# Patient Record
Sex: FEMALE | Race: BLACK OR AFRICAN AMERICAN | NOT HISPANIC OR LATINO | URBAN - METROPOLITAN AREA
[De-identification: names, ages, dates, MRNs, and addresses within clinical notes are randomized per-mention and may not be internally consistent; named-entity substitution may affect disease eponyms.]

---

## 2021-01-01 ENCOUNTER — INPATIENT (INPATIENT)
Facility: HOSPITAL | Age: 76
LOS: 12 days | DRG: 286 | End: 2021-06-08
Attending: INTERNAL MEDICINE | Admitting: INTERNAL MEDICINE
Payer: MEDICARE

## 2021-01-01 ENCOUNTER — INPATIENT (INPATIENT)
Facility: HOSPITAL | Age: 76
LOS: 3 days | Discharge: HOME HEALTH | End: 2021-02-03
Attending: INTERNAL MEDICINE | Admitting: INTERNAL MEDICINE
Payer: MEDICARE

## 2021-01-01 VITALS
DIASTOLIC BLOOD PRESSURE: 48 MMHG | HEIGHT: 65 IN | HEART RATE: 81 BPM | TEMPERATURE: 98 F | WEIGHT: 293 LBS | RESPIRATION RATE: 16 BRPM | OXYGEN SATURATION: 98 % | SYSTOLIC BLOOD PRESSURE: 113 MMHG

## 2021-01-01 VITALS
OXYGEN SATURATION: 96 % | SYSTOLIC BLOOD PRESSURE: 117 MMHG | HEART RATE: 93 BPM | RESPIRATION RATE: 17 BRPM | TEMPERATURE: 98 F | DIASTOLIC BLOOD PRESSURE: 75 MMHG

## 2021-01-01 VITALS — SYSTOLIC BLOOD PRESSURE: 123 MMHG | DIASTOLIC BLOOD PRESSURE: 65 MMHG | HEART RATE: 89 BPM

## 2021-01-01 DIAGNOSIS — I48.91 UNSPECIFIED ATRIAL FIBRILLATION: ICD-10-CM

## 2021-01-01 DIAGNOSIS — I31.3 PERICARDIAL EFFUSION (NONINFLAMMATORY): ICD-10-CM

## 2021-01-01 DIAGNOSIS — I50.9 HEART FAILURE, UNSPECIFIED: ICD-10-CM

## 2021-01-01 DIAGNOSIS — Z71.89 OTHER SPECIFIED COUNSELING: ICD-10-CM

## 2021-01-01 DIAGNOSIS — Z51.5 ENCOUNTER FOR PALLIATIVE CARE: ICD-10-CM

## 2021-01-01 DIAGNOSIS — J96.02 ACUTE RESPIRATORY FAILURE WITH HYPERCAPNIA: ICD-10-CM

## 2021-01-01 DIAGNOSIS — I42.9 CARDIOMYOPATHY, UNSPECIFIED: ICD-10-CM

## 2021-01-01 DIAGNOSIS — N17.9 ACUTE KIDNEY FAILURE, UNSPECIFIED: ICD-10-CM

## 2021-01-01 DIAGNOSIS — F03.90 UNSPECIFIED DEMENTIA, UNSPECIFIED SEVERITY, WITHOUT BEHAVIORAL DISTURBANCE, PSYCHOTIC DISTURBANCE, MOOD DISTURBANCE, AND ANXIETY: ICD-10-CM

## 2021-01-01 DIAGNOSIS — I27.20 PULMONARY HYPERTENSION, UNSPECIFIED: ICD-10-CM

## 2021-01-01 DIAGNOSIS — G93.40 ENCEPHALOPATHY, UNSPECIFIED: ICD-10-CM

## 2021-01-01 DIAGNOSIS — J44.9 CHRONIC OBSTRUCTIVE PULMONARY DISEASE, UNSPECIFIED: ICD-10-CM

## 2021-01-01 DIAGNOSIS — R53.2 FUNCTIONAL QUADRIPLEGIA: ICD-10-CM

## 2021-01-01 DIAGNOSIS — I24.8 OTHER FORMS OF ACUTE ISCHEMIC HEART DISEASE: ICD-10-CM

## 2021-01-01 DIAGNOSIS — Z79.01 LONG TERM (CURRENT) USE OF ANTICOAGULANTS: ICD-10-CM

## 2021-01-01 DIAGNOSIS — F03.90 UNSPECIFIED DEMENTIA WITHOUT BEHAVIORAL DISTURBANCE: ICD-10-CM

## 2021-01-01 DIAGNOSIS — I50.33 ACUTE ON CHRONIC DIASTOLIC (CONGESTIVE) HEART FAILURE: ICD-10-CM

## 2021-01-01 DIAGNOSIS — I50.810 RIGHT HEART FAILURE, UNSPECIFIED: ICD-10-CM

## 2021-01-01 DIAGNOSIS — R57.9 SHOCK, UNSPECIFIED: ICD-10-CM

## 2021-01-01 LAB
A1C WITH ESTIMATED AVERAGE GLUCOSE RESULT: 5.3 % — SIGNIFICANT CHANGE UP (ref 4–5.6)
ALBUMIN SERPL ELPH-MCNC: 1.8 G/DL — LOW (ref 3.3–5)
ALBUMIN SERPL ELPH-MCNC: 2.2 G/DL — LOW (ref 3.3–5)
ALBUMIN SERPL ELPH-MCNC: 2.3 G/DL — LOW (ref 3.3–5)
ALBUMIN SERPL ELPH-MCNC: 2.4 G/DL — LOW (ref 3.3–5)
ALBUMIN SERPL ELPH-MCNC: 2.5 G/DL — LOW (ref 3.3–5)
ALBUMIN SERPL ELPH-MCNC: 2.5 G/DL — LOW (ref 3.3–5)
ALBUMIN SERPL ELPH-MCNC: 2.6 G/DL — LOW (ref 3.3–5)
ALBUMIN SERPL ELPH-MCNC: 2.7 G/DL — LOW (ref 3.3–5)
ALBUMIN SERPL ELPH-MCNC: 2.8 G/DL — LOW (ref 3.3–5)
ALBUMIN SERPL ELPH-MCNC: 2.9 G/DL — LOW (ref 3.3–5)
ALBUMIN SERPL ELPH-MCNC: 2.9 G/DL — LOW (ref 3.3–5)
ALBUMIN SERPL ELPH-MCNC: 3 G/DL — LOW (ref 3.3–5)
ALBUMIN SERPL ELPH-MCNC: 3.1 G/DL — LOW (ref 3.3–5)
ALBUMIN SERPL ELPH-MCNC: 3.2 G/DL — LOW (ref 3.3–5)
ALBUMIN SERPL ELPH-MCNC: 3.3 G/DL — SIGNIFICANT CHANGE UP (ref 3.3–5)
ALBUMIN SERPL ELPH-MCNC: 3.3 G/DL — SIGNIFICANT CHANGE UP (ref 3.3–5)
ALP SERPL-CCNC: 103 U/L — SIGNIFICANT CHANGE UP (ref 40–120)
ALP SERPL-CCNC: 107 U/L — SIGNIFICANT CHANGE UP (ref 40–120)
ALP SERPL-CCNC: 112 U/L — SIGNIFICANT CHANGE UP (ref 40–120)
ALP SERPL-CCNC: 118 U/L — SIGNIFICANT CHANGE UP (ref 40–120)
ALP SERPL-CCNC: 119 U/L — SIGNIFICANT CHANGE UP (ref 40–120)
ALP SERPL-CCNC: 136 U/L — HIGH (ref 40–120)
ALP SERPL-CCNC: 156 U/L — HIGH (ref 40–120)
ALP SERPL-CCNC: 189 U/L — HIGH (ref 40–120)
ALP SERPL-CCNC: 190 U/L — HIGH (ref 40–120)
ALP SERPL-CCNC: 193 U/L — HIGH (ref 40–120)
ALP SERPL-CCNC: 200 U/L — HIGH (ref 40–120)
ALP SERPL-CCNC: 204 U/L — HIGH (ref 40–120)
ALP SERPL-CCNC: 215 U/L — HIGH (ref 40–120)
ALP SERPL-CCNC: 220 U/L — HIGH (ref 40–120)
ALP SERPL-CCNC: 221 U/L — HIGH (ref 40–120)
ALP SERPL-CCNC: 224 U/L — HIGH (ref 40–120)
ALP SERPL-CCNC: 224 U/L — HIGH (ref 40–120)
ALP SERPL-CCNC: 225 U/L — HIGH (ref 40–120)
ALP SERPL-CCNC: 230 U/L — HIGH (ref 40–120)
ALP SERPL-CCNC: 231 U/L — HIGH (ref 40–120)
ALP SERPL-CCNC: 234 U/L — HIGH (ref 40–120)
ALP SERPL-CCNC: 235 U/L — HIGH (ref 40–120)
ALP SERPL-CCNC: 240 U/L — HIGH (ref 40–120)
ALP SERPL-CCNC: 241 U/L — HIGH (ref 40–120)
ALP SERPL-CCNC: 242 U/L — HIGH (ref 40–120)
ALP SERPL-CCNC: 246 U/L — HIGH (ref 40–120)
ALP SERPL-CCNC: 256 U/L — HIGH (ref 40–120)
ALP SERPL-CCNC: 42 U/L — SIGNIFICANT CHANGE UP (ref 40–120)
ALP SERPL-CCNC: 72 U/L — SIGNIFICANT CHANGE UP (ref 40–120)
ALP SERPL-CCNC: 80 U/L — SIGNIFICANT CHANGE UP (ref 40–120)
ALP SERPL-CCNC: 82 U/L — SIGNIFICANT CHANGE UP (ref 40–120)
ALP SERPL-CCNC: 83 U/L — SIGNIFICANT CHANGE UP (ref 40–120)
ALP SERPL-CCNC: 83 U/L — SIGNIFICANT CHANGE UP (ref 40–120)
ALP SERPL-CCNC: 84 U/L — SIGNIFICANT CHANGE UP (ref 40–120)
ALP SERPL-CCNC: 86 U/L — SIGNIFICANT CHANGE UP (ref 40–120)
ALP SERPL-CCNC: 91 U/L — SIGNIFICANT CHANGE UP (ref 40–120)
ALP SERPL-CCNC: 92 U/L — SIGNIFICANT CHANGE UP (ref 40–120)
ALP SERPL-CCNC: 95 U/L — SIGNIFICANT CHANGE UP (ref 40–120)
ALT FLD-CCNC: 14 U/L — SIGNIFICANT CHANGE UP (ref 10–45)
ALT FLD-CCNC: 19 U/L — SIGNIFICANT CHANGE UP (ref 10–45)
ALT FLD-CCNC: 19 U/L — SIGNIFICANT CHANGE UP (ref 10–45)
ALT FLD-CCNC: 20 U/L — SIGNIFICANT CHANGE UP (ref 10–45)
ALT FLD-CCNC: 21 U/L — SIGNIFICANT CHANGE UP (ref 10–45)
ALT FLD-CCNC: 22 U/L — SIGNIFICANT CHANGE UP (ref 10–45)
ALT FLD-CCNC: 23 U/L — SIGNIFICANT CHANGE UP (ref 10–45)
ALT FLD-CCNC: 24 U/L — SIGNIFICANT CHANGE UP (ref 10–45)
ALT FLD-CCNC: 24 U/L — SIGNIFICANT CHANGE UP (ref 10–45)
ALT FLD-CCNC: 25 U/L — SIGNIFICANT CHANGE UP (ref 10–45)
ALT FLD-CCNC: 26 U/L — SIGNIFICANT CHANGE UP (ref 10–45)
ALT FLD-CCNC: 27 U/L — SIGNIFICANT CHANGE UP (ref 10–45)
ALT FLD-CCNC: 28 U/L — SIGNIFICANT CHANGE UP (ref 10–45)
ALT FLD-CCNC: 29 U/L — SIGNIFICANT CHANGE UP (ref 10–45)
ALT FLD-CCNC: 36 U/L — SIGNIFICANT CHANGE UP (ref 12–78)
ALT FLD-CCNC: 49 U/L — SIGNIFICANT CHANGE UP (ref 12–78)
AMMONIA BLD-MCNC: 41 UMOL/L — SIGNIFICANT CHANGE UP (ref 11–55)
ANA PAT FLD IF-IMP: ABNORMAL
ANA TITR SER: ABNORMAL
ANION GAP SERPL CALC-SCNC: 1 MMOL/L — LOW (ref 5–17)
ANION GAP SERPL CALC-SCNC: 1 MMOL/L — LOW (ref 5–17)
ANION GAP SERPL CALC-SCNC: 11 MMOL/L — SIGNIFICANT CHANGE UP (ref 5–17)
ANION GAP SERPL CALC-SCNC: 11 MMOL/L — SIGNIFICANT CHANGE UP (ref 5–17)
ANION GAP SERPL CALC-SCNC: 12 MMOL/L — SIGNIFICANT CHANGE UP (ref 5–17)
ANION GAP SERPL CALC-SCNC: 13 MMOL/L — SIGNIFICANT CHANGE UP (ref 5–17)
ANION GAP SERPL CALC-SCNC: 14 MMOL/L — SIGNIFICANT CHANGE UP (ref 5–17)
ANION GAP SERPL CALC-SCNC: 15 MMOL/L — SIGNIFICANT CHANGE UP (ref 5–17)
ANION GAP SERPL CALC-SCNC: 16 MMOL/L — SIGNIFICANT CHANGE UP (ref 5–17)
ANION GAP SERPL CALC-SCNC: 17 MMOL/L — SIGNIFICANT CHANGE UP (ref 5–17)
ANION GAP SERPL CALC-SCNC: 17 MMOL/L — SIGNIFICANT CHANGE UP (ref 5–17)
ANION GAP SERPL CALC-SCNC: 18 MMOL/L — HIGH (ref 5–17)
ANION GAP SERPL CALC-SCNC: 19 MMOL/L — HIGH (ref 5–17)
ANION GAP SERPL CALC-SCNC: 2 MMOL/L — LOW (ref 5–17)
ANION GAP SERPL CALC-SCNC: 28 MMOL/L — HIGH (ref 5–17)
ANION GAP SERPL CALC-SCNC: 4 MMOL/L — LOW (ref 5–17)
ANION GAP SERPL CALC-SCNC: 4 MMOL/L — LOW (ref 5–17)
ANISOCYTOSIS BLD QL: SLIGHT — SIGNIFICANT CHANGE UP
ANISOCYTOSIS BLD QL: SLIGHT — SIGNIFICANT CHANGE UP
APPEARANCE UR: ABNORMAL
APPEARANCE UR: CLEAR — SIGNIFICANT CHANGE UP
APPEARANCE UR: CLEAR — SIGNIFICANT CHANGE UP
APTT BLD: 28 SEC — SIGNIFICANT CHANGE UP (ref 27.5–35.5)
APTT BLD: 31.7 SEC — SIGNIFICANT CHANGE UP (ref 27.5–35.5)
APTT BLD: 32.1 SEC — SIGNIFICANT CHANGE UP (ref 27.5–35.5)
APTT BLD: 32.5 SEC — SIGNIFICANT CHANGE UP (ref 27.5–35.5)
APTT BLD: 35.1 SEC — SIGNIFICANT CHANGE UP (ref 27.5–35.5)
APTT BLD: 35.6 SEC — HIGH (ref 27.5–35.5)
APTT BLD: 40.4 SEC — HIGH (ref 27.5–35.5)
APTT BLD: 43.1 SEC — HIGH (ref 27.5–35.5)
APTT BLD: 48.3 SEC — HIGH (ref 27.5–35.5)
APTT BLD: 63.1 SEC — HIGH (ref 27.5–35.5)
APTT BLD: 67.1 SEC — HIGH (ref 27.5–35.5)
APTT BLD: 67.5 SEC — HIGH (ref 27.5–35.5)
APTT BLD: 94.2 SEC — HIGH (ref 27.5–35.5)
APTT BLD: >200 SEC — CRITICAL HIGH (ref 27.5–35.5)
APTT BLD: >200 SEC — CRITICAL HIGH (ref 27.5–35.5)
AST SERPL-CCNC: 23 U/L — SIGNIFICANT CHANGE UP (ref 10–40)
AST SERPL-CCNC: 32 U/L — SIGNIFICANT CHANGE UP (ref 10–40)
AST SERPL-CCNC: 34 U/L — SIGNIFICANT CHANGE UP (ref 10–40)
AST SERPL-CCNC: 35 U/L — SIGNIFICANT CHANGE UP (ref 10–40)
AST SERPL-CCNC: 36 U/L — SIGNIFICANT CHANGE UP (ref 10–40)
AST SERPL-CCNC: 36 U/L — SIGNIFICANT CHANGE UP (ref 15–37)
AST SERPL-CCNC: 38 U/L — SIGNIFICANT CHANGE UP (ref 10–40)
AST SERPL-CCNC: 39 U/L — SIGNIFICANT CHANGE UP (ref 10–40)
AST SERPL-CCNC: 40 U/L — SIGNIFICANT CHANGE UP (ref 10–40)
AST SERPL-CCNC: 41 U/L — HIGH (ref 10–40)
AST SERPL-CCNC: 41 U/L — HIGH (ref 10–40)
AST SERPL-CCNC: 42 U/L — HIGH (ref 10–40)
AST SERPL-CCNC: 43 U/L — HIGH (ref 10–40)
AST SERPL-CCNC: 43 U/L — HIGH (ref 10–40)
AST SERPL-CCNC: 44 U/L — HIGH (ref 10–40)
AST SERPL-CCNC: 44 U/L — HIGH (ref 10–40)
AST SERPL-CCNC: 46 U/L — HIGH (ref 10–40)
AST SERPL-CCNC: 47 U/L — HIGH (ref 10–40)
AST SERPL-CCNC: 47 U/L — HIGH (ref 10–40)
AST SERPL-CCNC: 50 U/L — HIGH (ref 10–40)
AST SERPL-CCNC: 50 U/L — HIGH (ref 10–40)
AST SERPL-CCNC: 53 U/L — HIGH (ref 10–40)
AST SERPL-CCNC: 56 U/L — HIGH (ref 10–40)
AST SERPL-CCNC: 57 U/L — HIGH (ref 15–37)
BACTERIA # UR AUTO: ABNORMAL
BACTERIA # UR AUTO: ABNORMAL
BACTERIA # UR AUTO: NEGATIVE — SIGNIFICANT CHANGE UP
BASE EXCESS BLDMV CALC-SCNC: 10.1 MMOL/L — HIGH (ref -3–3)
BASE EXCESS BLDMV CALC-SCNC: 11.1 MMOL/L — HIGH (ref -3–3)
BASE EXCESS BLDMV CALC-SCNC: 11.4 MMOL/L — HIGH (ref -3–3)
BASE EXCESS BLDMV CALC-SCNC: 11.6 MMOL/L — HIGH (ref -3–3)
BASE EXCESS BLDMV CALC-SCNC: 8.8 MMOL/L — HIGH (ref -3–3)
BASE EXCESS BLDMV CALC-SCNC: 9.5 MMOL/L — HIGH (ref -3–3)
BASE EXCESS BLDMV CALC-SCNC: 9.5 MMOL/L — HIGH (ref -3–3)
BASE EXCESS BLDMV CALC-SCNC: 9.9 MMOL/L — HIGH (ref -3–3)
BASE EXCESS BLDV CALC-SCNC: 10.3 MMOL/L — HIGH (ref -2–2)
BASE EXCESS BLDV CALC-SCNC: 10.8 MMOL/L — HIGH (ref -2–2)
BASE EXCESS BLDV CALC-SCNC: 11.2 MMOL/L — HIGH (ref -2–2)
BASE EXCESS BLDV CALC-SCNC: 11.4 MMOL/L — HIGH (ref -2–2)
BASE EXCESS BLDV CALC-SCNC: 11.8 MMOL/L — HIGH (ref -2–2)
BASE EXCESS BLDV CALC-SCNC: 12.3 MMOL/L — HIGH (ref -2–2)
BASE EXCESS BLDV CALC-SCNC: 13.4 MMOL/L — HIGH (ref -2–2)
BASE EXCESS BLDV CALC-SCNC: 7.7 MMOL/L — HIGH (ref -2–2)
BASE EXCESS BLDV CALC-SCNC: 8.1 MMOL/L — HIGH (ref -2–2)
BASE EXCESS BLDV CALC-SCNC: 8.7 MMOL/L — HIGH (ref -2–2)
BASE EXCESS BLDV CALC-SCNC: 9 MMOL/L — HIGH (ref -2–2)
BASE EXCESS BLDV CALC-SCNC: 9.6 MMOL/L — HIGH (ref -2–2)
BASOPHILS # BLD AUTO: 0 K/UL — SIGNIFICANT CHANGE UP (ref 0–0.2)
BASOPHILS # BLD AUTO: 0 K/UL — SIGNIFICANT CHANGE UP (ref 0–0.2)
BASOPHILS # BLD AUTO: 0.01 K/UL — SIGNIFICANT CHANGE UP (ref 0–0.2)
BASOPHILS # BLD AUTO: 0.02 K/UL — SIGNIFICANT CHANGE UP (ref 0–0.2)
BASOPHILS # BLD AUTO: 0.03 K/UL — SIGNIFICANT CHANGE UP (ref 0–0.2)
BASOPHILS # BLD AUTO: 0.03 K/UL — SIGNIFICANT CHANGE UP (ref 0–0.2)
BASOPHILS # BLD AUTO: 0.04 K/UL — SIGNIFICANT CHANGE UP (ref 0–0.2)
BASOPHILS # BLD AUTO: 0.04 K/UL — SIGNIFICANT CHANGE UP (ref 0–0.2)
BASOPHILS # BLD AUTO: 0.05 K/UL — SIGNIFICANT CHANGE UP (ref 0–0.2)
BASOPHILS NFR BLD AUTO: 0 % — SIGNIFICANT CHANGE UP (ref 0–2)
BASOPHILS NFR BLD AUTO: 0 % — SIGNIFICANT CHANGE UP (ref 0–2)
BASOPHILS NFR BLD AUTO: 0.1 % — SIGNIFICANT CHANGE UP (ref 0–2)
BASOPHILS NFR BLD AUTO: 0.2 % — SIGNIFICANT CHANGE UP (ref 0–2)
BASOPHILS NFR BLD AUTO: 0.3 % — SIGNIFICANT CHANGE UP (ref 0–2)
BASOPHILS NFR BLD AUTO: 0.4 % — SIGNIFICANT CHANGE UP (ref 0–2)
BILIRUB SERPL-MCNC: 0.9 MG/DL — SIGNIFICANT CHANGE UP (ref 0.2–1.2)
BILIRUB SERPL-MCNC: 1.8 MG/DL — HIGH (ref 0.2–1.2)
BILIRUB SERPL-MCNC: 2 MG/DL — HIGH (ref 0.2–1.2)
BILIRUB SERPL-MCNC: 2.1 MG/DL — HIGH (ref 0.2–1.2)
BILIRUB SERPL-MCNC: 2.2 MG/DL — HIGH (ref 0.2–1.2)
BILIRUB SERPL-MCNC: 2.3 MG/DL — HIGH (ref 0.2–1.2)
BILIRUB SERPL-MCNC: 2.4 MG/DL — HIGH (ref 0.2–1.2)
BILIRUB SERPL-MCNC: 2.5 MG/DL — HIGH (ref 0.2–1.2)
BILIRUB SERPL-MCNC: 2.6 MG/DL — HIGH (ref 0.2–1.2)
BILIRUB SERPL-MCNC: 2.9 MG/DL — HIGH (ref 0.2–1.2)
BILIRUB SERPL-MCNC: 3 MG/DL — HIGH (ref 0.2–1.2)
BILIRUB SERPL-MCNC: 3.1 MG/DL — HIGH (ref 0.2–1.2)
BILIRUB SERPL-MCNC: 3.2 MG/DL — HIGH (ref 0.2–1.2)
BILIRUB SERPL-MCNC: 3.2 MG/DL — HIGH (ref 0.2–1.2)
BILIRUB SERPL-MCNC: 3.3 MG/DL — HIGH (ref 0.2–1.2)
BILIRUB SERPL-MCNC: 3.4 MG/DL — HIGH (ref 0.2–1.2)
BILIRUB SERPL-MCNC: 3.7 MG/DL — HIGH (ref 0.2–1.2)
BILIRUB SERPL-MCNC: 3.7 MG/DL — HIGH (ref 0.2–1.2)
BILIRUB SERPL-MCNC: 3.8 MG/DL — HIGH (ref 0.2–1.2)
BILIRUB SERPL-MCNC: 3.8 MG/DL — HIGH (ref 0.2–1.2)
BILIRUB SERPL-MCNC: 4 MG/DL — HIGH (ref 0.2–1.2)
BILIRUB SERPL-MCNC: 4.1 MG/DL — HIGH (ref 0.2–1.2)
BILIRUB SERPL-MCNC: 4.1 MG/DL — HIGH (ref 0.2–1.2)
BILIRUB SERPL-MCNC: 4.2 MG/DL — HIGH (ref 0.2–1.2)
BILIRUB SERPL-MCNC: 4.4 MG/DL — HIGH (ref 0.2–1.2)
BILIRUB UR-MCNC: ABNORMAL
BILIRUB UR-MCNC: NEGATIVE — SIGNIFICANT CHANGE UP
BILIRUB UR-MCNC: NEGATIVE — SIGNIFICANT CHANGE UP
BLD GP AB SCN SERPL QL: NEGATIVE — SIGNIFICANT CHANGE UP
BUN SERPL-MCNC: 100 MG/DL — HIGH (ref 7–23)
BUN SERPL-MCNC: 26 MG/DL — HIGH (ref 7–23)
BUN SERPL-MCNC: 31 MG/DL — HIGH (ref 7–23)
BUN SERPL-MCNC: 32 MG/DL — HIGH (ref 7–23)
BUN SERPL-MCNC: 35 MG/DL — HIGH (ref 7–23)
BUN SERPL-MCNC: 36 MG/DL — HIGH (ref 7–23)
BUN SERPL-MCNC: 49 MG/DL — HIGH (ref 7–23)
BUN SERPL-MCNC: 56 MG/DL — HIGH (ref 7–23)
BUN SERPL-MCNC: 58 MG/DL — HIGH (ref 7–23)
BUN SERPL-MCNC: 60 MG/DL — HIGH (ref 7–23)
BUN SERPL-MCNC: 62 MG/DL — HIGH (ref 7–23)
BUN SERPL-MCNC: 62 MG/DL — HIGH (ref 7–23)
BUN SERPL-MCNC: 63 MG/DL — HIGH (ref 7–23)
BUN SERPL-MCNC: 66 MG/DL — HIGH (ref 7–23)
BUN SERPL-MCNC: 66 MG/DL — HIGH (ref 7–23)
BUN SERPL-MCNC: 67 MG/DL — HIGH (ref 7–23)
BUN SERPL-MCNC: 68 MG/DL — HIGH (ref 7–23)
BUN SERPL-MCNC: 70 MG/DL — HIGH (ref 7–23)
BUN SERPL-MCNC: 71 MG/DL — HIGH (ref 7–23)
BUN SERPL-MCNC: 73 MG/DL — HIGH (ref 7–23)
BUN SERPL-MCNC: 74 MG/DL — HIGH (ref 7–23)
BUN SERPL-MCNC: 74 MG/DL — HIGH (ref 7–23)
BUN SERPL-MCNC: 75 MG/DL — HIGH (ref 7–23)
BUN SERPL-MCNC: 76 MG/DL — HIGH (ref 7–23)
BUN SERPL-MCNC: 76 MG/DL — HIGH (ref 7–23)
BUN SERPL-MCNC: 78 MG/DL — HIGH (ref 7–23)
BUN SERPL-MCNC: 81 MG/DL — HIGH (ref 7–23)
BUN SERPL-MCNC: 82 MG/DL — HIGH (ref 7–23)
BUN SERPL-MCNC: 83 MG/DL — HIGH (ref 7–23)
BUN SERPL-MCNC: 83 MG/DL — HIGH (ref 7–23)
BUN SERPL-MCNC: 86 MG/DL — HIGH (ref 7–23)
BUN SERPL-MCNC: 86 MG/DL — HIGH (ref 7–23)
BUN SERPL-MCNC: 94 MG/DL — HIGH (ref 7–23)
BUN SERPL-MCNC: 96 MG/DL — HIGH (ref 7–23)
BUN SERPL-MCNC: 98 MG/DL — HIGH (ref 7–23)
BUN SERPL-MCNC: 98 MG/DL — HIGH (ref 7–23)
C3 SERPL-MCNC: 106 MG/DL — SIGNIFICANT CHANGE UP (ref 81–157)
C4 SERPL-MCNC: 17 MG/DL — SIGNIFICANT CHANGE UP (ref 13–39)
CA-I SERPL-SCNC: 1 MMOL/L — LOW (ref 1.12–1.3)
CA-I SERPL-SCNC: 1.03 MMOL/L — LOW (ref 1.12–1.3)
CA-I SERPL-SCNC: 1.14 MMOL/L — SIGNIFICANT CHANGE UP (ref 1.12–1.3)
CA-I SERPL-SCNC: 1.15 MMOL/L — SIGNIFICANT CHANGE UP (ref 1.12–1.3)
CA-I SERPL-SCNC: 1.16 MMOL/L — SIGNIFICANT CHANGE UP (ref 1.12–1.3)
CA-I SERPL-SCNC: 1.18 MMOL/L — SIGNIFICANT CHANGE UP (ref 1.12–1.3)
CA-I SERPL-SCNC: 1.21 MMOL/L — SIGNIFICANT CHANGE UP (ref 1.12–1.3)
CALCIUM SERPL-MCNC: 10 MG/DL — SIGNIFICANT CHANGE UP (ref 8.4–10.5)
CALCIUM SERPL-MCNC: 10 MG/DL — SIGNIFICANT CHANGE UP (ref 8.4–10.5)
CALCIUM SERPL-MCNC: 10.1 MG/DL — SIGNIFICANT CHANGE UP (ref 8.4–10.5)
CALCIUM SERPL-MCNC: 10.4 MG/DL — SIGNIFICANT CHANGE UP (ref 8.4–10.5)
CALCIUM SERPL-MCNC: 10.5 MG/DL — SIGNIFICANT CHANGE UP (ref 8.4–10.5)
CALCIUM SERPL-MCNC: 7.3 MG/DL — LOW (ref 8.4–10.5)
CALCIUM SERPL-MCNC: 8.1 MG/DL — LOW (ref 8.4–10.5)
CALCIUM SERPL-MCNC: 8.5 MG/DL — SIGNIFICANT CHANGE UP (ref 8.4–10.5)
CALCIUM SERPL-MCNC: 8.6 MG/DL — SIGNIFICANT CHANGE UP (ref 8.5–10.1)
CALCIUM SERPL-MCNC: 8.8 MG/DL — SIGNIFICANT CHANGE UP (ref 8.4–10.5)
CALCIUM SERPL-MCNC: 8.8 MG/DL — SIGNIFICANT CHANGE UP (ref 8.5–10.1)
CALCIUM SERPL-MCNC: 8.9 MG/DL — SIGNIFICANT CHANGE UP (ref 8.4–10.5)
CALCIUM SERPL-MCNC: 8.9 MG/DL — SIGNIFICANT CHANGE UP (ref 8.4–10.5)
CALCIUM SERPL-MCNC: 8.9 MG/DL — SIGNIFICANT CHANGE UP (ref 8.5–10.1)
CALCIUM SERPL-MCNC: 9 MG/DL — SIGNIFICANT CHANGE UP (ref 8.4–10.5)
CALCIUM SERPL-MCNC: 9.1 MG/DL — SIGNIFICANT CHANGE UP (ref 8.4–10.5)
CALCIUM SERPL-MCNC: 9.1 MG/DL — SIGNIFICANT CHANGE UP (ref 8.4–10.5)
CALCIUM SERPL-MCNC: 9.2 MG/DL — SIGNIFICANT CHANGE UP (ref 8.4–10.5)
CALCIUM SERPL-MCNC: 9.3 MG/DL — SIGNIFICANT CHANGE UP (ref 8.4–10.5)
CALCIUM SERPL-MCNC: 9.5 MG/DL — SIGNIFICANT CHANGE UP (ref 8.4–10.5)
CALCIUM SERPL-MCNC: 9.6 MG/DL — SIGNIFICANT CHANGE UP (ref 8.4–10.5)
CALCIUM SERPL-MCNC: 9.6 MG/DL — SIGNIFICANT CHANGE UP (ref 8.4–10.5)
CALCIUM SERPL-MCNC: 9.7 MG/DL — SIGNIFICANT CHANGE UP (ref 8.4–10.5)
CALCIUM SERPL-MCNC: 9.9 MG/DL — SIGNIFICANT CHANGE UP (ref 8.4–10.5)
CHLORIDE BLDV-SCNC: 100 MMOL/L — SIGNIFICANT CHANGE UP (ref 96–108)
CHLORIDE BLDV-SCNC: 100 MMOL/L — SIGNIFICANT CHANGE UP (ref 96–108)
CHLORIDE BLDV-SCNC: 101 MMOL/L — SIGNIFICANT CHANGE UP (ref 96–108)
CHLORIDE BLDV-SCNC: 102 MMOL/L — SIGNIFICANT CHANGE UP (ref 96–108)
CHLORIDE BLDV-SCNC: 103 MMOL/L — SIGNIFICANT CHANGE UP (ref 96–108)
CHLORIDE BLDV-SCNC: 103 MMOL/L — SIGNIFICANT CHANGE UP (ref 96–108)
CHLORIDE BLDV-SCNC: 104 MMOL/L — SIGNIFICANT CHANGE UP (ref 96–108)
CHLORIDE BLDV-SCNC: 104 MMOL/L — SIGNIFICANT CHANGE UP (ref 96–108)
CHLORIDE BLDV-SCNC: 106 MMOL/L — SIGNIFICANT CHANGE UP (ref 96–108)
CHLORIDE SERPL-SCNC: 100 MMOL/L — SIGNIFICANT CHANGE UP (ref 96–108)
CHLORIDE SERPL-SCNC: 101 MMOL/L — SIGNIFICANT CHANGE UP (ref 96–108)
CHLORIDE SERPL-SCNC: 102 MMOL/L — SIGNIFICANT CHANGE UP (ref 96–108)
CHLORIDE SERPL-SCNC: 103 MMOL/L — SIGNIFICANT CHANGE UP (ref 96–108)
CHLORIDE SERPL-SCNC: 104 MMOL/L — SIGNIFICANT CHANGE UP (ref 96–108)
CHLORIDE SERPL-SCNC: 104 MMOL/L — SIGNIFICANT CHANGE UP (ref 96–108)
CHLORIDE SERPL-SCNC: 96 MMOL/L — SIGNIFICANT CHANGE UP (ref 96–108)
CHLORIDE SERPL-SCNC: 97 MMOL/L — SIGNIFICANT CHANGE UP (ref 96–108)
CHLORIDE SERPL-SCNC: 98 MMOL/L — SIGNIFICANT CHANGE UP (ref 96–108)
CHLORIDE SERPL-SCNC: 98 MMOL/L — SIGNIFICANT CHANGE UP (ref 96–108)
CHLORIDE SERPL-SCNC: 99 MMOL/L — SIGNIFICANT CHANGE UP (ref 96–108)
CHLORIDE UR-SCNC: 102 MMOL/L — SIGNIFICANT CHANGE UP
CK MB BLD-MCNC: 4.2 % — HIGH (ref 0–3.5)
CK MB BLD-MCNC: <0.7 % — SIGNIFICANT CHANGE UP (ref 0–3.5)
CK MB CFR SERPL CALC: 2.2 NG/ML — SIGNIFICANT CHANGE UP (ref 0–3.8)
CK MB CFR SERPL CALC: <1 NG/ML — SIGNIFICANT CHANGE UP (ref 0.5–3.6)
CK MB CFR SERPL CALC: <1 NG/ML — SIGNIFICANT CHANGE UP (ref 0.5–3.6)
CK SERPL-CCNC: 138 U/L — SIGNIFICANT CHANGE UP (ref 26–192)
CK SERPL-CCNC: 335 U/L — HIGH (ref 26–192)
CK SERPL-CCNC: 52 U/L — SIGNIFICANT CHANGE UP (ref 25–170)
CO2 BLDMV-SCNC: 35 MMOL/L — HIGH (ref 21–29)
CO2 BLDMV-SCNC: 36 MMOL/L — HIGH (ref 21–29)
CO2 BLDMV-SCNC: 37 MMOL/L — HIGH (ref 21–29)
CO2 BLDMV-SCNC: 37 MMOL/L — HIGH (ref 21–29)
CO2 BLDMV-SCNC: 38 MMOL/L — HIGH (ref 21–29)
CO2 BLDMV-SCNC: 39 MMOL/L — HIGH (ref 21–29)
CO2 BLDMV-SCNC: 40 MMOL/L — HIGH (ref 21–29)
CO2 BLDMV-SCNC: 40 MMOL/L — HIGH (ref 21–29)
CO2 BLDV-SCNC: 35 MMOL/L — HIGH (ref 22–30)
CO2 BLDV-SCNC: 35 MMOL/L — HIGH (ref 22–30)
CO2 BLDV-SCNC: 36 MMOL/L — HIGH (ref 22–30)
CO2 BLDV-SCNC: 36 MMOL/L — HIGH (ref 22–30)
CO2 BLDV-SCNC: 37 MMOL/L — HIGH (ref 22–30)
CO2 BLDV-SCNC: 37 MMOL/L — HIGH (ref 22–30)
CO2 BLDV-SCNC: 38 MMOL/L — HIGH (ref 22–30)
CO2 BLDV-SCNC: 38 MMOL/L — HIGH (ref 22–30)
CO2 BLDV-SCNC: 39 MMOL/L — HIGH (ref 22–30)
CO2 BLDV-SCNC: 41 MMOL/L — HIGH (ref 22–30)
CO2 SERPL-SCNC: 18 MMOL/L — LOW (ref 22–31)
CO2 SERPL-SCNC: 24 MMOL/L — SIGNIFICANT CHANGE UP (ref 22–31)
CO2 SERPL-SCNC: 26 MMOL/L — SIGNIFICANT CHANGE UP (ref 22–31)
CO2 SERPL-SCNC: 27 MMOL/L — SIGNIFICANT CHANGE UP (ref 22–31)
CO2 SERPL-SCNC: 28 MMOL/L — SIGNIFICANT CHANGE UP (ref 22–31)
CO2 SERPL-SCNC: 28 MMOL/L — SIGNIFICANT CHANGE UP (ref 22–31)
CO2 SERPL-SCNC: 29 MMOL/L — SIGNIFICANT CHANGE UP (ref 22–31)
CO2 SERPL-SCNC: 29 MMOL/L — SIGNIFICANT CHANGE UP (ref 22–31)
CO2 SERPL-SCNC: 31 MMOL/L — SIGNIFICANT CHANGE UP (ref 22–31)
CO2 SERPL-SCNC: 32 MMOL/L — HIGH (ref 22–31)
CO2 SERPL-SCNC: 33 MMOL/L — HIGH (ref 22–31)
CO2 SERPL-SCNC: 34 MMOL/L — HIGH (ref 22–31)
CO2 SERPL-SCNC: 34 MMOL/L — HIGH (ref 22–31)
CO2 SERPL-SCNC: 36 MMOL/L — HIGH (ref 22–31)
CO2 SERPL-SCNC: 37 MMOL/L — HIGH (ref 22–31)
CO2 SERPL-SCNC: 37 MMOL/L — HIGH (ref 22–31)
CO2 SERPL-SCNC: 38 MMOL/L — HIGH (ref 22–31)
CO2 SERPL-SCNC: 38 MMOL/L — HIGH (ref 22–31)
CO2 SERPL-SCNC: 42 MMOL/L — HIGH (ref 22–31)
COLOR SPEC: ABNORMAL
COLOR SPEC: YELLOW — SIGNIFICANT CHANGE UP
COLOR SPEC: YELLOW — SIGNIFICANT CHANGE UP
COPPER SERPL-MCNC: 116 UG/DL — SIGNIFICANT CHANGE UP (ref 80–158)
COVID-19 NUCLEOCAPSID GAM AB INTERP: NEGATIVE — SIGNIFICANT CHANGE UP
COVID-19 NUCLEOCAPSID TOTAL GAM ANTIBODY RESULT: 0.09 INDEX — SIGNIFICANT CHANGE UP
COVID-19 SPIKE DOMAIN AB INTERP: NEGATIVE — SIGNIFICANT CHANGE UP
COVID-19 SPIKE DOMAIN ANTIBODY RESULT: 0.4 U/ML — SIGNIFICANT CHANGE UP
CREAT ?TM UR-MCNC: 15 MG/DL — SIGNIFICANT CHANGE UP
CREAT ?TM UR-MCNC: 25 MG/DL — SIGNIFICANT CHANGE UP
CREAT SERPL-MCNC: 0.97 MG/DL — SIGNIFICANT CHANGE UP (ref 0.5–1.3)
CREAT SERPL-MCNC: 1.22 MG/DL — SIGNIFICANT CHANGE UP (ref 0.5–1.3)
CREAT SERPL-MCNC: 1.32 MG/DL — HIGH (ref 0.5–1.3)
CREAT SERPL-MCNC: 1.67 MG/DL — HIGH (ref 0.5–1.3)
CREAT SERPL-MCNC: 2 MG/DL — HIGH (ref 0.5–1.3)
CREAT SERPL-MCNC: 2.66 MG/DL — HIGH (ref 0.5–1.3)
CREAT SERPL-MCNC: 2.84 MG/DL — HIGH (ref 0.5–1.3)
CREAT SERPL-MCNC: 2.85 MG/DL — HIGH (ref 0.5–1.3)
CREAT SERPL-MCNC: 2.86 MG/DL — HIGH (ref 0.5–1.3)
CREAT SERPL-MCNC: 2.91 MG/DL — HIGH (ref 0.5–1.3)
CREAT SERPL-MCNC: 2.94 MG/DL — HIGH (ref 0.5–1.3)
CREAT SERPL-MCNC: 2.95 MG/DL — HIGH (ref 0.5–1.3)
CREAT SERPL-MCNC: 2.95 MG/DL — HIGH (ref 0.5–1.3)
CREAT SERPL-MCNC: 2.98 MG/DL — HIGH (ref 0.5–1.3)
CREAT SERPL-MCNC: 2.99 MG/DL — HIGH (ref 0.5–1.3)
CREAT SERPL-MCNC: 3 MG/DL — HIGH (ref 0.5–1.3)
CREAT SERPL-MCNC: 3 MG/DL — HIGH (ref 0.5–1.3)
CREAT SERPL-MCNC: 3.01 MG/DL — HIGH (ref 0.5–1.3)
CREAT SERPL-MCNC: 3.01 MG/DL — HIGH (ref 0.5–1.3)
CREAT SERPL-MCNC: 3.02 MG/DL — HIGH (ref 0.5–1.3)
CREAT SERPL-MCNC: 3.02 MG/DL — HIGH (ref 0.5–1.3)
CREAT SERPL-MCNC: 3.04 MG/DL — HIGH (ref 0.5–1.3)
CREAT SERPL-MCNC: 3.06 MG/DL — HIGH (ref 0.5–1.3)
CREAT SERPL-MCNC: 3.09 MG/DL — HIGH (ref 0.5–1.3)
CREAT SERPL-MCNC: 3.13 MG/DL — HIGH (ref 0.5–1.3)
CREAT SERPL-MCNC: 3.16 MG/DL — HIGH (ref 0.5–1.3)
CREAT SERPL-MCNC: 3.21 MG/DL — HIGH (ref 0.5–1.3)
CREAT SERPL-MCNC: 3.28 MG/DL — HIGH (ref 0.5–1.3)
CREAT SERPL-MCNC: 3.29 MG/DL — HIGH (ref 0.5–1.3)
CREAT SERPL-MCNC: 3.31 MG/DL — HIGH (ref 0.5–1.3)
CREAT SERPL-MCNC: 3.32 MG/DL — HIGH (ref 0.5–1.3)
CREAT SERPL-MCNC: 3.38 MG/DL — HIGH (ref 0.5–1.3)
CREAT SERPL-MCNC: 3.42 MG/DL — HIGH (ref 0.5–1.3)
CREAT SERPL-MCNC: 3.92 MG/DL — HIGH (ref 0.5–1.3)
CREAT SERPL-MCNC: 4.1 MG/DL — HIGH (ref 0.5–1.3)
CREAT SERPL-MCNC: 4.49 MG/DL — HIGH (ref 0.5–1.3)
CREAT SERPL-MCNC: 4.73 MG/DL — HIGH (ref 0.5–1.3)
CREAT SERPL-MCNC: 4.87 MG/DL — HIGH (ref 0.5–1.3)
CRP SERPL-MCNC: 78 MG/L — HIGH (ref 0–4)
CULTURE RESULTS: SIGNIFICANT CHANGE UP
D DIMER BLD IA.RAPID-MCNC: 335 NG/ML DDU — HIGH
DACRYOCYTES BLD QL SMEAR: SLIGHT — SIGNIFICANT CHANGE UP
DACRYOCYTES BLD QL SMEAR: SLIGHT — SIGNIFICANT CHANGE UP
DIFF PNL FLD: ABNORMAL
DIFF PNL FLD: ABNORMAL
DIFF PNL FLD: NEGATIVE — SIGNIFICANT CHANGE UP
DSDNA AB SER-ACNC: 12 IU/ML — SIGNIFICANT CHANGE UP
ELLIPTOCYTES BLD QL SMEAR: SLIGHT — SIGNIFICANT CHANGE UP
ELLIPTOCYTES BLD QL SMEAR: SLIGHT — SIGNIFICANT CHANGE UP
ENA SCL70 AB SER-ACNC: <0.2 AI — SIGNIFICANT CHANGE UP
EOSINOPHIL # BLD AUTO: 0.01 K/UL — SIGNIFICANT CHANGE UP (ref 0–0.5)
EOSINOPHIL # BLD AUTO: 0.02 K/UL — SIGNIFICANT CHANGE UP (ref 0–0.5)
EOSINOPHIL # BLD AUTO: 0.03 K/UL — SIGNIFICANT CHANGE UP (ref 0–0.5)
EOSINOPHIL # BLD AUTO: 0.04 K/UL — SIGNIFICANT CHANGE UP (ref 0–0.5)
EOSINOPHIL # BLD AUTO: 0.05 K/UL — SIGNIFICANT CHANGE UP (ref 0–0.5)
EOSINOPHIL # BLD AUTO: 0.06 K/UL — SIGNIFICANT CHANGE UP (ref 0–0.5)
EOSINOPHIL # BLD AUTO: 0.06 K/UL — SIGNIFICANT CHANGE UP (ref 0–0.5)
EOSINOPHIL # BLD AUTO: 0.12 K/UL — SIGNIFICANT CHANGE UP (ref 0–0.5)
EOSINOPHIL # BLD AUTO: 0.13 K/UL — SIGNIFICANT CHANGE UP (ref 0–0.5)
EOSINOPHIL # BLD AUTO: 0.14 K/UL — SIGNIFICANT CHANGE UP (ref 0–0.5)
EOSINOPHIL # BLD AUTO: 0.14 K/UL — SIGNIFICANT CHANGE UP (ref 0–0.5)
EOSINOPHIL # BLD AUTO: 0.15 K/UL — SIGNIFICANT CHANGE UP (ref 0–0.5)
EOSINOPHIL # BLD AUTO: 0.15 K/UL — SIGNIFICANT CHANGE UP (ref 0–0.5)
EOSINOPHIL NFR BLD AUTO: 0.1 % — SIGNIFICANT CHANGE UP (ref 0–6)
EOSINOPHIL NFR BLD AUTO: 0.4 % — SIGNIFICANT CHANGE UP (ref 0–6)
EOSINOPHIL NFR BLD AUTO: 0.4 % — SIGNIFICANT CHANGE UP (ref 0–6)
EOSINOPHIL NFR BLD AUTO: 0.5 % — SIGNIFICANT CHANGE UP (ref 0–6)
EOSINOPHIL NFR BLD AUTO: 0.6 % — SIGNIFICANT CHANGE UP (ref 0–6)
EOSINOPHIL NFR BLD AUTO: 0.6 % — SIGNIFICANT CHANGE UP (ref 0–6)
EOSINOPHIL NFR BLD AUTO: 0.7 % — SIGNIFICANT CHANGE UP (ref 0–6)
EOSINOPHIL NFR BLD AUTO: 0.8 % — SIGNIFICANT CHANGE UP (ref 0–6)
EOSINOPHIL NFR BLD AUTO: 0.9 % — SIGNIFICANT CHANGE UP (ref 0–6)
EOSINOPHIL NFR BLD AUTO: 1.1 % — SIGNIFICANT CHANGE UP (ref 0–6)
EOSINOPHIL NFR BLD AUTO: 1.2 % — SIGNIFICANT CHANGE UP (ref 0–6)
EOSINOPHIL NFR BLD AUTO: 1.4 % — SIGNIFICANT CHANGE UP (ref 0–6)
EOSINOPHIL NFR BLD AUTO: 1.5 % — SIGNIFICANT CHANGE UP (ref 0–6)
EPI CELLS # UR: 0 /HPF — SIGNIFICANT CHANGE UP
EPI CELLS # UR: 1 /HPF — SIGNIFICANT CHANGE UP
EPI CELLS # UR: SIGNIFICANT CHANGE UP
ERYTHROCYTE [SEDIMENTATION RATE] IN BLOOD: 8 MM/HR — SIGNIFICANT CHANGE UP (ref 0–20)
ESTIMATED AVERAGE GLUCOSE: 105 MG/DL — SIGNIFICANT CHANGE UP (ref 68–114)
FIBRINOGEN PPP-MCNC: 268 MG/DL — LOW (ref 290–520)
FIBRINOGEN PPP-MCNC: 312 MG/DL — SIGNIFICANT CHANGE UP (ref 290–520)
FOLATE SERPL-MCNC: 7.2 NG/ML — SIGNIFICANT CHANGE UP
GAS PNL BLDA: SIGNIFICANT CHANGE UP
GAS PNL BLDMV: SIGNIFICANT CHANGE UP
GAS PNL BLDV: 137 MMOL/L — SIGNIFICANT CHANGE UP (ref 135–145)
GAS PNL BLDV: 138 MMOL/L — SIGNIFICANT CHANGE UP (ref 135–145)
GAS PNL BLDV: 138 MMOL/L — SIGNIFICANT CHANGE UP (ref 135–145)
GAS PNL BLDV: 139 MMOL/L — SIGNIFICANT CHANGE UP (ref 135–145)
GAS PNL BLDV: 140 MMOL/L — SIGNIFICANT CHANGE UP (ref 135–145)
GAS PNL BLDV: 143 MMOL/L — SIGNIFICANT CHANGE UP (ref 135–145)
GAS PNL BLDV: 143 MMOL/L — SIGNIFICANT CHANGE UP (ref 135–145)
GAS PNL BLDV: 145 MMOL/L — SIGNIFICANT CHANGE UP (ref 135–145)
GAS PNL BLDV: 145 MMOL/L — SIGNIFICANT CHANGE UP (ref 135–145)
GAS PNL BLDV: SIGNIFICANT CHANGE UP
GGT SERPL-CCNC: 224 U/L — HIGH (ref 8–40)
GIANT PLATELETS BLD QL SMEAR: PRESENT — SIGNIFICANT CHANGE UP
GIANT PLATELETS BLD QL SMEAR: PRESENT — SIGNIFICANT CHANGE UP
GLUCOSE BLDV-MCNC: 100 MG/DL — HIGH (ref 70–99)
GLUCOSE BLDV-MCNC: 113 MG/DL — HIGH (ref 70–99)
GLUCOSE BLDV-MCNC: 117 MG/DL — HIGH (ref 70–99)
GLUCOSE BLDV-MCNC: 126 MG/DL — HIGH (ref 70–99)
GLUCOSE BLDV-MCNC: 133 MG/DL — HIGH (ref 70–99)
GLUCOSE BLDV-MCNC: 146 MG/DL — HIGH (ref 70–99)
GLUCOSE BLDV-MCNC: 155 MG/DL — HIGH (ref 70–99)
GLUCOSE BLDV-MCNC: 89 MG/DL — SIGNIFICANT CHANGE UP (ref 70–99)
GLUCOSE BLDV-MCNC: 89 MG/DL — SIGNIFICANT CHANGE UP (ref 70–99)
GLUCOSE SERPL-MCNC: 101 MG/DL — HIGH (ref 70–99)
GLUCOSE SERPL-MCNC: 104 MG/DL — HIGH (ref 70–99)
GLUCOSE SERPL-MCNC: 104 MG/DL — HIGH (ref 70–99)
GLUCOSE SERPL-MCNC: 107 MG/DL — HIGH (ref 70–99)
GLUCOSE SERPL-MCNC: 108 MG/DL — HIGH (ref 70–99)
GLUCOSE SERPL-MCNC: 111 MG/DL — HIGH (ref 70–99)
GLUCOSE SERPL-MCNC: 113 MG/DL — HIGH (ref 70–99)
GLUCOSE SERPL-MCNC: 115 MG/DL — HIGH (ref 70–99)
GLUCOSE SERPL-MCNC: 115 MG/DL — HIGH (ref 70–99)
GLUCOSE SERPL-MCNC: 116 MG/DL — HIGH (ref 70–99)
GLUCOSE SERPL-MCNC: 118 MG/DL — HIGH (ref 70–99)
GLUCOSE SERPL-MCNC: 119 MG/DL — HIGH (ref 70–99)
GLUCOSE SERPL-MCNC: 124 MG/DL — HIGH (ref 70–99)
GLUCOSE SERPL-MCNC: 132 MG/DL — HIGH (ref 70–99)
GLUCOSE SERPL-MCNC: 134 MG/DL — HIGH (ref 70–99)
GLUCOSE SERPL-MCNC: 136 MG/DL — HIGH (ref 70–99)
GLUCOSE SERPL-MCNC: 137 MG/DL — HIGH (ref 70–99)
GLUCOSE SERPL-MCNC: 140 MG/DL — HIGH (ref 70–99)
GLUCOSE SERPL-MCNC: 153 MG/DL — HIGH (ref 70–99)
GLUCOSE SERPL-MCNC: 154 MG/DL — HIGH (ref 70–99)
GLUCOSE SERPL-MCNC: 157 MG/DL — HIGH (ref 70–99)
GLUCOSE SERPL-MCNC: 158 MG/DL — HIGH (ref 70–99)
GLUCOSE SERPL-MCNC: 159 MG/DL — HIGH (ref 70–99)
GLUCOSE SERPL-MCNC: 166 MG/DL — HIGH (ref 70–99)
GLUCOSE SERPL-MCNC: 169 MG/DL — HIGH (ref 70–99)
GLUCOSE SERPL-MCNC: 174 MG/DL — HIGH (ref 70–99)
GLUCOSE SERPL-MCNC: 180 MG/DL — HIGH (ref 70–99)
GLUCOSE SERPL-MCNC: 236 MG/DL — HIGH (ref 70–99)
GLUCOSE SERPL-MCNC: 296 MG/DL — HIGH (ref 70–99)
GLUCOSE SERPL-MCNC: 72 MG/DL — SIGNIFICANT CHANGE UP (ref 70–99)
GLUCOSE SERPL-MCNC: 78 MG/DL — SIGNIFICANT CHANGE UP (ref 70–99)
GLUCOSE SERPL-MCNC: 83 MG/DL — SIGNIFICANT CHANGE UP (ref 70–99)
GLUCOSE SERPL-MCNC: 85 MG/DL — SIGNIFICANT CHANGE UP (ref 70–99)
GLUCOSE SERPL-MCNC: 89 MG/DL — SIGNIFICANT CHANGE UP (ref 70–99)
GLUCOSE SERPL-MCNC: 91 MG/DL — SIGNIFICANT CHANGE UP (ref 70–99)
GLUCOSE SERPL-MCNC: 93 MG/DL — SIGNIFICANT CHANGE UP (ref 70–99)
GLUCOSE SERPL-MCNC: 94 MG/DL — SIGNIFICANT CHANGE UP (ref 70–99)
GLUCOSE SERPL-MCNC: 95 MG/DL — SIGNIFICANT CHANGE UP (ref 70–99)
GLUCOSE SERPL-MCNC: 96 MG/DL — SIGNIFICANT CHANGE UP (ref 70–99)
GLUCOSE UR QL: NEGATIVE MG/DL — SIGNIFICANT CHANGE UP
GLUCOSE UR QL: NEGATIVE — SIGNIFICANT CHANGE UP
GLUCOSE UR QL: NEGATIVE — SIGNIFICANT CHANGE UP
GRAM STN FLD: SIGNIFICANT CHANGE UP
HCO3 BLDMV-SCNC: 34 MMOL/L — HIGH (ref 20–28)
HCO3 BLDMV-SCNC: 34 MMOL/L — HIGH (ref 20–28)
HCO3 BLDMV-SCNC: 35 MMOL/L — HIGH (ref 20–28)
HCO3 BLDMV-SCNC: 36 MMOL/L — HIGH (ref 20–28)
HCO3 BLDMV-SCNC: 36 MMOL/L — HIGH (ref 20–28)
HCO3 BLDMV-SCNC: 37 MMOL/L — HIGH (ref 20–28)
HCO3 BLDMV-SCNC: 38 MMOL/L — HIGH (ref 20–28)
HCO3 BLDMV-SCNC: 38 MMOL/L — HIGH (ref 20–28)
HCO3 BLDV-SCNC: 34 MMOL/L — HIGH (ref 21–29)
HCO3 BLDV-SCNC: 35 MMOL/L — HIGH (ref 21–29)
HCO3 BLDV-SCNC: 35 MMOL/L — HIGH (ref 21–29)
HCO3 BLDV-SCNC: 36 MMOL/L — HIGH (ref 21–29)
HCO3 BLDV-SCNC: 36 MMOL/L — HIGH (ref 21–29)
HCO3 BLDV-SCNC: 37 MMOL/L — HIGH (ref 21–29)
HCO3 BLDV-SCNC: 37 MMOL/L — HIGH (ref 21–29)
HCO3 BLDV-SCNC: 38 MMOL/L — HIGH (ref 21–29)
HCO3 BLDV-SCNC: 39 MMOL/L — HIGH (ref 21–29)
HCO3 BLDV-SCNC: 39 MMOL/L — HIGH (ref 21–29)
HCT VFR BLD CALC: 24.3 % — LOW (ref 34.5–45)
HCT VFR BLD CALC: 26.2 % — LOW (ref 34.5–45)
HCT VFR BLD CALC: 28.3 % — LOW (ref 34.5–45)
HCT VFR BLD CALC: 29.5 % — LOW (ref 34.5–45)
HCT VFR BLD CALC: 30.9 % — LOW (ref 34.5–45)
HCT VFR BLD CALC: 32.6 % — LOW (ref 34.5–45)
HCT VFR BLD CALC: 32.8 % — LOW (ref 34.5–45)
HCT VFR BLD CALC: 33.4 % — LOW (ref 34.5–45)
HCT VFR BLD CALC: 33.6 % — LOW (ref 34.5–45)
HCT VFR BLD CALC: 33.6 % — LOW (ref 34.5–45)
HCT VFR BLD CALC: 34 % — LOW (ref 34.5–45)
HCT VFR BLD CALC: 34.3 % — LOW (ref 34.5–45)
HCT VFR BLD CALC: 35.1 % — SIGNIFICANT CHANGE UP (ref 34.5–45)
HCT VFR BLD CALC: 35.2 % — SIGNIFICANT CHANGE UP (ref 34.5–45)
HCT VFR BLD CALC: 35.3 % — SIGNIFICANT CHANGE UP (ref 34.5–45)
HCT VFR BLD CALC: 35.4 % — SIGNIFICANT CHANGE UP (ref 34.5–45)
HCT VFR BLD CALC: 35.4 % — SIGNIFICANT CHANGE UP (ref 34.5–45)
HCT VFR BLD CALC: 35.5 % — SIGNIFICANT CHANGE UP (ref 34.5–45)
HCT VFR BLD CALC: 35.5 % — SIGNIFICANT CHANGE UP (ref 34.5–45)
HCT VFR BLD CALC: 35.6 % — SIGNIFICANT CHANGE UP (ref 34.5–45)
HCT VFR BLD CALC: 35.7 % — SIGNIFICANT CHANGE UP (ref 34.5–45)
HCT VFR BLD CALC: 36.4 % — SIGNIFICANT CHANGE UP (ref 34.5–45)
HCT VFR BLD CALC: 36.4 % — SIGNIFICANT CHANGE UP (ref 34.5–45)
HCT VFR BLD CALC: 37.1 % — SIGNIFICANT CHANGE UP (ref 34.5–45)
HCT VFR BLD CALC: 37.7 % — SIGNIFICANT CHANGE UP (ref 34.5–45)
HCT VFR BLD CALC: 39 % — SIGNIFICANT CHANGE UP (ref 34.5–45)
HCT VFR BLD CALC: 41.3 % — SIGNIFICANT CHANGE UP (ref 34.5–45)
HCT VFR BLDA CALC: 32 % — LOW (ref 39–50)
HCT VFR BLDA CALC: 33 % — LOW (ref 39–50)
HCT VFR BLDA CALC: 33 % — LOW (ref 39–50)
HCT VFR BLDA CALC: 34 % — LOW (ref 39–50)
HCT VFR BLDA CALC: 36 % — LOW (ref 39–50)
HCT VFR BLDA CALC: 38 % — LOW (ref 39–50)
HGB BLD CALC-MCNC: 10.5 G/DL — LOW (ref 11.5–15.5)
HGB BLD CALC-MCNC: 10.5 G/DL — LOW (ref 11.5–15.5)
HGB BLD CALC-MCNC: 10.8 G/DL — LOW (ref 11.5–15.5)
HGB BLD CALC-MCNC: 11 G/DL — LOW (ref 11.5–15.5)
HGB BLD CALC-MCNC: 11 G/DL — LOW (ref 11.5–15.5)
HGB BLD CALC-MCNC: 11.1 G/DL — LOW (ref 11.5–15.5)
HGB BLD CALC-MCNC: 11.1 G/DL — LOW (ref 11.5–15.5)
HGB BLD CALC-MCNC: 11.6 G/DL — SIGNIFICANT CHANGE UP (ref 11.5–15.5)
HGB BLD CALC-MCNC: 12.2 G/DL — SIGNIFICANT CHANGE UP (ref 11.5–15.5)
HGB BLD-MCNC: 10.3 G/DL — LOW (ref 11.5–15.5)
HGB BLD-MCNC: 10.4 G/DL — LOW (ref 11.5–15.5)
HGB BLD-MCNC: 10.5 G/DL — LOW (ref 11.5–15.5)
HGB BLD-MCNC: 10.6 G/DL — LOW (ref 11.5–15.5)
HGB BLD-MCNC: 10.8 G/DL — LOW (ref 11.5–15.5)
HGB BLD-MCNC: 11 G/DL — LOW (ref 11.5–15.5)
HGB BLD-MCNC: 11.1 G/DL — LOW (ref 11.5–15.5)
HGB BLD-MCNC: 11.2 G/DL — LOW (ref 11.5–15.5)
HGB BLD-MCNC: 11.3 G/DL — LOW (ref 11.5–15.5)
HGB BLD-MCNC: 11.4 G/DL — LOW (ref 11.5–15.5)
HGB BLD-MCNC: 11.5 G/DL — SIGNIFICANT CHANGE UP (ref 11.5–15.5)
HGB BLD-MCNC: 11.6 G/DL — SIGNIFICANT CHANGE UP (ref 11.5–15.5)
HGB BLD-MCNC: 11.7 G/DL — SIGNIFICANT CHANGE UP (ref 11.5–15.5)
HGB BLD-MCNC: 11.8 G/DL — SIGNIFICANT CHANGE UP (ref 11.5–15.5)
HGB BLD-MCNC: 11.8 G/DL — SIGNIFICANT CHANGE UP (ref 11.5–15.5)
HGB BLD-MCNC: 11.9 G/DL — SIGNIFICANT CHANGE UP (ref 11.5–15.5)
HGB BLD-MCNC: 11.9 G/DL — SIGNIFICANT CHANGE UP (ref 11.5–15.5)
HGB BLD-MCNC: 12 G/DL — SIGNIFICANT CHANGE UP (ref 11.5–15.5)
HGB BLD-MCNC: 12.1 G/DL — SIGNIFICANT CHANGE UP (ref 11.5–15.5)
HGB BLD-MCNC: 12.3 G/DL — SIGNIFICANT CHANGE UP (ref 11.5–15.5)
HGB BLD-MCNC: 12.9 G/DL — SIGNIFICANT CHANGE UP (ref 11.5–15.5)
HGB BLD-MCNC: 13.2 G/DL — SIGNIFICANT CHANGE UP (ref 11.5–15.5)
HGB BLD-MCNC: 13.5 G/DL — SIGNIFICANT CHANGE UP (ref 11.5–15.5)
HGB BLD-MCNC: 8.1 G/DL — LOW (ref 11.5–15.5)
HGB BLD-MCNC: 8.8 G/DL — LOW (ref 11.5–15.5)
HGB BLD-MCNC: 8.8 G/DL — LOW (ref 11.5–15.5)
HGB BLD-MCNC: 9.4 G/DL — LOW (ref 11.5–15.5)
HISTONE AB SER-ACNC: 0.8 UNITS — SIGNIFICANT CHANGE UP (ref 0–0.9)
HIV 1+2 AB+HIV1 P24 AG SERPL QL IA: SIGNIFICANT CHANGE UP
HOROWITZ INDEX BLDMV+IHG-RTO: 30 — SIGNIFICANT CHANGE UP
HOROWITZ INDEX BLDMV+IHG-RTO: 40 — SIGNIFICANT CHANGE UP
HOROWITZ INDEX BLDV+IHG-RTO: 30 — SIGNIFICANT CHANGE UP
HOROWITZ INDEX BLDV+IHG-RTO: 32 — SIGNIFICANT CHANGE UP
HYALINE CASTS # UR AUTO: 0 /LPF — SIGNIFICANT CHANGE UP (ref 0–2)
HYALINE CASTS # UR AUTO: 1 /LPF — SIGNIFICANT CHANGE UP (ref 0–2)
HYPOCHROMIA BLD QL: SIGNIFICANT CHANGE UP
IMM GRANULOCYTES NFR BLD AUTO: 0.2 % — SIGNIFICANT CHANGE UP (ref 0–1.5)
IMM GRANULOCYTES NFR BLD AUTO: 0.3 % — SIGNIFICANT CHANGE UP (ref 0–1.5)
IMM GRANULOCYTES NFR BLD AUTO: 0.3 % — SIGNIFICANT CHANGE UP (ref 0–1.5)
IMM GRANULOCYTES NFR BLD AUTO: 0.4 % — SIGNIFICANT CHANGE UP (ref 0–1.5)
IMM GRANULOCYTES NFR BLD AUTO: 0.5 % — SIGNIFICANT CHANGE UP (ref 0–1.5)
IMM GRANULOCYTES NFR BLD AUTO: 0.5 % — SIGNIFICANT CHANGE UP (ref 0–1.5)
IMM GRANULOCYTES NFR BLD AUTO: 0.6 % — SIGNIFICANT CHANGE UP (ref 0–1.5)
IMM GRANULOCYTES NFR BLD AUTO: 0.6 % — SIGNIFICANT CHANGE UP (ref 0–1.5)
IMM GRANULOCYTES NFR BLD AUTO: 0.7 % — SIGNIFICANT CHANGE UP (ref 0–1.5)
INR BLD: 1.17 RATIO — HIGH (ref 0.88–1.16)
INR BLD: 1.18 RATIO — HIGH (ref 0.88–1.16)
INR BLD: 1.2 RATIO — HIGH (ref 0.88–1.16)
INR BLD: 1.2 RATIO — HIGH (ref 0.88–1.16)
INR BLD: 1.49 RATIO — HIGH (ref 0.88–1.16)
INR BLD: 1.56 RATIO — HIGH (ref 0.88–1.16)
INR BLD: 2.28 RATIO — HIGH (ref 0.88–1.16)
KETONES UR-MCNC: NEGATIVE — SIGNIFICANT CHANGE UP
KETONES UR-MCNC: NEGATIVE — SIGNIFICANT CHANGE UP
KETONES UR-MCNC: SIGNIFICANT CHANGE UP
LACTATE BLDV-MCNC: 0.7 MMOL/L — SIGNIFICANT CHANGE UP (ref 0.7–2)
LACTATE BLDV-MCNC: 0.8 MMOL/L — SIGNIFICANT CHANGE UP (ref 0.7–2)
LACTATE BLDV-MCNC: 0.9 MMOL/L — SIGNIFICANT CHANGE UP (ref 0.7–2)
LACTATE BLDV-MCNC: 1.6 MMOL/L — SIGNIFICANT CHANGE UP (ref 0.7–2)
LACTATE BLDV-MCNC: 1.7 MMOL/L — SIGNIFICANT CHANGE UP (ref 0.7–2)
LACTATE BLDV-MCNC: 1.8 MMOL/L — SIGNIFICANT CHANGE UP (ref 0.7–2)
LACTATE SERPL-SCNC: 0.7 MMOL/L — SIGNIFICANT CHANGE UP (ref 0.7–2)
LACTATE SERPL-SCNC: 0.9 MMOL/L — SIGNIFICANT CHANGE UP (ref 0.7–2)
LACTATE SERPL-SCNC: 0.9 MMOL/L — SIGNIFICANT CHANGE UP (ref 0.7–2)
LACTATE SERPL-SCNC: 1.2 MMOL/L — SIGNIFICANT CHANGE UP (ref 0.7–2)
LACTATE SERPL-SCNC: 1.7 MMOL/L — SIGNIFICANT CHANGE UP (ref 0.7–2)
LEUKOCYTE ESTERASE UR-ACNC: ABNORMAL
LEUKOCYTE ESTERASE UR-ACNC: NEGATIVE — SIGNIFICANT CHANGE UP
LEUKOCYTE ESTERASE UR-ACNC: NEGATIVE — SIGNIFICANT CHANGE UP
LYMPHOCYTES # BLD AUTO: 0.42 K/UL — LOW (ref 1–3.3)
LYMPHOCYTES # BLD AUTO: 0.55 K/UL — LOW (ref 1–3.3)
LYMPHOCYTES # BLD AUTO: 0.6 K/UL — LOW (ref 1–3.3)
LYMPHOCYTES # BLD AUTO: 0.61 K/UL — LOW (ref 1–3.3)
LYMPHOCYTES # BLD AUTO: 0.64 K/UL — LOW (ref 1–3.3)
LYMPHOCYTES # BLD AUTO: 0.68 K/UL — LOW (ref 1–3.3)
LYMPHOCYTES # BLD AUTO: 0.78 K/UL — LOW (ref 1–3.3)
LYMPHOCYTES # BLD AUTO: 1 K/UL — SIGNIFICANT CHANGE UP (ref 1–3.3)
LYMPHOCYTES # BLD AUTO: 1.07 K/UL — SIGNIFICANT CHANGE UP (ref 1–3.3)
LYMPHOCYTES # BLD AUTO: 1.11 K/UL — SIGNIFICANT CHANGE UP (ref 1–3.3)
LYMPHOCYTES # BLD AUTO: 1.13 K/UL — SIGNIFICANT CHANGE UP (ref 1–3.3)
LYMPHOCYTES # BLD AUTO: 1.13 K/UL — SIGNIFICANT CHANGE UP (ref 1–3.3)
LYMPHOCYTES # BLD AUTO: 1.18 K/UL — SIGNIFICANT CHANGE UP (ref 1–3.3)
LYMPHOCYTES # BLD AUTO: 1.41 K/UL — SIGNIFICANT CHANGE UP (ref 1–3.3)
LYMPHOCYTES # BLD AUTO: 1.43 K/UL — SIGNIFICANT CHANGE UP (ref 1–3.3)
LYMPHOCYTES # BLD AUTO: 1.48 K/UL — SIGNIFICANT CHANGE UP (ref 1–3.3)
LYMPHOCYTES # BLD AUTO: 1.52 K/UL — SIGNIFICANT CHANGE UP (ref 1–3.3)
LYMPHOCYTES # BLD AUTO: 1.63 K/UL — SIGNIFICANT CHANGE UP (ref 1–3.3)
LYMPHOCYTES # BLD AUTO: 1.74 K/UL — SIGNIFICANT CHANGE UP (ref 1–3.3)
LYMPHOCYTES # BLD AUTO: 10 % — LOW (ref 13–44)
LYMPHOCYTES # BLD AUTO: 10.5 % — LOW (ref 13–44)
LYMPHOCYTES # BLD AUTO: 10.5 % — LOW (ref 13–44)
LYMPHOCYTES # BLD AUTO: 11 % — LOW (ref 13–44)
LYMPHOCYTES # BLD AUTO: 11.3 % — LOW (ref 13–44)
LYMPHOCYTES # BLD AUTO: 11.4 % — LOW (ref 13–44)
LYMPHOCYTES # BLD AUTO: 11.9 % — LOW (ref 13–44)
LYMPHOCYTES # BLD AUTO: 12 % — LOW (ref 13–44)
LYMPHOCYTES # BLD AUTO: 12.5 % — LOW (ref 13–44)
LYMPHOCYTES # BLD AUTO: 14.2 % — SIGNIFICANT CHANGE UP (ref 13–44)
LYMPHOCYTES # BLD AUTO: 14.5 % — SIGNIFICANT CHANGE UP (ref 13–44)
LYMPHOCYTES # BLD AUTO: 15.6 % — SIGNIFICANT CHANGE UP (ref 13–44)
LYMPHOCYTES # BLD AUTO: 19.9 % — SIGNIFICANT CHANGE UP (ref 13–44)
LYMPHOCYTES # BLD AUTO: 20.1 % — SIGNIFICANT CHANGE UP (ref 13–44)
LYMPHOCYTES # BLD AUTO: 28.5 % — SIGNIFICANT CHANGE UP (ref 13–44)
LYMPHOCYTES # BLD AUTO: 6.8 % — LOW (ref 13–44)
LYMPHOCYTES # BLD AUTO: 6.8 % — LOW (ref 13–44)
LYMPHOCYTES # BLD AUTO: 7 % — LOW (ref 13–44)
LYMPHOCYTES # BLD AUTO: 8.6 % — LOW (ref 13–44)
MACROCYTES BLD QL: SIGNIFICANT CHANGE UP
MACROCYTES BLD QL: SLIGHT — SIGNIFICANT CHANGE UP
MAGNESIUM SERPL-MCNC: 1.8 MG/DL — SIGNIFICANT CHANGE UP (ref 1.6–2.6)
MAGNESIUM SERPL-MCNC: 1.9 MG/DL — SIGNIFICANT CHANGE UP (ref 1.6–2.6)
MAGNESIUM SERPL-MCNC: 2 MG/DL — SIGNIFICANT CHANGE UP (ref 1.6–2.6)
MAGNESIUM SERPL-MCNC: 2.1 MG/DL — SIGNIFICANT CHANGE UP (ref 1.6–2.6)
MAGNESIUM SERPL-MCNC: 2.2 MG/DL — SIGNIFICANT CHANGE UP (ref 1.6–2.6)
MAGNESIUM SERPL-MCNC: 2.4 MG/DL — SIGNIFICANT CHANGE UP (ref 1.6–2.6)
MAGNESIUM SERPL-MCNC: 2.5 MG/DL — SIGNIFICANT CHANGE UP (ref 1.6–2.6)
MAGNESIUM SERPL-MCNC: 2.5 MG/DL — SIGNIFICANT CHANGE UP (ref 1.6–2.6)
MAGNESIUM SERPL-MCNC: 2.8 MG/DL — HIGH (ref 1.6–2.6)
MANUAL SMEAR VERIFICATION: SIGNIFICANT CHANGE UP
MCHC RBC-ENTMCNC: 27.5 PG — SIGNIFICANT CHANGE UP (ref 27–34)
MCHC RBC-ENTMCNC: 27.6 PG — SIGNIFICANT CHANGE UP (ref 27–34)
MCHC RBC-ENTMCNC: 27.7 PG — SIGNIFICANT CHANGE UP (ref 27–34)
MCHC RBC-ENTMCNC: 27.7 PG — SIGNIFICANT CHANGE UP (ref 27–34)
MCHC RBC-ENTMCNC: 27.8 PG — SIGNIFICANT CHANGE UP (ref 27–34)
MCHC RBC-ENTMCNC: 27.8 PG — SIGNIFICANT CHANGE UP (ref 27–34)
MCHC RBC-ENTMCNC: 28.1 PG — SIGNIFICANT CHANGE UP (ref 27–34)
MCHC RBC-ENTMCNC: 28.2 PG — SIGNIFICANT CHANGE UP (ref 27–34)
MCHC RBC-ENTMCNC: 28.2 PG — SIGNIFICANT CHANGE UP (ref 27–34)
MCHC RBC-ENTMCNC: 28.5 PG — SIGNIFICANT CHANGE UP (ref 27–34)
MCHC RBC-ENTMCNC: 28.8 PG — SIGNIFICANT CHANGE UP (ref 27–34)
MCHC RBC-ENTMCNC: 28.9 PG — SIGNIFICANT CHANGE UP (ref 27–34)
MCHC RBC-ENTMCNC: 28.9 PG — SIGNIFICANT CHANGE UP (ref 27–34)
MCHC RBC-ENTMCNC: 29 PG — SIGNIFICANT CHANGE UP (ref 27–34)
MCHC RBC-ENTMCNC: 29.1 PG — SIGNIFICANT CHANGE UP (ref 27–34)
MCHC RBC-ENTMCNC: 29.2 PG — SIGNIFICANT CHANGE UP (ref 27–34)
MCHC RBC-ENTMCNC: 29.3 PG — SIGNIFICANT CHANGE UP (ref 27–34)
MCHC RBC-ENTMCNC: 29.4 PG — SIGNIFICANT CHANGE UP (ref 27–34)
MCHC RBC-ENTMCNC: 29.6 PG — SIGNIFICANT CHANGE UP (ref 27–34)
MCHC RBC-ENTMCNC: 30 PG — SIGNIFICANT CHANGE UP (ref 27–34)
MCHC RBC-ENTMCNC: 31 GM/DL — LOW (ref 32–36)
MCHC RBC-ENTMCNC: 31 GM/DL — LOW (ref 32–36)
MCHC RBC-ENTMCNC: 31.1 GM/DL — LOW (ref 32–36)
MCHC RBC-ENTMCNC: 31.1 GM/DL — LOW (ref 32–36)
MCHC RBC-ENTMCNC: 31.3 GM/DL — LOW (ref 32–36)
MCHC RBC-ENTMCNC: 31.3 GM/DL — LOW (ref 32–36)
MCHC RBC-ENTMCNC: 31.4 GM/DL — LOW (ref 32–36)
MCHC RBC-ENTMCNC: 31.5 GM/DL — LOW (ref 32–36)
MCHC RBC-ENTMCNC: 31.6 GM/DL — LOW (ref 32–36)
MCHC RBC-ENTMCNC: 31.8 GM/DL — LOW (ref 32–36)
MCHC RBC-ENTMCNC: 31.9 GM/DL — LOW (ref 32–36)
MCHC RBC-ENTMCNC: 32.1 GM/DL — SIGNIFICANT CHANGE UP (ref 32–36)
MCHC RBC-ENTMCNC: 32.1 GM/DL — SIGNIFICANT CHANGE UP (ref 32–36)
MCHC RBC-ENTMCNC: 32.7 GM/DL — SIGNIFICANT CHANGE UP (ref 32–36)
MCHC RBC-ENTMCNC: 32.8 GM/DL — SIGNIFICANT CHANGE UP (ref 32–36)
MCHC RBC-ENTMCNC: 32.9 GM/DL — SIGNIFICANT CHANGE UP (ref 32–36)
MCHC RBC-ENTMCNC: 32.9 GM/DL — SIGNIFICANT CHANGE UP (ref 32–36)
MCHC RBC-ENTMCNC: 33.3 GM/DL — SIGNIFICANT CHANGE UP (ref 32–36)
MCHC RBC-ENTMCNC: 33.4 GM/DL — SIGNIFICANT CHANGE UP (ref 32–36)
MCHC RBC-ENTMCNC: 33.5 GM/DL — SIGNIFICANT CHANGE UP (ref 32–36)
MCHC RBC-ENTMCNC: 33.6 GM/DL — SIGNIFICANT CHANGE UP (ref 32–36)
MCHC RBC-ENTMCNC: 33.6 GM/DL — SIGNIFICANT CHANGE UP (ref 32–36)
MCHC RBC-ENTMCNC: 33.8 GM/DL — SIGNIFICANT CHANGE UP (ref 32–36)
MCHC RBC-ENTMCNC: 33.8 GM/DL — SIGNIFICANT CHANGE UP (ref 32–36)
MCHC RBC-ENTMCNC: 34.2 GM/DL — SIGNIFICANT CHANGE UP (ref 32–36)
MCHC RBC-ENTMCNC: 34.2 GM/DL — SIGNIFICANT CHANGE UP (ref 32–36)
MCHC RBC-ENTMCNC: 34.3 GM/DL — SIGNIFICANT CHANGE UP (ref 32–36)
MCHC RBC-ENTMCNC: 34.8 GM/DL — SIGNIFICANT CHANGE UP (ref 32–36)
MCHC RBC-ENTMCNC: 35.3 GM/DL — SIGNIFICANT CHANGE UP (ref 32–36)
MCV RBC AUTO: 82.2 FL — SIGNIFICANT CHANGE UP (ref 80–100)
MCV RBC AUTO: 82.4 FL — SIGNIFICANT CHANGE UP (ref 80–100)
MCV RBC AUTO: 82.8 FL — SIGNIFICANT CHANGE UP (ref 80–100)
MCV RBC AUTO: 84.4 FL — SIGNIFICANT CHANGE UP (ref 80–100)
MCV RBC AUTO: 84.9 FL — SIGNIFICANT CHANGE UP (ref 80–100)
MCV RBC AUTO: 85 FL — SIGNIFICANT CHANGE UP (ref 80–100)
MCV RBC AUTO: 85.1 FL — SIGNIFICANT CHANGE UP (ref 80–100)
MCV RBC AUTO: 85.7 FL — SIGNIFICANT CHANGE UP (ref 80–100)
MCV RBC AUTO: 85.7 FL — SIGNIFICANT CHANGE UP (ref 80–100)
MCV RBC AUTO: 85.8 FL — SIGNIFICANT CHANGE UP (ref 80–100)
MCV RBC AUTO: 86.2 FL — SIGNIFICANT CHANGE UP (ref 80–100)
MCV RBC AUTO: 86.3 FL — SIGNIFICANT CHANGE UP (ref 80–100)
MCV RBC AUTO: 86.5 FL — SIGNIFICANT CHANGE UP (ref 80–100)
MCV RBC AUTO: 86.6 FL — SIGNIFICANT CHANGE UP (ref 80–100)
MCV RBC AUTO: 86.7 FL — SIGNIFICANT CHANGE UP (ref 80–100)
MCV RBC AUTO: 87.4 FL — SIGNIFICANT CHANGE UP (ref 80–100)
MCV RBC AUTO: 87.5 FL — SIGNIFICANT CHANGE UP (ref 80–100)
MCV RBC AUTO: 88.7 FL — SIGNIFICANT CHANGE UP (ref 80–100)
MCV RBC AUTO: 89.8 FL — SIGNIFICANT CHANGE UP (ref 80–100)
MCV RBC AUTO: 89.9 FL — SIGNIFICANT CHANGE UP (ref 80–100)
MCV RBC AUTO: 91.4 FL — SIGNIFICANT CHANGE UP (ref 80–100)
MCV RBC AUTO: 92.6 FL — SIGNIFICANT CHANGE UP (ref 80–100)
MCV RBC AUTO: 92.7 FL — SIGNIFICANT CHANGE UP (ref 80–100)
MCV RBC AUTO: 92.8 FL — SIGNIFICANT CHANGE UP (ref 80–100)
MCV RBC AUTO: 93.1 FL — SIGNIFICANT CHANGE UP (ref 80–100)
MCV RBC AUTO: 93.3 FL — SIGNIFICANT CHANGE UP (ref 80–100)
MCV RBC AUTO: 93.4 FL — SIGNIFICANT CHANGE UP (ref 80–100)
MCV RBC AUTO: 93.6 FL — SIGNIFICANT CHANGE UP (ref 80–100)
MCV RBC AUTO: 93.9 FL — SIGNIFICANT CHANGE UP (ref 80–100)
MONOCYTES # BLD AUTO: 0.1 K/UL — SIGNIFICANT CHANGE UP (ref 0–0.9)
MONOCYTES # BLD AUTO: 0.28 K/UL — SIGNIFICANT CHANGE UP (ref 0–0.9)
MONOCYTES # BLD AUTO: 0.52 K/UL — SIGNIFICANT CHANGE UP (ref 0–0.9)
MONOCYTES # BLD AUTO: 0.52 K/UL — SIGNIFICANT CHANGE UP (ref 0–0.9)
MONOCYTES # BLD AUTO: 0.58 K/UL — SIGNIFICANT CHANGE UP (ref 0–0.9)
MONOCYTES # BLD AUTO: 0.62 K/UL — SIGNIFICANT CHANGE UP (ref 0–0.9)
MONOCYTES # BLD AUTO: 0.67 K/UL — SIGNIFICANT CHANGE UP (ref 0–0.9)
MONOCYTES # BLD AUTO: 0.69 K/UL — SIGNIFICANT CHANGE UP (ref 0–0.9)
MONOCYTES # BLD AUTO: 0.73 K/UL — SIGNIFICANT CHANGE UP (ref 0–0.9)
MONOCYTES # BLD AUTO: 0.84 K/UL — SIGNIFICANT CHANGE UP (ref 0–0.9)
MONOCYTES # BLD AUTO: 0.84 K/UL — SIGNIFICANT CHANGE UP (ref 0–0.9)
MONOCYTES # BLD AUTO: 0.86 K/UL — SIGNIFICANT CHANGE UP (ref 0–0.9)
MONOCYTES # BLD AUTO: 0.92 K/UL — HIGH (ref 0–0.9)
MONOCYTES # BLD AUTO: 0.96 K/UL — HIGH (ref 0–0.9)
MONOCYTES # BLD AUTO: 0.98 K/UL — HIGH (ref 0–0.9)
MONOCYTES # BLD AUTO: 1.06 K/UL — HIGH (ref 0–0.9)
MONOCYTES # BLD AUTO: 1.11 K/UL — HIGH (ref 0–0.9)
MONOCYTES # BLD AUTO: 1.26 K/UL — HIGH (ref 0–0.9)
MONOCYTES # BLD AUTO: 1.31 K/UL — HIGH (ref 0–0.9)
MONOCYTES NFR BLD AUTO: 10 % — SIGNIFICANT CHANGE UP (ref 2–14)
MONOCYTES NFR BLD AUTO: 10.6 % — SIGNIFICANT CHANGE UP (ref 2–14)
MONOCYTES NFR BLD AUTO: 10.7 % — SIGNIFICANT CHANGE UP (ref 2–14)
MONOCYTES NFR BLD AUTO: 11.2 % — SIGNIFICANT CHANGE UP (ref 2–14)
MONOCYTES NFR BLD AUTO: 11.8 % — SIGNIFICANT CHANGE UP (ref 2–14)
MONOCYTES NFR BLD AUTO: 13.7 % — SIGNIFICANT CHANGE UP (ref 2–14)
MONOCYTES NFR BLD AUTO: 17.3 % — HIGH (ref 2–14)
MONOCYTES NFR BLD AUTO: 3.7 % — SIGNIFICANT CHANGE UP (ref 2–14)
MONOCYTES NFR BLD AUTO: 4.4 % — SIGNIFICANT CHANGE UP (ref 2–14)
MONOCYTES NFR BLD AUTO: 5.2 % — SIGNIFICANT CHANGE UP (ref 2–14)
MONOCYTES NFR BLD AUTO: 7.3 % — SIGNIFICANT CHANGE UP (ref 2–14)
MONOCYTES NFR BLD AUTO: 7.4 % — SIGNIFICANT CHANGE UP (ref 2–14)
MONOCYTES NFR BLD AUTO: 7.4 % — SIGNIFICANT CHANGE UP (ref 2–14)
MONOCYTES NFR BLD AUTO: 7.7 % — SIGNIFICANT CHANGE UP (ref 2–14)
MONOCYTES NFR BLD AUTO: 7.7 % — SIGNIFICANT CHANGE UP (ref 2–14)
MONOCYTES NFR BLD AUTO: 8.1 % — SIGNIFICANT CHANGE UP (ref 2–14)
MONOCYTES NFR BLD AUTO: 8.5 % — SIGNIFICANT CHANGE UP (ref 2–14)
MONOCYTES NFR BLD AUTO: 8.8 % — SIGNIFICANT CHANGE UP (ref 2–14)
MONOCYTES NFR BLD AUTO: 9.4 % — SIGNIFICANT CHANGE UP (ref 2–14)
MRSA PCR RESULT.: SIGNIFICANT CHANGE UP
MRSA PCR RESULT.: SIGNIFICANT CHANGE UP
NEUTROPHILS # BLD AUTO: 1.97 K/UL — SIGNIFICANT CHANGE UP (ref 1.8–7.4)
NEUTROPHILS # BLD AUTO: 10.05 K/UL — HIGH (ref 1.8–7.4)
NEUTROPHILS # BLD AUTO: 10.84 K/UL — HIGH (ref 1.8–7.4)
NEUTROPHILS # BLD AUTO: 11.39 K/UL — HIGH (ref 1.8–7.4)
NEUTROPHILS # BLD AUTO: 12.4 K/UL — HIGH (ref 1.8–7.4)
NEUTROPHILS # BLD AUTO: 3.02 K/UL — SIGNIFICANT CHANGE UP (ref 1.8–7.4)
NEUTROPHILS # BLD AUTO: 3.21 K/UL — SIGNIFICANT CHANGE UP (ref 1.8–7.4)
NEUTROPHILS # BLD AUTO: 3.47 K/UL — SIGNIFICANT CHANGE UP (ref 1.8–7.4)
NEUTROPHILS # BLD AUTO: 5.06 K/UL — SIGNIFICANT CHANGE UP (ref 1.8–7.4)
NEUTROPHILS # BLD AUTO: 5.44 K/UL — SIGNIFICANT CHANGE UP (ref 1.8–7.4)
NEUTROPHILS # BLD AUTO: 5.88 K/UL — SIGNIFICANT CHANGE UP (ref 1.8–7.4)
NEUTROPHILS # BLD AUTO: 6.39 K/UL — SIGNIFICANT CHANGE UP (ref 1.8–7.4)
NEUTROPHILS # BLD AUTO: 6.64 K/UL — SIGNIFICANT CHANGE UP (ref 1.8–7.4)
NEUTROPHILS # BLD AUTO: 6.79 K/UL — SIGNIFICANT CHANGE UP (ref 1.8–7.4)
NEUTROPHILS # BLD AUTO: 6.89 K/UL — SIGNIFICANT CHANGE UP (ref 1.8–7.4)
NEUTROPHILS # BLD AUTO: 7.01 K/UL — SIGNIFICANT CHANGE UP (ref 1.8–7.4)
NEUTROPHILS # BLD AUTO: 7.43 K/UL — HIGH (ref 1.8–7.4)
NEUTROPHILS # BLD AUTO: 7.63 K/UL — HIGH (ref 1.8–7.4)
NEUTROPHILS # BLD AUTO: 9.78 K/UL — HIGH (ref 1.8–7.4)
NEUTROPHILS NFR BLD AUTO: 56.6 % — SIGNIFICANT CHANGE UP (ref 43–77)
NEUTROPHILS NFR BLD AUTO: 61.1 % — SIGNIFICANT CHANGE UP (ref 43–77)
NEUTROPHILS NFR BLD AUTO: 71.4 % — SIGNIFICANT CHANGE UP (ref 43–77)
NEUTROPHILS NFR BLD AUTO: 72.6 % — SIGNIFICANT CHANGE UP (ref 43–77)
NEUTROPHILS NFR BLD AUTO: 73.4 % — SIGNIFICANT CHANGE UP (ref 43–77)
NEUTROPHILS NFR BLD AUTO: 74.1 % — SIGNIFICANT CHANGE UP (ref 43–77)
NEUTROPHILS NFR BLD AUTO: 74.3 % — SIGNIFICANT CHANGE UP (ref 43–77)
NEUTROPHILS NFR BLD AUTO: 76.8 % — SIGNIFICANT CHANGE UP (ref 43–77)
NEUTROPHILS NFR BLD AUTO: 78.1 % — HIGH (ref 43–77)
NEUTROPHILS NFR BLD AUTO: 78.7 % — HIGH (ref 43–77)
NEUTROPHILS NFR BLD AUTO: 78.7 % — HIGH (ref 43–77)
NEUTROPHILS NFR BLD AUTO: 78.8 % — HIGH (ref 43–77)
NEUTROPHILS NFR BLD AUTO: 79.6 % — HIGH (ref 43–77)
NEUTROPHILS NFR BLD AUTO: 80.4 % — HIGH (ref 43–77)
NEUTROPHILS NFR BLD AUTO: 81.5 % — HIGH (ref 43–77)
NEUTROPHILS NFR BLD AUTO: 82.9 % — HIGH (ref 43–77)
NEUTROPHILS NFR BLD AUTO: 83.9 % — HIGH (ref 43–77)
NEUTROPHILS NFR BLD AUTO: 84.2 % — HIGH (ref 43–77)
NEUTROPHILS NFR BLD AUTO: 84.5 % — HIGH (ref 43–77)
NITRITE UR-MCNC: NEGATIVE — SIGNIFICANT CHANGE UP
NRBC # BLD: 0 /100 WBCS — SIGNIFICANT CHANGE UP (ref 0–0)
NT-PROBNP SERPL-SCNC: 8601 PG/ML — HIGH (ref 0–450)
NT-PROBNP SERPL-SCNC: 8765 PG/ML — HIGH (ref 0–450)
O2 CT VFR BLD CALC: 31 MMHG — SIGNIFICANT CHANGE UP (ref 30–65)
O2 CT VFR BLD CALC: 32 MMHG — SIGNIFICANT CHANGE UP (ref 30–65)
O2 CT VFR BLD CALC: 42 MMHG — SIGNIFICANT CHANGE UP (ref 30–65)
O2 CT VFR BLD CALC: 42 MMHG — SIGNIFICANT CHANGE UP (ref 30–65)
O2 CT VFR BLD CALC: 46 MMHG — SIGNIFICANT CHANGE UP (ref 30–65)
O2 CT VFR BLD CALC: 47 MMHG — SIGNIFICANT CHANGE UP (ref 30–65)
O2 CT VFR BLD CALC: 47 MMHG — SIGNIFICANT CHANGE UP (ref 30–65)
O2 CT VFR BLD CALC: 50 MMHG — SIGNIFICANT CHANGE UP (ref 30–65)
OSMOLALITY UR: 307 MOS/KG — SIGNIFICANT CHANGE UP (ref 300–900)
OTHER CELLS CSF MANUAL: 11 ML/DL — LOW (ref 18–22)
OTHER CELLS CSF MANUAL: 12 ML/DL — LOW (ref 18–22)
OTHER CELLS CSF MANUAL: 13 ML/DL — LOW (ref 18–22)
OVALOCYTES BLD QL SMEAR: SLIGHT — SIGNIFICANT CHANGE UP
PCO2 BLDMV: 32 MMHG — SIGNIFICANT CHANGE UP (ref 30–65)
PCO2 BLDMV: 50 MMHG — SIGNIFICANT CHANGE UP (ref 30–65)
PCO2 BLDMV: 57 MMHG — SIGNIFICANT CHANGE UP (ref 30–65)
PCO2 BLDMV: 57 MMHG — SIGNIFICANT CHANGE UP (ref 30–65)
PCO2 BLDMV: 58 MMHG — SIGNIFICANT CHANGE UP (ref 30–65)
PCO2 BLDMV: 61 MMHG — SIGNIFICANT CHANGE UP (ref 30–65)
PCO2 BLDMV: 61 MMHG — SIGNIFICANT CHANGE UP (ref 30–65)
PCO2 BLDMV: 63 MMHG — SIGNIFICANT CHANGE UP (ref 30–65)
PCO2 BLDV: 33 MMHG — LOW (ref 35–50)
PCO2 BLDV: 44 MMHG — SIGNIFICANT CHANGE UP (ref 35–50)
PCO2 BLDV: 50 MMHG — SIGNIFICANT CHANGE UP (ref 35–50)
PCO2 BLDV: 51 MMHG — HIGH (ref 35–50)
PCO2 BLDV: 53 MMHG — HIGH (ref 35–50)
PCO2 BLDV: 55 MMHG — HIGH (ref 35–50)
PCO2 BLDV: 56 MMHG — HIGH (ref 35–50)
PCO2 BLDV: 57 MMHG — HIGH (ref 35–50)
PCO2 BLDV: 57 MMHG — HIGH (ref 35–50)
PCO2 BLDV: 59 MMHG — HIGH (ref 35–50)
PCO2 BLDV: 63 MMHG — HIGH (ref 35–50)
PCO2 BLDV: 96 MMHG — HIGH (ref 35–50)
PH BLDMV: 7.4 — SIGNIFICANT CHANGE UP (ref 7.32–7.45)
PH BLDMV: 7.41 — SIGNIFICANT CHANGE UP (ref 7.32–7.45)
PH BLDMV: 7.42 — SIGNIFICANT CHANGE UP (ref 7.32–7.45)
PH BLDMV: 7.42 — SIGNIFICANT CHANGE UP (ref 7.32–7.45)
PH BLDMV: 7.45 — SIGNIFICANT CHANGE UP (ref 7.32–7.45)
PH BLDMV: 7.62 — CRITICAL HIGH (ref 7.32–7.45)
PH BLDV: 7.23 — LOW (ref 7.35–7.45)
PH BLDV: 7.41 — SIGNIFICANT CHANGE UP (ref 7.35–7.45)
PH BLDV: 7.42 — SIGNIFICANT CHANGE UP (ref 7.35–7.45)
PH BLDV: 7.44 — SIGNIFICANT CHANGE UP (ref 7.35–7.45)
PH BLDV: 7.45 — SIGNIFICANT CHANGE UP (ref 7.35–7.45)
PH BLDV: 7.46 — HIGH (ref 7.35–7.45)
PH BLDV: 7.51 — HIGH (ref 7.35–7.45)
PH BLDV: 7.62 — CRITICAL HIGH (ref 7.35–7.45)
PH UR: 6 — SIGNIFICANT CHANGE UP (ref 5–8)
PH UR: 7 — SIGNIFICANT CHANGE UP (ref 5–8)
PH UR: 7 — SIGNIFICANT CHANGE UP (ref 5–8)
PHOSPHATE SERPL-MCNC: 2 MG/DL — LOW (ref 2.5–4.5)
PHOSPHATE SERPL-MCNC: 2.1 MG/DL — LOW (ref 2.5–4.5)
PHOSPHATE SERPL-MCNC: 2.2 MG/DL — LOW (ref 2.5–4.5)
PHOSPHATE SERPL-MCNC: 2.3 MG/DL — LOW (ref 2.5–4.5)
PHOSPHATE SERPL-MCNC: 2.5 MG/DL — SIGNIFICANT CHANGE UP (ref 2.5–4.5)
PHOSPHATE SERPL-MCNC: 2.6 MG/DL — SIGNIFICANT CHANGE UP (ref 2.5–4.5)
PHOSPHATE SERPL-MCNC: 2.6 MG/DL — SIGNIFICANT CHANGE UP (ref 2.5–4.5)
PHOSPHATE SERPL-MCNC: 2.7 MG/DL — SIGNIFICANT CHANGE UP (ref 2.5–4.5)
PHOSPHATE SERPL-MCNC: 2.9 MG/DL — SIGNIFICANT CHANGE UP (ref 2.5–4.5)
PHOSPHATE SERPL-MCNC: 3 MG/DL — SIGNIFICANT CHANGE UP (ref 2.5–4.5)
PHOSPHATE SERPL-MCNC: 3.1 MG/DL — SIGNIFICANT CHANGE UP (ref 2.5–4.5)
PHOSPHATE SERPL-MCNC: 3.2 MG/DL — SIGNIFICANT CHANGE UP (ref 2.5–4.5)
PHOSPHATE SERPL-MCNC: 3.2 MG/DL — SIGNIFICANT CHANGE UP (ref 2.5–4.5)
PHOSPHATE SERPL-MCNC: 3.3 MG/DL — SIGNIFICANT CHANGE UP (ref 2.5–4.5)
PHOSPHATE SERPL-MCNC: 3.4 MG/DL — SIGNIFICANT CHANGE UP (ref 2.5–4.5)
PHOSPHATE SERPL-MCNC: 3.4 MG/DL — SIGNIFICANT CHANGE UP (ref 2.5–4.5)
PHOSPHATE SERPL-MCNC: 4.1 MG/DL — SIGNIFICANT CHANGE UP (ref 2.5–4.5)
PHOSPHATE SERPL-MCNC: 4.6 MG/DL — HIGH (ref 2.5–4.5)
PHOSPHATE SERPL-MCNC: 4.8 MG/DL — HIGH (ref 2.5–4.5)
PHOSPHATE SERPL-MCNC: 4.9 MG/DL — HIGH (ref 2.5–4.5)
PHOSPHATE SERPL-MCNC: 5.1 MG/DL — HIGH (ref 2.5–4.5)
PHOSPHATE SERPL-MCNC: 5.3 MG/DL — HIGH (ref 2.5–4.5)
PHOSPHATE SERPL-MCNC: 5.4 MG/DL — HIGH (ref 2.5–4.5)
PHOSPHATE SERPL-MCNC: 5.4 MG/DL — HIGH (ref 2.5–4.5)
PHOSPHATE SERPL-MCNC: 5.9 MG/DL — HIGH (ref 2.5–4.5)
PHOSPHATE SERPL-MCNC: 5.9 MG/DL — HIGH (ref 2.5–4.5)
PHOSPHATE SERPL-MCNC: 6.1 MG/DL — HIGH (ref 2.5–4.5)
PLAT MORPH BLD: ABNORMAL
PLAT MORPH BLD: ABNORMAL
PLAT MORPH BLD: NORMAL — SIGNIFICANT CHANGE UP
PLATELET # BLD AUTO: 103 K/UL — LOW (ref 150–400)
PLATELET # BLD AUTO: 114 K/UL — LOW (ref 150–400)
PLATELET # BLD AUTO: 117 K/UL — LOW (ref 150–400)
PLATELET # BLD AUTO: 125 K/UL — LOW (ref 150–400)
PLATELET # BLD AUTO: 135 K/UL — LOW (ref 150–400)
PLATELET # BLD AUTO: 142 K/UL — LOW (ref 150–400)
PLATELET # BLD AUTO: 172 K/UL — SIGNIFICANT CHANGE UP (ref 150–400)
PLATELET # BLD AUTO: 181 K/UL — SIGNIFICANT CHANGE UP (ref 150–400)
PLATELET # BLD AUTO: 187 K/UL — SIGNIFICANT CHANGE UP (ref 150–400)
PLATELET # BLD AUTO: 199 K/UL — SIGNIFICANT CHANGE UP (ref 150–400)
PLATELET # BLD AUTO: 207 K/UL — SIGNIFICANT CHANGE UP (ref 150–400)
PLATELET # BLD AUTO: 209 K/UL — SIGNIFICANT CHANGE UP (ref 150–400)
PLATELET # BLD AUTO: 213 K/UL — SIGNIFICANT CHANGE UP (ref 150–400)
PLATELET # BLD AUTO: 218 K/UL — SIGNIFICANT CHANGE UP (ref 150–400)
PLATELET # BLD AUTO: 57 K/UL — LOW (ref 150–400)
PLATELET # BLD AUTO: 70 K/UL — LOW (ref 150–400)
PLATELET # BLD AUTO: 71 K/UL — LOW (ref 150–400)
PLATELET # BLD AUTO: 73 K/UL — LOW (ref 150–400)
PLATELET # BLD AUTO: 74 K/UL — LOW (ref 150–400)
PLATELET # BLD AUTO: 75 K/UL — LOW (ref 150–400)
PLATELET # BLD AUTO: 80 K/UL — LOW (ref 150–400)
PLATELET # BLD AUTO: 86 K/UL — LOW (ref 150–400)
PLATELET # BLD AUTO: 89 K/UL — LOW (ref 150–400)
PLATELET # BLD AUTO: 93 K/UL — LOW (ref 150–400)
PLATELET # BLD AUTO: 94 K/UL — LOW (ref 150–400)
PLATELET # BLD AUTO: 95 K/UL — LOW (ref 150–400)
PLATELET # BLD AUTO: 97 K/UL — LOW (ref 150–400)
PO2 BLDV: 35 MMHG — SIGNIFICANT CHANGE UP (ref 25–45)
PO2 BLDV: 39 MMHG — SIGNIFICANT CHANGE UP (ref 25–45)
PO2 BLDV: 40 MMHG — SIGNIFICANT CHANGE UP (ref 25–45)
PO2 BLDV: 42 MMHG — SIGNIFICANT CHANGE UP (ref 25–45)
PO2 BLDV: 42 MMHG — SIGNIFICANT CHANGE UP (ref 25–45)
PO2 BLDV: 43 MMHG — SIGNIFICANT CHANGE UP (ref 25–45)
PO2 BLDV: 43 MMHG — SIGNIFICANT CHANGE UP (ref 25–45)
PO2 BLDV: 44 MMHG — SIGNIFICANT CHANGE UP (ref 25–45)
PO2 BLDV: 47 MMHG — HIGH (ref 25–45)
PO2 BLDV: 52 MMHG — HIGH (ref 25–45)
POIKILOCYTOSIS BLD QL AUTO: SLIGHT — SIGNIFICANT CHANGE UP
POIKILOCYTOSIS BLD QL AUTO: SLIGHT — SIGNIFICANT CHANGE UP
POLYCHROMASIA BLD QL SMEAR: SLIGHT — SIGNIFICANT CHANGE UP
POTASSIUM BLDV-SCNC: 3.3 MMOL/L — LOW (ref 3.5–5.3)
POTASSIUM BLDV-SCNC: 3.3 MMOL/L — LOW (ref 3.5–5.3)
POTASSIUM BLDV-SCNC: 3.4 MMOL/L — LOW (ref 3.5–5.3)
POTASSIUM BLDV-SCNC: 3.5 MMOL/L — SIGNIFICANT CHANGE UP (ref 3.5–5.3)
POTASSIUM BLDV-SCNC: 3.5 MMOL/L — SIGNIFICANT CHANGE UP (ref 3.5–5.3)
POTASSIUM BLDV-SCNC: 3.6 MMOL/L — SIGNIFICANT CHANGE UP (ref 3.5–5.3)
POTASSIUM BLDV-SCNC: 3.8 MMOL/L — SIGNIFICANT CHANGE UP (ref 3.5–5.3)
POTASSIUM BLDV-SCNC: 4.3 MMOL/L — SIGNIFICANT CHANGE UP (ref 3.5–5.3)
POTASSIUM BLDV-SCNC: 4.6 MMOL/L — SIGNIFICANT CHANGE UP (ref 3.5–5.3)
POTASSIUM SERPL-MCNC: 3 MMOL/L — LOW (ref 3.5–5.3)
POTASSIUM SERPL-MCNC: 3.1 MMOL/L — LOW (ref 3.5–5.3)
POTASSIUM SERPL-MCNC: 3.1 MMOL/L — LOW (ref 3.5–5.3)
POTASSIUM SERPL-MCNC: 3.2 MMOL/L — LOW (ref 3.5–5.3)
POTASSIUM SERPL-MCNC: 3.3 MMOL/L — LOW (ref 3.5–5.3)
POTASSIUM SERPL-MCNC: 3.4 MMOL/L — LOW (ref 3.5–5.3)
POTASSIUM SERPL-MCNC: 3.5 MMOL/L — SIGNIFICANT CHANGE UP (ref 3.5–5.3)
POTASSIUM SERPL-MCNC: 3.5 MMOL/L — SIGNIFICANT CHANGE UP (ref 3.5–5.3)
POTASSIUM SERPL-MCNC: 3.6 MMOL/L — SIGNIFICANT CHANGE UP (ref 3.5–5.3)
POTASSIUM SERPL-MCNC: 3.7 MMOL/L — SIGNIFICANT CHANGE UP (ref 3.5–5.3)
POTASSIUM SERPL-MCNC: 3.8 MMOL/L — SIGNIFICANT CHANGE UP (ref 3.5–5.3)
POTASSIUM SERPL-MCNC: 3.9 MMOL/L — SIGNIFICANT CHANGE UP (ref 3.5–5.3)
POTASSIUM SERPL-MCNC: 4 MMOL/L — SIGNIFICANT CHANGE UP (ref 3.5–5.3)
POTASSIUM SERPL-MCNC: 4 MMOL/L — SIGNIFICANT CHANGE UP (ref 3.5–5.3)
POTASSIUM SERPL-MCNC: 4.1 MMOL/L — SIGNIFICANT CHANGE UP (ref 3.5–5.3)
POTASSIUM SERPL-MCNC: 4.2 MMOL/L — SIGNIFICANT CHANGE UP (ref 3.5–5.3)
POTASSIUM SERPL-MCNC: 4.2 MMOL/L — SIGNIFICANT CHANGE UP (ref 3.5–5.3)
POTASSIUM SERPL-MCNC: 4.4 MMOL/L — SIGNIFICANT CHANGE UP (ref 3.5–5.3)
POTASSIUM SERPL-MCNC: 4.5 MMOL/L — SIGNIFICANT CHANGE UP (ref 3.5–5.3)
POTASSIUM SERPL-MCNC: 4.9 MMOL/L — SIGNIFICANT CHANGE UP (ref 3.5–5.3)
POTASSIUM SERPL-MCNC: 4.9 MMOL/L — SIGNIFICANT CHANGE UP (ref 3.5–5.3)
POTASSIUM SERPL-SCNC: 3 MMOL/L — LOW (ref 3.5–5.3)
POTASSIUM SERPL-SCNC: 3.1 MMOL/L — LOW (ref 3.5–5.3)
POTASSIUM SERPL-SCNC: 3.1 MMOL/L — LOW (ref 3.5–5.3)
POTASSIUM SERPL-SCNC: 3.2 MMOL/L — LOW (ref 3.5–5.3)
POTASSIUM SERPL-SCNC: 3.3 MMOL/L — LOW (ref 3.5–5.3)
POTASSIUM SERPL-SCNC: 3.4 MMOL/L — LOW (ref 3.5–5.3)
POTASSIUM SERPL-SCNC: 3.5 MMOL/L — SIGNIFICANT CHANGE UP (ref 3.5–5.3)
POTASSIUM SERPL-SCNC: 3.5 MMOL/L — SIGNIFICANT CHANGE UP (ref 3.5–5.3)
POTASSIUM SERPL-SCNC: 3.6 MMOL/L — SIGNIFICANT CHANGE UP (ref 3.5–5.3)
POTASSIUM SERPL-SCNC: 3.7 MMOL/L — SIGNIFICANT CHANGE UP (ref 3.5–5.3)
POTASSIUM SERPL-SCNC: 3.8 MMOL/L — SIGNIFICANT CHANGE UP (ref 3.5–5.3)
POTASSIUM SERPL-SCNC: 3.9 MMOL/L — SIGNIFICANT CHANGE UP (ref 3.5–5.3)
POTASSIUM SERPL-SCNC: 4 MMOL/L — SIGNIFICANT CHANGE UP (ref 3.5–5.3)
POTASSIUM SERPL-SCNC: 4 MMOL/L — SIGNIFICANT CHANGE UP (ref 3.5–5.3)
POTASSIUM SERPL-SCNC: 4.1 MMOL/L — SIGNIFICANT CHANGE UP (ref 3.5–5.3)
POTASSIUM SERPL-SCNC: 4.2 MMOL/L — SIGNIFICANT CHANGE UP (ref 3.5–5.3)
POTASSIUM SERPL-SCNC: 4.2 MMOL/L — SIGNIFICANT CHANGE UP (ref 3.5–5.3)
POTASSIUM SERPL-SCNC: 4.4 MMOL/L — SIGNIFICANT CHANGE UP (ref 3.5–5.3)
POTASSIUM SERPL-SCNC: 4.5 MMOL/L — SIGNIFICANT CHANGE UP (ref 3.5–5.3)
POTASSIUM SERPL-SCNC: 4.9 MMOL/L — SIGNIFICANT CHANGE UP (ref 3.5–5.3)
POTASSIUM SERPL-SCNC: 4.9 MMOL/L — SIGNIFICANT CHANGE UP (ref 3.5–5.3)
PROT ?TM UR-MCNC: 10 MG/DL — SIGNIFICANT CHANGE UP (ref 0–12)
PROT ?TM UR-MCNC: 12 MG/DL — SIGNIFICANT CHANGE UP (ref 0–12)
PROT SERPL-MCNC: 4.3 G/DL — LOW (ref 6–8.3)
PROT SERPL-MCNC: 4.9 G/DL — LOW (ref 6–8.3)
PROT SERPL-MCNC: 5.6 G/DL — LOW (ref 6–8.3)
PROT SERPL-MCNC: 5.6 G/DL — LOW (ref 6–8.3)
PROT SERPL-MCNC: 5.7 G/DL — LOW (ref 6–8.3)
PROT SERPL-MCNC: 5.8 G/DL — LOW (ref 6–8.3)
PROT SERPL-MCNC: 5.8 G/DL — LOW (ref 6–8.3)
PROT SERPL-MCNC: 5.9 G/DL — LOW (ref 6–8.3)
PROT SERPL-MCNC: 6 G/DL — SIGNIFICANT CHANGE UP (ref 6–8.3)
PROT SERPL-MCNC: 6.2 G/DL — SIGNIFICANT CHANGE UP (ref 6–8.3)
PROT SERPL-MCNC: 6.2 G/DL — SIGNIFICANT CHANGE UP (ref 6–8.3)
PROT SERPL-MCNC: 6.4 G/DL — SIGNIFICANT CHANGE UP (ref 6–8.3)
PROT SERPL-MCNC: 6.5 G/DL — SIGNIFICANT CHANGE UP (ref 6–8.3)
PROT SERPL-MCNC: 6.6 G/DL — SIGNIFICANT CHANGE UP (ref 6–8.3)
PROT SERPL-MCNC: 6.7 G/DL — SIGNIFICANT CHANGE UP (ref 6–8.3)
PROT SERPL-MCNC: 6.8 G/DL — SIGNIFICANT CHANGE UP (ref 6–8.3)
PROT SERPL-MCNC: 6.9 G/DL — SIGNIFICANT CHANGE UP (ref 6–8.3)
PROT SERPL-MCNC: 7.2 G/DL — SIGNIFICANT CHANGE UP (ref 6–8.3)
PROT SERPL-MCNC: 7.2 G/DL — SIGNIFICANT CHANGE UP (ref 6–8.3)
PROT SERPL-MCNC: 7.3 G/DL — SIGNIFICANT CHANGE UP (ref 6–8.3)
PROT SERPL-MCNC: 7.5 G/DL — SIGNIFICANT CHANGE UP (ref 6–8.3)
PROT SERPL-MCNC: 7.7 GM/DL — SIGNIFICANT CHANGE UP (ref 6–8.3)
PROT SERPL-MCNC: 7.9 GM/DL — SIGNIFICANT CHANGE UP (ref 6–8.3)
PROT UR-MCNC: ABNORMAL
PROT UR-MCNC: ABNORMAL
PROT UR-MCNC: NEGATIVE MG/DL — SIGNIFICANT CHANGE UP
PROT/CREAT UR-RTO: 0.8 RATIO — HIGH (ref 0–0.2)
PROTHROM AB SERPL-ACNC: 13.9 SEC — HIGH (ref 10.6–13.6)
PROTHROM AB SERPL-ACNC: 14 SEC — HIGH (ref 10.6–13.6)
PROTHROM AB SERPL-ACNC: 14.3 SEC — HIGH (ref 10.6–13.6)
PROTHROM AB SERPL-ACNC: 14.3 SEC — HIGH (ref 10.6–13.6)
PROTHROM AB SERPL-ACNC: 17.5 SEC — HIGH (ref 10.6–13.6)
PROTHROM AB SERPL-ACNC: 18.3 SEC — HIGH (ref 10.6–13.6)
PROTHROM AB SERPL-ACNC: 25.4 SEC — HIGH (ref 10.6–13.6)
RAPID RVP RESULT: SIGNIFICANT CHANGE UP
RAPID RVP RESULT: SIGNIFICANT CHANGE UP
RBC # BLD: 2.74 M/UL — LOW (ref 3.8–5.2)
RBC # BLD: 3.04 M/UL — LOW (ref 3.8–5.2)
RBC # BLD: 3.18 M/UL — LOW (ref 3.8–5.2)
RBC # BLD: 3.18 M/UL — LOW (ref 3.8–5.2)
RBC # BLD: 3.52 M/UL — LOW (ref 3.8–5.2)
RBC # BLD: 3.58 M/UL — LOW (ref 3.8–5.2)
RBC # BLD: 3.59 M/UL — LOW (ref 3.8–5.2)
RBC # BLD: 3.76 M/UL — LOW (ref 3.8–5.2)
RBC # BLD: 3.79 M/UL — LOW (ref 3.8–5.2)
RBC # BLD: 3.8 M/UL — SIGNIFICANT CHANGE UP (ref 3.8–5.2)
RBC # BLD: 3.85 M/UL — SIGNIFICANT CHANGE UP (ref 3.8–5.2)
RBC # BLD: 3.85 M/UL — SIGNIFICANT CHANGE UP (ref 3.8–5.2)
RBC # BLD: 3.94 M/UL — SIGNIFICANT CHANGE UP (ref 3.8–5.2)
RBC # BLD: 3.95 M/UL — SIGNIFICANT CHANGE UP (ref 3.8–5.2)
RBC # BLD: 3.95 M/UL — SIGNIFICANT CHANGE UP (ref 3.8–5.2)
RBC # BLD: 3.96 M/UL — SIGNIFICANT CHANGE UP (ref 3.8–5.2)
RBC # BLD: 4 M/UL — SIGNIFICANT CHANGE UP (ref 3.8–5.2)
RBC # BLD: 4.05 M/UL — SIGNIFICANT CHANGE UP (ref 3.8–5.2)
RBC # BLD: 4.05 M/UL — SIGNIFICANT CHANGE UP (ref 3.8–5.2)
RBC # BLD: 4.06 M/UL — SIGNIFICANT CHANGE UP (ref 3.8–5.2)
RBC # BLD: 4.17 M/UL — SIGNIFICANT CHANGE UP (ref 3.8–5.2)
RBC # BLD: 4.18 M/UL — SIGNIFICANT CHANGE UP (ref 3.8–5.2)
RBC # BLD: 4.24 M/UL — SIGNIFICANT CHANGE UP (ref 3.8–5.2)
RBC # BLD: 4.24 M/UL — SIGNIFICANT CHANGE UP (ref 3.8–5.2)
RBC # BLD: 4.3 M/UL — SIGNIFICANT CHANGE UP (ref 3.8–5.2)
RBC # BLD: 4.33 M/UL — SIGNIFICANT CHANGE UP (ref 3.8–5.2)
RBC # BLD: 4.4 M/UL — SIGNIFICANT CHANGE UP (ref 3.8–5.2)
RBC # BLD: 4.52 M/UL — SIGNIFICANT CHANGE UP (ref 3.8–5.2)
RBC # BLD: 4.79 M/UL — SIGNIFICANT CHANGE UP (ref 3.8–5.2)
RBC # FLD: 14.6 % — HIGH (ref 10.3–14.5)
RBC # FLD: 14.6 % — HIGH (ref 10.3–14.5)
RBC # FLD: 14.8 % — HIGH (ref 10.3–14.5)
RBC # FLD: 15 % — HIGH (ref 10.3–14.5)
RBC # FLD: 15.4 % — HIGH (ref 10.3–14.5)
RBC # FLD: 15.4 % — HIGH (ref 10.3–14.5)
RBC # FLD: 15.5 % — HIGH (ref 10.3–14.5)
RBC # FLD: 15.5 % — HIGH (ref 10.3–14.5)
RBC # FLD: 15.6 % — HIGH (ref 10.3–14.5)
RBC # FLD: 15.6 % — HIGH (ref 10.3–14.5)
RBC # FLD: 15.7 % — HIGH (ref 10.3–14.5)
RBC # FLD: 15.9 % — HIGH (ref 10.3–14.5)
RBC # FLD: 15.9 % — HIGH (ref 10.3–14.5)
RBC # FLD: 16.1 % — HIGH (ref 10.3–14.5)
RBC # FLD: 16.2 % — HIGH (ref 10.3–14.5)
RBC # FLD: 16.2 % — HIGH (ref 10.3–14.5)
RBC # FLD: 16.3 % — HIGH (ref 10.3–14.5)
RBC # FLD: 16.4 % — HIGH (ref 10.3–14.5)
RBC # FLD: 16.4 % — HIGH (ref 10.3–14.5)
RBC # FLD: 16.6 % — HIGH (ref 10.3–14.5)
RBC # FLD: 17 % — HIGH (ref 10.3–14.5)
RBC # FLD: 18.1 % — HIGH (ref 10.3–14.5)
RBC # FLD: 19.2 % — HIGH (ref 10.3–14.5)
RBC # FLD: 19.3 % — HIGH (ref 10.3–14.5)
RBC # FLD: 19.3 % — HIGH (ref 10.3–14.5)
RBC # FLD: 19.4 % — HIGH (ref 10.3–14.5)
RBC # FLD: 19.7 % — HIGH (ref 10.3–14.5)
RBC BLD AUTO: ABNORMAL
RBC BLD AUTO: ABNORMAL
RBC BLD AUTO: SIGNIFICANT CHANGE UP
RBC CASTS # UR COMP ASSIST: 2 /HPF — SIGNIFICANT CHANGE UP (ref 0–4)
RBC CASTS # UR COMP ASSIST: >50 /HPF — HIGH (ref 0–4)
RH IG SCN BLD-IMP: POSITIVE — SIGNIFICANT CHANGE UP
RHEUMATOID FACT SERPL-ACNC: <10 IU/ML — SIGNIFICANT CHANGE UP (ref 0–13)
S AUREUS DNA NOSE QL NAA+PROBE: SIGNIFICANT CHANGE UP
S AUREUS DNA NOSE QL NAA+PROBE: SIGNIFICANT CHANGE UP
SAO2 % BLDMV: 52 % — LOW (ref 60–90)
SAO2 % BLDMV: 76 % — SIGNIFICANT CHANGE UP (ref 60–90)
SAO2 % BLDMV: 77 % — SIGNIFICANT CHANGE UP (ref 60–90)
SAO2 % BLDMV: 81 % — SIGNIFICANT CHANGE UP (ref 60–90)
SAO2 % BLDV: 72 % — SIGNIFICANT CHANGE UP (ref 67–88)
SAO2 % BLDV: 75 % — SIGNIFICANT CHANGE UP (ref 67–88)
SAO2 % BLDV: 75 % — SIGNIFICANT CHANGE UP (ref 67–88)
SAO2 % BLDV: 77 % — SIGNIFICANT CHANGE UP (ref 67–88)
SAO2 % BLDV: 78 % — SIGNIFICANT CHANGE UP (ref 67–88)
SAO2 % BLDV: 78 % — SIGNIFICANT CHANGE UP (ref 67–88)
SAO2 % BLDV: 81 % — SIGNIFICANT CHANGE UP (ref 67–88)
SAO2 % BLDV: 82 % — SIGNIFICANT CHANGE UP (ref 67–88)
SAO2 % BLDV: 84 % — SIGNIFICANT CHANGE UP (ref 67–88)
SARS-COV-2 IGG SERPL QL IA: NEGATIVE — SIGNIFICANT CHANGE UP
SARS-COV-2 IGG+IGM SERPL QL IA: 0.09 INDEX — SIGNIFICANT CHANGE UP
SARS-COV-2 IGG+IGM SERPL QL IA: 0.4 U/ML — SIGNIFICANT CHANGE UP
SARS-COV-2 IGG+IGM SERPL QL IA: NEGATIVE — SIGNIFICANT CHANGE UP
SARS-COV-2 IGG+IGM SERPL QL IA: NEGATIVE — SIGNIFICANT CHANGE UP
SARS-COV-2 IGM SERPL IA-ACNC: 0.08 INDEX — SIGNIFICANT CHANGE UP
SARS-COV-2 RNA SPEC QL NAA+PROBE: SIGNIFICANT CHANGE UP
SCHISTOCYTES BLD QL AUTO: SLIGHT — SIGNIFICANT CHANGE UP
SMUDGE CELLS # BLD: PRESENT — SIGNIFICANT CHANGE UP
SODIUM SERPL-SCNC: 137 MMOL/L — SIGNIFICANT CHANGE UP (ref 135–145)
SODIUM SERPL-SCNC: 139 MMOL/L — SIGNIFICANT CHANGE UP (ref 135–145)
SODIUM SERPL-SCNC: 140 MMOL/L — SIGNIFICANT CHANGE UP (ref 135–145)
SODIUM SERPL-SCNC: 141 MMOL/L — SIGNIFICANT CHANGE UP (ref 135–145)
SODIUM SERPL-SCNC: 141 MMOL/L — SIGNIFICANT CHANGE UP (ref 135–145)
SODIUM SERPL-SCNC: 142 MMOL/L — SIGNIFICANT CHANGE UP (ref 135–145)
SODIUM SERPL-SCNC: 143 MMOL/L — SIGNIFICANT CHANGE UP (ref 135–145)
SODIUM SERPL-SCNC: 143 MMOL/L — SIGNIFICANT CHANGE UP (ref 135–145)
SODIUM SERPL-SCNC: 144 MMOL/L — SIGNIFICANT CHANGE UP (ref 135–145)
SODIUM SERPL-SCNC: 145 MMOL/L — SIGNIFICANT CHANGE UP (ref 135–145)
SODIUM SERPL-SCNC: 146 MMOL/L — HIGH (ref 135–145)
SODIUM SERPL-SCNC: 147 MMOL/L — HIGH (ref 135–145)
SODIUM SERPL-SCNC: 148 MMOL/L — HIGH (ref 135–145)
SODIUM SERPL-SCNC: 149 MMOL/L — HIGH (ref 135–145)
SODIUM SERPL-SCNC: 149 MMOL/L — HIGH (ref 135–145)
SODIUM SERPL-SCNC: 150 MMOL/L — HIGH (ref 135–145)
SODIUM SERPL-SCNC: 150 MMOL/L — HIGH (ref 135–145)
SODIUM SERPL-SCNC: 151 MMOL/L — HIGH (ref 135–145)
SODIUM UR-SCNC: 118 MMOL/L — SIGNIFICANT CHANGE UP
SP GR SPEC: 1 — LOW (ref 1.01–1.02)
SP GR SPEC: 1.01 — SIGNIFICANT CHANGE UP (ref 1.01–1.02)
SP GR SPEC: 1.02 — SIGNIFICANT CHANGE UP (ref 1.01–1.02)
SPECIMEN SOURCE: SIGNIFICANT CHANGE UP
TARGETS BLD QL SMEAR: SIGNIFICANT CHANGE UP
TARGETS BLD QL SMEAR: SLIGHT — SIGNIFICANT CHANGE UP
TROPONIN I SERPL-MCNC: 0.06 NG/ML — HIGH (ref 0.01–0.04)
TROPONIN I SERPL-MCNC: 0.06 NG/ML — HIGH (ref 0.01–0.04)
TROPONIN T, HIGH SENSITIVITY RESULT: 100 NG/L — HIGH (ref 0–51)
TSH SERPL-MCNC: 3.9 UIU/ML — SIGNIFICANT CHANGE UP (ref 0.27–4.2)
UROBILINOGEN FLD QL: 4 MG/DL
UROBILINOGEN FLD QL: ABNORMAL
UROBILINOGEN FLD QL: NEGATIVE — SIGNIFICANT CHANGE UP
UUN UR-MCNC: 129 MG/DL — SIGNIFICANT CHANGE UP
VANCOMYCIN FLD-MCNC: 12.4 UG/ML — SIGNIFICANT CHANGE UP
VANCOMYCIN FLD-MCNC: <4 UG/ML — SIGNIFICANT CHANGE UP
VANCOMYCIN TROUGH SERPL-MCNC: 11.7 UG/ML — SIGNIFICANT CHANGE UP (ref 10–20)
VARIANT LYMPHS # BLD: 6.4 % — HIGH (ref 0–6)
VIT B1 SERPL-MCNC: 138.6 NMOL/L — SIGNIFICANT CHANGE UP (ref 66.5–200)
VIT B12 SERPL-MCNC: 1704 PG/ML — HIGH (ref 232–1245)
WBC # BLD: 10.08 K/UL — SIGNIFICANT CHANGE UP (ref 3.8–10.5)
WBC # BLD: 11.14 K/UL — HIGH (ref 3.8–10.5)
WBC # BLD: 12.42 K/UL — HIGH (ref 3.8–10.5)
WBC # BLD: 12.85 K/UL — HIGH (ref 3.8–10.5)
WBC # BLD: 13.09 K/UL — HIGH (ref 3.8–10.5)
WBC # BLD: 14.48 K/UL — HIGH (ref 3.8–10.5)
WBC # BLD: 15.57 K/UL — HIGH (ref 3.8–10.5)
WBC # BLD: 2.69 K/UL — LOW (ref 3.8–10.5)
WBC # BLD: 4.42 K/UL — SIGNIFICANT CHANGE UP (ref 3.8–10.5)
WBC # BLD: 4.59 K/UL — SIGNIFICANT CHANGE UP (ref 3.8–10.5)
WBC # BLD: 5.01 K/UL — SIGNIFICANT CHANGE UP (ref 3.8–10.5)
WBC # BLD: 5.33 K/UL — SIGNIFICANT CHANGE UP (ref 3.8–10.5)
WBC # BLD: 5.67 K/UL — SIGNIFICANT CHANGE UP (ref 3.8–10.5)
WBC # BLD: 5.67 K/UL — SIGNIFICANT CHANGE UP (ref 3.8–10.5)
WBC # BLD: 5.99 K/UL — SIGNIFICANT CHANGE UP (ref 3.8–10.5)
WBC # BLD: 6.46 K/UL — SIGNIFICANT CHANGE UP (ref 3.8–10.5)
WBC # BLD: 7.1 K/UL — SIGNIFICANT CHANGE UP (ref 3.8–10.5)
WBC # BLD: 7.83 K/UL — SIGNIFICANT CHANGE UP (ref 3.8–10.5)
WBC # BLD: 7.94 K/UL — SIGNIFICANT CHANGE UP (ref 3.8–10.5)
WBC # BLD: 8.1 K/UL — SIGNIFICANT CHANGE UP (ref 3.8–10.5)
WBC # BLD: 8.33 K/UL — SIGNIFICANT CHANGE UP (ref 3.8–10.5)
WBC # BLD: 8.43 K/UL — SIGNIFICANT CHANGE UP (ref 3.8–10.5)
WBC # BLD: 8.58 K/UL — SIGNIFICANT CHANGE UP (ref 3.8–10.5)
WBC # BLD: 8.8 K/UL — SIGNIFICANT CHANGE UP (ref 3.8–10.5)
WBC # BLD: 8.96 K/UL — SIGNIFICANT CHANGE UP (ref 3.8–10.5)
WBC # BLD: 9.12 K/UL — SIGNIFICANT CHANGE UP (ref 3.8–10.5)
WBC # BLD: 9.44 K/UL — SIGNIFICANT CHANGE UP (ref 3.8–10.5)
WBC # BLD: 9.67 K/UL — SIGNIFICANT CHANGE UP (ref 3.8–10.5)
WBC # BLD: 9.77 K/UL — SIGNIFICANT CHANGE UP (ref 3.8–10.5)
WBC # FLD AUTO: 10.08 K/UL — SIGNIFICANT CHANGE UP (ref 3.8–10.5)
WBC # FLD AUTO: 11.14 K/UL — HIGH (ref 3.8–10.5)
WBC # FLD AUTO: 12.42 K/UL — HIGH (ref 3.8–10.5)
WBC # FLD AUTO: 12.85 K/UL — HIGH (ref 3.8–10.5)
WBC # FLD AUTO: 13.09 K/UL — HIGH (ref 3.8–10.5)
WBC # FLD AUTO: 14.48 K/UL — HIGH (ref 3.8–10.5)
WBC # FLD AUTO: 15.57 K/UL — HIGH (ref 3.8–10.5)
WBC # FLD AUTO: 2.69 K/UL — LOW (ref 3.8–10.5)
WBC # FLD AUTO: 4.42 K/UL — SIGNIFICANT CHANGE UP (ref 3.8–10.5)
WBC # FLD AUTO: 4.59 K/UL — SIGNIFICANT CHANGE UP (ref 3.8–10.5)
WBC # FLD AUTO: 5.01 K/UL — SIGNIFICANT CHANGE UP (ref 3.8–10.5)
WBC # FLD AUTO: 5.33 K/UL — SIGNIFICANT CHANGE UP (ref 3.8–10.5)
WBC # FLD AUTO: 5.67 K/UL — SIGNIFICANT CHANGE UP (ref 3.8–10.5)
WBC # FLD AUTO: 5.67 K/UL — SIGNIFICANT CHANGE UP (ref 3.8–10.5)
WBC # FLD AUTO: 5.99 K/UL — SIGNIFICANT CHANGE UP (ref 3.8–10.5)
WBC # FLD AUTO: 6.46 K/UL — SIGNIFICANT CHANGE UP (ref 3.8–10.5)
WBC # FLD AUTO: 7.1 K/UL — SIGNIFICANT CHANGE UP (ref 3.8–10.5)
WBC # FLD AUTO: 7.83 K/UL — SIGNIFICANT CHANGE UP (ref 3.8–10.5)
WBC # FLD AUTO: 7.94 K/UL — SIGNIFICANT CHANGE UP (ref 3.8–10.5)
WBC # FLD AUTO: 8.1 K/UL — SIGNIFICANT CHANGE UP (ref 3.8–10.5)
WBC # FLD AUTO: 8.33 K/UL — SIGNIFICANT CHANGE UP (ref 3.8–10.5)
WBC # FLD AUTO: 8.43 K/UL — SIGNIFICANT CHANGE UP (ref 3.8–10.5)
WBC # FLD AUTO: 8.58 K/UL — SIGNIFICANT CHANGE UP (ref 3.8–10.5)
WBC # FLD AUTO: 8.8 K/UL — SIGNIFICANT CHANGE UP (ref 3.8–10.5)
WBC # FLD AUTO: 8.96 K/UL — SIGNIFICANT CHANGE UP (ref 3.8–10.5)
WBC # FLD AUTO: 9.12 K/UL — SIGNIFICANT CHANGE UP (ref 3.8–10.5)
WBC # FLD AUTO: 9.44 K/UL — SIGNIFICANT CHANGE UP (ref 3.8–10.5)
WBC # FLD AUTO: 9.67 K/UL — SIGNIFICANT CHANGE UP (ref 3.8–10.5)
WBC # FLD AUTO: 9.77 K/UL — SIGNIFICANT CHANGE UP (ref 3.8–10.5)
WBC UR QL: 10 /HPF — HIGH (ref 0–5)
WBC UR QL: 7 /HPF — HIGH (ref 0–5)
WBC UR QL: SIGNIFICANT CHANGE UP
ZINC SERPL-MCNC: 49 UG/DL — SIGNIFICANT CHANGE UP (ref 44–115)

## 2021-01-01 PROCEDURE — 99223 1ST HOSP IP/OBS HIGH 75: CPT

## 2021-01-01 PROCEDURE — 99291 CRITICAL CARE FIRST HOUR: CPT | Mod: 25

## 2021-01-01 PROCEDURE — 82977 ASSAY OF GGT: CPT

## 2021-01-01 PROCEDURE — 82565 ASSAY OF CREATININE: CPT

## 2021-01-01 PROCEDURE — 93451 RIGHT HEART CATH: CPT

## 2021-01-01 PROCEDURE — 82607 VITAMIN B-12: CPT

## 2021-01-01 PROCEDURE — 99291 CRITICAL CARE FIRST HOUR: CPT

## 2021-01-01 PROCEDURE — 84425 ASSAY OF VITAMIN B-1: CPT

## 2021-01-01 PROCEDURE — 99221 1ST HOSP IP/OBS SF/LOW 40: CPT

## 2021-01-01 PROCEDURE — 84630 ASSAY OF ZINC: CPT

## 2021-01-01 PROCEDURE — 93010 ELECTROCARDIOGRAM REPORT: CPT

## 2021-01-01 PROCEDURE — 94002 VENT MGMT INPAT INIT DAY: CPT

## 2021-01-01 PROCEDURE — 71045 X-RAY EXAM CHEST 1 VIEW: CPT | Mod: 26

## 2021-01-01 PROCEDURE — 80202 ASSAY OF VANCOMYCIN: CPT

## 2021-01-01 PROCEDURE — 84540 ASSAY OF URINE/UREA-N: CPT

## 2021-01-01 PROCEDURE — 71045 X-RAY EXAM CHEST 1 VIEW: CPT | Mod: 26,77

## 2021-01-01 PROCEDURE — 99233 SBSQ HOSP IP/OBS HIGH 50: CPT

## 2021-01-01 PROCEDURE — U0005: CPT

## 2021-01-01 PROCEDURE — 36430 TRANSFUSION BLD/BLD COMPNT: CPT

## 2021-01-01 PROCEDURE — 85025 COMPLETE CBC W/AUTO DIFF WBC: CPT

## 2021-01-01 PROCEDURE — 86769 SARS-COV-2 COVID-19 ANTIBODY: CPT

## 2021-01-01 PROCEDURE — 99233 SBSQ HOSP IP/OBS HIGH 50: CPT | Mod: GC

## 2021-01-01 PROCEDURE — 84132 ASSAY OF SERUM POTASSIUM: CPT

## 2021-01-01 PROCEDURE — 71250 CT THORAX DX C-: CPT | Mod: 26

## 2021-01-01 PROCEDURE — 85730 THROMBOPLASTIN TIME PARTIAL: CPT

## 2021-01-01 PROCEDURE — 81001 URINALYSIS AUTO W/SCOPE: CPT

## 2021-01-01 PROCEDURE — 93010 ELECTROCARDIOGRAM REPORT: CPT | Mod: 76

## 2021-01-01 PROCEDURE — 82435 ASSAY OF BLOOD CHLORIDE: CPT

## 2021-01-01 PROCEDURE — P9016: CPT

## 2021-01-01 PROCEDURE — 76705 ECHO EXAM OF ABDOMEN: CPT | Mod: 26,RT

## 2021-01-01 PROCEDURE — 93306 TTE W/DOPPLER COMPLETE: CPT | Mod: 26

## 2021-01-01 PROCEDURE — 99292 CRITICAL CARE ADDL 30 MIN: CPT | Mod: 25

## 2021-01-01 PROCEDURE — 94003 VENT MGMT INPAT SUBQ DAY: CPT

## 2021-01-01 PROCEDURE — 83735 ASSAY OF MAGNESIUM: CPT

## 2021-01-01 PROCEDURE — 93926 LOWER EXTREMITY STUDY: CPT

## 2021-01-01 PROCEDURE — P9059: CPT

## 2021-01-01 PROCEDURE — 82553 CREATINE MB FRACTION: CPT

## 2021-01-01 PROCEDURE — 86923 COMPATIBILITY TEST ELECTRIC: CPT

## 2021-01-01 PROCEDURE — 83036 HEMOGLOBIN GLYCOSYLATED A1C: CPT

## 2021-01-01 PROCEDURE — 87640 STAPH A DNA AMP PROBE: CPT

## 2021-01-01 PROCEDURE — 86901 BLOOD TYPING SEROLOGIC RH(D): CPT

## 2021-01-01 PROCEDURE — 85610 PROTHROMBIN TIME: CPT

## 2021-01-01 PROCEDURE — 95720 EEG PHY/QHP EA INCR W/VEEG: CPT

## 2021-01-01 PROCEDURE — 85018 HEMOGLOBIN: CPT

## 2021-01-01 PROCEDURE — 85652 RBC SED RATE AUTOMATED: CPT

## 2021-01-01 PROCEDURE — 36415 COLL VENOUS BLD VENIPUNCTURE: CPT

## 2021-01-01 PROCEDURE — 86160 COMPLEMENT ANTIGEN: CPT

## 2021-01-01 PROCEDURE — 74176 CT ABD & PELVIS W/O CONTRAST: CPT | Mod: 26

## 2021-01-01 PROCEDURE — C1769: CPT

## 2021-01-01 PROCEDURE — 95700 EEG CONT REC W/VID EEG TECH: CPT

## 2021-01-01 PROCEDURE — 76705 ECHO EXAM OF ABDOMEN: CPT

## 2021-01-01 PROCEDURE — 82803 BLOOD GASES ANY COMBINATION: CPT

## 2021-01-01 PROCEDURE — 87070 CULTURE OTHR SPECIMN AEROBIC: CPT

## 2021-01-01 PROCEDURE — 82570 ASSAY OF URINE CREATININE: CPT

## 2021-01-01 PROCEDURE — 70551 MRI BRAIN STEM W/O DYE: CPT

## 2021-01-01 PROCEDURE — 84484 ASSAY OF TROPONIN QUANT: CPT

## 2021-01-01 PROCEDURE — 99222 1ST HOSP IP/OBS MODERATE 55: CPT

## 2021-01-01 PROCEDURE — 82436 ASSAY OF URINE CHLORIDE: CPT

## 2021-01-01 PROCEDURE — 93970 EXTREMITY STUDY: CPT

## 2021-01-01 PROCEDURE — 83935 ASSAY OF URINE OSMOLALITY: CPT

## 2021-01-01 PROCEDURE — 86900 BLOOD TYPING SEROLOGIC ABO: CPT

## 2021-01-01 PROCEDURE — 74176 CT ABD & PELVIS W/O CONTRAST: CPT

## 2021-01-01 PROCEDURE — 93970 EXTREMITY STUDY: CPT | Mod: 26

## 2021-01-01 PROCEDURE — 82140 ASSAY OF AMMONIA: CPT

## 2021-01-01 PROCEDURE — 86431 RHEUMATOID FACTOR QUANT: CPT

## 2021-01-01 PROCEDURE — 0225U NFCT DS DNA&RNA 21 SARSCOV2: CPT

## 2021-01-01 PROCEDURE — 76775 US EXAM ABDO BACK WALL LIM: CPT | Mod: 26

## 2021-01-01 PROCEDURE — 86235 NUCLEAR ANTIGEN ANTIBODY: CPT

## 2021-01-01 PROCEDURE — 86850 RBC ANTIBODY SCREEN: CPT

## 2021-01-01 PROCEDURE — U0003: CPT

## 2021-01-01 PROCEDURE — 36620 INSERTION CATHETER ARTERY: CPT

## 2021-01-01 PROCEDURE — 83605 ASSAY OF LACTIC ACID: CPT

## 2021-01-01 PROCEDURE — 86225 DNA ANTIBODY NATIVE: CPT

## 2021-01-01 PROCEDURE — 83516 IMMUNOASSAY NONANTIBODY: CPT

## 2021-01-01 PROCEDURE — C1894: CPT

## 2021-01-01 PROCEDURE — 71250 CT THORAX DX C-: CPT

## 2021-01-01 PROCEDURE — 82525 ASSAY OF COPPER: CPT

## 2021-01-01 PROCEDURE — 94799 UNLISTED PULMONARY SVC/PX: CPT

## 2021-01-01 PROCEDURE — 95715 VEEG EA 12-26HR INTMT MNTR: CPT

## 2021-01-01 PROCEDURE — 82746 ASSAY OF FOLIC ACID SERUM: CPT

## 2021-01-01 PROCEDURE — 93451 RIGHT HEART CATH: CPT | Mod: 26

## 2021-01-01 PROCEDURE — 82947 ASSAY GLUCOSE BLOOD QUANT: CPT

## 2021-01-01 PROCEDURE — 84100 ASSAY OF PHOSPHORUS: CPT

## 2021-01-01 PROCEDURE — 87086 URINE CULTURE/COLONY COUNT: CPT

## 2021-01-01 PROCEDURE — 84300 ASSAY OF URINE SODIUM: CPT

## 2021-01-01 PROCEDURE — 82330 ASSAY OF CALCIUM: CPT

## 2021-01-01 PROCEDURE — 84156 ASSAY OF PROTEIN URINE: CPT

## 2021-01-01 PROCEDURE — 95711 VEEG 2-12 HR UNMONITORED: CPT

## 2021-01-01 PROCEDURE — 87040 BLOOD CULTURE FOR BACTERIA: CPT

## 2021-01-01 PROCEDURE — 93005 ELECTROCARDIOGRAM TRACING: CPT

## 2021-01-01 PROCEDURE — 85014 HEMATOCRIT: CPT

## 2021-01-01 PROCEDURE — 99195 PHLEBOTOMY: CPT

## 2021-01-01 PROCEDURE — 99223 1ST HOSP IP/OBS HIGH 75: CPT | Mod: 25

## 2021-01-01 PROCEDURE — 70450 CT HEAD/BRAIN W/O DYE: CPT | Mod: 26

## 2021-01-01 PROCEDURE — 71045 X-RAY EXAM CHEST 1 VIEW: CPT

## 2021-01-01 PROCEDURE — 93306 TTE W/DOPPLER COMPLETE: CPT

## 2021-01-01 PROCEDURE — 36556 INSERT NON-TUNNEL CV CATH: CPT | Mod: 77,RT

## 2021-01-01 PROCEDURE — P9037: CPT

## 2021-01-01 PROCEDURE — 82550 ASSAY OF CK (CPK): CPT

## 2021-01-01 PROCEDURE — 85384 FIBRINOGEN ACTIVITY: CPT

## 2021-01-01 PROCEDURE — 86140 C-REACTIVE PROTEIN: CPT

## 2021-01-01 PROCEDURE — 70551 MRI BRAIN STEM W/O DYE: CPT | Mod: 26

## 2021-01-01 PROCEDURE — 95714 VEEG EA 12-26 HR UNMNTR: CPT

## 2021-01-01 PROCEDURE — C1751: CPT

## 2021-01-01 PROCEDURE — 36556 INSERT NON-TUNNEL CV CATH: CPT

## 2021-01-01 PROCEDURE — 93926 LOWER EXTREMITY STUDY: CPT | Mod: 26,RT

## 2021-01-01 PROCEDURE — 86038 ANTINUCLEAR ANTIBODIES: CPT

## 2021-01-01 PROCEDURE — 70450 CT HEAD/BRAIN W/O DYE: CPT

## 2021-01-01 PROCEDURE — 87641 MR-STAPH DNA AMP PROBE: CPT

## 2021-01-01 PROCEDURE — 87389 HIV-1 AG W/HIV-1&-2 AB AG IA: CPT

## 2021-01-01 PROCEDURE — 95718 EEG PHYS/QHP 2-12 HR W/VEEG: CPT

## 2021-01-01 PROCEDURE — 84295 ASSAY OF SERUM SODIUM: CPT

## 2021-01-01 PROCEDURE — 80053 COMPREHEN METABOLIC PANEL: CPT

## 2021-01-01 PROCEDURE — 85379 FIBRIN DEGRADATION QUANT: CPT

## 2021-01-01 PROCEDURE — 84443 ASSAY THYROID STIM HORMONE: CPT

## 2021-01-01 PROCEDURE — 99285 EMERGENCY DEPT VISIT HI MDM: CPT

## 2021-01-01 RX ORDER — POTASSIUM CHLORIDE 20 MEQ
20 PACKET (EA) ORAL ONCE
Refills: 0 | Status: COMPLETED | OUTPATIENT
Start: 2021-01-01 | End: 2021-01-01

## 2021-01-01 RX ORDER — PANTOPRAZOLE SODIUM 20 MG/1
40 TABLET, DELAYED RELEASE ORAL ONCE
Refills: 0 | Status: COMPLETED | OUTPATIENT
Start: 2021-01-01 | End: 2021-01-01

## 2021-01-01 RX ORDER — POTASSIUM CHLORIDE 20 MEQ
1 PACKET (EA) ORAL
Qty: 30 | Refills: 0
Start: 2021-01-01 | End: 2021-01-01

## 2021-01-01 RX ORDER — CEFEPIME 1 G/1
1000 INJECTION, POWDER, FOR SOLUTION INTRAMUSCULAR; INTRAVENOUS ONCE
Refills: 0 | Status: COMPLETED | OUTPATIENT
Start: 2021-01-01 | End: 2021-01-01

## 2021-01-01 RX ORDER — POTASSIUM CHLORIDE 20 MEQ
40 PACKET (EA) ORAL ONCE
Refills: 0 | Status: COMPLETED | OUTPATIENT
Start: 2021-01-01 | End: 2021-01-01

## 2021-01-01 RX ORDER — BUMETANIDE 0.25 MG/ML
2 INJECTION INTRAMUSCULAR; INTRAVENOUS ONCE
Refills: 0 | Status: COMPLETED | OUTPATIENT
Start: 2021-01-01 | End: 2021-01-01

## 2021-01-01 RX ORDER — ROBINUL 0.2 MG/ML
0.4 INJECTION INTRAMUSCULAR; INTRAVENOUS EVERY 6 HOURS
Refills: 0 | Status: DISCONTINUED | OUTPATIENT
Start: 2021-01-01 | End: 2021-01-01

## 2021-01-01 RX ORDER — BUMETANIDE 0.25 MG/ML
4 INJECTION INTRAMUSCULAR; INTRAVENOUS ONCE
Refills: 0 | Status: COMPLETED | OUTPATIENT
Start: 2021-01-01 | End: 2021-01-01

## 2021-01-01 RX ORDER — POTASSIUM CHLORIDE 20 MEQ
40 PACKET (EA) ORAL EVERY 4 HOURS
Refills: 0 | Status: COMPLETED | OUTPATIENT
Start: 2021-01-01 | End: 2021-01-01

## 2021-01-01 RX ORDER — HEPARIN SODIUM 5000 [USP'U]/ML
5000 INJECTION INTRAVENOUS; SUBCUTANEOUS EVERY 8 HOURS
Refills: 0 | Status: DISCONTINUED | OUTPATIENT
Start: 2021-01-01 | End: 2021-01-01

## 2021-01-01 RX ORDER — FENTANYL CITRATE 50 UG/ML
25 INJECTION INTRAVENOUS ONCE
Refills: 0 | Status: DISCONTINUED | OUTPATIENT
Start: 2021-01-01 | End: 2021-01-01

## 2021-01-01 RX ORDER — BUDESONIDE AND FORMOTEROL FUMARATE DIHYDRATE 160; 4.5 UG/1; UG/1
2 AEROSOL RESPIRATORY (INHALATION)
Qty: 0 | Refills: 0 | DISCHARGE

## 2021-01-01 RX ORDER — POTASSIUM CHLORIDE 20 MEQ
40 PACKET (EA) ORAL EVERY 4 HOURS
Refills: 0 | Status: DISCONTINUED | OUTPATIENT
Start: 2021-01-01 | End: 2021-01-01

## 2021-01-01 RX ORDER — NOREPINEPHRINE BITARTRATE/D5W 8 MG/250ML
0.05 PLASTIC BAG, INJECTION (ML) INTRAVENOUS
Qty: 8 | Refills: 0 | Status: DISCONTINUED | OUTPATIENT
Start: 2021-01-01 | End: 2021-01-01

## 2021-01-01 RX ORDER — LOSARTAN POTASSIUM 100 MG/1
25 TABLET, FILM COATED ORAL DAILY
Refills: 0 | Status: DISCONTINUED | OUTPATIENT
Start: 2021-01-01 | End: 2021-01-01

## 2021-01-01 RX ORDER — FENTANYL CITRATE 50 UG/ML
50 INJECTION INTRAVENOUS ONCE
Refills: 0 | Status: DISCONTINUED | OUTPATIENT
Start: 2021-01-01 | End: 2021-01-01

## 2021-01-01 RX ORDER — HYDROMORPHONE HYDROCHLORIDE 2 MG/ML
0.8 INJECTION INTRAMUSCULAR; INTRAVENOUS; SUBCUTANEOUS ONCE
Refills: 0 | Status: DISCONTINUED | OUTPATIENT
Start: 2021-01-01 | End: 2021-01-01

## 2021-01-01 RX ORDER — ROBINUL 0.2 MG/ML
0.4 INJECTION INTRAMUSCULAR; INTRAVENOUS EVERY 4 HOURS
Refills: 0 | Status: DISCONTINUED | OUTPATIENT
Start: 2021-01-01 | End: 2021-01-01

## 2021-01-01 RX ORDER — SODIUM CHLORIDE 9 MG/ML
1000 INJECTION, SOLUTION INTRAVENOUS
Refills: 0 | Status: DISCONTINUED | OUTPATIENT
Start: 2021-01-01 | End: 2021-01-01

## 2021-01-01 RX ORDER — CEFEPIME 1 G/1
1000 INJECTION, POWDER, FOR SOLUTION INTRAMUSCULAR; INTRAVENOUS EVERY 12 HOURS
Refills: 0 | Status: DISCONTINUED | OUTPATIENT
Start: 2021-01-01 | End: 2021-01-01

## 2021-01-01 RX ORDER — ACETAZOLAMIDE 250 MG/1
500 TABLET ORAL ONCE
Refills: 0 | Status: COMPLETED | OUTPATIENT
Start: 2021-01-01 | End: 2021-01-01

## 2021-01-01 RX ORDER — SODIUM,POTASSIUM PHOSPHATES 278-250MG
1 POWDER IN PACKET (EA) ORAL ONCE
Refills: 0 | Status: COMPLETED | OUTPATIENT
Start: 2021-01-01 | End: 2021-01-01

## 2021-01-01 RX ORDER — PROTAMINE SULFATE 10 MG/ML
50 AMPUL (ML) INTRAVENOUS ONCE
Refills: 0 | Status: COMPLETED | OUTPATIENT
Start: 2021-01-01 | End: 2021-01-01

## 2021-01-01 RX ORDER — BUMETANIDE 0.25 MG/ML
1 INJECTION INTRAMUSCULAR; INTRAVENOUS
Qty: 60 | Refills: 0
Start: 2021-01-01 | End: 2021-01-01

## 2021-01-01 RX ORDER — MORPHINE SULFATE 50 MG/1
2 CAPSULE, EXTENDED RELEASE ORAL
Refills: 0 | Status: DISCONTINUED | OUTPATIENT
Start: 2021-01-01 | End: 2021-01-01

## 2021-01-01 RX ORDER — HEPARIN SODIUM 5000 [USP'U]/ML
2000 INJECTION INTRAVENOUS; SUBCUTANEOUS
Qty: 25000 | Refills: 0 | Status: DISCONTINUED | OUTPATIENT
Start: 2021-01-01 | End: 2021-01-01

## 2021-01-01 RX ORDER — POTASSIUM CHLORIDE 20 MEQ
20 PACKET (EA) ORAL
Refills: 0 | Status: COMPLETED | OUTPATIENT
Start: 2021-01-01 | End: 2021-01-01

## 2021-01-01 RX ORDER — PHENYLEPHRINE HYDROCHLORIDE 10 MG/ML
0.8 INJECTION INTRAVENOUS
Qty: 160 | Refills: 0 | Status: DISCONTINUED | OUTPATIENT
Start: 2021-01-01 | End: 2021-01-01

## 2021-01-01 RX ORDER — PROPOFOL 10 MG/ML
25 INJECTION, EMULSION INTRAVENOUS
Qty: 1000 | Refills: 0 | Status: DISCONTINUED | OUTPATIENT
Start: 2021-01-01 | End: 2021-01-01

## 2021-01-01 RX ORDER — SODIUM,POTASSIUM PHOSPHATES 278-250MG
1 POWDER IN PACKET (EA) ORAL ONCE
Refills: 0 | Status: DISCONTINUED | OUTPATIENT
Start: 2021-01-01 | End: 2021-01-01

## 2021-01-01 RX ORDER — POTASSIUM CHLORIDE 20 MEQ
40 PACKET (EA) ORAL ONCE
Refills: 0 | Status: DISCONTINUED | OUTPATIENT
Start: 2021-01-01 | End: 2021-01-01

## 2021-01-01 RX ORDER — NOREPINEPHRINE BITARTRATE/D5W 8 MG/250ML
0.03 PLASTIC BAG, INJECTION (ML) INTRAVENOUS
Qty: 16 | Refills: 0 | Status: DISCONTINUED | OUTPATIENT
Start: 2021-01-01 | End: 2021-01-01

## 2021-01-01 RX ORDER — VANCOMYCIN HCL 1 G
1000 VIAL (EA) INTRAVENOUS ONCE
Refills: 0 | Status: COMPLETED | OUTPATIENT
Start: 2021-01-01 | End: 2021-01-01

## 2021-01-01 RX ORDER — CALCIUM CHLORIDE
1000 POWDER (GRAM) MISCELLANEOUS ONCE
Refills: 0 | Status: COMPLETED | OUTPATIENT
Start: 2021-01-01 | End: 2021-01-01

## 2021-01-01 RX ORDER — CHLORHEXIDINE GLUCONATE 213 G/1000ML
1 SOLUTION TOPICAL
Refills: 0 | Status: DISCONTINUED | OUTPATIENT
Start: 2021-01-01 | End: 2021-01-01

## 2021-01-01 RX ORDER — CALCIUM GLUCONATE 100 MG/ML
2 VIAL (ML) INTRAVENOUS ONCE
Refills: 0 | Status: COMPLETED | OUTPATIENT
Start: 2021-01-01 | End: 2021-01-01

## 2021-01-01 RX ORDER — ALBUTEROL 90 UG/1
2 AEROSOL, METERED ORAL EVERY 6 HOURS
Refills: 0 | Status: DISCONTINUED | OUTPATIENT
Start: 2021-01-01 | End: 2021-01-01

## 2021-01-01 RX ORDER — SODIUM BICARBONATE 1 MEQ/ML
50 SYRINGE (ML) INTRAVENOUS ONCE
Refills: 0 | Status: COMPLETED | OUTPATIENT
Start: 2021-01-01 | End: 2021-01-01

## 2021-01-01 RX ORDER — LOSARTAN POTASSIUM 100 MG/1
1 TABLET, FILM COATED ORAL
Qty: 0 | Refills: 0 | DISCHARGE

## 2021-01-01 RX ORDER — ATORVASTATIN CALCIUM 80 MG/1
1 TABLET, FILM COATED ORAL
Qty: 0 | Refills: 0 | DISCHARGE

## 2021-01-01 RX ORDER — POTASSIUM PHOSPHATE, MONOBASIC POTASSIUM PHOSPHATE, DIBASIC 236; 224 MG/ML; MG/ML
15 INJECTION, SOLUTION INTRAVENOUS ONCE
Refills: 0 | Status: COMPLETED | OUTPATIENT
Start: 2021-01-01 | End: 2021-01-01

## 2021-01-01 RX ORDER — SODIUM CHLORIDE 9 MG/ML
500 INJECTION, SOLUTION INTRAVENOUS ONCE
Refills: 0 | Status: COMPLETED | OUTPATIENT
Start: 2021-01-01 | End: 2021-01-01

## 2021-01-01 RX ORDER — VASOPRESSIN 20 [USP'U]/ML
0.05 INJECTION INTRAVENOUS
Qty: 50 | Refills: 0 | Status: DISCONTINUED | OUTPATIENT
Start: 2021-01-01 | End: 2021-01-01

## 2021-01-01 RX ORDER — BUMETANIDE 0.25 MG/ML
4 INJECTION INTRAMUSCULAR; INTRAVENOUS ONCE
Refills: 0 | Status: DISCONTINUED | OUTPATIENT
Start: 2021-01-01 | End: 2021-01-01

## 2021-01-01 RX ORDER — CEFEPIME 1 G/1
INJECTION, POWDER, FOR SOLUTION INTRAMUSCULAR; INTRAVENOUS
Refills: 0 | Status: DISCONTINUED | OUTPATIENT
Start: 2021-01-01 | End: 2021-01-01

## 2021-01-01 RX ORDER — BUDESONIDE AND FORMOTEROL FUMARATE DIHYDRATE 160; 4.5 UG/1; UG/1
2 AEROSOL RESPIRATORY (INHALATION)
Refills: 0 | Status: DISCONTINUED | OUTPATIENT
Start: 2021-01-01 | End: 2021-01-01

## 2021-01-01 RX ORDER — MOXIFLOXACIN HYDROCHLORIDE TABLETS, 400 MG 400 MG/1
1 TABLET, FILM COATED ORAL
Qty: 0 | Refills: 0 | DISCHARGE

## 2021-01-01 RX ORDER — LACTULOSE 10 G/15ML
20 SOLUTION ORAL
Refills: 0 | Status: DISCONTINUED | OUTPATIENT
Start: 2021-01-01 | End: 2021-01-01

## 2021-01-01 RX ORDER — ACETAZOLAMIDE 250 MG/1
500 TABLET ORAL ONCE
Refills: 0 | Status: DISCONTINUED | OUTPATIENT
Start: 2021-01-01 | End: 2021-01-01

## 2021-01-01 RX ORDER — HYDROMORPHONE HYDROCHLORIDE 2 MG/ML
0.5 INJECTION INTRAMUSCULAR; INTRAVENOUS; SUBCUTANEOUS
Refills: 0 | Status: DISCONTINUED | OUTPATIENT
Start: 2021-01-01 | End: 2021-01-01

## 2021-01-01 RX ORDER — ASPIRIN/CALCIUM CARB/MAGNESIUM 324 MG
325 TABLET ORAL ONCE
Refills: 0 | Status: COMPLETED | OUTPATIENT
Start: 2021-01-01 | End: 2021-01-01

## 2021-01-01 RX ORDER — AMIODARONE HYDROCHLORIDE 400 MG/1
150 TABLET ORAL ONCE
Refills: 0 | Status: COMPLETED | OUTPATIENT
Start: 2021-01-01 | End: 2021-01-01

## 2021-01-01 RX ORDER — APIXABAN 2.5 MG/1
1 TABLET, FILM COATED ORAL
Qty: 0 | Refills: 0 | DISCHARGE

## 2021-01-01 RX ORDER — PANTOPRAZOLE SODIUM 20 MG/1
40 TABLET, DELAYED RELEASE ORAL DAILY
Refills: 0 | Status: DISCONTINUED | OUTPATIENT
Start: 2021-01-01 | End: 2021-01-01

## 2021-01-01 RX ORDER — BUMETANIDE 0.25 MG/ML
1 INJECTION INTRAMUSCULAR; INTRAVENOUS
Qty: 20 | Refills: 0 | Status: DISCONTINUED | OUTPATIENT
Start: 2021-01-01 | End: 2021-01-01

## 2021-01-01 RX ORDER — BUMETANIDE 0.25 MG/ML
1 INJECTION INTRAMUSCULAR; INTRAVENOUS
Refills: 0 | Status: DISCONTINUED | OUTPATIENT
Start: 2021-01-01 | End: 2021-01-01

## 2021-01-01 RX ORDER — PHYTONADIONE (VIT K1) 5 MG
10 TABLET ORAL ONCE
Refills: 0 | Status: COMPLETED | OUTPATIENT
Start: 2021-01-01 | End: 2021-01-01

## 2021-01-01 RX ORDER — FUROSEMIDE 40 MG
1 TABLET ORAL
Qty: 0 | Refills: 0 | DISCHARGE

## 2021-01-01 RX ORDER — POTASSIUM PHOSPHATE, MONOBASIC POTASSIUM PHOSPHATE, DIBASIC 236; 224 MG/ML; MG/ML
30 INJECTION, SOLUTION INTRAVENOUS ONCE
Refills: 0 | Status: COMPLETED | OUTPATIENT
Start: 2021-01-01 | End: 2021-01-01

## 2021-01-01 RX ORDER — CEFEPIME 1 G/1
2000 INJECTION, POWDER, FOR SOLUTION INTRAMUSCULAR; INTRAVENOUS EVERY 24 HOURS
Refills: 0 | Status: DISCONTINUED | OUTPATIENT
Start: 2021-01-01 | End: 2021-01-01

## 2021-01-01 RX ORDER — ALBUTEROL 90 UG/1
2 AEROSOL, METERED ORAL
Qty: 0 | Refills: 0 | DISCHARGE

## 2021-01-01 RX ORDER — ATORVASTATIN CALCIUM 80 MG/1
40 TABLET, FILM COATED ORAL AT BEDTIME
Refills: 0 | Status: DISCONTINUED | OUTPATIENT
Start: 2021-01-01 | End: 2021-01-01

## 2021-01-01 RX ORDER — PROTAMINE SULFATE 10 MG/ML
50 AMPUL (ML) INTRAVENOUS ONCE
Refills: 0 | Status: DISCONTINUED | OUTPATIENT
Start: 2021-01-01 | End: 2021-01-01

## 2021-01-01 RX ORDER — FUROSEMIDE 40 MG
40 TABLET ORAL ONCE
Refills: 0 | Status: COMPLETED | OUTPATIENT
Start: 2021-01-01 | End: 2021-01-01

## 2021-01-01 RX ORDER — SODIUM CHLORIDE 9 MG/ML
10 INJECTION INTRAMUSCULAR; INTRAVENOUS; SUBCUTANEOUS
Refills: 0 | Status: DISCONTINUED | OUTPATIENT
Start: 2021-01-01 | End: 2021-01-01

## 2021-01-01 RX ORDER — BUMETANIDE 0.25 MG/ML
3 INJECTION INTRAMUSCULAR; INTRAVENOUS
Qty: 20 | Refills: 0 | Status: DISCONTINUED | OUTPATIENT
Start: 2021-01-01 | End: 2021-01-01

## 2021-01-01 RX ORDER — HYDROMORPHONE HYDROCHLORIDE 2 MG/ML
0.5 INJECTION INTRAMUSCULAR; INTRAVENOUS; SUBCUTANEOUS ONCE
Refills: 0 | Status: DISCONTINUED | OUTPATIENT
Start: 2021-01-01 | End: 2021-01-01

## 2021-01-01 RX ORDER — APIXABAN 2.5 MG/1
2.5 TABLET, FILM COATED ORAL
Refills: 0 | Status: DISCONTINUED | OUTPATIENT
Start: 2021-01-01 | End: 2021-01-01

## 2021-01-01 RX ORDER — CEFEPIME 1 G/1
2000 INJECTION, POWDER, FOR SOLUTION INTRAMUSCULAR; INTRAVENOUS ONCE
Refills: 0 | Status: COMPLETED | OUTPATIENT
Start: 2021-01-01 | End: 2021-01-01

## 2021-01-01 RX ORDER — NOREPINEPHRINE BITARTRATE/D5W 8 MG/250ML
0.04 PLASTIC BAG, INJECTION (ML) INTRAVENOUS
Qty: 16 | Refills: 0 | Status: DISCONTINUED | OUTPATIENT
Start: 2021-01-01 | End: 2021-01-01

## 2021-01-01 RX ORDER — CHLORHEXIDINE GLUCONATE 213 G/1000ML
15 SOLUTION TOPICAL EVERY 12 HOURS
Refills: 0 | Status: DISCONTINUED | OUTPATIENT
Start: 2021-01-01 | End: 2021-01-01

## 2021-01-01 RX ORDER — PROPOFOL 10 MG/ML
30 INJECTION, EMULSION INTRAVENOUS
Qty: 500 | Refills: 0 | Status: DISCONTINUED | OUTPATIENT
Start: 2021-01-01 | End: 2021-01-01

## 2021-01-01 RX ORDER — ACETAMINOPHEN 500 MG
650 TABLET ORAL EVERY 6 HOURS
Refills: 0 | Status: DISCONTINUED | OUTPATIENT
Start: 2021-01-01 | End: 2021-01-01

## 2021-01-01 RX ORDER — POTASSIUM CHLORIDE 20 MEQ
10 PACKET (EA) ORAL
Refills: 0 | Status: COMPLETED | OUTPATIENT
Start: 2021-01-01 | End: 2021-01-01

## 2021-01-01 RX ORDER — VASOPRESSIN 20 [USP'U]/ML
0.04 INJECTION INTRAVENOUS
Qty: 50 | Refills: 0 | Status: DISCONTINUED | OUTPATIENT
Start: 2021-01-01 | End: 2021-01-01

## 2021-01-01 RX ORDER — HEPARIN SODIUM 5000 [USP'U]/ML
1500 INJECTION INTRAVENOUS; SUBCUTANEOUS
Qty: 25000 | Refills: 0 | Status: DISCONTINUED | OUTPATIENT
Start: 2021-01-01 | End: 2021-01-01

## 2021-01-01 RX ORDER — HEPARIN SODIUM 5000 [USP'U]/ML
5000 INJECTION INTRAVENOUS; SUBCUTANEOUS EVERY 12 HOURS
Refills: 0 | Status: DISCONTINUED | OUTPATIENT
Start: 2021-01-01 | End: 2021-01-01

## 2021-01-01 RX ORDER — VANCOMYCIN HCL 1 G
1250 VIAL (EA) INTRAVENOUS ONCE
Refills: 0 | Status: COMPLETED | OUTPATIENT
Start: 2021-01-01 | End: 2021-01-01

## 2021-01-01 RX ORDER — VASOPRESSIN 20 [USP'U]/ML
0.06 INJECTION INTRAVENOUS
Qty: 50 | Refills: 0 | Status: DISCONTINUED | OUTPATIENT
Start: 2021-01-01 | End: 2021-01-01

## 2021-01-01 RX ORDER — LACTULOSE 10 G/15ML
20 SOLUTION ORAL EVERY 12 HOURS
Refills: 0 | Status: DISCONTINUED | OUTPATIENT
Start: 2021-01-01 | End: 2021-01-01

## 2021-01-01 RX ORDER — SODIUM CHLORIDE 9 MG/ML
1000 INJECTION INTRAMUSCULAR; INTRAVENOUS; SUBCUTANEOUS
Refills: 0 | Status: DISCONTINUED | OUTPATIENT
Start: 2021-01-01 | End: 2021-01-01

## 2021-01-01 RX ADMIN — CHLORHEXIDINE GLUCONATE 15 MILLILITER(S): 213 SOLUTION TOPICAL at 17:29

## 2021-01-01 RX ADMIN — Medication 10.5 MICROGRAM(S)/KG/MIN: at 11:25

## 2021-01-01 RX ADMIN — HEPARIN SODIUM 5000 UNIT(S): 5000 INJECTION INTRAVENOUS; SUBCUTANEOUS at 06:05

## 2021-01-01 RX ADMIN — CHLORHEXIDINE GLUCONATE 15 MILLILITER(S): 213 SOLUTION TOPICAL at 18:20

## 2021-01-01 RX ADMIN — HEPARIN SODIUM 5000 UNIT(S): 5000 INJECTION INTRAVENOUS; SUBCUTANEOUS at 13:25

## 2021-01-01 RX ADMIN — Medication 100 MILLIEQUIVALENT(S): at 07:38

## 2021-01-01 RX ADMIN — Medication 40 MILLIEQUIVALENT(S): at 13:07

## 2021-01-01 RX ADMIN — HEPARIN SODIUM 5000 UNIT(S): 5000 INJECTION INTRAVENOUS; SUBCUTANEOUS at 06:19

## 2021-01-01 RX ADMIN — FENTANYL CITRATE 25 MICROGRAM(S): 50 INJECTION INTRAVENOUS at 08:37

## 2021-01-01 RX ADMIN — Medication 1 PACKET(S): at 19:28

## 2021-01-01 RX ADMIN — PANTOPRAZOLE SODIUM 40 MILLIGRAM(S): 20 TABLET, DELAYED RELEASE ORAL at 15:42

## 2021-01-01 RX ADMIN — CHLORHEXIDINE GLUCONATE 15 MILLILITER(S): 213 SOLUTION TOPICAL at 05:28

## 2021-01-01 RX ADMIN — BUMETANIDE 10 MG/HR: 0.25 INJECTION INTRAMUSCULAR; INTRAVENOUS at 16:41

## 2021-01-01 RX ADMIN — SODIUM CHLORIDE 10 MILLILITER(S): 9 INJECTION INTRAMUSCULAR; INTRAVENOUS; SUBCUTANEOUS at 20:32

## 2021-01-01 RX ADMIN — SODIUM CHLORIDE 10 MILLILITER(S): 9 INJECTION INTRAMUSCULAR; INTRAVENOUS; SUBCUTANEOUS at 19:49

## 2021-01-01 RX ADMIN — Medication 100 MILLIEQUIVALENT(S): at 05:06

## 2021-01-01 RX ADMIN — CEFEPIME 100 MILLIGRAM(S): 1 INJECTION, POWDER, FOR SOLUTION INTRAMUSCULAR; INTRAVENOUS at 05:46

## 2021-01-01 RX ADMIN — HEPARIN SODIUM 5000 UNIT(S): 5000 INJECTION INTRAVENOUS; SUBCUTANEOUS at 21:38

## 2021-01-01 RX ADMIN — CHLORHEXIDINE GLUCONATE 15 MILLILITER(S): 213 SOLUTION TOPICAL at 05:50

## 2021-01-01 RX ADMIN — HYDROMORPHONE HYDROCHLORIDE 0.8 MILLIGRAM(S): 2 INJECTION INTRAMUSCULAR; INTRAVENOUS; SUBCUTANEOUS at 17:50

## 2021-01-01 RX ADMIN — LACTULOSE 20 GRAM(S): 10 SOLUTION ORAL at 18:06

## 2021-01-01 RX ADMIN — Medication 3.16 MICROGRAM(S)/KG/MIN: at 01:23

## 2021-01-01 RX ADMIN — HYDROMORPHONE HYDROCHLORIDE 0.8 MILLIGRAM(S): 2 INJECTION INTRAMUSCULAR; INTRAVENOUS; SUBCUTANEOUS at 18:15

## 2021-01-01 RX ADMIN — ATORVASTATIN CALCIUM 40 MILLIGRAM(S): 80 TABLET, FILM COATED ORAL at 23:31

## 2021-01-01 RX ADMIN — BUMETANIDE 10 MG/HR: 0.25 INJECTION INTRAMUSCULAR; INTRAVENOUS at 13:20

## 2021-01-01 RX ADMIN — HEPARIN SODIUM 5000 UNIT(S): 5000 INJECTION INTRAVENOUS; SUBCUTANEOUS at 21:11

## 2021-01-01 RX ADMIN — CHLORHEXIDINE GLUCONATE 15 MILLILITER(S): 213 SOLUTION TOPICAL at 06:07

## 2021-01-01 RX ADMIN — Medication 100 MILLIEQUIVALENT(S): at 21:49

## 2021-01-01 RX ADMIN — HEPARIN SODIUM 5000 UNIT(S): 5000 INJECTION INTRAVENOUS; SUBCUTANEOUS at 05:12

## 2021-01-01 RX ADMIN — BUMETANIDE 1 MILLIGRAM(S): 0.25 INJECTION INTRAMUSCULAR; INTRAVENOUS at 05:14

## 2021-01-01 RX ADMIN — PANTOPRAZOLE SODIUM 40 MILLIGRAM(S): 20 TABLET, DELAYED RELEASE ORAL at 13:15

## 2021-01-01 RX ADMIN — CEFEPIME 100 MILLIGRAM(S): 1 INJECTION, POWDER, FOR SOLUTION INTRAMUSCULAR; INTRAVENOUS at 17:16

## 2021-01-01 RX ADMIN — CHLORHEXIDINE GLUCONATE 15 MILLILITER(S): 213 SOLUTION TOPICAL at 18:53

## 2021-01-01 RX ADMIN — SODIUM CHLORIDE 10 MILLILITER(S): 9 INJECTION INTRAMUSCULAR; INTRAVENOUS; SUBCUTANEOUS at 18:20

## 2021-01-01 RX ADMIN — Medication 10.5 MICROGRAM(S)/KG/MIN: at 14:16

## 2021-01-01 RX ADMIN — Medication 50 MILLIEQUIVALENT(S): at 23:37

## 2021-01-01 RX ADMIN — ACETAZOLAMIDE 500 MILLIGRAM(S): 250 TABLET ORAL at 13:28

## 2021-01-01 RX ADMIN — Medication 40 MILLIEQUIVALENT(S): at 18:24

## 2021-01-01 RX ADMIN — AMIODARONE HYDROCHLORIDE 600 MILLIGRAM(S): 400 TABLET ORAL at 00:56

## 2021-01-01 RX ADMIN — CHLORHEXIDINE GLUCONATE 15 MILLILITER(S): 213 SOLUTION TOPICAL at 05:46

## 2021-01-01 RX ADMIN — Medication 40 MILLIEQUIVALENT(S): at 02:47

## 2021-01-01 RX ADMIN — BUMETANIDE 2 MILLIGRAM(S): 0.25 INJECTION INTRAMUSCULAR; INTRAVENOUS at 20:26

## 2021-01-01 RX ADMIN — CHLORHEXIDINE GLUCONATE 1 APPLICATION(S): 213 SOLUTION TOPICAL at 05:34

## 2021-01-01 RX ADMIN — CHLORHEXIDINE GLUCONATE 15 MILLILITER(S): 213 SOLUTION TOPICAL at 05:17

## 2021-01-01 RX ADMIN — Medication 5 MILLIGRAM(S): at 23:31

## 2021-01-01 RX ADMIN — Medication 40 MILLIEQUIVALENT(S): at 13:20

## 2021-01-01 RX ADMIN — Medication 20 MILLIEQUIVALENT(S): at 18:27

## 2021-01-01 RX ADMIN — SODIUM CHLORIDE 10 MILLILITER(S): 9 INJECTION INTRAMUSCULAR; INTRAVENOUS; SUBCUTANEOUS at 17:34

## 2021-01-01 RX ADMIN — CHLORHEXIDINE GLUCONATE 15 MILLILITER(S): 213 SOLUTION TOPICAL at 18:06

## 2021-01-01 RX ADMIN — HEPARIN SODIUM 5000 UNIT(S): 5000 INJECTION INTRAVENOUS; SUBCUTANEOUS at 05:50

## 2021-01-01 RX ADMIN — Medication 102 MILLIGRAM(S): at 13:44

## 2021-01-01 RX ADMIN — ATORVASTATIN CALCIUM 40 MILLIGRAM(S): 80 TABLET, FILM COATED ORAL at 21:16

## 2021-01-01 RX ADMIN — ATORVASTATIN CALCIUM 40 MILLIGRAM(S): 80 TABLET, FILM COATED ORAL at 21:45

## 2021-01-01 RX ADMIN — CHLORHEXIDINE GLUCONATE 15 MILLILITER(S): 213 SOLUTION TOPICAL at 17:31

## 2021-01-01 RX ADMIN — BUMETANIDE 5 MG/HR: 0.25 INJECTION INTRAMUSCULAR; INTRAVENOUS at 07:00

## 2021-01-01 RX ADMIN — BUMETANIDE 5 MG/HR: 0.25 INJECTION INTRAMUSCULAR; INTRAVENOUS at 08:36

## 2021-01-01 RX ADMIN — CEFEPIME 100 MILLIGRAM(S): 1 INJECTION, POWDER, FOR SOLUTION INTRAMUSCULAR; INTRAVENOUS at 05:39

## 2021-01-01 RX ADMIN — CHLORHEXIDINE GLUCONATE 15 MILLILITER(S): 213 SOLUTION TOPICAL at 17:20

## 2021-01-01 RX ADMIN — PANTOPRAZOLE SODIUM 40 MILLIGRAM(S): 20 TABLET, DELAYED RELEASE ORAL at 15:37

## 2021-01-01 RX ADMIN — BUMETANIDE 5 MG/HR: 0.25 INJECTION INTRAMUSCULAR; INTRAVENOUS at 17:05

## 2021-01-01 RX ADMIN — LOSARTAN POTASSIUM 25 MILLIGRAM(S): 100 TABLET, FILM COATED ORAL at 05:36

## 2021-01-01 RX ADMIN — ATORVASTATIN CALCIUM 40 MILLIGRAM(S): 80 TABLET, FILM COATED ORAL at 21:35

## 2021-01-01 RX ADMIN — Medication 50 MILLIEQUIVALENT(S): at 20:25

## 2021-01-01 RX ADMIN — PROPOFOL 18 MICROGRAM(S)/KG/MIN: 10 INJECTION, EMULSION INTRAVENOUS at 20:32

## 2021-01-01 RX ADMIN — PANTOPRAZOLE SODIUM 40 MILLIGRAM(S): 20 TABLET, DELAYED RELEASE ORAL at 11:25

## 2021-01-01 RX ADMIN — BUDESONIDE AND FORMOTEROL FUMARATE DIHYDRATE 2 PUFF(S): 160; 4.5 AEROSOL RESPIRATORY (INHALATION) at 05:50

## 2021-01-01 RX ADMIN — Medication 40 MILLIEQUIVALENT(S): at 21:16

## 2021-01-01 RX ADMIN — HEPARIN SODIUM 5000 UNIT(S): 5000 INJECTION INTRAVENOUS; SUBCUTANEOUS at 21:00

## 2021-01-01 RX ADMIN — CEFEPIME 100 MILLIGRAM(S): 1 INJECTION, POWDER, FOR SOLUTION INTRAMUSCULAR; INTRAVENOUS at 05:15

## 2021-01-01 RX ADMIN — APIXABAN 2.5 MILLIGRAM(S): 2.5 TABLET, FILM COATED ORAL at 17:17

## 2021-01-01 RX ADMIN — FENTANYL CITRATE 25 MICROGRAM(S): 50 INJECTION INTRAVENOUS at 14:00

## 2021-01-01 RX ADMIN — HEPARIN SODIUM 5000 UNIT(S): 5000 INJECTION INTRAVENOUS; SUBCUTANEOUS at 05:06

## 2021-01-01 RX ADMIN — Medication 50 MILLIEQUIVALENT(S): at 20:58

## 2021-01-01 RX ADMIN — FENTANYL CITRATE 50 MICROGRAM(S): 50 INJECTION INTRAVENOUS at 02:10

## 2021-01-01 RX ADMIN — CHLORHEXIDINE GLUCONATE 1 APPLICATION(S): 213 SOLUTION TOPICAL at 06:00

## 2021-01-01 RX ADMIN — HEPARIN SODIUM 5000 UNIT(S): 5000 INJECTION INTRAVENOUS; SUBCUTANEOUS at 21:49

## 2021-01-01 RX ADMIN — VASOPRESSIN 4.8 UNIT(S)/MIN: 20 INJECTION INTRAVENOUS at 07:00

## 2021-01-01 RX ADMIN — BUMETANIDE 2 MILLIGRAM(S): 0.25 INJECTION INTRAMUSCULAR; INTRAVENOUS at 15:07

## 2021-01-01 RX ADMIN — Medication 100 MILLIEQUIVALENT(S): at 03:45

## 2021-01-01 RX ADMIN — APIXABAN 2.5 MILLIGRAM(S): 2.5 TABLET, FILM COATED ORAL at 06:36

## 2021-01-01 RX ADMIN — AMIODARONE HYDROCHLORIDE 600 MILLIGRAM(S): 400 TABLET ORAL at 11:30

## 2021-01-01 RX ADMIN — LACTULOSE 20 GRAM(S): 10 SOLUTION ORAL at 12:21

## 2021-01-01 RX ADMIN — CHLORHEXIDINE GLUCONATE 1 APPLICATION(S): 213 SOLUTION TOPICAL at 06:20

## 2021-01-01 RX ADMIN — CHLORHEXIDINE GLUCONATE 15 MILLILITER(S): 213 SOLUTION TOPICAL at 05:12

## 2021-01-01 RX ADMIN — Medication 10.5 MICROGRAM(S)/KG/MIN: at 21:22

## 2021-01-01 RX ADMIN — HYDROMORPHONE HYDROCHLORIDE 0.5 MILLIGRAM(S): 2 INJECTION INTRAMUSCULAR; INTRAVENOUS; SUBCUTANEOUS at 17:36

## 2021-01-01 RX ADMIN — VASOPRESSIN 2.4 UNIT(S)/MIN: 20 INJECTION INTRAVENOUS at 19:29

## 2021-01-01 RX ADMIN — CHLORHEXIDINE GLUCONATE 1 APPLICATION(S): 213 SOLUTION TOPICAL at 06:04

## 2021-01-01 RX ADMIN — Medication 1000 MILLIGRAM(S): at 11:13

## 2021-01-01 RX ADMIN — Medication 650 MILLIGRAM(S): at 20:40

## 2021-01-01 RX ADMIN — ATORVASTATIN CALCIUM 40 MILLIGRAM(S): 80 TABLET, FILM COATED ORAL at 21:31

## 2021-01-01 RX ADMIN — ATORVASTATIN CALCIUM 40 MILLIGRAM(S): 80 TABLET, FILM COATED ORAL at 21:43

## 2021-01-01 RX ADMIN — CHLORHEXIDINE GLUCONATE 15 MILLILITER(S): 213 SOLUTION TOPICAL at 17:08

## 2021-01-01 RX ADMIN — HEPARIN SODIUM 5000 UNIT(S): 5000 INJECTION INTRAVENOUS; SUBCUTANEOUS at 21:31

## 2021-01-01 RX ADMIN — VASOPRESSIN 3.6 UNIT(S)/MIN: 20 INJECTION INTRAVENOUS at 21:36

## 2021-01-01 RX ADMIN — CHLORHEXIDINE GLUCONATE 15 MILLILITER(S): 213 SOLUTION TOPICAL at 19:02

## 2021-01-01 RX ADMIN — Medication 50 MILLIEQUIVALENT(S): at 21:45

## 2021-01-01 RX ADMIN — Medication 20 MILLIEQUIVALENT(S): at 06:47

## 2021-01-01 RX ADMIN — BUDESONIDE AND FORMOTEROL FUMARATE DIHYDRATE 2 PUFF(S): 160; 4.5 AEROSOL RESPIRATORY (INHALATION) at 05:36

## 2021-01-01 RX ADMIN — Medication 200 GRAM(S): at 11:13

## 2021-01-01 RX ADMIN — VASOPRESSIN 3.6 UNIT(S)/MIN: 20 INJECTION INTRAVENOUS at 08:36

## 2021-01-01 RX ADMIN — HEPARIN SODIUM 5000 UNIT(S): 5000 INJECTION INTRAVENOUS; SUBCUTANEOUS at 13:26

## 2021-01-01 RX ADMIN — CHLORHEXIDINE GLUCONATE 1 APPLICATION(S): 213 SOLUTION TOPICAL at 05:17

## 2021-01-01 RX ADMIN — BUMETANIDE 10 MG/HR: 0.25 INJECTION INTRAMUSCULAR; INTRAVENOUS at 04:44

## 2021-01-01 RX ADMIN — Medication 10 MILLIGRAM(S): at 05:46

## 2021-01-01 RX ADMIN — VASOPRESSIN 4.8 UNIT(S)/MIN: 20 INJECTION INTRAVENOUS at 23:49

## 2021-01-01 RX ADMIN — Medication 50 MILLIEQUIVALENT(S): at 11:14

## 2021-01-01 RX ADMIN — BUMETANIDE 5 MG/HR: 0.25 INJECTION INTRAMUSCULAR; INTRAVENOUS at 11:25

## 2021-01-01 RX ADMIN — BUMETANIDE 10 MG/HR: 0.25 INJECTION INTRAMUSCULAR; INTRAVENOUS at 01:22

## 2021-01-01 RX ADMIN — CHLORHEXIDINE GLUCONATE 1 APPLICATION(S): 213 SOLUTION TOPICAL at 05:51

## 2021-01-01 RX ADMIN — Medication 325 MILLIGRAM(S): at 20:02

## 2021-01-01 RX ADMIN — HEPARIN SODIUM 5000 UNIT(S): 5000 INJECTION INTRAVENOUS; SUBCUTANEOUS at 05:25

## 2021-01-01 RX ADMIN — BUDESONIDE AND FORMOTEROL FUMARATE DIHYDRATE 2 PUFF(S): 160; 4.5 AEROSOL RESPIRATORY (INHALATION) at 19:08

## 2021-01-01 RX ADMIN — Medication 9.38 MICROGRAM(S)/KG/MIN: at 11:18

## 2021-01-01 RX ADMIN — ATORVASTATIN CALCIUM 40 MILLIGRAM(S): 80 TABLET, FILM COATED ORAL at 21:00

## 2021-01-01 RX ADMIN — CHLORHEXIDINE GLUCONATE 1 APPLICATION(S): 213 SOLUTION TOPICAL at 06:03

## 2021-01-01 RX ADMIN — BUMETANIDE 5 MG/HR: 0.25 INJECTION INTRAMUSCULAR; INTRAVENOUS at 19:28

## 2021-01-01 RX ADMIN — Medication 3.16 MICROGRAM(S)/KG/MIN: at 19:48

## 2021-01-01 RX ADMIN — Medication 5 MILLIGRAM(S): at 21:35

## 2021-01-01 RX ADMIN — Medication 40 MILLIEQUIVALENT(S): at 17:17

## 2021-01-01 RX ADMIN — Medication 330 MILLIGRAM(S): at 11:13

## 2021-01-01 RX ADMIN — Medication 100 MILLIEQUIVALENT(S): at 06:05

## 2021-01-01 RX ADMIN — CEFEPIME 100 MILLIGRAM(S): 1 INJECTION, POWDER, FOR SOLUTION INTRAMUSCULAR; INTRAVENOUS at 18:20

## 2021-01-01 RX ADMIN — PANTOPRAZOLE SODIUM 40 MILLIGRAM(S): 20 TABLET, DELAYED RELEASE ORAL at 12:06

## 2021-01-01 RX ADMIN — CEFEPIME 100 MILLIGRAM(S): 1 INJECTION, POWDER, FOR SOLUTION INTRAMUSCULAR; INTRAVENOUS at 13:37

## 2021-01-01 RX ADMIN — CEFEPIME 100 MILLIGRAM(S): 1 INJECTION, POWDER, FOR SOLUTION INTRAMUSCULAR; INTRAVENOUS at 16:41

## 2021-01-01 RX ADMIN — Medication 250 MILLIGRAM(S): at 18:27

## 2021-01-01 RX ADMIN — CEFEPIME 100 MILLIGRAM(S): 1 INJECTION, POWDER, FOR SOLUTION INTRAMUSCULAR; INTRAVENOUS at 13:59

## 2021-01-01 RX ADMIN — Medication 10 MILLIGRAM(S): at 20:35

## 2021-01-01 RX ADMIN — FENTANYL CITRATE 50 MICROGRAM(S): 50 INJECTION INTRAVENOUS at 06:00

## 2021-01-01 RX ADMIN — HEPARIN SODIUM 5000 UNIT(S): 5000 INJECTION INTRAVENOUS; SUBCUTANEOUS at 21:01

## 2021-01-01 RX ADMIN — ATORVASTATIN CALCIUM 40 MILLIGRAM(S): 80 TABLET, FILM COATED ORAL at 21:04

## 2021-01-01 RX ADMIN — BUMETANIDE 15 MG/HR: 0.25 INJECTION INTRAMUSCULAR; INTRAVENOUS at 17:56

## 2021-01-01 RX ADMIN — PANTOPRAZOLE SODIUM 40 MILLIGRAM(S): 20 TABLET, DELAYED RELEASE ORAL at 17:27

## 2021-01-01 RX ADMIN — HEPARIN SODIUM 5000 UNIT(S): 5000 INJECTION INTRAVENOUS; SUBCUTANEOUS at 13:59

## 2021-01-01 RX ADMIN — HEPARIN SODIUM 5000 UNIT(S): 5000 INJECTION INTRAVENOUS; SUBCUTANEOUS at 13:16

## 2021-01-01 RX ADMIN — Medication 10 MILLIGRAM(S): at 06:39

## 2021-01-01 RX ADMIN — Medication 10.5 MICROGRAM(S)/KG/MIN: at 07:00

## 2021-01-01 RX ADMIN — Medication 10.5 MICROGRAM(S)/KG/MIN: at 10:30

## 2021-01-01 RX ADMIN — CHLORHEXIDINE GLUCONATE 15 MILLILITER(S): 213 SOLUTION TOPICAL at 06:19

## 2021-01-01 RX ADMIN — Medication 10.5 MICROGRAM(S)/KG/MIN: at 19:31

## 2021-01-01 RX ADMIN — PROPOFOL 18 MICROGRAM(S)/KG/MIN: 10 INJECTION, EMULSION INTRAVENOUS at 07:00

## 2021-01-01 RX ADMIN — Medication 50 MILLIEQUIVALENT(S): at 19:31

## 2021-01-01 RX ADMIN — Medication 5 MILLIGRAM(S): at 21:10

## 2021-01-01 RX ADMIN — Medication 650 MILLIGRAM(S): at 20:13

## 2021-01-01 RX ADMIN — FENTANYL CITRATE 50 MICROGRAM(S): 50 INJECTION INTRAVENOUS at 05:10

## 2021-01-01 RX ADMIN — BUMETANIDE 10 MG/HR: 0.25 INJECTION INTRAMUSCULAR; INTRAVENOUS at 09:30

## 2021-01-01 RX ADMIN — Medication 0.5 MILLIGRAM(S): at 17:35

## 2021-01-01 RX ADMIN — PANTOPRAZOLE SODIUM 40 MILLIGRAM(S): 20 TABLET, DELAYED RELEASE ORAL at 11:20

## 2021-01-01 RX ADMIN — HEPARIN SODIUM 10 UNIT(S)/HR: 5000 INJECTION INTRAVENOUS; SUBCUTANEOUS at 02:39

## 2021-01-01 RX ADMIN — BUMETANIDE 1 MILLIGRAM(S): 0.25 INJECTION INTRAMUSCULAR; INTRAVENOUS at 05:50

## 2021-01-01 RX ADMIN — BUMETANIDE 10 MG/HR: 0.25 INJECTION INTRAMUSCULAR; INTRAVENOUS at 21:49

## 2021-01-01 RX ADMIN — BUMETANIDE 1 MILLIGRAM(S): 0.25 INJECTION INTRAMUSCULAR; INTRAVENOUS at 17:17

## 2021-01-01 RX ADMIN — Medication 20 MILLIEQUIVALENT(S): at 05:25

## 2021-01-01 RX ADMIN — ATORVASTATIN CALCIUM 40 MILLIGRAM(S): 80 TABLET, FILM COATED ORAL at 21:38

## 2021-01-01 RX ADMIN — CHLORHEXIDINE GLUCONATE 15 MILLILITER(S): 213 SOLUTION TOPICAL at 18:18

## 2021-01-01 RX ADMIN — POTASSIUM PHOSPHATE, MONOBASIC POTASSIUM PHOSPHATE, DIBASIC 83.33 MILLIMOLE(S): 236; 224 INJECTION, SOLUTION INTRAVENOUS at 22:50

## 2021-01-01 RX ADMIN — APIXABAN 2.5 MILLIGRAM(S): 2.5 TABLET, FILM COATED ORAL at 05:50

## 2021-01-01 RX ADMIN — HEPARIN SODIUM 5000 UNIT(S): 5000 INJECTION INTRAVENOUS; SUBCUTANEOUS at 13:37

## 2021-01-01 RX ADMIN — APIXABAN 2.5 MILLIGRAM(S): 2.5 TABLET, FILM COATED ORAL at 05:14

## 2021-01-01 RX ADMIN — HEPARIN SODIUM 5000 UNIT(S): 5000 INJECTION INTRAVENOUS; SUBCUTANEOUS at 14:02

## 2021-01-01 RX ADMIN — HEPARIN SODIUM 5000 UNIT(S): 5000 INJECTION INTRAVENOUS; SUBCUTANEOUS at 21:16

## 2021-01-01 RX ADMIN — ATORVASTATIN CALCIUM 40 MILLIGRAM(S): 80 TABLET, FILM COATED ORAL at 21:11

## 2021-01-01 RX ADMIN — Medication 50 MILLIEQUIVALENT(S): at 18:16

## 2021-01-01 RX ADMIN — CHLORHEXIDINE GLUCONATE 1 APPLICATION(S): 213 SOLUTION TOPICAL at 05:12

## 2021-01-01 RX ADMIN — BUMETANIDE 10 MG/HR: 0.25 INJECTION INTRAMUSCULAR; INTRAVENOUS at 21:22

## 2021-01-01 RX ADMIN — CHLORHEXIDINE GLUCONATE 1 APPLICATION(S): 213 SOLUTION TOPICAL at 05:46

## 2021-01-01 RX ADMIN — LACTULOSE 20 GRAM(S): 10 SOLUTION ORAL at 05:11

## 2021-01-01 RX ADMIN — CHLORHEXIDINE GLUCONATE 1 APPLICATION(S): 213 SOLUTION TOPICAL at 05:06

## 2021-01-01 RX ADMIN — BUDESONIDE AND FORMOTEROL FUMARATE DIHYDRATE 2 PUFF(S): 160; 4.5 AEROSOL RESPIRATORY (INHALATION) at 05:15

## 2021-01-01 RX ADMIN — CHLORHEXIDINE GLUCONATE 1 APPLICATION(S): 213 SOLUTION TOPICAL at 05:26

## 2021-01-01 RX ADMIN — HEPARIN SODIUM 5000 UNIT(S): 5000 INJECTION INTRAVENOUS; SUBCUTANEOUS at 05:17

## 2021-01-01 RX ADMIN — CEFEPIME 100 MILLIGRAM(S): 1 INJECTION, POWDER, FOR SOLUTION INTRAMUSCULAR; INTRAVENOUS at 05:33

## 2021-01-01 RX ADMIN — Medication 166.67 MILLIGRAM(S): at 14:13

## 2021-01-01 RX ADMIN — BUMETANIDE 10 MG/HR: 0.25 INJECTION INTRAMUSCULAR; INTRAVENOUS at 11:18

## 2021-01-01 RX ADMIN — HEPARIN SODIUM 5000 UNIT(S): 5000 INJECTION INTRAVENOUS; SUBCUTANEOUS at 21:22

## 2021-01-01 RX ADMIN — APIXABAN 2.5 MILLIGRAM(S): 2.5 TABLET, FILM COATED ORAL at 05:36

## 2021-01-01 RX ADMIN — POTASSIUM PHOSPHATE, MONOBASIC POTASSIUM PHOSPHATE, DIBASIC 62.5 MILLIMOLE(S): 236; 224 INJECTION, SOLUTION INTRAVENOUS at 06:37

## 2021-01-01 RX ADMIN — BUMETANIDE 1 MILLIGRAM(S): 0.25 INJECTION INTRAMUSCULAR; INTRAVENOUS at 05:36

## 2021-01-01 RX ADMIN — HYDROMORPHONE HYDROCHLORIDE 0.5 MILLIGRAM(S): 2 INJECTION INTRAMUSCULAR; INTRAVENOUS; SUBCUTANEOUS at 17:45

## 2021-01-01 RX ADMIN — POTASSIUM PHOSPHATE, MONOBASIC POTASSIUM PHOSPHATE, DIBASIC 62.5 MILLIMOLE(S): 236; 224 INJECTION, SOLUTION INTRAVENOUS at 03:15

## 2021-01-01 RX ADMIN — VASOPRESSIN 2.4 UNIT(S)/MIN: 20 INJECTION INTRAVENOUS at 13:47

## 2021-01-01 RX ADMIN — ATORVASTATIN CALCIUM 40 MILLIGRAM(S): 80 TABLET, FILM COATED ORAL at 21:10

## 2021-01-01 RX ADMIN — ATORVASTATIN CALCIUM 40 MILLIGRAM(S): 80 TABLET, FILM COATED ORAL at 22:00

## 2021-01-01 RX ADMIN — FENTANYL CITRATE 25 MICROGRAM(S): 50 INJECTION INTRAVENOUS at 09:00

## 2021-01-01 RX ADMIN — Medication 100 MILLIEQUIVALENT(S): at 04:44

## 2021-01-01 RX ADMIN — BUMETANIDE 2 MILLIGRAM(S): 0.25 INJECTION INTRAMUSCULAR; INTRAVENOUS at 11:13

## 2021-01-01 RX ADMIN — APIXABAN 2.5 MILLIGRAM(S): 2.5 TABLET, FILM COATED ORAL at 19:07

## 2021-01-01 RX ADMIN — HEPARIN SODIUM 5000 UNIT(S): 5000 INJECTION INTRAVENOUS; SUBCUTANEOUS at 15:42

## 2021-01-01 RX ADMIN — APIXABAN 2.5 MILLIGRAM(S): 2.5 TABLET, FILM COATED ORAL at 16:29

## 2021-01-01 RX ADMIN — Medication 40 MILLIGRAM(S): at 20:44

## 2021-01-01 RX ADMIN — VASOPRESSIN 3.6 UNIT(S)/MIN: 20 INJECTION INTRAVENOUS at 01:17

## 2021-01-01 RX ADMIN — ATORVASTATIN CALCIUM 40 MILLIGRAM(S): 80 TABLET, FILM COATED ORAL at 21:49

## 2021-01-01 RX ADMIN — BUMETANIDE 10 MG/HR: 0.25 INJECTION INTRAMUSCULAR; INTRAVENOUS at 15:08

## 2021-01-01 RX ADMIN — Medication 250 MILLIGRAM(S): at 14:16

## 2021-01-01 RX ADMIN — CHLORHEXIDINE GLUCONATE 15 MILLILITER(S): 213 SOLUTION TOPICAL at 17:17

## 2021-01-01 RX ADMIN — Medication 50 MILLIEQUIVALENT(S): at 18:42

## 2021-01-01 RX ADMIN — Medication 50 MILLIEQUIVALENT(S): at 21:59

## 2021-01-01 RX ADMIN — Medication 100 MILLIEQUIVALENT(S): at 09:29

## 2021-01-01 RX ADMIN — Medication 200 GRAM(S): at 04:00

## 2021-01-01 RX ADMIN — FENTANYL CITRATE 50 MICROGRAM(S): 50 INJECTION INTRAVENOUS at 01:33

## 2021-01-01 RX ADMIN — BUMETANIDE 5 MG/HR: 0.25 INJECTION INTRAMUSCULAR; INTRAVENOUS at 20:13

## 2021-01-01 RX ADMIN — BUMETANIDE 5 MG/HR: 0.25 INJECTION INTRAMUSCULAR; INTRAVENOUS at 19:31

## 2021-01-01 RX ADMIN — ATORVASTATIN CALCIUM 40 MILLIGRAM(S): 80 TABLET, FILM COATED ORAL at 21:01

## 2021-01-01 RX ADMIN — LACTULOSE 20 GRAM(S): 10 SOLUTION ORAL at 13:55

## 2021-01-01 RX ADMIN — CEFEPIME 100 MILLIGRAM(S): 1 INJECTION, POWDER, FOR SOLUTION INTRAMUSCULAR; INTRAVENOUS at 13:26

## 2021-01-01 RX ADMIN — BUMETANIDE 1 MILLIGRAM(S): 0.25 INJECTION INTRAMUSCULAR; INTRAVENOUS at 16:29

## 2021-01-01 RX ADMIN — Medication 20 MILLIEQUIVALENT(S): at 10:30

## 2021-01-01 RX ADMIN — BUMETANIDE 10 MG/HR: 0.25 INJECTION INTRAMUSCULAR; INTRAVENOUS at 18:22

## 2021-01-01 RX ADMIN — FENTANYL CITRATE 50 MICROGRAM(S): 50 INJECTION INTRAVENOUS at 02:27

## 2021-01-01 RX ADMIN — Medication 5 MILLIGRAM(S): at 23:57

## 2021-01-01 RX ADMIN — CHLORHEXIDINE GLUCONATE 15 MILLILITER(S): 213 SOLUTION TOPICAL at 18:16

## 2021-01-01 RX ADMIN — HEPARIN SODIUM 5000 UNIT(S): 5000 INJECTION INTRAVENOUS; SUBCUTANEOUS at 15:37

## 2021-01-01 RX ADMIN — Medication 100 MILLIEQUIVALENT(S): at 05:50

## 2021-01-01 RX ADMIN — CHLORHEXIDINE GLUCONATE 15 MILLILITER(S): 213 SOLUTION TOPICAL at 05:33

## 2021-01-01 RX ADMIN — BUMETANIDE 10 MG/HR: 0.25 INJECTION INTRAMUSCULAR; INTRAVENOUS at 06:04

## 2021-01-01 RX ADMIN — Medication 10.5 MICROGRAM(S)/KG/MIN: at 05:23

## 2021-01-01 RX ADMIN — Medication 40 MILLIEQUIVALENT(S): at 06:35

## 2021-01-01 RX ADMIN — CHLORHEXIDINE GLUCONATE 15 MILLILITER(S): 213 SOLUTION TOPICAL at 05:06

## 2021-01-01 RX ADMIN — Medication 10.5 MICROGRAM(S)/KG/MIN: at 19:29

## 2021-01-01 RX ADMIN — HEPARIN SODIUM 15 UNIT(S)/HR: 5000 INJECTION INTRAVENOUS; SUBCUTANEOUS at 17:57

## 2021-01-01 RX ADMIN — FENTANYL CITRATE 25 MICROGRAM(S): 50 INJECTION INTRAVENOUS at 13:25

## 2021-01-01 RX ADMIN — PANTOPRAZOLE SODIUM 40 MILLIGRAM(S): 20 TABLET, DELAYED RELEASE ORAL at 11:53

## 2021-01-01 RX ADMIN — Medication 40 MILLIEQUIVALENT(S): at 21:04

## 2021-01-01 RX ADMIN — POTASSIUM PHOSPHATE, MONOBASIC POTASSIUM PHOSPHATE, DIBASIC 62.5 MILLIMOLE(S): 236; 224 INJECTION, SOLUTION INTRAVENOUS at 05:47

## 2021-01-01 RX ADMIN — CHLORHEXIDINE GLUCONATE 1 APPLICATION(S): 213 SOLUTION TOPICAL at 05:28

## 2021-01-01 RX ADMIN — Medication 250 MILLIGRAM(S): at 06:04

## 2021-01-01 RX ADMIN — Medication 40 MILLIEQUIVALENT(S): at 21:01

## 2021-01-01 RX ADMIN — Medication 40 MILLIEQUIVALENT(S): at 04:42

## 2021-01-01 RX ADMIN — HEPARIN SODIUM 5000 UNIT(S): 5000 INJECTION INTRAVENOUS; SUBCUTANEOUS at 05:23

## 2021-01-01 RX ADMIN — CHLORHEXIDINE GLUCONATE 15 MILLILITER(S): 213 SOLUTION TOPICAL at 06:00

## 2021-01-01 RX ADMIN — ATORVASTATIN CALCIUM 40 MILLIGRAM(S): 80 TABLET, FILM COATED ORAL at 21:12

## 2021-01-01 RX ADMIN — BUMETANIDE 132 MILLIGRAM(S): 0.25 INJECTION INTRAMUSCULAR; INTRAVENOUS at 01:22

## 2021-01-01 RX ADMIN — BUDESONIDE AND FORMOTEROL FUMARATE DIHYDRATE 2 PUFF(S): 160; 4.5 AEROSOL RESPIRATORY (INHALATION) at 17:17

## 2021-01-01 RX ADMIN — CHLORHEXIDINE GLUCONATE 15 MILLILITER(S): 213 SOLUTION TOPICAL at 06:04

## 2021-01-01 RX ADMIN — BUMETANIDE 10 MG/HR: 0.25 INJECTION INTRAMUSCULAR; INTRAVENOUS at 19:49

## 2021-01-01 RX ADMIN — VASOPRESSIN 4.8 UNIT(S)/MIN: 20 INJECTION INTRAVENOUS at 23:10

## 2021-01-01 RX ADMIN — Medication 40 MILLIEQUIVALENT(S): at 12:15

## 2021-01-01 RX ADMIN — BUMETANIDE 1 MILLIGRAM(S): 0.25 INJECTION INTRAMUSCULAR; INTRAVENOUS at 10:51

## 2021-01-01 RX ADMIN — CHLORHEXIDINE GLUCONATE 1 APPLICATION(S): 213 SOLUTION TOPICAL at 06:39

## 2021-01-01 RX ADMIN — Medication 5 MILLIGRAM(S): at 21:43

## 2021-01-01 RX ADMIN — Medication 50 MILLIEQUIVALENT(S): at 14:33

## 2021-01-01 RX ADMIN — ATORVASTATIN CALCIUM 40 MILLIGRAM(S): 80 TABLET, FILM COATED ORAL at 21:22

## 2021-01-01 RX ADMIN — Medication 10.5 MICROGRAM(S)/KG/MIN: at 20:13

## 2021-01-01 RX ADMIN — CHLORHEXIDINE GLUCONATE 15 MILLILITER(S): 213 SOLUTION TOPICAL at 05:25

## 2021-01-01 RX ADMIN — BUDESONIDE AND FORMOTEROL FUMARATE DIHYDRATE 2 PUFF(S): 160; 4.5 AEROSOL RESPIRATORY (INHALATION) at 16:29

## 2021-01-01 RX ADMIN — SODIUM CHLORIDE 1000 MILLILITER(S): 9 INJECTION, SOLUTION INTRAVENOUS at 08:57

## 2021-01-01 RX ADMIN — PROPOFOL 16.9 MICROGRAM(S)/KG/MIN: 10 INJECTION, EMULSION INTRAVENOUS at 19:49

## 2021-01-01 RX ADMIN — CHLORHEXIDINE GLUCONATE 15 MILLILITER(S): 213 SOLUTION TOPICAL at 06:39

## 2021-01-01 RX ADMIN — CEFEPIME 100 MILLIGRAM(S): 1 INJECTION, POWDER, FOR SOLUTION INTRAMUSCULAR; INTRAVENOUS at 18:18

## 2021-01-01 RX ADMIN — FENTANYL CITRATE 50 MICROGRAM(S): 50 INJECTION INTRAVENOUS at 01:17

## 2021-01-01 RX ADMIN — ATORVASTATIN CALCIUM 40 MILLIGRAM(S): 80 TABLET, FILM COATED ORAL at 23:57

## 2021-01-30 NOTE — ED PROVIDER NOTE - CLINICAL SUMMARY MEDICAL DECISION MAKING FREE TEXT BOX
pt presented today with chest pain and worsening pedal edema, has been off her lasix for the last two days, pt's o2 sat on RA was 90-91%, chest xray shows cardiomegaly, ?left pleural effusion, pt admitted for chf exacerbation

## 2021-01-30 NOTE — ED PROVIDER NOTE - CARE PLAN
Principal Discharge DX:	CHF exacerbation   Principal Discharge DX:	CHF exacerbation  Secondary Diagnosis:	Atrial fibrillation with controlled ventricular rate

## 2021-01-30 NOTE — H&P ADULT - HISTORY OF PRESENT ILLNESS
74 y/o F Hx of Afib on Eliquis presents with cp that comes and goes, today cp intermittent at rest associated with some sob. pt also with b/l leg edema, as per her daughter pt was recently discharged from [facility], started on Eliquis and told to stop the lasix, pt has not taken her lasix in two days, today she c/o feeling "a hot sensation" throughout her body,

## 2021-01-30 NOTE — ED ADULT NURSE NOTE - PMH
Afib    CHF (congestive heart failure)    COPD (chronic obstructive pulmonary disease)    Dementia

## 2021-01-30 NOTE — H&P ADULT - NSICDXPASTMEDICALHX_GEN_ALL_CORE_FT
PAST MEDICAL HISTORY:  Afib     CHF (congestive heart failure)     COPD (chronic obstructive pulmonary disease)     Dementia

## 2021-01-30 NOTE — CONSULT NOTE ADULT - SUBJECTIVE AND OBJECTIVE BOX
HPI:  HPI:  76 y/o F Hx of Afib on Eliquis presents with cp that comes and goes, today cp intermittent at rest associated with some sob. pt also with b/l leg edema, as per her daughter pt was recently discharged from [facility], started on Eliquis and told to stop the lasix, pt has not taken her lasix in two days, today she c/o feeling "a hot sensation" throughout her body, (30 Jan 2021 20:15)      Chief Complaint:  Patient is a 75y old  Female who presents with a chief complaint of sob (30 Jan 2021 20:15)      Review of Systems:    General:  No wt loss, fevers, chills, night sweats  Eyes:  Good vision, no reported pain  ENT:  No sore throat, pain, runny nose, dysphagia  CV:  No pain, palpitations, hypo/hypertension  Resp:  dyspnea,          Social History/Family History  SOCHX:   tobacco,  -  alcohol    FMHX: FA/MO  - contributory       Discussed with:  PMD, Family    Physical Exam:    Vital Signs:  Vital Signs Last 24 Hrs  T(C): 36.4 (30 Jan 2021 20:46), Max: 36.9 (30 Jan 2021 14:46)  T(F): 97.5 (30 Jan 2021 20:46), Max: 98.4 (30 Jan 2021 14:46)  HR: 73 (30 Jan 2021 20:46) (73 - 85)  BP: 140/87 (30 Jan 2021 20:46) (99/50 - 140/87)  BP(mean): 66 (30 Jan 2021 20:02) (66 - 66)  RR: 17 (30 Jan 2021 20:46) (16 - 17)  SpO2: 96% (30 Jan 2021 20:46) (96% - 98%)  Daily Height in cm: 165.1 (30 Jan 2021 14:46)    Daily   I&O's Summary      Chest:  decreased at bases  Cardiovascular:  Regular rhythm, S1, S2, no murmur/rub/S3/S4, no carotid/femoral/abdominal bruit, radial/pedal pulses 2+,  edema  Abdomen:  Soft, non-tender, non-distended, normoactive bowel sounds, no HSM    Laboratory:                          11.6   5.33  )-----------( 207      ( 30 Jan 2021 16:37 )             35.3     01-30    137  |  99  |  35<H>  ----------------------------<  96  4.5   |  37<H>  |  2.00<H>    Ca    8.9      30 Jan 2021 18:32    TPro  7.9  /  Alb  3.0<L>  /  TBili  2.9<H>  /  DBili  x   /  AST  57<H>  /  ALT  49  /  AlkPhos  112  01-30      CARDIAC MARKERS ( 30 Jan 2021 18:32 )  .055 ng/mL / x     / 138 U/L / x     / <1.0 ng/mL  CARDIAC MARKERS ( 30 Jan 2021 16:37 )  x     / x     / 335 U/L / x     / x          CAPILLARY BLOOD GLUCOSE        LIVER FUNCTIONS - ( 30 Jan 2021 18:32 )  Alb: 3.0 g/dL / Pro: 7.9 gm/dL / ALK PHOS: 112 U/L / ALT: 49 U/L / AST: 57 U/L / GGT: x           PT/INR - ( 30 Jan 2021 16:37 )   PT: 25.4 sec;   INR: 2.28 ratio         PTT - ( 30 Jan 2021 16:37 )  PTT:43.1 sec      Assessment:    this patient is admitted after a recent discharge from [facility]  The patient was taking Lasix but was told to hold off for 2 days  I initiated Bumex due to the patient's renal function  A diagnostic echocardiogram is ordered  The primary diagnosis is acute on chronic diastolic and/or systolic congestive heart failure with the noted clinical signs  A diagnostic echocardiogram will help confirm systolic versus diastolic  A mildly elevated troponin level with an elevated creatinine is not indicative of an acute MI and likely demand type ischemia

## 2021-01-30 NOTE — ED PROVIDER NOTE - OBJECTIVE STATEMENT
74 y/o F Hx of Afib on Eliquis presents with cp that comes and goes, today cp intermittent at rest associated with some sob. pt also with b/l leg edema. 74 y/o F Hx of Afib on Eliquis presents with cp that comes and goes, today cp intermittent at rest associated with some sob. pt also with b/l leg edema, as per her daughter pt was recently discharged from [facility], started on Eliquis and told to stop the lasix, pt has not taken her lasix in two days, today she c/o feeling "a hot sensation" throughout her body, (-) sick contacts (-) fevers or chills

## 2021-01-30 NOTE — H&P ADULT - ASSESSMENT
76 y/o F Hx of Afib on Eliquis presents with cp that comes and goes, today cp intermittent at rest associated with some sob. pt also with b/l leg edema, as per her daughter pt was recently discharged from [facility], started on Eliquis and told to stop the lasix, pt has not taken her lasix in two days, today she c/o feeling "a hot sensation" throughout her body,

## 2021-01-30 NOTE — ED ADULT NURSE NOTE - OBJECTIVE STATEMENT
received er bed 2 biba from home c/o chest pain since yesterday intermittent shortness of breath b/l lower extremities + 4 edema b/l rales audible deneis fever or cough alert oriented to person received er bed 2 biba from home c/o chest pain since yesterday intermittent shortness of breath b/l lower extremities + 4 edema b/l rales audible deneis fever or cough alert oriented to person. right eye cataracts, CHF, Afib, Htn, HLD, and CKD stage 3, RA, vitamin d deficiency,

## 2021-01-30 NOTE — H&P ADULT - NSHPLABSRESULTS_GEN_ALL_CORE
11.6   5.33  )-----------( 207      ( 30 Jan 2021 16:37 )             35.3     01-30    137  |  99  |  35<H>  ----------------------------<  96  4.5   |  37<H>  |  2.00<H>    Ca    8.9      30 Jan 2021 18:32    TPro  7.9  /  Alb  3.0<L>  /  TBili  2.9<H>  /  DBili  x   /  AST  57<H>  /  ALT  49  /  AlkPhos  112  01-30    PT/INR - ( 30 Jan 2021 16:37 )   PT: 25.4 sec;   INR: 2.28 ratio         PTT - ( 30 Jan 2021 16:37 )  PTT:43.1 sec

## 2021-01-30 NOTE — ED ADULT NURSE NOTE - NSIMPLEMENTINTERV_GEN_ALL_ED
Implemented All Fall with Harm Risk Interventions:  [place] to call system. Call bell, personal items and telephone within reach. Instruct patient to call for assistance. Room bathroom lighting operational. Non-slip footwear when patient is off stretcher. Physically safe environment: no spills, clutter or unnecessary equipment. Stretcher in lowest position, wheels locked, appropriate side rails in place. Provide visual cue, wrist band, yellow gown, etc. Monitor gait and stability. Monitor for mental status changes and reorient to person, place, and time. Review medications for side effects contributing to fall risk. Reinforce activity limits and safety measures with patient and family. Provide visual clues: red socks.

## 2021-01-30 NOTE — ED ADULT NURSE NOTE - HOW OFTEN DO YOU HAVE A DRINK CONTAINING ALCOHOL?
December 12, 2018      Renato Nova MD  [address] [place] [address]           [facility] - Hematology Oncology  [address]  [address]  [place] [address]  Phone: [phone]  Fax: [phone]          Patient: Tami Herrera   MR Number: [id number]   YOB: 1959   Date of Visit: 12/12/2018       Dear Dr. Renato Nova:    Thank you for referring Tami Herrera to me for evaluation. Attached you will find relevant portions of my assessment and plan of care.    If you have questions, please do not hesitate to call me. I look forward to following Tami Herrera along with you.    Sincerely,    Jeremy Horne MD    Enclosure  CC:  No Recipients    If you would like to receive this communication electronically, please contact [email or web address] or [phone] to request more information on [facility] access.    For providers and/or their staff who would like to refer a patient to [facility], please contact us through our one-stop-shop provider referral line, [facility], at [phone].    If you feel you have received this communication in error or would no longer like to receive these types of communications, please e-mail [email or web address]          Never

## 2021-01-31 NOTE — PHYSICAL THERAPY INITIAL EVALUATION ADULT - PLANNED THERAPY INTERVENTIONS, PT EVAL
stair climbing ex/balance training/bed mobility training/gait training/strengthening/transfer training

## 2021-01-31 NOTE — PROGRESS NOTE ADULT - SUBJECTIVE AND OBJECTIVE BOX
Patient is a 75y old  Female who presents with a chief complaint of sob (2021 11:11)      INTERVAL HPI/OVERNIGHT EVENTS:  pt is feeling better  MEDICATIONS  (STANDING):  apixaban 2.5 milliGRAM(s) Oral two times a day  atorvastatin 40 milliGRAM(s) Oral at bedtime  bisacodyl 5 milliGRAM(s) Oral at bedtime  budesonide 160 MICROgram(s)/formoterol 4.5 MICROgram(s) Inhaler 2 Puff(s) Inhalation two times a day  buMETAnide 1 milliGRAM(s) Oral two times a day  losartan 25 milliGRAM(s) Oral daily    MEDICATIONS  (PRN):  ALBUTerol    90 MICROgram(s) HFA Inhaler 2 Puff(s) Inhalation every 6 hours PRN Shortness of Breath and/or Wheezing      Allergies    No Known Allergies    Intolerances        REVIEW OF SYSTEMS:  CONSTITUTIONAL: No fever, weight loss, or fatigue  EYES: No eye pain, visual disturbances, or discharge  ENMT:  No difficulty hearing, tinnitus, vertigo; No sinus or throat pain  NECK: No pain or stiffness  BREASTS: No pain, masses, or nipple discharge  RESPIRATORY: No cough, wheezing, chills or hemoptysis; No shortness of breath  CARDIOVASCULAR: No chest pain, palpitations, dizziness, or leg swelling  GASTROINTESTINAL: No abdominal or epigastric pain. No nausea, vomiting, or hematemesis; No diarrhea or constipation. No melena or hematochezia.  GENITOURINARY: No dysuria, frequency, hematuria, or incontinence  NEUROLOGICAL: No headaches, memory loss, loss of strength, numbness, or tremors  SKIN: No itching, burning, rashes, or lesions   LYMPH NODES: No enlarged glands  ENDOCRINE: No heat or cold intolerance; No hair loss  MUSCULOSKELETAL: No joint pain or swelling; No muscle, back, or extremity pain  PSYCHIATRIC: No depression, anxiety, mood swings, or difficulty sleeping  HEME/LYMPH: No easy bruising, or bleeding gums  ALLERGY AND IMMUNOLOGIC: No hives or eczema    Vital Signs Last 24 Hrs  T(C): 36.8 (2021 10:30), Max: 36.9 (2021 14:46)  T(F): 98.2 (2021 10:30), Max: 98.4 (2021 14:46)  HR: 70 (2021 10:30) (70 - 85)  BP: 102/67 (2021 10:30) (99/50 - 140/87)  BP(mean): 66 (2021 20:02) (66 - 66)  RR: 17 (2021 10:30) (16 - 18)  SpO2: 97% (2021 10:30) (96% - 99%)    PHYSICAL EXAM:  GENERAL: NAD, well-groomed, well-developed  HEAD:  Atraumatic, Normocephalic  EYES: EOMI, PERRLA, conjunctiva and sclera clear  ENMT: No tonsillar erythema, exudates, or enlargement; Moist mucous membranes, Good dentition, No lesions  NECK: Supple, No JVD, Normal thyroid  NERVOUS SYSTEM:  Alert & Oriented X3, Good concentration; Motor Strength 5/5 B/L upper and lower extremities; DTRs 2+ intact and symmetric  CHEST/LUNG: Clear to percussion bilaterally; No rales, rhonchi, wheezing, or rubs  HEART: Regular rate and rhythm; No murmurs, rubs, or gallops  ABDOMEN: Soft, Nontender, Nondistended; Bowel sounds present  EXTREMITIES:  2+ Peripheral Pulses, No clubbing, cyanosis, or edema  LYMPH: No lymphadenopathy noted  SKIN: No rashes or lesions    LABS:                        13.5   4.59  )-----------( 218      ( 2021 13:30 )             41.3     31    140  |  99  |  31<H>  ----------------------------<  89  3.2<L>   |  37<H>  |  1.67<H>    Ca    8.9      2021 13:30    TPro  7.9  /  Alb  3.0<L>  /  TBili  2.9<H>  /  DBili  x   /  AST  57<H>  /  ALT  49  /  AlkPhos  112  30    PT/INR - ( 2021 16:37 )   PT: 25.4 sec;   INR: 2.28 ratio         PTT - ( 2021 16:37 )  PTT:43.1 sec  Urinalysis Basic - ( 2021 14:12 )    Color: Yellow / Appearance: Clear / S.005 / pH: x  Gluc: x / Ketone: Negative  / Bili: Negative / Urobili: 4 mg/dL   Blood: x / Protein: Negative mg/dL / Nitrite: Negative   Leuk Esterase: Negative / RBC: x / WBC x   Sq Epi: x / Non Sq Epi: x / Bacteria: x      CAPILLARY BLOOD GLUCOSE        CULTURES:    HEMOGLOBIN A1C:    CHOLESTEROL:        RADIOLOGY & ADDITIONAL TESTS:

## 2021-01-31 NOTE — PHYSICAL THERAPY INITIAL EVALUATION ADULT - STRENGTHENING, PT EVAL
Improve strength in the UE and LE to 5/5 and be able to perform functional tasks-bed mobility, sitting, standing, transfers and ambulate in a safe manner with or without  assistive device and prevent falls.

## 2021-01-31 NOTE — PROGRESS NOTE ADULT - SUBJECTIVE AND OBJECTIVE BOX
NEPHROLOGY CONSULTATION    CHIEF COMPLAINT:  Renal failure      HPI:  Came to ER c/o chest pain, dyspnea and edema.  Recently d/c from another hospital and started on Eliquis, but told to stop lasix.      ROS:  no chest pain now      PAST MEDICAL & SURGICAL HISTORY:  Dementia  CHF (congestive heart failure), EF not known  COPD (chronic obstructive pulmonary disease)  Afib        SOCIAL HISTORY:  NA    FAMILY HISTORY:  NA      MEDICATIONS  (STANDING):  apixaban 2.5 milliGRAM(s) Oral two times a day  atorvastatin 40 milliGRAM(s) Oral at bedtime  bisacodyl 5 milliGRAM(s) Oral at bedtime  budesonide 160 MICROgram(s)/formoterol 4.5 MICROgram(s) Inhaler 2 Puff(s) Inhalation two times a day  buMETAnide 1 milliGRAM(s) Oral two times a day  losartan 25 milliGRAM(s) Oral daily      PHYSICAL EXAMINATION:  T(F): 98.2 (01-31-21 @ 10:30)  HR: 70 (01-31-21 @ 10:30)  BP: 102/67 (01-31-21 @ 10:30)  RR: 17 (01-31-21 @ 10:30)  SpO2: 97% (01-31-21 @ 10:30)  Conversant, no apparent distress  PERRLA, pink conjunctivae, no ptosis  Good dentition, no pharyngeal erythema  Neck non tender, no mass, no thyromegaly or nodules  Normal respiratory effort, lungs diminished BS at bases  Heart with RRR, no murmurs or rubs, 1+ peripheral edema  Abdomen soft, no masses, no organomegaly  Skin no rashes, ulcers or lesions, normal turgor and temperature  Appropriate affect, AO x 3    LABS:                        11.6   5.33  )-----------( 207      ( 30 Jan 2021 16:37 )             35.3     01-30    137  |  99  |  35<H>  ----------------------------<  96  4.5   |  37<H>  |  2.00<H>    Ca    8.9      30 Jan 2021 18:32    TPro  7.9  /  Alb  3.0<L>  /  TBili  2.9<H>  /  DBili  x   /  AST  57<H>  /  ALT  49  /  AlkPhos  112  01-30    proBNP 8601  SARS-CoV-2 negative    RADIOLOGY:  Chest X-Ray personally reviewed and shows cardiomegaly and PVC        ASSESSMENT:  1.  Renal failure, unkown whether this is acute or chronic  2.  Clinical and radiographic fluid overload  3.  Probably has significant cardiomyopathy given cardiomegaly and clinical picture    PLAN:  Continue diuretics, may need to escalate to intravenous depending on clinical response  May continue low dose ARB for now  Urinalysis, renal ultrasound  Older blood work would assist

## 2021-01-31 NOTE — PROGRESS NOTE ADULT - SUBJECTIVE AND OBJECTIVE BOX
74 y/o F Hx of Afib on Eliquis presents with cp that comes and goes, today cp intermittent at rest associated with some sob. pt also with b/l leg edema, as per her daughter pt was recently discharged from [facility], started on Eliquis and told to stop the lasix, pt has not taken her lasix in two days, today she c/o feeling "a hot sensation" throughout her body, (30 Jan 2021 20:15)      Chief Complaint:  Patient is a 75y old  Female who presents with a chief complaint of sob (30 Jan 2021 20:15)      Review of Systems:    General:  No wt loss, fevers, chills, night sweats  Eyes:  Good vision, no reported pain  ENT:  No sore throat, pain, runny nose, dysphagia  CV:  No pain, palpitations, hypo/hypertension  Resp:  dyspnea,          Social History/Family History  SOCHX:   tobacco,  -  alcohol    FMHX: FA/MO  - contributory       Discussed with:  PMD, Family    Physical Exam:    Vital Signs:  Vital Signs Last 24 Hrs  T(C): 36.4 (30 Jan 2021 20:46), Max: 36.9 (30 Jan 2021 14:46)  T(F): 97.5 (30 Jan 2021 20:46), Max: 98.4 (30 Jan 2021 14:46)  HR: 73 (30 Jan 2021 20:46) (73 - 85)  BP: 140/87 (30 Jan 2021 20:46) (99/50 - 140/87)  BP(mean): 66 (30 Jan 2021 20:02) (66 - 66)  RR: 17 (30 Jan 2021 20:46) (16 - 17)  SpO2: 96% (30 Jan 2021 20:46) (96% - 98%)  Daily Height in cm: 165.1 (30 Jan 2021 14:46)    Daily   I&O's Summary      Chest:  decreased at bases  Cardiovascular:  Regular rhythm, S1, S2, no murmur/rub/S3/S4, no carotid/femoral/abdominal bruit, radial/pedal pulses 2+,  edema  Abdomen:  Soft, non-tender, non-distended, normoactive bowel sounds, no HSM    Laboratory:                          11.6   5.33  )-----------( 207      ( 30 Jan 2021 16:37 )             35.3     01-30    137  |  99  |  35<H>  ----------------------------<  96  4.5   |  37<H>  |  2.00<H>    Ca    8.9      30 Jan 2021 18:32    TPro  7.9  /  Alb  3.0<L>  /  TBili  2.9<H>  /  DBili  x   /  AST  57<H>  /  ALT  49  /  AlkPhos  112  01-30      CARDIAC MARKERS ( 30 Jan 2021 18:32 )  .055 ng/mL / x     / 138 U/L / x     / <1.0 ng/mL  CARDIAC MARKERS ( 30 Jan 2021 16:37 )  x     / x     / 335 U/L / x     / x          CAPILLARY BLOOD GLUCOSE        LIVER FUNCTIONS - ( 30 Jan 2021 18:32 )  Alb: 3.0 g/dL / Pro: 7.9 gm/dL / ALK PHOS: 112 U/L / ALT: 49 U/L / AST: 57 U/L / GGT: x           PT/INR - ( 30 Jan 2021 16:37 )   PT: 25.4 sec;   INR: 2.28 ratio         PTT - ( 30 Jan 2021 16:37 )  PTT:43.1 sec      Assessment:    this patient is admitted after a recent discharge from [facility]  The patient was taking Lasix but was told to hold off for 2 days  I initiated Bumex due to the patient's renal function  A diagnostic echocardiogram is ordered  The primary diagnosis is acute on chronic diastolic and/or systolic congestive heart failure with the noted clinical signs  A diagnostic echocardiogram will help confirm systolic versus diastolic  A mildly elevated troponin level with an elevated creatinine is not indicative of an acute MI and likely demand type ischemia

## 2021-01-31 NOTE — PHYSICAL THERAPY INITIAL EVALUATION ADULT - LIVES WITH, PROFILE
As per daughter named James, the pt. lives with her in apt, has 15 steps with no rails ( hand held assist)

## 2021-01-31 NOTE — PHYSICAL THERAPY INITIAL EVALUATION ADULT - ADDITIONAL COMMENTS
pt's daughter states that patient has a rolling walker at home and ambulates around the house independently using the rolling walker only intermittently.

## 2021-02-01 NOTE — PROGRESS NOTE ADULT - SUBJECTIVE AND OBJECTIVE BOX
[facility] NEPHROLOGY SERVICES, [facility]  NEPHROLOGY AND HYPERTENSION  [address]  [address]  [place] [address]  [phone]    MD LANG PHAN, MD JANAE WILKINS MD YELENA ROSENBERG, MD JAIDA DUNCAN, MD CORI STAPLETON MD          Patient events noted  Feels well but appears slightly tachypneic   MEDICATIONS  (STANDING):  apixaban 2.5 milliGRAM(s) Oral two times a day  atorvastatin 40 milliGRAM(s) Oral at bedtime  bisacodyl 5 milliGRAM(s) Oral at bedtime  budesonide 160 MICROgram(s)/formoterol 4.5 MICROgram(s) Inhaler 2 Puff(s) Inhalation two times a day  buMETAnide 1 milliGRAM(s) Oral two times a day    MEDICATIONS  (PRN):  ALBUTerol    90 MICROgram(s) HFA Inhaler 2 Puff(s) Inhalation every 6 hours PRN Shortness of Breath and/or Wheezing      01-31-21 @ 07:01  -  02-01-21 @ 07:00  --------------------------------------------------------  IN: 0 mL / OUT: 350 mL / NET: -350 mL      PHYSICAL EXAM:      T(C): 36.7 (02-01-21 @ 16:37), Max: 36.7 (02-01-21 @ 16:37)  HR: 97 (02-01-21 @ 16:37) (62 - 97)  BP: 97/64 (02-01-21 @ 16:37) (90/55 - 117/79)  RR: 18 (02-01-21 @ 16:37) (18 - 18)  SpO2: 96% (02-01-21 @ 16:37) (96% - 98%)  Wt(kg): --  Lungs clear  Heart S1S2  Abd soft NT ND  Extremities:   tr edema                                    11.8   5.99  )-----------( 213      ( 01 Feb 2021 08:25 )             37.1     02-01    142  |  100  |  36<H>  ----------------------------<  83  4.2   |  38<H>  |  1.32<H>    Ca    8.6      01 Feb 2021 08:47  Mg     1.9     02-01    TPro  7.7  /  Alb  2.7<L>  /  TBili  2.0<H>  /  DBili  x   /  AST  36  /  ALT  36  /  AlkPhos  103  02-01      LIVER FUNCTIONS - ( 01 Feb 2021 08:47 )  Alb: 2.7 g/dL / Pro: 7.7 gm/dL / ALK PHOS: 103 U/L / ALT: 36 U/L / AST: 36 U/L / GGT: x           Creatinine Trend: 1.32<--, 1.67<--, 2.00<--          ASSESSMENT:   Renal failure, suspected acute on chronic    Clinical and radiographic fluid overload   Probably has significant cardiomyopathy given cardiomegaly and clinical picture    PLAN:  Continue diuretics, may need to escalate to intravenous depending on clinical response  HOLD ARB for now as increasing diuretic challenge  Urinalysis, renal ultrasound        Amrik Nobles MD [facility] NEPHROLOGY SERVICES, [facility]  NEPHROLOGY AND HYPERTENSION  [address]  [address]  [place] [address]  [phone]    MD LANG PHAN, MD JANAE WILKINS MD YELENA ROSENBERG, MD JAIDA DUNCAN, MD CORI STAPLETON MD          Patient events noted  Feels well but appears slightly tachypneic   MEDICATIONS  (STANDING):  apixaban 2.5 milliGRAM(s) Oral two times a day  atorvastatin 40 milliGRAM(s) Oral at bedtime  bisacodyl 5 milliGRAM(s) Oral at bedtime  budesonide 160 MICROgram(s)/formoterol 4.5 MICROgram(s) Inhaler 2 Puff(s) Inhalation two times a day  buMETAnide 1 milliGRAM(s) Oral two times a day    MEDICATIONS  (PRN):  ALBUTerol    90 MICROgram(s) HFA Inhaler 2 Puff(s) Inhalation every 6 hours PRN Shortness of Breath and/or Wheezing      01-31-21 @ 07:01  -  02-01-21 @ 07:00  --------------------------------------------------------  IN: 0 mL / OUT: 350 mL / NET: -350 mL      PHYSICAL EXAM:      T(C): 36.7 (02-01-21 @ 16:37), Max: 36.7 (02-01-21 @ 16:37)  HR: 97 (02-01-21 @ 16:37) (62 - 97)  BP: 97/64 (02-01-21 @ 16:37) (90/55 - 117/79)  RR: 18 (02-01-21 @ 16:37) (18 - 18)  SpO2: 96% (02-01-21 @ 16:37) (96% - 98%)  Wt(kg): --  Lungs clear  Heart S1S2  Abd soft NT ND  Extremities:   tr edema                                    11.8   5.99  )-----------( 213      ( 01 Feb 2021 08:25 )             37.1     02-01    142  |  100  |  36<H>  ----------------------------<  83  4.2   |  38<H>  |  1.32<H>    Ca    8.6      01 Feb 2021 08:47  Mg     1.9     02-01    TPro  7.7  /  Alb  2.7<L>  /  TBili  2.0<H>  /  DBili  x   /  AST  36  /  ALT  36  /  AlkPhos  103  02-01      LIVER FUNCTIONS - ( 01 Feb 2021 08:47 )  Alb: 2.7 g/dL / Pro: 7.7 gm/dL / ALK PHOS: 103 U/L / ALT: 36 U/L / AST: 36 U/L / GGT: x           Creatinine Trend: 1.32<--, 1.67<--, 2.00<--          ASSESSMENT:   Renal failure, suspected acute on chronic    Clinical and radiographic fluid overload   Probably has significant cardiomyopathy given cardiomegaly and clinical picture    PLAN:  Continue diuretics, increase oral dose BUMEX  HOLD ARB for now as increasing diuretic challenge and low MAP  Urinalysis bland   US no hydro   Will follow.        Amrik Nobles MD

## 2021-02-01 NOTE — DIETITIAN INITIAL EVALUATION ADULT. - PERTINENT LABORATORY DATA
02-01 Na142 mmol/L Glu 83 mg/dL K+ 4.2 mmol/L Cr  1.32 mg/dL<H> BUN 36 mg/dL<H> 02-01 Alb 2.7 g/dL<L>02-01 ALT 36 U/L AST 36 U/L Alkaline Phosphatase 103 U/L

## 2021-02-01 NOTE — DIETITIAN INITIAL EVALUATION ADULT. - PERTINENT MEDS FT
MEDICATIONS  (STANDING):  apixaban 2.5 milliGRAM(s) Oral two times a day  atorvastatin 40 milliGRAM(s) Oral at bedtime  bisacodyl 5 milliGRAM(s) Oral at bedtime  budesonide 160 MICROgram(s)/formoterol 4.5 MICROgram(s) Inhaler 2 Puff(s) Inhalation two times a day  buMETAnide 1 milliGRAM(s) Oral two times a day  losartan 25 milliGRAM(s) Oral daily    MEDICATIONS  (PRN):  ALBUTerol    90 MICROgram(s) HFA Inhaler 2 Puff(s) Inhalation every 6 hours PRN Shortness of Breath and/or Wheezing

## 2021-02-01 NOTE — DIETITIAN INITIAL EVALUATION ADULT. - PHYSCIAL ASSESSMENT
BMI=38.5(01/31), 01/31, 3+ edema of feet, ankles, legs noted 02/01, 2+ edema of feet, ankles, legs noted/well nourished/obese/other (specify)

## 2021-02-01 NOTE — DIETITIAN INITIAL EVALUATION ADULT. - ORAL INTAKE PTA/DIET HISTORY
Pt is confused, RD spoke to pt's daughter James via phone.  Daughter did the shopping/cooking, diet PTA: Low sodium, no sugar.   Pt c good appetite, ate well PTA & @ present, requested to food when pt was seen.

## 2021-02-01 NOTE — PROGRESS NOTE ADULT - SUBJECTIVE AND OBJECTIVE BOX
Patient is a 75y old  Female who presents with a chief complaint of sob (2021 12:04)      INTERVAL HPI/OVERNIGHT EVENTS:  pt is feeling better  MEDICATIONS  (STANDING):  apixaban 2.5 milliGRAM(s) Oral two times a day  atorvastatin 40 milliGRAM(s) Oral at bedtime  bisacodyl 5 milliGRAM(s) Oral at bedtime  budesonide 160 MICROgram(s)/formoterol 4.5 MICROgram(s) Inhaler 2 Puff(s) Inhalation two times a day  buMETAnide 1 milliGRAM(s) Oral two times a day  losartan 25 milliGRAM(s) Oral daily    MEDICATIONS  (PRN):  ALBUTerol    90 MICROgram(s) HFA Inhaler 2 Puff(s) Inhalation every 6 hours PRN Shortness of Breath and/or Wheezing      Allergies    No Known Allergies    Intolerances        REVIEW OF SYSTEMS:  CONSTITUTIONAL: No fever, weight loss, or fatigue  EYES: No eye pain, visual disturbances, or discharge  ENMT:  No difficulty hearing, tinnitus, vertigo; No sinus or throat pain  NECK: No pain or stiffness  BREASTS: No pain, masses, or nipple discharge  RESPIRATORY: No cough, wheezing, chills or hemoptysis; No shortness of breath  CARDIOVASCULAR: No chest pain, palpitations, dizziness, or leg swelling  GASTROINTESTINAL: No abdominal or epigastric pain. No nausea, vomiting, or hematemesis; No diarrhea or constipation. No melena or hematochezia.  GENITOURINARY: No dysuria, frequency, hematuria, or incontinence  NEUROLOGICAL: No headaches, memory loss, loss of strength, numbness, or tremors  SKIN: No itching, burning, rashes, or lesions   LYMPH NODES: No enlarged glands  ENDOCRINE: No heat or cold intolerance; No hair loss  MUSCULOSKELETAL: No joint pain or swelling; No muscle, back, or extremity pain  PSYCHIATRIC: No depression, anxiety, mood swings, or difficulty sleeping  HEME/LYMPH: No easy bruising, or bleeding gums  ALLERGY AND IMMUNOLOGIC: No hives or eczema    Vital Signs Last 24 Hrs  T(C): 36.4 (2021 00:17), Max: 36.4 (2021 00:17)  T(F): 97.6 (2021 00:17), Max: 97.6 (2021 00:17)  HR: 90 (2021 09:20) (62 - 90)  BP: 117/79 (2021 09:20) (90/55 - 117/79)  BP(mean): --  RR: 18 (2021 06:23) (18 - 18)  SpO2: 97% (2021 06:23) (96% - 97%)    PHYSICAL EXAM:  GENERAL: NAD, well-groomed, well-developed  HEAD:  Atraumatic, Normocephalic  EYES: EOMI, PERRLA, conjunctiva and sclera clear  ENMT: No tonsillar erythema, exudates, or enlargement; Moist mucous membranes, Good dentition, No lesions  NECK: Supple, No JVD, Normal thyroid  NERVOUS SYSTEM:  Alert & Oriented X3, Good concentration; Motor Strength 5/5 B/L upper and lower extremities; DTRs 2+ intact and symmetric  CHEST/LUNG: Clear to percussion bilaterally; No rales, rhonchi, wheezing, or rubs  HEART: Regular rate and rhythm; No murmurs, rubs, or gallops  ABDOMEN: Soft, Nontender, Nondistended; Bowel sounds present  EXTREMITIES:  2+ Peripheral Pulses, No clubbing, cyanosis, or edema  LYMPH: No lymphadenopathy noted  SKIN: No rashes or lesions    LABS:                        11.8   5.99  )-----------( 213      ( 2021 08:25 )             37.1     02-    142  |  100  |  36<H>  ----------------------------<  83  4.2   |  38<H>  |  1.32<H>    Ca    8.6      2021 08:47  Mg     1.9     -    TPro  7.7  /  Alb  2.7<L>  /  TBili  2.0<H>  /  DBili  x   /  AST  36  /  ALT  36  /  AlkPhos  103  02    PT/INR - ( 2021 16:37 )   PT: 25.4 sec;   INR: 2.28 ratio         PTT - ( 2021 16:37 )  PTT:43.1 sec  Urinalysis Basic - ( 2021 14:12 )    Color: Yellow / Appearance: Clear / S.005 / pH: x  Gluc: x / Ketone: Negative  / Bili: Negative / Urobili: 4 mg/dL   Blood: x / Protein: Negative mg/dL / Nitrite: Negative   Leuk Esterase: Negative / RBC: x / WBC 0-2   Sq Epi: x / Non Sq Epi: Occasional / Bacteria: Occasional      CAPILLARY BLOOD GLUCOSE        CULTURES:    HEMOGLOBIN A1C:    CHOLESTEROL:        RADIOLOGY & ADDITIONAL TESTS:

## 2021-02-01 NOTE — PROGRESS NOTE ADULT - SUBJECTIVE AND OBJECTIVE BOX
74 y/o F Hx of Afib on Eliquis presents with cp that comes and goes, today cp intermittent at rest associated with some sob. pt also with b/l leg edema, as per her daughter pt was recently discharged from [facility], started on Eliquis and told to stop the lasix, pt has not taken her lasix in two days, today she c/o feeling "a hot sensation" throughout her body, (30 Jan 2021 20:15)      Chief Complaint:  Patient is a 75y old  Female who presents with a chief complaint of sob (30 Jan 2021 20:15)      Review of Systems:    General:  No wt loss, fevers, chills, night sweats  Eyes:  Good vision, no reported pain  ENT:  No sore throat, pain, runny nose, dysphagia  CV:  No pain, palpitations, hypo/hypertension  Resp:  dyspnea,          Social History/Family History  SOCHX:   tobacco,  -  alcohol    FMHX: FA/MO  - contributory       Discussed with:  PMD, Family    Physical Exam:    Vital Signs:  Vital Signs Last 24 Hrs  T(C): 36.4 (30 Jan 2021 20:46), Max: 36.9 (30 Jan 2021 14:46)  T(F): 97.5 (30 Jan 2021 20:46), Max: 98.4 (30 Jan 2021 14:46)  HR: 73 (30 Jan 2021 20:46) (73 - 85)  BP: 140/87 (30 Jan 2021 20:46) (99/50 - 140/87)  BP(mean): 66 (30 Jan 2021 20:02) (66 - 66)  RR: 17 (30 Jan 2021 20:46) (16 - 17)  SpO2: 96% (30 Jan 2021 20:46) (96% - 98%)  Daily Height in cm: 165.1 (30 Jan 2021 14:46)    Daily   I&O's Summary      Chest:  decreased at bases  Cardiovascular:  Regular rhythm, S1, S2, no murmur/rub/S3/S4, no carotid/femoral/abdominal bruit, radial/pedal pulses 2+,  edema  Abdomen:  Soft, non-tender, non-distended, normoactive bowel sounds, no HSM    Laboratory:                          11.6   5.33  )-----------( 207      ( 30 Jan 2021 16:37 )             35.3     01-30    137  |  99  |  35<H>  ----------------------------<  96  4.5   |  37<H>  |  2.00<H>    Ca    8.9      30 Jan 2021 18:32    TPro  7.9  /  Alb  3.0<L>  /  TBili  2.9<H>  /  DBili  x   /  AST  57<H>  /  ALT  49  /  AlkPhos  112  01-30      CARDIAC MARKERS ( 30 Jan 2021 18:32 )  .055 ng/mL / x     / 138 U/L / x     / <1.0 ng/mL  CARDIAC MARKERS ( 30 Jan 2021 16:37 )  x     / x     / 335 U/L / x     / x          CAPILLARY BLOOD GLUCOSE        LIVER FUNCTIONS - ( 30 Jan 2021 18:32 )  Alb: 3.0 g/dL / Pro: 7.9 gm/dL / ALK PHOS: 112 U/L / ALT: 49 U/L / AST: 57 U/L / GGT: x           PT/INR - ( 30 Jan 2021 16:37 )   PT: 25.4 sec;   INR: 2.28 ratio         PTT - ( 30 Jan 2021 16:37 )  PTT:43.1 sec      Assessment:    this patient is admitted after a recent discharge from [facility]  The patient was taking Lasix but was told to hold off for 2 days  I initiated Bumex due to the patient's renal function  A diagnostic echocardiogram is ordered  The primary diagnosis is acute on chronic diastolic and/or systolic congestive heart failure with the noted clinical signs  A diagnostic echocardiogram will help confirm systolic versus diastolic  A mildly elevated troponin level with an elevated creatinine is not indicative of an acute MI and likely demand type ischemia

## 2021-02-02 NOTE — PROGRESS NOTE ADULT - PROBLEM/PLAN-1
Clinic hours for Dr. Hawkins:  Monday 7 am - 5 pm  Tuesday 7 am - 5 pm  Wednesday 7 am - 5 pm  Thursday 7 am - 5 pm  Friday  7 am - 4 pm    If you need a refill on your prescription please call your pharmacy and let them know. Please be proactive and call before your medication runs out. The pharmacy will then contact us for the refill. Please allow 24-48 hours for the refill to be processed.     If your physician has ordered additional laboratory or radiology testing as part of your ongoing plan of care, please allow 7-10 business days from the day of your lab draw or test for the results to be sent and reviewed by your provider. If your results are critical and require more immediate intervention, you will be contacted sooner. Your results will be conveyed to you via a phone call or letter.    You may be receiving a patient satisfaction survey in the mail. Please take the time to complete, as your feedback is very important to us. We strive to make your experience exceptional and your comments help us with that goal. We look forward to hearing from you.      
DISPLAY PLAN FREE TEXT

## 2021-02-02 NOTE — PROGRESS NOTE ADULT - SUBJECTIVE AND OBJECTIVE BOX
76 y/o F Hx of Afib on Eliquis presents with cp that comes and goes, today cp intermittent at rest associated with some sob. pt also with b/l leg edema, as per her daughter pt was recently discharged from [facility], started on Eliquis and told to stop the lasix, pt has not taken her lasix in two days, today she c/o feeling "a hot sensation" throughout her body, (30 Jan 2021 20:15)      Chief Complaint:  Patient is a 75y old  Female who presents with a chief complaint of sob (30 Jan 2021 20:15)      Review of Systems:    General:  No wt loss, fevers, chills, night sweats  Eyes:  Good vision, no reported pain  ENT:  No sore throat, pain, runny nose, dysphagia  CV:  No pain, palpitations, hypo/hypertension  Resp:  dyspnea,          Social History/Family History  SOCHX:   tobacco,  -  alcohol    FMHX: FA/MO  - contributory       Discussed with:  PMD, Family    Physical Exam:    Vital Signs:  Vital Signs Last 24 Hrs  T(C): 36.4 (30 Jan 2021 20:46), Max: 36.9 (30 Jan 2021 14:46)  T(F): 97.5 (30 Jan 2021 20:46), Max: 98.4 (30 Jan 2021 14:46)  HR: 73 (30 Jan 2021 20:46) (73 - 85)  BP: 140/87 (30 Jan 2021 20:46) (99/50 - 140/87)  BP(mean): 66 (30 Jan 2021 20:02) (66 - 66)  RR: 17 (30 Jan 2021 20:46) (16 - 17)  SpO2: 96% (30 Jan 2021 20:46) (96% - 98%)  Daily Height in cm: 165.1 (30 Jan 2021 14:46)    Daily   I&O's Summary      Chest:  decreased at bases  Cardiovascular:  Regular rhythm, S1, S2, no murmur/rub/S3/S4, no carotid/femoral/abdominal bruit, radial/pedal pulses 2+,  edema  Abdomen:  Soft, non-tender, non-distended, normoactive bowel sounds, no HSM    Laboratory:                          11.6   5.33  )-----------( 207      ( 30 Jan 2021 16:37 )             35.3     01-30    137  |  99  |  35<H>  ----------------------------<  96  4.5   |  37<H>  |  2.00<H>    Ca    8.9      30 Jan 2021 18:32    TPro  7.9  /  Alb  3.0<L>  /  TBili  2.9<H>  /  DBili  x   /  AST  57<H>  /  ALT  49  /  AlkPhos  112  01-30      CARDIAC MARKERS ( 30 Jan 2021 18:32 )  .055 ng/mL / x     / 138 U/L / x     / <1.0 ng/mL  CARDIAC MARKERS ( 30 Jan 2021 16:37 )  x     / x     / 335 U/L / x     / x          CAPILLARY BLOOD GLUCOSE        LIVER FUNCTIONS - ( 30 Jan 2021 18:32 )  Alb: 3.0 g/dL / Pro: 7.9 gm/dL / ALK PHOS: 112 U/L / ALT: 49 U/L / AST: 57 U/L / GGT: x           PT/INR - ( 30 Jan 2021 16:37 )   PT: 25.4 sec;   INR: 2.28 ratio         PTT - ( 30 Jan 2021 16:37 )  PTT:43.1 sec      Assessment:    this patient is admitted after a recent discharge from [facility]  The patient was taking Lasix but was told to hold off for 2 days  I initiated Bumex due to the patient's renal function, good output noted  A diagnostic echocardiogram is noted for right sided heart failure with pulmonary hypertension and tricuspid valve disease  A mildly elevated troponin level with an elevated creatinine is not indicative of an acute MI and likely demand type ischemia  consider treatment for pulmonary hypertension

## 2021-02-02 NOTE — PROGRESS NOTE ADULT - SUBJECTIVE AND OBJECTIVE BOX
[facility] NEPHROLOGY SERVICES, [facility]  NEPHROLOGY AND HYPERTENSION  [address]  [address]  [place] [address]  [phone]    MD LANG PHAN, MD JANAE WILKINS, MD KARMEN MONTES, MD JAIDA DUNCAN, MD CORI STAPLETON MD          Patient events noted  feels well no distress    MEDICATIONS  (STANDING):  apixaban 2.5 milliGRAM(s) Oral two times a day  atorvastatin 40 milliGRAM(s) Oral at bedtime  bisacodyl 5 milliGRAM(s) Oral at bedtime  budesonide 160 MICROgram(s)/formoterol 4.5 MICROgram(s) Inhaler 2 Puff(s) Inhalation two times a day  buMETAnide 1 milliGRAM(s) Oral two times a day    MEDICATIONS  (PRN):  ALBUTerol    90 MICROgram(s) HFA Inhaler 2 Puff(s) Inhalation every 6 hours PRN Shortness of Breath and/or Wheezing      02-01-21 @ 07:01  -  02-02-21 @ 07:00  --------------------------------------------------------  IN: 50 mL / OUT: 950 mL / NET: -900 mL    02-02-21 @ 07:01  -  02-02-21 @ 21:29  --------------------------------------------------------  IN: 360 mL / OUT: 1000 mL / NET: -640 mL      PHYSICAL EXAM:      T(C): 36.8 (02-02-21 @ 20:00), Max: 36.8 (02-01-21 @ 23:41)  HR: 76 (02-02-21 @ 20:00) (71 - 89)  BP: 100/62 (02-02-21 @ 20:00) (85/62 - 133/72)  RR: 17 (02-02-21 @ 20:00) (17 - 18)  SpO2: 97% (02-02-21 @ 20:00) (97% - 100%)  Wt(kg): --  Lungs clear decreased BS at bases  Heart S1S2  Abd soft NT ND  Extremities:   2 edema                                    11.9   5.01  )-----------( 199      ( 02 Feb 2021 08:36 )             37.1     02-02    139  |  99  |  32<H>  ----------------------------<  72  4.9   |  38<H>  |  1.22    Ca    8.8      02 Feb 2021 08:36  Mg     1.9     02-01    TPro  7.7  /  Alb  2.7<L>  /  TBili  2.0<H>  /  DBili  x   /  AST  36  /  ALT  36  /  AlkPhos  103  02-01      LIVER FUNCTIONS - ( 01 Feb 2021 08:47 )  Alb: 2.7 g/dL / Pro: 7.7 gm/dL / ALK PHOS: 103 U/L / ALT: 36 U/L / AST: 36 U/L / GGT: x           Creatinine Trend: 1.22<--, 1.32<--, 1.67<--, 2.00<--      ASSESSMENT:   Renal failure, suspected acute on chronic    Clinical and radiographic fluid overload   Probably has significant cardiomyopathy given cardiomegaly and clinical picture  Clinically stable    PLAN:  Continue diuretics, increase oral dose BUMEX  HOLD ARB for now as increasing diuretic challenge and low MAP  Urinalysis bland   US no hydro   Will follow.      Amrik Nobles MD

## 2021-02-02 NOTE — PROGRESS NOTE ADULT - SUBJECTIVE AND OBJECTIVE BOX
Patient is a 75y old  Female who presents with a chief complaint of sob (2021 20:57)      INTERVAL HPI/OVERNIGHT EVENTS:  pt is feeling better  MEDICATIONS  (STANDING):  apixaban 2.5 milliGRAM(s) Oral two times a day  atorvastatin 40 milliGRAM(s) Oral at bedtime  bisacodyl 5 milliGRAM(s) Oral at bedtime  budesonide 160 MICROgram(s)/formoterol 4.5 MICROgram(s) Inhaler 2 Puff(s) Inhalation two times a day  buMETAnide 1 milliGRAM(s) Oral two times a day    MEDICATIONS  (PRN):  ALBUTerol    90 MICROgram(s) HFA Inhaler 2 Puff(s) Inhalation every 6 hours PRN Shortness of Breath and/or Wheezing      Allergies    No Known Allergies    Intolerances        REVIEW OF SYSTEMS:  CONSTITUTIONAL: No fever, weight loss, or fatigue  EYES: No eye pain, visual disturbances, or discharge  ENMT:  No difficulty hearing, tinnitus, vertigo; No sinus or throat pain  NECK: No pain or stiffness  BREASTS: No pain, masses, or nipple discharge  RESPIRATORY: No cough, wheezing, chills or hemoptysis; No shortness of breath  CARDIOVASCULAR: No chest pain, palpitations, dizziness, or leg swelling  GASTROINTESTINAL: No abdominal or epigastric pain. No nausea, vomiting, or hematemesis; No diarrhea or constipation. No melena or hematochezia.  GENITOURINARY: No dysuria, frequency, hematuria, or incontinence  NEUROLOGICAL: No headaches, memory loss, loss of strength, numbness, or tremors  SKIN: No itching, burning, rashes, or lesions   LYMPH NODES: No enlarged glands  ENDOCRINE: No heat or cold intolerance; No hair loss  MUSCULOSKELETAL: No joint pain or swelling; No muscle, back, or extremity pain  PSYCHIATRIC: No depression, anxiety, mood swings, or difficulty sleeping  HEME/LYMPH: No easy bruising, or bleeding gums  ALLERGY AND IMMUNOLOGIC: No hives or eczema    Vital Signs Last 24 Hrs  T(C): 36.3 (2021 11:15), Max: 36.8 (2021 23:41)  T(F): 97.3 (2021 11:15), Max: 98.2 (2021 23:41)  HR: 86 (2021 11:15) (71 - 97)  BP: 130/60 (2021 11:15) (86/64 - 133/72)  BP(mean): --  RR: 18 (2021 11:15) (18 - 18)  SpO2: 97% (2021 11:15) (96% - 100%)    PHYSICAL EXAM:  GENERAL: NAD, well-groomed, well-developed  HEAD:  Atraumatic, Normocephalic  EYES: EOMI, PERRLA, conjunctiva and sclera clear  ENMT: No tonsillar erythema, exudates, or enlargement; Moist mucous membranes, Good dentition, No lesions  NECK: Supple, No JVD, Normal thyroid  NERVOUS SYSTEM:  Alert & Oriented X3, Good concentration; Motor Strength 5/5 B/L upper and lower extremities; DTRs 2+ intact and symmetric  CHEST/LUNG: Clear to percussion bilaterally; No rales, rhonchi, wheezing, or rubs  HEART: Regular rate and rhythm; No murmurs, rubs, or gallops  ABDOMEN: Soft, Nontender, Nondistended; Bowel sounds present  EXTREMITIES:  2+ Peripheral Pulses, No clubbing, cyanosis, or edema  LYMPH: No lymphadenopathy noted  SKIN: No rashes or lesions    LABS:                        11.9   5.01  )-----------( 199      ( 2021 08:36 )             37.1     02-    139  |  99  |  32<H>  ----------------------------<  72  4.9   |  38<H>  |  1.22    Ca    8.8      2021 08:36  Mg     1.9     -    TPro  7.7  /  Alb  2.7<L>  /  TBili  2.0<H>  /  DBili  x   /  AST  36  /  ALT  36  /  AlkPhos  103  02      Urinalysis Basic - ( 2021 14:12 )    Color: Yellow / Appearance: Clear / S.005 / pH: x  Gluc: x / Ketone: Negative  / Bili: Negative / Urobili: 4 mg/dL   Blood: x / Protein: Negative mg/dL / Nitrite: Negative   Leuk Esterase: Negative / RBC: x / WBC 0-2   Sq Epi: x / Non Sq Epi: Occasional / Bacteria: Occasional      CAPILLARY BLOOD GLUCOSE        CULTURES:    HEMOGLOBIN A1C:    CHOLESTEROL:        RADIOLOGY & ADDITIONAL TESTS:

## 2021-02-02 NOTE — PROGRESS NOTE ADULT - PROBLEM SELECTOR PROBLEM 4
Atrial fibrillation with controlled ventricular rate

## 2021-02-03 NOTE — PROGRESS NOTE ADULT - PROVIDER SPECIALTY LIST ADULT
Cardiology
Nephrology
Nephrology
Cardiology
Nephrology
Internal Medicine

## 2021-02-03 NOTE — DISCHARGE NOTE PROVIDER - HOSPITAL COURSE
76 y/o F Hx of Afib on Eliquis presents with cp that comes and goes, today cp intermittent at rest associated with some sob. pt also with b/l leg edema

## 2021-02-03 NOTE — DISCHARGE NOTE PROVIDER - CARE PROVIDER_API CALL
Esteban Henson  INTERNAL MEDICINE  [address] [place] [address]  Phone: [phone]  Fax: [phone]  Follow Up Time:

## 2021-02-03 NOTE — DISCHARGE NOTE PROVIDER - NSDCMRMEDTOKEN_GEN_ALL_CORE_FT
Albuterol (Eqv-ProAir HFA) 90 mcg/inh inhalation aerosol: 2 puff(s) inhaled every 6 hours  atorvastatin 40 mg oral tablet: 1 tab(s) orally once a day  bumetanide 1 mg oral tablet: 1 tab(s) orally 2 times a day  Eliquis 5 mg oral tablet: 1 tab(s) orally 2 times a day  Klor-Con 10 mEq oral tablet, extended release: 1 tab(s) orally once a day   losartan 25 mg oral tablet: 1 tab(s) orally once a day

## 2021-02-03 NOTE — DISCHARGE NOTE PROVIDER - NSDCCPCAREPLAN_GEN_ALL_CORE_FT
PRINCIPAL DISCHARGE DIAGNOSIS  Diagnosis: Acute on chronic diastolic CHF (congestive heart failure), NYHA class 1  Assessment and Plan of Treatment:       SECONDARY DISCHARGE DIAGNOSES  Diagnosis: NIC (acute kidney injury)  Assessment and Plan of Treatment: NIC (acute kidney injury)    Diagnosis: Chronic obstructive pulmonary disease, unspecified COPD type  Assessment and Plan of Treatment: Chronic obstructive pulmonary disease, unspecified COPD type    Diagnosis: Atrial fibrillation with controlled ventricular rate  Assessment and Plan of Treatment:

## 2021-02-03 NOTE — PROGRESS NOTE ADULT - SUBJECTIVE AND OBJECTIVE BOX
76 y/o F Hx of Afib on Eliquis presents with cp that comes and goes, today cp intermittent at rest associated with some sob. pt also with b/l leg edema, as per her daughter pt was recently discharged from [facility], started on Eliquis and told to stop the lasix, pt has not taken her lasix in two days, today she c/o feeling "a hot sensation" throughout her body, (30 Jan 2021 20:15)      Chief Complaint:  Patient is a 75y old  Female who presents with a chief complaint of sob (30 Jan 2021 20:15)      Review of Systems:    General:  No wt loss, fevers, chills, night sweats  Eyes:  Good vision, no reported pain  ENT:  No sore throat, pain, runny nose, dysphagia  CV:  No pain, palpitations, hypo/hypertension  Resp:  dyspnea,          Social History/Family History  SOCHX:   tobacco,  -  alcohol    FMHX: FA/MO  - contributory       Discussed with:  PMD, Family    Physical Exam:    Vital Signs:  Vital Signs Last 24 Hrs  T(C): 36.4 (30 Jan 2021 20:46), Max: 36.9 (30 Jan 2021 14:46)  T(F): 97.5 (30 Jan 2021 20:46), Max: 98.4 (30 Jan 2021 14:46)  HR: 73 (30 Jan 2021 20:46) (73 - 85)  BP: 140/87 (30 Jan 2021 20:46) (99/50 - 140/87)  BP(mean): 66 (30 Jan 2021 20:02) (66 - 66)  RR: 17 (30 Jan 2021 20:46) (16 - 17)  SpO2: 96% (30 Jan 2021 20:46) (96% - 98%)  Daily Height in cm: 165.1 (30 Jan 2021 14:46)    Daily   I&O's Summary      Chest:  decreased at bases  Cardiovascular:  Regular rhythm, S1, S2, no murmur/rub/S3/S4, no carotid/femoral/abdominal bruit, radial/pedal pulses 2+,  edema  Abdomen:  Soft, non-tender, non-distended, normoactive bowel sounds, no HSM    Laboratory:                          11.6   5.33  )-----------( 207      ( 30 Jan 2021 16:37 )             35.3     01-30    137  |  99  |  35<H>  ----------------------------<  96  4.5   |  37<H>  |  2.00<H>    Ca    8.9      30 Jan 2021 18:32    TPro  7.9  /  Alb  3.0<L>  /  TBili  2.9<H>  /  DBili  x   /  AST  57<H>  /  ALT  49  /  AlkPhos  112  01-30      CARDIAC MARKERS ( 30 Jan 2021 18:32 )  .055 ng/mL / x     / 138 U/L / x     / <1.0 ng/mL  CARDIAC MARKERS ( 30 Jan 2021 16:37 )  x     / x     / 335 U/L / x     / x          CAPILLARY BLOOD GLUCOSE        LIVER FUNCTIONS - ( 30 Jan 2021 18:32 )  Alb: 3.0 g/dL / Pro: 7.9 gm/dL / ALK PHOS: 112 U/L / ALT: 49 U/L / AST: 57 U/L / GGT: x           PT/INR - ( 30 Jan 2021 16:37 )   PT: 25.4 sec;   INR: 2.28 ratio         PTT - ( 30 Jan 2021 16:37 )  PTT:43.1 sec      Assessment:    this patient is admitted after a recent discharge from [facility]  The patient was taking Lasix but was told to hold off for 2 days  I initiated Bumex due to the patient's renal function, good output noted  A diagnostic echocardiogram is noted for right sided heart failure with pulmonary hypertension and tricuspid valve disease  A mildly elevated troponin level with an elevated creatinine is not indicative of an acute MI and likely demand type ischemia  consider treatment for pulmonary hypertension

## 2021-02-03 NOTE — DISCHARGE NOTE NURSING/CASE MANAGEMENT/SOCIAL WORK - PATIENT PORTAL LINK FT
You can access the FollowMyHealth Patient Portal offered by [facility] by registering at the following website: [email or web address]. By joining [facility] [employer] [facility]’s FollowMyHealth portal, you will also be able to view your health information using other applications (apps) compatible with our system.

## 2021-05-26 PROBLEM — I50.9 HEART FAILURE, UNSPECIFIED: Chronic | Status: ACTIVE | Noted: 2021-01-01

## 2021-05-26 PROBLEM — F03.90 UNSPECIFIED DEMENTIA, UNSPECIFIED SEVERITY, WITHOUT BEHAVIORAL DISTURBANCE, PSYCHOTIC DISTURBANCE, MOOD DISTURBANCE, AND ANXIETY: Chronic | Status: ACTIVE | Noted: 2021-01-01

## 2021-05-26 PROBLEM — J44.9 CHRONIC OBSTRUCTIVE PULMONARY DISEASE, UNSPECIFIED: Chronic | Status: ACTIVE | Noted: 2021-01-01

## 2021-05-26 PROBLEM — I48.91 UNSPECIFIED ATRIAL FIBRILLATION: Chronic | Status: ACTIVE | Noted: 2021-01-01

## 2021-05-26 NOTE — PROCEDURE NOTE - NSINFORMCONSENT_GEN_A_CORE
This was an emergent procedure.
This was an emergent procedure.
via telephone/Benefits, risks, and possible complications of procedure explained to patient/caregiver who verbalized understanding and gave written consent.

## 2021-05-26 NOTE — PROCEDURE NOTE - NSICDXPROCEDURE_GEN_ALL_CORE_FT
PROCEDURES:  Insertion, catheter, non-tunneled, age 5 years or older 26-May-2021 18:52:52  Eva Martel

## 2021-05-26 NOTE — PROGRESS NOTE ADULT - SUBJECTIVE AND OBJECTIVE BOX
Called to patient's bedside for intubation.  Therapy initiated by primary medical team.    Patient Assessment: Pt obtunded on BiPAP support.    Baseline Vital Signs:  BP: 127/73  HR: 81  RR: 24  SpO2:94     Medications Administered: Propofol 50 mg, Succinylcholine 60 mg, Phenylephrine 200 mcg    Intubation:      [ ] Direct Laryngoscopy    [X] Video-Assisted Laryngoscopy   [ ] Fiberoptic Intubation    Blade:   Cormack-Lehane View: [X] 1     [ ] 2A     [ ] 2B     [ ] 3     [ ]  4  ETT Size: 7.5  Marking at Teeth: 21    Post-Intubation Vital Signs:  BP: 133/72  HR: 92  RR: 16  SpO2: 97%     Positive end-tidal carbon dioxide via EasyCap, positive bilateral breath sounds.  CXR to be reviewed by primary team.  Atraumatic intubation with no complications.

## 2021-05-26 NOTE — CONSULT NOTE ADULT - ATTENDING COMMENTS
asked to see by Dr Welch.  75yrs. HFpEF. severe PHTN. severe TR and RV failure. COPD on home O2. HTN, lipids. ChAfib eloquis. CKD/recurrent NIC Cr .9 Jan 2021. DM  Does not follow with a cardiologist. Worsening dementia over past couple of months.   January 2021 chest pressure admitted for a few days. TTE LV normal. severe enlarged RA/ RV vol pressure overload. mildly reduced RV. LA +. mild MR. thickneing of Mv leaflets. Mod TR. mild/mod AI. severe PI. RVSP 82. no effusion.   CT scan showed:   May 23 altered MS. [facility]. Found to have NIC Cr 4.6, K 6.3. NTproBNP 33,000.   Managed with lasix. CT chest/abdo/pelvis: large pericardial effusion. asked to see by Dr Welch.  75yrs. HFpEF. severe PHTN. severe TR and RV failure. COPD on home O2. HTN, lipids. ChAfib eloquis. CKD/recurrent NIC Cr .9 Jan 2021. DM  Does not follow with a cardiologist. Worsening dementia over past couple of months.   January/Feb 2021 chest pressure admitted for a few days. TTE LV normal. severe enlarged RA/ RV vol pressure overload. mildly reduced RV. LA +. mild MR. thickneing of Mv leaflets. Mod TR. mild/mod AI. severe PI. RVSP 82. no effusion. CT scan showed may have shown effusion.   d/c on diuretics.   May 23 altered MS. [facility]. Found to have NIC Cr 4.6, K 6.3. NTproBNP 33,000.   Managed with lasix. CT chest/abdo/pelvis: large pericardial effusion. atelexasis. stable cardiomegaly. enlarged PA. ascites.   Transferred to [facility] for possible pericardiocentesis. Found to be in hypercarbic resp failure. Intuabated. Brief period of pressors which have now been weaned off.   Current meds: bumex 2/hr, proprofol.   HR 80Afib, 110/-120/, afebrile.   UO; 10/hr  TTE: LA 4.4, LVEDD 3.6, preserved LV function. severe RA/RV + with severe RV dysfunction. septal flattening. mod-sev TR. mod [place]. mild-mod MR.   pericardial effusion 3.4cm adjacent to RA. Post to LV 1.5.cm.   CXR:   CT chest abdo.   05-26    144  |  100  |  68<H>  ----------------------------<  116<H>  4.9   |  31  |  4.87<H>  Ca    8.9      26 May 2021 10:15  Phos  5.9     05-26  Mg     2.8     05-26  TPro  6.6  /  Alb  3.1<L>  /  TBili  2.1<H>  /  DBili  x   /  AST  43<H>  /  ALT  27  /  AlkPhos  91  05-26                        11.4   5.67  )-----------( 97       ( 26 May 2021 10:14 )             36.4   lactate .7.   INR 1.4,   ABG: PH 7.45, PCo2 40, PO2 103, Bi 34. FiO2 40. asked to see by Dr Welch.  75yrs. HFpEF. severe PHTN. severe TR and RV failure. COPD on home O2. HTN, lipids. ChAfib eloquis. CKD/recurrent NIC Cr .9 Jan 2021. DM  Does not follow with a cardiologist. Worsening dementia over past couple of months.   January/Feb 2021 chest pressure admitted for a few days. TTE LV normal. severe enlarged RA/ RV vol pressure overload. mildly reduced RV. LA +. mild MR. thickneing of Mv leaflets. Mod TR. mild/mod AI. severe PI. RVSP 82. no effusion. CT scan showed may have shown effusion.   d/c on diuretics.   May 23 altered MS. [facility]. Found to have NIC Cr 4.6, K 6.3. NTproBNP 33,000.   Managed with lasix. CT chest/abdo/pelvis: large pericardial effusion. atelexasis. stable cardiomegaly. enlarged PA. ascites.   Transferred to [facility] for possible pericardiocentesis. Found to be in hypercarbic resp failure. Intuabated. Brief period of pressors which have now been weaned off.   Current meds: bumex 2/hr, proprofol.   HR 80Afib, 110/-120/, afebrile.   sedated intubated.   2/4 EDM over precordiium. JVP appreciated.   abdo soft. warm LE. + LE edema.   UO; 10/hr  TTE: LA 4.4, LVEDD 3.6, preserved LV function. severe RA/RV + with severe RV dysfunction. septal flattening. mod-sev TR. mod MT. mild-mod MR.   pericardial effusion 3.4cm adjacent to RA. Post to LV 1.5.cm.   CXR: significant cardiomegaly.   CT chest abdo.   05-26    144  |  100  |  68<H>  ----------------------------<  116<H>  4.9   |  31  |  4.87<H>  Ca    8.9      26 May 2021 10:15  Phos  5.9     05-26  Mg     2.8     05-26  TPro  6.6  /  Alb  3.1<L>  /  TBili  2.1<H>  /  DBili  x   /  AST  43<H>  /  ALT  27  /  AlkPhos  91  05-26                        11.4   5.67  )-----------( 97       ( 26 May 2021 10:14 )             36.4   lactate .7.   INR 1.4,   ABG: PH 7.45, PCo2 40, PO2 103, Bi 34. FiO2 40.  75yrs old patient with severe pulmonary HTN of as yet unclear etiology.   Hemodynamically unstable but hypertensive after intubation.   Pericardial effusion likely not contributory to hemodynamic demise. Hypercapnic arrest with elevation in pulmonary pressures possible.   discussed with CTU team.  Suggest:  Place swan. confirm central venous pressures are low as reflected by femoral line.   would start sildenafil 20 tid with rapid uptitration. alternative would be IV flolan however therapeutic effect likely to take much longer.   please ask Dr Patel to consult. When stable will need full work up for pulmonary HTN.   In the meanwhile: venous dopplers. collagen vascular profile. HIV.   Jered Farooq

## 2021-05-26 NOTE — H&P ADULT - NSHPPHYSICALEXAM_GEN_ALL_CORE
A&O to first name, lethargic  Distant heart sounds, irregular  Obese, abd soft, non distended, + bowel sounds  + distal pulses, warm and well perfused

## 2021-05-26 NOTE — CHART NOTE - NSCHARTNOTEFT_GEN_A_CORE
IR consulted for possible pericardial effusion drainage, CT reviewed, effusion small-moderate with no tamponade therefore no intervention planned. Spoke with MD Welch who also reviewed CT and not significant effusion and no tamponade, no surgical intervention at this time. Per MD Welch transfer to medicine or CCU service for management. IR consulted for possible pericardial effusion drainage, CT reviewed, effusion small-moderate with no tamponade therefore no intervention planned. Spoke with MD Welch who also reviewed CT and not significant effusion and no tamponade, no surgical intervention at this time. Per MD Welch transfer to medicine or CCU service for management.    MICU fellow- no MICU beds available. Spoke with MD Milton covering CICU and will accept patient to his service. MD Welch updated. Spoke with daughter James Navarrete and also aware of plan.

## 2021-05-26 NOTE — CONSULT NOTE ADULT - ASSESSMENT
----------------------------------------------------------  Interventional Radiology Brief Consult Note  -----------------------------------------------------------    Reason for Referral: Pericardial Drain placement    Clinical Summary: 74yo transfer from [facility] with pericardial effusion, NIC and hypotension    Vitals:  T(C): --  HR: 84 (05-26-21 @ 11:30) (76 - 89)  BP: 116/69 (05-26-21 @ 10:15) (116/69 - 123/65)  RR: --  SpO2: 94% (05-26-21 @ 11:30) (94% - 100%)    Labs:           11.4  5.67)-----(--     (05-26-21 @ 10:14)         36.4     144 | 100 | 68  --------------------< 116     (05-26-21 @ 10:15)  4.9 | 31 | 4.87       PT: 18.3<H> 05-26-21 @ 10:14  aPTT: 94.2<H> 05-26-21 @ 10:14   INR: 1.56<H> 05-26-21 @ 10:14      Assessment: 75y Female with small pericardial effusion on recent CT imaging and NO evidence of tamponade on recent Echocardiogram.    Recommendations:  - No IR intervention at this time as No echocardiographic evidence of pericardial tamponade and CT shows small to moderate effusion.  - Continue conservative management given patients multiple comorbidities.  - Case discussed with Dr. Olson.

## 2021-05-26 NOTE — H&P ADULT - NSHPSOCIALHISTORY_GEN_ALL_CORE
Lives at home with daughter, ,  is 84 and lives with daughter as well  Hx of dementia?? over past month more and more confused but can recall past events  Occasional ETOH, never smoker, no illicit drug use  Legally blind, daughter helps with ADLs, uses walker for short distances, but been very weak, restricted ROMs

## 2021-05-26 NOTE — CONSULT NOTE ADULT - SUBJECTIVE AND OBJECTIVE BOX
NEPHROLOGY CONSULTATION    CHIEF COMPLAINT: AMS    HPI:  Pt is 74 yo female with PMH of CHFpEF, Severe TR with Right CHF and severe pHTN, COPD on home , HTN, HLD, Chronic Afib (on Eliquis), OA, DM, right eye cataract who was brought in by EMS to [facility] on 21 for AMS. Per chart on arrival to ED pt was hypotensive, obtunded. Chest CT with large pericardial effusion (increased since 2021) - per thoracic surgery consult no indication for pericardial window, pHTN, cirrhotic liver with small ascites, ECHO EF 55% dilated RV with decreased function, severe pHTN, NIC. On  Patient transferred to [facility] with concern of pericardial effusion, hypotension and NIC. Seen on [facility] CTU earlier, intubated. Hx from   chart as pt was unable to provide hx or ROS.    ROS:  as above    Allergies:  No Known Allergies    PAST MEDICAL & SURGICAL HISTORY:  Afib  COPD (chronic obstructive pulmonary disease)  CHF (congestive heart failure)  Dementia    SOCIAL HISTORY:  negative    FAMILY HISTORY:  NC    MEDICATIONS  (STANDING):  buMETAnide Infusion 2 mG/Hr (10 mL/Hr) IV Continuous <Continuous>  chlorhexidine 0.12% Liquid 15 milliLiter(s) Oral Mucosa every 12 hours  chlorhexidine 2% Cloths 1 Application(s) Topical <User Schedule>  norepinephrine Infusion 0.05 MICROgram(s)/kG/Min (9.38 mL/Hr) IV Continuous <Continuous>  propofol Infusion 30 MICROgram(s)/kG/Min (18 mL/Hr) IV Continuous <Continuous>  sodium chloride 0.9%. 1000 milliLiter(s) (10 mL/Hr) IV Continuous <Continuous>  vasopressin Infusion 0.05 Unit(s)/Min (3 mL/Hr) IV Continuous <Continuous>    Vital Signs Last 24 Hrs  T(C): 36.4 (21 @ 17:40), Max: 36.4 (21 @ 17:40)  T(F): 97.5 (21 @ 17:40), Max: 97.5 (21 @ 17:40)  HR: 80 (21 @ 17:40) (65 - 89)  BP: 113/68 (21 @ 17:40) (113/68 - 123/65)  BP(mean): 94 (21 @ 17:40) (89 - 94)  RR: 18 (21 @ 17:40) (14 - 28)  SpO2: 100% (21 @ 17:40) (89% - 100%)    I&O's Detail    26 May 2021 07:01  -  26 May 2021 18:23  --------------------------------------------------------  IN:    Bumetanide: 30 mL    IV PiggyBack: 50 mL    Norepinephrine: 9.4 mL    Plasma: 300 mL    Propofol: 216 mL    sodium chloride 0.9%: 50 mL    Vasopressin: 10 mL  Total IN: 665.4 mL    OUT:    Indwelling Catheter - Urethral (mL): 190 mL    Phenylephrine: 0 mL  Total OUT: 190 mL    Total NET: 475.4 mL    Mode: AC/ CMV (Assist Control/ Continuous Mandatory Ventilation)  RR (machine): 14  TV (machine): 550  FiO2: 40  PEEP: 5  ITime: 1  MAP: 11  PIP: 34    s1s2  b/l air entry  soft, ND  edema    LABS:                        11.4   5.67  )-----------( 97       ( 26 May 2021 10:14 )             36.4         144  |  100  |  68<H>  ----------------------------<  116<H>  4.9   |  31  |  4.87<H>    Ca    8.9      26 May 2021 10:15  Phos  5.9       Mg     2.8         TPro  6.6  /  Alb  3.1<L>  /  TBili  2.1<H>  /  DBili  x   /  AST  43<H>  /  ALT  27  /  AlkPhos  91      Urinalysis Basic - ( 26 May 2021 11:07 )    Color: Dark Orange / Appearance: Slightly Turbid / S.021 / pH: x  Gluc: x / Ketone: Trace  / Bili: Moderate / Urobili: Negative   Blood: x / Protein: 300 mg/dL / Nitrite: Negative   Leuk Esterase: Large / RBC: >50 /hpf / WBC 10 /HPF   Sq Epi: x / Non Sq Epi: 0 /hpf / Bacteria: Many    LIVER FUNCTIONS - ( 26 May 2021 10:15 )  Alb: 3.1 g/dL / Pro: 6.6 g/dL / ALK PHOS: 91 U/L / ALT: 27 U/L / AST: 43 U/L / GGT: x           PT/INR - ( 26 May 2021 15:00 )   PT: 17.5 sec;   INR: 1.49 ratio       PTT - ( 26 May 2021 15:00 )  PTT:35.6 sec    A/P:    COPD, CO2 retention, CM, peric effusion, R heart failure, resp failure, intubated  Hemodynamic NIC (Cr 0.97 - 2/3/21)  Poor UO  Trial of Bumex qtt  Avoid nephrotoxins  Avoid hypotension  No urgent indication for HD, hopefully will avoid  F/u BMP, UO  Prognosis is guarded    [phone]

## 2021-05-26 NOTE — PROCEDURE NOTE - NSPOSTPRCRAD_GEN_A_CORE
central line located in the superior vena cava/post-procedure radiography performed
femoral vein/central line located in the/post procedure radiography not performed

## 2021-05-26 NOTE — CONSULT NOTE ADULT - ASSESSMENT
Pt is a 75 year old woman with a PMHx of HFpEF, severe pHTN, severe TR with right-sided HF, COPD on 3L home O2, HTN, HLD, chronic afib (on Eliquis), OA, CKD3, DM, right eye cataract who was brought in by EMS to [facility] on 5/23/21 for AMS. Pt is a 75 year old woman with a PMHx of HFpEF, severe pHTN, severe TR with right-sided HF, COPD on 3L home O2, HTN, HLD, chronic afib (on Eliquis), OA, CKD3, DM, right eye cataract who was brought in by EMS to [facility] on 5/23/21 for AMS transferred here for hypotension w/o concern for pericardial tamponade. On arrival here she was hypotensive requiring jorge a/levo/vasa. She was intubated for hypercarbic respiratory failure, with subsequent improvement in BP. On our assessment SBPs 150-160s off all pressors.

## 2021-05-26 NOTE — PROGRESS NOTE ADULT - SUBJECTIVE AND OBJECTIVE BOX
SANJIV ROBERTS   MRN#: [id number]     The patient is a 75y Female who was seen, evaluated, & examined with the ICU staff with a multidisciplinary care plan formulated & implemented. All available clinical, laboratory, radiographic, pharmacologic, and electrocardiographic data were reviewed & analyzed.      The patient was in the ICU in critical condition secondary to:     persistent cardiopulmonary dysfunction  pulmonary hypertension - right heart failure    For support and evaluation & prevention of further decompensation secondary to persistent cardiopulmonary dysfunction, respiratory status required:     supplemental oxygen with full ventilatory support / mechanical ventilation  continuous pulse oximetry monitoring  following ABGs with A-line monitoring  IV Propofol infusion    Invasive hemodynamic monitoring with     a PA catheter was required to follow serial CIs/MVO2s   an A-line was required for continuous MAP/BP monitoring     to ensure adequate cardiovascular support and to evaluate for & help prevent decompensation.    In addition:  Sedated with Propofol, synchronous with the vent  No pressors or inotropes, lactate normal - will place PA catheter to assess filling pressures and need for pulmonary vasodilator  Acute hypoxic respiratory failure requiring mechanical ventilation - breathing trials once PA pressures are assessed and RV is unloaded  Start tube feeds  Non-oliguric NIC, Renal following - would start Bumex drip  Hemoglobin low but acceptable  Afebrile, no antibiotics  Sugars controlled  No central access    The patient required critical care management and I personally provided 35 minutes of non-continuous care to the patient, excluding separate procedures, in addition to discussing the patient and plan at length with the ICU staff and helping coordinate care.

## 2021-05-26 NOTE — H&P ADULT - ASSESSMENT
75y.o female with PMHx of CHFpEF, Severe TR with Right CHF and severe pHTN, COPD on home 02, HTN, HLD, Chronic Afib (on Eliquis), OA, CKD3, DM, right eye cataract who was brought in by EMS to [facility] on 5/23/21 for AMS. pt A&O x1, per daughter patient with generalized weakness and lethargy since since 5/22. Pt had recent in Jan 2021 with CP and NIC. Per chart on arrival to ED pt was hypotensive, obtunded. Chest CT with large pericardial effusion (increased since Jan 2021)- per thoracic surgery consult no indication for pericardial window, pHTN, cirrhotic liver with small ascites, ECHO EF 55% dilated RV with decreased function, severe TN, pHTN, NIC (per renal consult no acute indication for HD), venous duplex NG for DVTs, Head CT revealed chronic involutional and white matter changes, no acute intracranial process, NIC (SCr peaked at 4.6) with  hyperkalemia (5.8) requiring lokelma and insulin.    5/26 Patient transferred to [facility] with concern of pericardial effusion, hypotension and NIC. On arrival patient SBP 80's, A&O to first name but very lethargic. Started on Neosynephrine, Beto and Central line placed. 75y.o female with PMHx of CHFpEF, Severe TR with Right CHF and severe pHTN, COPD on home 02, HTN, HLD, Chronic Afib (on Eliquis), OA, CKD3, DM, right eye cataract who was brought in by EMS to [facility] on 5/23/21 for AMS. pt A&O x1, per daughter patient with generalized weakness and lethargy since since 5/22. Pt had recent in Jan 2021 with CP and NIC. Per chart on arrival to ED pt was hypotensive, obtunded. Chest CT with large pericardial effusion (increased since Jan 2021)- per thoracic surgery consult no indication for pericardial window, pHTN, cirrhotic liver with small ascites, ECHO EF 55% dilated RV with decreased function, severe TN, pHTN, NIC (per renal consult no acute indication for HD), venous duplex NG for DVTs, Head CT revealed chronic involutional and white matter changes, no acute intracranial process, NIC (SCr peaked at 4.6) with  hyperkalemia (5.8) requiring lokelma and insulin.    5/26 Patient transferred to [facility] with concern of pericardial effusion, hypotension and NIC. On arrival patient SBP 80's, A&O to first name but very lethargic. Started on Neosynephrine, Beto and Central line placed.    Spoke with daughter, James Navarrete, patient is full code.  75y.o female with PMHx of CHFpEF, Severe TR with Right CHF and severe pHTN, COPD on home 02, HTN, HLD, Chronic Afib (on Eliquis), OA, CKD3, DM, right eye cataract who was brought in by EMS to [facility] on 5/23/21 for AMS. pt A&O x1, per daughter patient with generalized weakness and lethargy since since 5/22. Pt had recent in Jan 2021 with CP and NIC. Per chart on arrival to ED pt was hypotensive, obtunded. Chest CT with large pericardial effusion (increased since Jan 2021)- per thoracic surgery consult no indication for pericardial window, pHTN, cirrhotic liver with small ascites, ECHO EF 55% dilated RV with decreased function, severe TN, pHTN, NIC (per renal consult no acute indication for HD), venous duplex NG for DVTs, Head CT revealed chronic involutional and white matter changes, no acute intracranial process, NIC (SCr peaked at 4.6) with  hyperkalemia (5.8) requiring lokelma and insulin.  Of note daughter states over past month patient more and more confused ? dementia and pt is legally blind. Daughter states pt HAS NOT RECEIVED COVID VACCINE as she has been sick these past few months.     5/26 Patient transferred to [facility] with concern of pericardial effusion, hypotension and NIC. On arrival patient SBP 80's, A&O to first name but very lethargic. Started on Neosynephrine, Beto and Central line placed.    Spoke with daughter, James Navarrete, patient is full code.

## 2021-05-26 NOTE — CONSULT NOTE ADULT - SUBJECTIVE AND OBJECTIVE BOX
Patient is a 75y old  Female who presents with a chief complaint of Transfer from [facility] with pericardial effusion, NIC and hypotension (26 May 2021 12:03)    HPI: Pt is a 75 year old woman with a PMHx of HFpEF, severe pHTN, severe TR with right-sided HF, COPD on home 02, HTN, HLD, Chronic Afib (on Eliquis), OA, CKD3, DM, right eye cataract who was brought in by EMS to [facility] on 5/23/21 for AMS. Per daughter, pt with generalized weakness and lethargy since since 5/22, more confused over the past month.     Pt had recent admission in Jan 2021 with CP and NIC in the setting of stopping lasix for 2 days. Per chart on arrival to ED pt was hypotensive, obtunded. Chest CT with large pericardial effusion (increased since Jan 2021), cirrhotic liver with small ascites. ECHO EF 55% dilated RV with decreased function, severe TR, pHTN. NIC(SCr peaked at 4.6) with  hyperkalemia (5.8) requiring lokema and insulin. Started on bumex 1mg PO BID.     Patient transferred to [facility] on 5/26 for pericardial effusion, hypotension and NIC. On arrival patient SBP 80's, started on levo/vaso. Placed on BiPAP, but intubated for airway protection due to worsening mental status. TTE w/ severe RV enlargement w/ severely decreased RV systolic function, mod-severe TR, severe pulmonary htn (RVSP 67 mm Hg), mod NH, large pericardial effusion (up to 3.4 cm adjacent to RA at largest dimension 1.5 cm posterior to LV, no echo evidence of tamponade). IR consulted, no indication for pericardiocentesis.           BUN 68, Cr 4.87, AST 43, ALT 27,  Alphos 91, BiliT 2.1, Alb 3.1. Lactate 0.8  HsTrop 100  ABG 7.22/89/148/35 -> 7.54/30/103/34  U/A: Large LE, WBC 10, many bacteria     Labs:  WBC  PAST MEDICAL & SURGICAL HISTORY:  Afib    COPD (chronic obstructive pulmonary disease)    CHF (congestive heart failure)    Dementia        FAMILY HISTORY:      Home Medications:  Albuterol (Eqv-ProAir HFA) 90 mcg/inh inhalation aerosol: 2 puff(s) inhaled every 6 hours (30 Jan 2021 17:42)  atorvastatin 40 mg oral tablet: 1 tab(s) orally once a day (30 Jan 2021 17:42)  Eliquis 5 mg oral tablet: 1 tab(s) orally 2 times a day (30 Jan 2021 17:42)  losartan 25 mg oral tablet: 1 tab(s) orally once a day (30 Jan 2021 17:42)      Medications:  chlorhexidine 0.12% Liquid 15 milliLiter(s) Oral Mucosa every 12 hours  chlorhexidine 2% Cloths 1 Application(s) Topical <User Schedule>  norepinephrine Infusion 0.05 MICROgram(s)/kG/Min IV Continuous <Continuous>  propofol Infusion 30 MICROgram(s)/kG/Min IV Continuous <Continuous>  sodium chloride 0.9% lock flush 10 milliLiter(s) IV Push every 1 hour PRN  sodium chloride 0.9%. 1000 milliLiter(s) IV Continuous <Continuous>  vasopressin Infusion 0.05 Unit(s)/Min IV Continuous <Continuous>      Review of systems:  10 point review of systems completed and are negative unless noted in HPI    Vitals:  T(C): --  HR: 72 (05-26-21 @ 13:45) (68 - 89)  BP: 116/69 (05-26-21 @ 10:15) (116/69 - 123/65)  BP(mean): 89 (05-26-21 @ 10:15) (89 - 90)  RR: 14 (05-26-21 @ 13:45) (14 - 28)  SpO2: 94% (05-26-21 @ 13:45) (94% - 100%)    Daily     Daily     Weight (kg): 100 (05-26 @ 09:41)    I&O's Summary    26 May 2021 07:01  -  26 May 2021 13:57  --------------------------------------------------------  IN: 101.4 mL / OUT: 50 mL / NET: 51.4 mL        Physical Exam:  Appearance: No Acute Distress  HEENT: PERRL  Neck:   Cardiovascular: Normal S1 S2, No murmurs/rubs/gallops  Respiratory: Clear to auscultation bilaterally  Gastrointestinal: Soft, Non-tender	  Skin: No cyanosis	  Neurologic: Non-focal  Extremities: No LE edema  Psychiatry: A & O x 3, Mood & affect appropriate    Labs:                        11.4   5.67  )-----------( 97       ( 26 May 2021 10:14 )             36.4     05-26    144  |  100  |  68<H>  ----------------------------<  116<H>  4.9   |  31  |  4.87<H>    Ca    8.9      26 May 2021 10:15  Phos  5.9     05-26  Mg     2.8     05-26    TPro  6.6  /  Alb  3.1<L>  /  TBili  2.1<H>  /  DBili  x   /  AST  43<H>  /  ALT  27  /  AlkPhos  91  05-26    PT/INR - ( 26 May 2021 10:14 )   PT: 18.3 sec;   INR: 1.56 ratio         PTT - ( 26 May 2021 10:14 )  PTT:94.2 sec  CARDIAC MARKERS ( 26 May 2021 10:15 )  x     / x     / 52 U/L / x     / 2.2 ng/mL          TELEMETRY:    Echocardiogram:  Dimensions:    Normal Values:  LA:     4.4    2.0 - 4.0 cm  Ao:            2.0 - 3.8 cm  SEPTUM: 0.8    0.6 - 1.2 cm  PWT:    1.0    0.6 - 1.1 cm  LVIDd:  3.6    3.0 - 5.6 cm  LVIDs:  2.2    1.8 - 4.0 cm  Derived variables:  LVMI: 45 g/m2  RWT: 0.55  EF (Visual Estimate): 55 %  ------------------------------------------------------------------------  Observations:  Mitral Valve: Mitral annular calcification, otherwise  normal mitral valve. Mild-moderate mitral regurgitation.  Aortic Valve/Aorta: Calcified trileaflet aortic valve with  normal opening.  Normal aortic root.  Left Atrium: Normal left atrial size.  Left Ventricle: Endocardium not well visualized; overall  preserved left ventricular systolic function. Septal  flattening with paradoxical septal motion consistent with  right ventricular overload. Small left ventricle.  Right Heart: Severe right atrial enlargement. Mobile  interatrial septum. Interatrial septum bows towards the LA  consistent with elevated RA pressure. Severe right  ventricular enlargement with severely decreased right  ventricular systolic function. Tricuspid annular dilatation  with normal leaflet opening. There is central malcoaptation  of the leaflets. Moderate-severe tricuspid regurgitation.  Normal pulmonic valve. Moderate pulmonic regurgitation.  Pericardium/Pleura: Large pericardial effusion seen  adjacent to the right atrium. The effusion measures up to  approximately 3.4 cm adjacent to the RA in its largest  dimension. Thickened pericardium with small-moderate  pericardial effusion seen posterior and lateral to the LV.  The effusion measures up to approximately 1.5 cm posterior  to the LV. No echocardiographic evidence of pericardial  tamponade.  Hemodynamic: Dilated IVC (diameterabout 3.4 cm) with less  than 50% respiratory variation in size consistent with  estimated RA pressure 15 mmHg. Estimated right ventricular  systolic pressure equals 67 mm Hg, assuming right atrial  pressure equals 15 mm Hg, consistent with severe pulmonary  hypertension.    Conclusions:  1. Endocardium not well visualized; overall preserved left  ventricular systolic function. Septal flattening with  paradoxical septal motion consistent with right ventricular  overload.  2. Severe right ventricular enlargement with severely  decreased right ventricular systolic function.  3. Estimated right ventricular systolic pressure equals 67  mm Hg, assuming right atrial pressure equals 15 mm Hg,  consistent with severe pulmonary hypertension.  4. Large pericardial effusion seen adjacent to the right  atrium. The effusion measures up to approximately 3.4 cm  adjacent to the RA in its largest dimension. Thickened  pericardium with small-moderate pericardial effusion seen  posterior and lateral to the LV. The effusion measures up  to approximately 1.5 cm posterior to the LV. No  echocardiographic evidence of pericardial tamponade.  Results discussed with physician assistant Schmidt from  [facility].  *** No previous Echo exam.  ------------------------------------------------------------------------  Confirmed on  5/26/2021 - 11:13:14 by Justin Riley M.D.  ------------------------------------------------------------------------    CXR:   IMPRESSION:  The cardiac silhouette is markedly enlarged compatible with pericardial effusion. If clinically indicated repeat ultrasound. The lungs appear to be clear. Calcified aortic knob. No pleural effusion. No pneumothorax.  EKG: Patient is a 75y old  Female who presents with a chief complaint of Transfer from [facility] with pericardial effusion, NIC and hypotension (26 May 2021 12:03)    HPI: Pt is a 75 year old woman with a PMHx of HFpEF, severe pHTN, severe TR with right-sided HF, COPD on home 02, HTN, HLD, Chronic Afib (on Eliquis), OA, CKD3, DM, right eye cataract who was brought in by EMS to [facility] on 5/23/21 for AMS. Per daughter, pt with generalized weakness and lethargy since since 5/22, more confused over the past month. On admission, Cr 4.6, ProBNP 33,300 (from 17,000), K 6.3, lasix 40 IV BID. TTE w/ large pericardial effusion. CT C/A/P w/ mid-to lower atelectasis, signs of PAH, small ascites. Patient transferred to [facility] on 5/26 for pericardial effusion, hypotension and NIC.     Pt had recent admission in Jan 2021 with CP and NIC in the setting of stopping lasix for 2 days. Per chart on arrival to ED pt was hypotensive, obtunded. Chest CT with large pericardial effusion (increased since Jan 2021), cirrhotic liver with small ascites. ECHO EF 55% dilated RV with decreased function, severe TR, pHTN. NIC(SCr peaked at 4.6->0.97) with  hyperkalemia (5.8) requiring lokema and insulin. Started on bumex 1mg PO BID.     On arrival to CTU, patient SBP 80's, HR 89, SpO2 100% on BiPAP. Weight 100 kg (109 on discharge 2/3/21). Started on jorge a/levo/vaso. Intubated for hypercarbic respiratory failure ( 7.22/89/148/35), and worsening mental status. BPs improved after intubation (currently off all drips). BUN 68, Cr 4.87, AST 43, ALT 27,  Alphos 91, BiliT 2.1, Alb 3.1. Lactate 0.8. HsTrop 100. ABG 7.22/89/148/35 -> 7.54/30/103/34. U/A: Large LE, WBC 10, many bacteria (had ray from OSH)  TTE w/ severe RV enlargement w/ severely decreased RV systolic function, mod-severe TR, severe pulmonary htn (RVSP 67 mm Hg), mod FL, large pericardial effusion (up to 3.4 cm adjacent to RA at largest dimension 1.5 cm posterior to LV, no echo evidence of tamponade). IR consulted, no indication for pericardiocentesis. CT C/A/P read pending.       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    Labs:  WBC  PAST MEDICAL & SURGICAL HISTORY:  Afib    COPD (chronic obstructive pulmonary disease)    CHF (congestive heart failure)    Dementia        FAMILY HISTORY:      Home Medications  Klor-Con 10 mEq oral tablet, extended release: 1 tab(s) orally once a day  bumetanide 1 mg oral tablet: 1 tab(s) orally 2 times a day  Eliquis 5 mg oral tablet: 1 tab(s) orally 2 times a day  Losartan 25 mg oral tablet: 1 tab(s) orally once a day  Atorvastatin 40 mg oral tablet: 1 tab(s) orally once a day  Albuterol (Eqv-ProAir HFA) 90 mcg/inh inhalation aerosol: 2 puff(s) inhaled every 6 hours      Medications:  chlorhexidine 0.12% Liquid 15 milliLiter(s) Oral Mucosa every 12 hours  chlorhexidine 2% Cloths 1 Application(s) Topical <User Schedule>  norepinephrine Infusion 0.05 MICROgram(s)/kG/Min IV Continuous <Continuous>  propofol Infusion 30 MICROgram(s)/kG/Min IV Continuous <Continuous>  sodium chloride 0.9% lock flush 10 milliLiter(s) IV Push every 1 hour PRN  sodium chloride 0.9%. 1000 milliLiter(s) IV Continuous <Continuous>  vasopressin Infusion 0.05 Unit(s)/Min IV Continuous <Continuous>      Review of systems:  10 point review of systems completed and are negative unless noted in HPI    Vitals:  T(C): --  HR: 72 (05-26-21 @ 13:45) (68 - 89)  BP: 116/69 (05-26-21 @ 10:15) (116/69 - 123/65)  BP(mean): 89 (05-26-21 @ 10:15) (89 - 90)  RR: 14 (05-26-21 @ 13:45) (14 - 28)  SpO2: 94% (05-26-21 @ 13:45) (94% - 100%)    Daily     Daily     Weight (kg): 100 (05-26 @ 09:41)    I&O's Summary    26 May 2021 07:01  -  26 May 2021 13:57  --------------------------------------------------------  IN: 101.4 mL / OUT: 50 mL / NET: 51.4 mL        Physical Exam:  Appearance: No Acute Distress  HEENT: PERRL  Neck:   Cardiovascular: Normal S1 S2, No murmurs/rubs/gallops  Respiratory: Clear to auscultation bilaterally  Gastrointestinal: Soft, Non-tender	  Skin: No cyanosis	  Neurologic: Non-focal  Extremities: No LE edema  Psychiatry: A & O x 3, Mood & affect appropriate    Labs:                        11.4   5.67  )-----------( 97       ( 26 May 2021 10:14 )             36.4     05-26    144  |  100  |  68<H>  ----------------------------<  116<H>  4.9   |  31  |  4.87<H>    Ca    8.9      26 May 2021 10:15  Phos  5.9     05-26  Mg     2.8     05-26    TPro  6.6  /  Alb  3.1<L>  /  TBili  2.1<H>  /  DBili  x   /  AST  43<H>  /  ALT  27  /  AlkPhos  91  05-26    PT/INR - ( 26 May 2021 10:14 )   PT: 18.3 sec;   INR: 1.56 ratio         PTT - ( 26 May 2021 10:14 )  PTT:94.2 sec  CARDIAC MARKERS ( 26 May 2021 10:15 )  x     / x     / 52 U/L / x     / 2.2 ng/mL          TELEMETRY:    Echocardiogram:  2/2  Summary:   1. Left ventricular ejection fraction, by visual estimation, is 50 to 55%.   2. Normal global left ventricular systolic function.   3. Severely enlarged right atrium.   4. Normal left ventricular internal cavity size.   5. Right ventricular volume and pressure overload.   6. Spectral Doppler shows pseudonormal pattern of left ventricular myocardial filling (Grade II diastolic dysfunction).   7. Mildly reduced RV systolic function.   8. Moderately enlarged right ventricle.   9. Mildly enlarged left atrium.  10. There is no evidence of pericardial effusion.  11. Mild mitral valve regurgitation.  12. Mild thickening and calcification of the anterior and posterior mitral valve leaflets.  13. Moderate-severe tricuspid regurgitation.  14. Mild to moderate aortic regurgitation.  15. Sclerotic aortic valve with normal opening.  16. Severe pulmonic valve regurgitation.  17. Estimated pulmonary artery systolic pressure is 82.3 mmHg assuming a right atrial pressure of 20 mmHg, which is consistent with severe pulmonary hypertension.      Dimensions:    Normal Values:  LA:     4.4    2.0 - 4.0 cm  Ao:            2.0 - 3.8 cm  SEPTUM: 0.8    0.6 - 1.2 cm  PWT:    1.0    0.6 - 1.1 cm  LVIDd:  3.6    3.0 - 5.6 cm  LVIDs:  2.2    1.8 - 4.0 cm  Derived variables:  LVMI: 45 g/m2  RWT: 0.55  EF (Visual Estimate): 55 %  ------------------------------------------------------------------------  Observations:  Mitral Valve: Mitral annular calcification, otherwise  normal mitral valve. Mild-moderate mitral regurgitation.  Aortic Valve/Aorta: Calcified trileaflet aortic valve with  normal opening.  Normal aortic root.  Left Atrium: Normal left atrial size.  Left Ventricle: Endocardium not well visualized; overall  preserved left ventricular systolic function. Septal  flattening with paradoxical septal motion consistent with  right ventricular overload. Small left ventricle.  Right Heart: Severe right atrial enlargement. Mobile  interatrial septum. Interatrial septum bows towards the LA  consistent with elevated RA pressure. Severe right  ventricular enlargement with severely decreased right  ventricular systolic function. Tricuspid annular dilatation  with normal leaflet opening. There is central malcoaptation  of the leaflets. Moderate-severe tricuspid regurgitation.  Normal pulmonic valve. Moderate pulmonic regurgitation.  Pericardium/Pleura: Large pericardial effusion seen  adjacent to the right atrium. The effusion measures up to  approximately 3.4 cm adjacent to the RA in its largest  dimension. Thickened pericardium with small-moderate  pericardial effusion seen posterior and lateral to the LV.  The effusion measures up to approximately 1.5 cm posterior  to the LV. No echocardiographic evidence of pericardial  tamponade.  Hemodynamic: Dilated IVC (diameterabout 3.4 cm) with less  than 50% respiratory variation in size consistent with  estimated RA pressure 15 mmHg. Estimated right ventricular  systolic pressure equals 67 mm Hg, assuming right atrial  pressure equals 15 mm Hg, consistent with severe pulmonary  hypertension.    Conclusions:  1. Endocardium not well visualized; overall preserved left  ventricular systolic function. Septal flattening with  paradoxical septal motion consistent with right ventricular  overload.  2. Severe right ventricular enlargement with severely  decreased right ventricular systolic function.  3. Estimated right ventricular systolic pressure equals 67  mm Hg, assuming right atrial pressure equals 15 mm Hg,  consistent with severe pulmonary hypertension.  4. Large pericardial effusion seen adjacent to the right  atrium. The effusion measures up to approximately 3.4 cm  adjacent to the RA in its largest dimension. Thickened  pericardium with small-moderate pericardial effusion seen  posterior and lateral to the LV. The effusion measures up  to approximately 1.5 cm posterior to the LV. No  echocardiographic evidence of pericardial tamponade.  Results discussed with physician assistant Brayan from  [facility].  *** No previous Echo exam.  ------------------------------------------------------------------------  Confirmed on  5/26/2021 - 11:13:14 by Justin Riley M.D.  ------------------------------------------------------------------------    CXR:   IMPRESSION:  The cardiac silhouette is markedly enlarged compatible with pericardial effusion. If clinically indicated repeat ultrasound. The lungs appear to be clear. Calcified aortic knob. No pleural effusion. No pneumothorax.  EKG: Patient is a 75y old  Female who presents with a chief complaint of Transfer from [facility] with pericardial effusion, NIC and hypotension (26 May 2021 12:03)    HPI: Pt is a 75 year old woman with a PMHx of HFpEF, severe pHTN, severe TR with right-sided HF, COPD on home 02, HTN, HLD, Chronic Afib (on Eliquis), OA, CKD3, DM, right eye cataract who was brought in by EMS to [facility] on 5/23/21 for AMS. Per daughter, pt with generalized weakness and lethargy since since 5/22, more confused over the past month. On admission, Cr 4.6, ProBNP 33,300 (from 17,000), K 6.3, lasix 40 IV BID. TTE w/ large pericardial effusion. CT C/A/P w/ mid-to lower atelectasis, signs of PAH, small ascites. Patient transferred to [facility] on 5/26 for pericardial effusion, hypotension and NIC.     Pt had recent admission in Jan 2021 with CP and NIC in the setting of stopping lasix for 2 days. Per chart on arrival to ED pt was hypotensive, obtunded. Chest CT with large pericardial effusion (increased since Jan 2021), cirrhotic liver with small ascites. ECHO EF 55% dilated RV with decreased function, severe TR, pHTN. NIC(SCr peaked at 4.6->0.97) with  hyperkalemia (5.8) requiring lokema and insulin. Started on bumex 1mg PO BID.     On arrival to CTU, patient SBP 80's, HR 89, SpO2 100% on BiPAP. Weight 100 kg (109 on discharge 2/3/21). Started on jorge a/levo/vaso. Intubated for hypercarbic respiratory failure ( 7.22/89/148/35), and worsening mental status. BPs improved after intubation (currently off all drips). BUN 68, Cr 4.87, AST 43, ALT 27,  Alphos 91, BiliT 2.1, Alb 3.1. Lactate 0.8. HsTrop 100. ABG 7.22/89/148/35 -> 7.54/30/103/34. U/A: Large LE, WBC 10, many bacteria (had ray from OSH)  TTE w/ severe RV enlargement w/ severely decreased RV systolic function, mod-severe TR, severe pulmonary htn (RVSP 67 mm Hg), mod [place], large pericardial effusion (up to 3.4 cm adjacent to RA at largest dimension 1.5 cm posterior to LV, no echo evidence of tamponade). IR consulted, no indication for pericardiocentesis. CT C/A/P read pending.       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    Labs:  WBC  PAST MEDICAL & SURGICAL HISTORY:  Afib    COPD (chronic obstructive pulmonary disease)    CHF (congestive heart failure)    Dementia        FAMILY HISTORY:      Home Medications  Klor-Con 10 mEq oral tablet, extended release: 1 tab(s) orally once a day  bumetanide 1 mg oral tablet: 1 tab(s) orally 2 times a day  Eliquis 5 mg oral tablet: 1 tab(s) orally 2 times a day  Losartan 25 mg oral tablet: 1 tab(s) orally once a day  Atorvastatin 40 mg oral tablet: 1 tab(s) orally once a day  Albuterol (Eqv-ProAir HFA) 90 mcg/inh inhalation aerosol: 2 puff(s) inhaled every 6 hours      Medications:  chlorhexidine 0.12% Liquid 15 milliLiter(s) Oral Mucosa every 12 hours  chlorhexidine 2% Cloths 1 Application(s) Topical <User Schedule>  norepinephrine Infusion 0.05 MICROgram(s)/kG/Min IV Continuous <Continuous>  propofol Infusion 30 MICROgram(s)/kG/Min IV Continuous <Continuous>  sodium chloride 0.9% lock flush 10 milliLiter(s) IV Push every 1 hour PRN  sodium chloride 0.9%. 1000 milliLiter(s) IV Continuous <Continuous>  vasopressin Infusion 0.05 Unit(s)/Min IV Continuous <Continuous>      Review of systems:  10 point review of systems completed and are negative unless noted in HPI    Vitals:  T(C): --  HR: 72 (05-26-21 @ 13:45) (68 - 89)  BP: 116/69 (05-26-21 @ 10:15) (116/69 - 123/65)  BP(mean): 89 (05-26-21 @ 10:15) (89 - 90)  RR: 14 (05-26-21 @ 13:45) (14 - 28)  SpO2: 94% (05-26-21 @ 13:45) (94% - 100%)    Daily     Daily     Weight (kg): 100 (05-26 @ 09:41)    I&O's Summary    26 May 2021 07:01  -  26 May 2021 13:57  --------------------------------------------------------  IN: 101.4 mL / OUT: 50 mL / NET: 51.4 mL        Physical Exam:  Appearance: No Acute Distress  HEENT: PERRL  Neck:   Cardiovascular: Normal S1 S2, No murmurs/rubs/gallops  Respiratory: Clear to auscultation bilaterally  Gastrointestinal: Soft, Non-tender	  Skin: No cyanosis	  Neurologic: Non-focal  Extremities: No LE edema  Psychiatry: A & O x 3, Mood & affect appropriate    Labs:                        11.4   5.67  )-----------( 97       ( 26 May 2021 10:14 )             36.4     05-26    144  |  100  |  68<H>  ----------------------------<  116<H>  4.9   |  31  |  4.87<H>    Ca    8.9      26 May 2021 10:15  Phos  5.9     05-26  Mg     2.8     05-26    TPro  6.6  /  Alb  3.1<L>  /  TBili  2.1<H>  /  DBili  x   /  AST  43<H>  /  ALT  27  /  AlkPhos  91  05-26    PT/INR - ( 26 May 2021 10:14 )   PT: 18.3 sec;   INR: 1.56 ratio         PTT - ( 26 May 2021 10:14 )  PTT:94.2 sec  CARDIAC MARKERS ( 26 May 2021 10:15 )  x     / x     / 52 U/L / x     / 2.2 ng/mL          TELEMETRY:    Echocardiogram:  2/2  Summary:   1. Left ventricular ejection fraction, by visual estimation, is 50 to 55%.   2. Normal global left ventricular systolic function.   3. Severely enlarged right atrium.   4. Normal left ventricular internal cavity size.   5. Right ventricular volume and pressure overload.   6. Spectral Doppler shows pseudonormal pattern of left ventricular myocardial filling (Grade II diastolic dysfunction).   7. Mildly reduced RV systolic function.   8. Moderately enlarged right ventricle.   9. Mildly enlarged left atrium.  10. There is no evidence of pericardial effusion.  11. Mild mitral valve regurgitation.  12. Mild thickening and calcification of the anterior and posterior mitral valve leaflets.  13. Moderate-severe tricuspid regurgitation.  14. Mild to moderate aortic regurgitation.  15. Sclerotic aortic valve with normal opening.  16. Severe pulmonic valve regurgitation.  17. Estimated pulmonary artery systolic pressure is 82.3 mmHg assuming a right atrial pressure of 20 mmHg, which is consistent with severe pulmonary hypertension.      Dimensions:    Normal Values:  LA:     4.4    2.0 - 4.0 cm  Ao:            2.0 - 3.8 cm  SEPTUM: 0.8    0.6 - 1.2 cm  PWT:    1.0    0.6 - 1.1 cm  LVIDd:  3.6    3.0 - 5.6 cm  LVIDs:  2.2    1.8 - 4.0 cm  Derived variables:  LVMI: 45 g/m2  RWT: 0.55  EF (Visual Estimate): 55 %  ------------------------------------------------------------------------  Observations:  Mitral Valve: Mitral annular calcification, otherwise  normal mitral valve. Mild-moderate mitral regurgitation.  Aortic Valve/Aorta: Calcified trileaflet aortic valve with  normal opening.  Normal aortic root.  Left Atrium: Normal left atrial size.  Left Ventricle: Endocardium not well visualized; overall  preserved left ventricular systolic function. Septal  flattening with paradoxical septal motion consistent with  right ventricular overload. Small left ventricle.  Right Heart: Severe right atrial enlargement. Mobile  interatrial septum. Interatrial septum bows towards the LA  consistent with elevated RA pressure. Severe right  ventricular enlargement with severely decreased right  ventricular systolic function. Tricuspid annular dilatation  with normal leaflet opening. There is central malcoaptation  of the leaflets. Moderate-severe tricuspid regurgitation.  Normal pulmonic valve. Moderate pulmonic regurgitation.  Pericardium/Pleura: Large pericardial effusion seen  adjacent to the right atrium. The effusion measures up to  approximately 3.4 cm adjacent to the RA in its largest  dimension. Thickened pericardium with small-moderate  pericardial effusion seen posterior and lateral to the LV.  The effusion measures up to approximately 1.5 cm posterior  to the LV. No echocardiographic evidence of pericardial  tamponade.  Hemodynamic: Dilated IVC (diameterabout 3.4 cm) with less  than 50% respiratory variation in size consistent with  estimated RA pressure 15 mmHg. Estimated right ventricular  systolic pressure equals 67 mm Hg, assuming right atrial  pressure equals 15 mm Hg, consistent with severe pulmonary  hypertension.    Conclusions:  1. Endocardium not well visualized; overall preserved left  ventricular systolic function. Septal flattening with  paradoxical septal motion consistent with right ventricular  overload.  2. Severe right ventricular enlargement with severely  decreased right ventricular systolic function.  3. Estimated right ventricular systolic pressure equals 67  mm Hg, assuming right atrial pressure equals 15 mm Hg,  consistent with severe pulmonary hypertension.  4. Large pericardial effusion seen adjacent to the right  atrium. The effusion measures up to approximately 3.4 cm  adjacent to the RA in its largest dimension. Thickened  pericardium with small-moderate pericardial effusion seen  posterior and lateral to the LV. The effusion measures up  to approximately 1.5 cm posterior to the LV. No  echocardiographic evidence of pericardial tamponade.  Results discussed with physician assistant Brayan from  [facility].  *** No previous Echo exam.  ------------------------------------------------------------------------  Confirmed on  5/26/2021 - 11:13:14 by Justin Riley M.D.  ------------------------------------------------------------------------    CXR:   IMPRESSION:  The cardiac silhouette is markedly enlarged compatible with pericardial effusion. If clinically indicated repeat ultrasound. The lungs appear to be clear. Calcified aortic knob. No pleural effusion. No pneumothorax.  EKG: Patient is a 75y old  Female who presents with a chief complaint of Transfer from [facility] with pericardial effusion, NIC and hypotension (26 May 2021 12:03)    HPI: Pt is a 75 year old woman with a PMHx of HFpEF, severe pHTN, severe TR with right-sided HF, COPD on 3L home O2, HTN, HLD, chronic afib (on Eliquis), OA, CKD3, DM, right eye cataract who was brought in by EMS to [facility] on 5/23/21 for AMS. Per daughter, pt with generalized weakness and lethargy since 5/22, more confused over the past few months (?dementia). On admission, Cr 4.6, ProBNP 33,300 (from 17,000), K 6.3. Started on lasix 40 IV BID. CT C/A/P w/ large pericardial effusion (enlarged compared to prior 1/2021), mid-to lower atelectasis, signs of PAH, small ascites. CTH negative for any acute changes. Patient transferred to [facility] on 5/26 for pericardial effusion, hypotension and NIC.     Pt had recent admission in Jan 2021 with CP and NIC in the setting of stopping lasix for 2 days. Per chart on arrival to ED pt was hypotensive, obtunded.  SCr peaked at 4.6->0.97 with  hyperkalemia (5.8) requiring lokema and insulin.  Chest CT with large pericardial effusion, cirrhotic liver with small ascites. TTE w/ EF 55%, dilated RV with decreased function, severe TR, severe pHTN. Started on bumex 1mg PO BID.     On arrival to CTU 5/26, patient SBP 80's, HR 89, SpO2 100% on BiPAP. 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 Started on jorge a/levo/vaso. BUN 68, Cr 4.87, AST 43, ALT 27, Alkphos 91, BiliT 2.1, Alb 3.1. Lactate 0.8. HsTrop 100. Intubated for hypercarbic respiratory failure ( 7.22/89/148/35 -> 7.54/30/103/34.), and worsening mental status. BPs improved after intubation (currently off all drips). TTE w/ severe RV enlargement w/ severely decreased RV systolic function, mod-severe TR, severe pulmonary htn (RVSP 67 mm Hg), mod [place], large pericardial effusion (up to 3.4 cm adjacent to RA at largest dimension 1.5 cm posterior to LV, no echo evidence of tamponade), dilated IVC (3.4 cm) with less than 50% respiratory variation. IR consulted, no indication for pericardiocentesis. Given Bumex 2 mg IV, started on bumex gtt 2mg/hr.    PAST MEDICAL & SURGICAL HISTORY:  Afib    COPD (chronic obstructive pulmonary disease)    CHF (congestive heart failure)    Dementia        FAMILY HISTORY:      Home Medications  amlodipine 5 mg daily  Klor-Con 10 mEq oral tablet, extended release: 1 tab(s) orally once a day  bumetanide 1 mg oral tablet: 1 tab(s) orally 2 times a day  Eliquis 5 mg oral tablet: 1 tab(s) orally 2 times a day  Losartan 25 mg oral tablet: 1 tab(s) orally once a day  Atorvastatin 40 mg oral tablet: 1 tab(s) orally once a day  Albuterol (Eqv-ProAir HFA) 90 mcg/inh inhalation aerosol: 2 puff(s) inhaled every 6 hours  Ipratropium 0.5mg Q4H  latanoprost 0.005% 1 drop both eyes daily      Medications:  chlorhexidine 0.12% Liquid 15 milliLiter(s) Oral Mucosa every 12 hours  chlorhexidine 2% Cloths 1 Application(s) Topical <User Schedule>  norepinephrine Infusion 0.05 MICROgram(s)/kG/Min IV Continuous <Continuous>  propofol Infusion 30 MICROgram(s)/kG/Min IV Continuous <Continuous>  sodium chloride 0.9% lock flush 10 milliLiter(s) IV Push every 1 hour PRN  sodium chloride 0.9%. 1000 milliLiter(s) IV Continuous <Continuous>  vasopressin Infusion 0.05 Unit(s)/Min IV Continuous <Continuous>      Review of systems:  10 point review of systems completed and are negative unless noted in HPI    Vitals:  T(C): --  HR: 72 (05-26-21 @ 13:45) (68 - 89)  BP: 116/69 (05-26-21 @ 10:15) (116/69 - 123/65)  BP(mean): 89 (05-26-21 @ 10:15) (89 - 90)  RR: 14 (05-26-21 @ 13:45) (14 - 28)  SpO2: 94% (05-26-21 @ 13:45) (94% - 100%)    Weight 100 kg (109 kg on discharge 2/3/21).     I&O's Summary    26 May 2021 07:01  -  26 May 2021 13:57  --------------------------------------------------------  IN: 101.4 mL / OUT: 50 mL / NET: 51.4 mL    vent:   AC RR 14, TV .55L, PEEP 5, FiO2 40            Physical Exam:  Appearance: Intubated, sedated  HEENT:   Neck:   Cardiovascular:   Respiratory:   Gastrointestinal: 	  Skin:	  Neurologic:   Extremities:   Psychiatry:     Lines: L femoral TLC (5/26)    Labs:                        11.4   5.67  )-----------( 97       ( 26 May 2021 10:14 )             36.4     05-26    144  |  100  |  68<H>  ----------------------------<  116<H>  4.9   |  31  |  4.87<H>    Ca    8.9      26 May 2021 10:15  Phos  5.9     05-26  Mg     2.8     05-26    TPro  6.6  /  Alb  3.1<L>  /  TBili  2.1<H>  /  DBili  x   /  AST  43<H>  /  ALT  27  /  AlkPhos  91  05-26    PT/INR - ( 26 May 2021 10:14 )   PT: 18.3 sec;   INR: 1.56 ratio         PTT - ( 26 May 2021 10:14 )  PTT:94.2 sec  CARDIAC MARKERS ( 26 May 2021 10:15 )  x     / x     / 52 U/L / x     / 2.2 ng/mL    TELEMETRY: afib, w/ occasional PVCs, bigem, trigem    Echocardiogram:  2/2/2021   1. Left ventricular ejection fraction, by visual estimation, is 50 to 55%.   2. Normal global left ventricular systolic function.   3. Severely enlarged right atrium.   4. Normal left ventricular internal cavity size.   5. Right ventricular volume and pressure overload.   6. Spectral Doppler shows pseudonormal pattern of left ventricular myocardial filling (Grade II diastolic dysfunction).   7. Mildly reduced RV systolic function.   8. Moderately enlarged right ventricle.   9. Mildly enlarged left atrium.  10. There is no evidence of pericardial effusion.  11. Mild mitral valve regurgitation.  12. Mild thickening and calcification of the anterior and posterior mitral valve leaflets.  13. Moderate-severe tricuspid regurgitation.  14. Mild to moderate aortic regurgitation.  15. Sclerotic aortic valve with normal opening.  16. Severe pulmonic valve regurgitation.  17. Estimated pulmonary artery systolic pressure is 82.3 mmHg assuming a right atrial pressure of 20 mmHg, which is consistent with severe pulmonary hypertension.    5/26/2021;  Dimensions:    Normal Values:  LA:     4.4    2.0 - 4.0 cm  Ao:            2.0 - 3.8 cm  SEPTUM: 0.8    0.6 - 1.2 cm  PWT:    1.0    0.6 - 1.1 cm  LVIDd:  3.6    3.0 - 5.6 cm  LVIDs:  2.2    1.8 - 4.0 cm  Derived variables:  LVMI: 45 g/m2  RWT: 0.55  EF (Visual Estimate): 55 %  ------------------------------------------------------------------------  Observations:  Mitral Valve: Mitral annular calcification, otherwise  normal mitral valve. Mild-moderate mitral regurgitation.  Aortic Valve/Aorta: Calcified trileaflet aortic valve with  normal opening.  Normal aortic root.  Left Atrium: Normal left atrial size.  Left Ventricle: Endocardium not well visualized; overall  preserved left ventricular systolic function. Septal  flattening with paradoxical septal motion consistent with  right ventricular overload. Small left ventricle.  Right Heart: Severe right atrial enlargement. Mobile  interatrial septum. Interatrial septum bows towards the LA  consistent with elevated RA pressure. Severe right  ventricular enlargement with severely decreased right  ventricular systolic function. Tricuspid annular dilatation  with normal leaflet opening. There is central malcoaptation  of the leaflets. Moderate-severe tricuspid regurgitation.  Normal pulmonic valve. Moderate pulmonic regurgitation.  Pericardium/Pleura: Large pericardial effusion seen  adjacent to the right atrium. The effusion measures up to  approximately 3.4 cm adjacent to the RA in its largest  dimension. Thickened pericardium with small-moderate  pericardial effusion seen posterior and lateral to the LV.  The effusion measures up to approximately 1.5 cm posterior  to the LV. No echocardiographic evidence of pericardial  tamponade.  Hemodynamic: Dilated IVC diameter 3.4 cm) with less  than 50% respiratory variation in size consistent with  estimated RA pressure 15 mmHg. Estimated right ventricular  systolic pressure equals 67 mm Hg, assuming right atrial  pressure equals 15 mm Hg, consistent with severe pulmonary  hypertension.    Conclusions:  1. Endocardium not well visualized; overall preserved left  ventricular systolic function. Septal flattening with  paradoxical septal motion consistent with right ventricular  overload.  2. Severe right ventricular enlargement with severely  decreased right ventricular systolic function.  3. Estimated right ventricular systolic pressure equals 67  mm Hg, assuming right atrial pressure equals 15 mm Hg,  consistent with severe pulmonary hypertension.  4. Large pericardial effusion seen adjacent to the right  atrium. The effusion measures up to approximately 3.4 cm  adjacent to the RA in its largest dimension. Thickened  pericardium with small-moderate pericardial effusion seen  posterior and lateral to the LV. The effusion measures up  to approximately 1.5 cm posterior to the LV. No  echocardiographic evidence of pericardial tamponade.  Results discussed with physician assistant Brayan from  [facility].  *** No previous Echo exam.  ------------------------------------------------------------------------  Confirmed on  5/26/2021 - 11:13:14 by Justin Riley M.D.  ------------------------------------------------------------------------    CXR:   IMPRESSION:  The cardiac silhouette is markedly enlarged compatible with pericardial effusion. If clinically indicated repeat ultrasound. The lungs appear to be clear. Calcified aortic knob. No pleural effusion. No pneumothorax.  EKG: Patient is a 75y old  Female who presents with a chief complaint of Transfer from [facility] with pericardial effusion, NIC and hypotension (26 May 2021 12:03)    HPI: Pt is a 75 year old woman with a PMHx of HFpEF, severe pHTN, severe TR with right-sided HF, COPD on 3L home O2, HTN, HLD, chronic afib (on Eliquis), OA, CKD3, DM, right eye cataract who was brought in by EMS to [facility] on 5/23/21 for AMS. Per daughter, pt with generalized weakness and lethargy since 5/22, more confused over the past few months (?dementia). On admission, Cr 4.6, ProBNP 33,300 (from 17,000), K 6.3. Started on lasix 40 IV BID. CT C/A/P w/ large pericardial effusion (enlarged compared to prior 1/2021), mid-to lower atelectasis, signs of PAH, small ascites. CTH negative for any acute changes. Patient transferred to [facility] on 5/26 for pericardial effusion, hypotension and NIC.     Pt had recent admission in Jan 2021 with CP and NIC in the setting of stopping lasix for 2 days. Per chart on arrival to ED pt was hypotensive, obtunded.  SCr peaked at 4.6->0.97 with  hyperkalemia (5.8) requiring lokema and insulin.  TTE w/ EF 55%, dilated RV with decreased function, severe TR, severe pHTN. Started on bumex 1mg PO BID.     On arrival to CTU 5/26, patient SBP 80's, HR 89, SpO2 100% on BiPAP. Started on jorge a/levo/vaso.  Intubated for hypercarbic respiratory failure ( 7.22/89/148/35 -> 7.54/30/103/34.), and worsening mental status. BPs improved after intubation (currently off all drips). Other labs notable for BUN 68, Cr 4.87, AST 43, ALT 27, Alkphos 91, BiliT 2.1, Alb 3.1. Lactate 0.8, HsTrop 100. TTE w/ severe RV enlargement w/ severely decreased RV systolic function, mod-severe TR, severe pulmonary htn (RVSP 67 mm Hg), mod DC, large pericardial effusion (up to 3.4 cm adjacent to RA at largest dimension 1.5 cm posterior to LV, no echo evidence of tamponade), dilated IVC (3.4 cm) with less than 50% respiratory variation. IR consulted, no indication for pericardiocentesis. Given Bumex 2 mg IV, started on bumex gtt 2mg/hr.    PAST MEDICAL & SURGICAL HISTORY:  Afib    COPD (chronic obstructive pulmonary disease)    CHF (congestive heart failure)    Dementia        FAMILY HISTORY:      Home Medications  amlodipine 5 mg daily  Klor-Con 10 mEq oral tablet, extended release: 1 tab(s) orally once a day  bumetanide 1 mg oral tablet: 1 tab(s) orally 2 times a day  Eliquis 5 mg oral tablet: 1 tab(s) orally 2 times a day  Losartan 25 mg oral tablet: 1 tab(s) orally once a day  Atorvastatin 40 mg oral tablet: 1 tab(s) orally once a day  Albuterol (Eqv-ProAir HFA) 90 mcg/inh inhalation aerosol: 2 puff(s) inhaled every 6 hours  Ipratropium 0.5mg Q4H  latanoprost 0.005% 1 drop both eyes daily      Medications:  chlorhexidine 0.12% Liquid 15 milliLiter(s) Oral Mucosa every 12 hours  chlorhexidine 2% Cloths 1 Application(s) Topical <User Schedule>  norepinephrine Infusion 0.05 MICROgram(s)/kG/Min IV Continuous <Continuous>  propofol Infusion 30 MICROgram(s)/kG/Min IV Continuous <Continuous>  sodium chloride 0.9% lock flush 10 milliLiter(s) IV Push every 1 hour PRN  sodium chloride 0.9%. 1000 milliLiter(s) IV Continuous <Continuous>  vasopressin Infusion 0.05 Unit(s)/Min IV Continuous <Continuous>      Review of systems:  10 point review of systems completed and are negative unless noted in HPI    Vitals:  T(C): --  HR: 72 (05-26-21 @ 13:45) (68 - 89)  BP: 116/69 (05-26-21 @ 10:15) (116/69 - 123/65)  BP(mean): 89 (05-26-21 @ 10:15) (89 - 90)  RR: 14 (05-26-21 @ 13:45) (14 - 28)  SpO2: 94% (05-26-21 @ 13:45) (94% - 100%)    Weight 100 kg (109 kg on discharge 2/3/21).     I&O's Summary    26 May 2021 07:01  -  26 May 2021 13:57  --------------------------------------------------------  IN: 101.4 mL / OUT: 50 mL / NET: 51.4 mL    vent:   AC RR 14, TV .55L, PEEP 5, FiO2 40    Physical Exam:  Appearance: Intubated, sedated  HEENT: ETT in place  Cardiovascular: Irreg irreg, II/IV diastolic murmur loudest at LUSB  Respiratory: Mechanical breath sounds b/l  Gastrointestinal: Soft	  Neurologic: Sedated  Extremities: 2+ B/L LE edema to the knees      Lines: L femoral TLC (5/26)    Labs:                        11.4   5.67  )-----------( 97       ( 26 May 2021 10:14 )             36.4     05-26    144  |  100  |  68<H>  ----------------------------<  116<H>  4.9   |  31  |  4.87<H>    Ca    8.9      26 May 2021 10:15  Phos  5.9     05-26  Mg     2.8     05-26    TPro  6.6  /  Alb  3.1<L>  /  TBili  2.1<H>  /  DBili  x   /  AST  43<H>  /  ALT  27  /  AlkPhos  91  05-26    PT/INR - ( 26 May 2021 10:14 )   PT: 18.3 sec;   INR: 1.56 ratio         PTT - ( 26 May 2021 10:14 )  PTT:94.2 sec  CARDIAC MARKERS ( 26 May 2021 10:15 )  x     / x     / 52 U/L / x     / 2.2 ng/mL    TELEMETRY: afib, w/ occasional PVCs, bigem, trigem    Echocardiogram:  2/2/2021   1. Left ventricular ejection fraction, by visual estimation, is 50 to 55%.   2. Normal global left ventricular systolic function.   3. Severely enlarged right atrium.   4. Normal left ventricular internal cavity size.   5. Right ventricular volume and pressure overload.   6. Spectral Doppler shows pseudonormal pattern of left ventricular myocardial filling (Grade II diastolic dysfunction).   7. Mildly reduced RV systolic function.   8. Moderately enlarged right ventricle.   9. Mildly enlarged left atrium.  10. There is no evidence of pericardial effusion.  11. Mild mitral valve regurgitation.  12. Mild thickening and calcification of the anterior and posterior mitral valve leaflets.  13. Moderate-severe tricuspid regurgitation.  14. Mild to moderate aortic regurgitation.  15. Sclerotic aortic valve with normal opening.  16. Severe pulmonic valve regurgitation.  17. Estimated pulmonary artery systolic pressure is 82.3 mmHg assuming a right atrial pressure of 20 mmHg, which is consistent with severe pulmonary hypertension.    5/26/2021;  Dimensions:    Normal Values:  LA:     4.4    2.0 - 4.0 cm  Ao:            2.0 - 3.8 cm  SEPTUM: 0.8    0.6 - 1.2 cm  PWT:    1.0    0.6 - 1.1 cm  LVIDd:  3.6    3.0 - 5.6 cm  LVIDs:  2.2    1.8 - 4.0 cm  Derived variables:  LVMI: 45 g/m2  RWT: 0.55  EF (Visual Estimate): 55 %  ------------------------------------------------------------------------  Observations:  Mitral Valve: Mitral annular calcification, otherwise  normal mitral valve. Mild-moderate mitral regurgitation.  Aortic Valve/Aorta: Calcified trileaflet aortic valve with  normal opening.  Normal aortic root.  Left Atrium: Normal left atrial size.  Left Ventricle: Endocardium not well visualized; overall  preserved left ventricular systolic function. Septal  flattening with paradoxical septal motion consistent with  right ventricular overload. Small left ventricle.  Right Heart: Severe right atrial enlargement. Mobile  interatrial septum. Interatrial septum bows towards the LA  consistent with elevated RA pressure. Severe right  ventricular enlargement with severely decreased right  ventricular systolic function. Tricuspid annular dilatation  with normal leaflet opening. There is central malcoaptation  of the leaflets. Moderate-severe tricuspid regurgitation.  Normal pulmonic valve. Moderate pulmonic regurgitation.  Pericardium/Pleura: Large pericardial effusion seen  adjacent to the right atrium. The effusion measures up to  approximately 3.4 cm adjacent to the RA in its largest  dimension. Thickened pericardium with small-moderate  pericardial effusion seen posterior and lateral to the LV.  The effusion measures up to approximately 1.5 cm posterior  to the LV. No echocardiographic evidence of pericardial  tamponade.  Hemodynamic: Dilated IVC diameter 3.4 cm) with less  than 50% respiratory variation in size consistent with  estimated RA pressure 15 mmHg. Estimated right ventricular  systolic pressure equals 67 mm Hg, assuming right atrial  pressure equals 15 mm Hg, consistent with severe pulmonary  hypertension.    Conclusions:  1. Endocardium not well visualized; overall preserved left  ventricular systolic function. Septal flattening with  paradoxical septal motion consistent with right ventricular  overload.  2. Severe right ventricular enlargement with severely  decreased right ventricular systolic function.  3. Estimated right ventricular systolic pressure equals 67  mm Hg, assuming right atrial pressure equals 15 mm Hg,  consistent with severe pulmonary hypertension.  4. Large pericardial effusion seen adjacent to the right  atrium. The effusion measures up to approximately 3.4 cm  adjacent to the RA in its largest dimension. Thickened  pericardium with small-moderate pericardial effusion seen  posterior and lateral to the LV. The effusion measures up  to approximately 1.5 cm posterior to the LV. No  echocardiographic evidence of pericardial tamponade.  Results discussed with physician assistant Schmidt from  [facility].  *** No previous Echo exam.  ------------------------------------------------------------------------  Confirmed on  5/26/2021 - 11:13:14 by Justin Riley M.D.  ------------------------------------------------------------------------    CXR:   IMPRESSION:  The cardiac silhouette is markedly enlarged compatible with pericardial effusion. If clinically indicated repeat ultrasound. The lungs appear to be clear. Calcified aortic knob. No pleural effusion. No pneumothorax.    EKG 5/26: Afib w/ PVCs, old anterolateral MI

## 2021-05-26 NOTE — H&P ADULT - PROBLEM SELECTOR PLAN 1
CXR r/o widened mediastinum  ECHO r/o pericardial effusion/tamponade  CT scan if pericardial effusion to assess for IR drainage CXR r/o widened mediastinum  ECHO r/o pericardial effusion/tamponade  CT scan if pericardial effusion to assess for possible IR drainage  Beto, Central line  Labs, type and screen, covid PCR and antibodies  UA  Add levo and vaso for MAP goal >65   consider reversing coaglation in setting of pericardial effusion pending labs

## 2021-05-26 NOTE — PROGRESS NOTE ADULT - SUBJECTIVE AND OBJECTIVE BOX
SANJIV ROBERTS  MRN-[id number]  Patient is a 75y old  Female who presents with a chief complaint of Transfer from [facility] with pericardial effusion, NIC and hypotension (26 May 2021 19:08)    HPI:  75y.o female with PMHx of CHFpEF, Severe TR with Right CHF and severe pHTN, COPD on home 02, HTN, HLD, Chronic Afib (on Eliquis), OA, CKD3, DM, right eye cataract who was brought in by EMS to [facility] on 21 for AMS. pt A&O x1, per daughter patient with generalized weakness and lethargy since since . Pt had recent in 2021 with CP and NIC. Per chart on arrival to ED pt was hypotensive, obtunded. Chest CT with large pericardial effusion (increased since 2021)- per thoracic surgery consult no indication for pericardial window, pHTN, cirrhotic liver with small ascites, ECHO EF 55% dilated RV with decreased function, severe TN, pHTN, NIC (per renal consult no acute indication for HD), venous duplex NG for DVTs, Head CT revealed chronic involutional and white matter changes, no acute intracranial process, NIC(SCr peaked at 4.6) with  hyperkalemia (5.8) requiring lokelma and insulin.  Of note daughter states over past month patient more and more confused ? dementia and pt is legally blind. Daughter states pt HAS NOT RECEIVED COVID VACCINE as she has been sick these past few months.      Patient transferred to [facility] with concern of pericardial effusion, hypotension and NIC. On arrival patient SBP 80's, A&O to first name but very lethargic.  (26 May 2021 10:16)      Hospital Course:   Patient transferred to [facility] with concern of pericardial effusion, hypotension and NIC.    intubated     24 HOUR EVENTS:    REVIEW OF SYSTEMS:   Unable to obtain, secondary to patient being intubated     ICU Vital Signs Last 24 Hrs  T(C): 36.4 (26 May 2021 17:40), Max: 36.4 (26 May 2021 17:40)  T(F): 97.5 (26 May 2021 17:40), Max: 97.5 (26 May 2021 17:40)  HR: 68 (26 May 2021 18:30) (65 - 89)  BP: 113/68 (26 May 2021 17:40) (113/68 - 123/65)  BP(mean): 94 (26 May 2021 17:40) (89 - 94)  ABP: 100/40 (26 May 2021 18:30) (100/40 - 162/90)  ABP(mean): 62 (26 May 2021 18:30) (20 - 118)  RR: 14 (26 May 2021 18:30) (13 - 28)  SpO2: 100% (26 May 2021 18:30) (89% - 100%)    Mode: AC/ CMV (Assist Control/ Continuous Mandatory Ventilation), RR (machine): 14, TV (machine): 550, FiO2: 40, PEEP: 5, ITime: 1  CVP(mm Hg): 8 (21 @ 18:30) (6 - 316)  CO: --  CI: --  PA: --  PA(mean): --  PA(direct): --  PCWP: --  LA: --  RA: --  SVR: --  SVRI: --  PVR: --  PVRI: --  I&O's Summary    26 May 2021 07:01  -  26 May 2021 19:23  --------------------------------------------------------  IN: 757.4 mL / OUT: 385 mL / NET: 372.4 mL        CAPILLARY BLOOD GLUCOSE    CAPILLARY BLOOD GLUCOSE          PHYSICAL EXAM:   General: No acute distress  Eyes: EOMI, PERRLA, conjunctiva and sclera clear  Chest/Lung: CTAB, no wheezes, rales, or rhonchi  Heart: Regular rate, regular rhythm. Normal S1/S2. No murmurs, rubs, or gallops.  Abdomen: Soft, nontender, nondistended. Normal bowel sounds.  Extremites: 2+ peripheral pulses B/L. No clubbing, cyanosis, or edema.  Neurology: A&O x3, no focal deficits  Skin: No rashes or lesions  ============================I/O===========================   I&O's Detail    26 May 2021 07:01  -  26 May 2021 19:23  --------------------------------------------------------  IN:    Bumetanide: 50 mL    IV PiggyBack: 50 mL    Norepinephrine: 9.4 mL    Plasma: 300 mL    Propofol: 288 mL    sodium chloride 0.9%: 50 mL    Vasopressin: 10 mL  Total IN: 757.4 mL    OUT:    Indwelling Catheter - Urethral (mL): 385 mL    Phenylephrine: 0 mL  Total OUT: 385 mL    Total NET: 372.4 mL        ============================ LABS =========================                        11.4   5.67  )-----------( 97       ( 26 May 2021 10:14 )             36.4         144  |  100  |  68<H>  ----------------------------<  116<H>  4.9   |  31  |  4.87<H>    Ca    8.9      26 May 2021 10:15  Phos  5.9       Mg     2.8         TPro  6.6  /  Alb  3.1<L>  /  TBili  2.1<H>  /  DBili  x   /  AST  43<H>  /  ALT  27  /  AlkPhos  91      LIVER FUNCTIONS - ( 26 May 2021 10:15 )  Alb: 3.1 g/dL / Pro: 6.6 g/dL / ALK PHOS: 91 U/L / ALT: 27 U/L / AST: 43 U/L / GGT: x           PT/INR - ( 26 May 2021 15:00 )   PT: 17.5 sec;   INR: 1.49 ratio         PTT - ( 26 May 2021 15:00 )  PTT:35.6 sec  ABG - ( 26 May 2021 13:35 )  pH, Arterial: 7.54  pH, Blood: x     /  pCO2: 40    /  pO2: 103   / HCO3: 34    / Base Excess: 10.5  /  SaO2: 99                Blood Gas Arterial, Lactate: 1.5 mmol/L (21 @ 13:35)  Blood Gas Arterial, Lactate: 0.7 mmol/L (21 @ 11:10)  Blood Gas Arterial, Lactate: 0.7 mmol/L (21 @ 10:50)  Blood Gas Venous - Lactate: 0.8 mmoL/L (21 @ 10:19)  Blood Gas Arterial, Lactate: 0.7 mmol/L (21 @ 10:00)    Urinalysis Basic - ( 26 May 2021 11:07 )    Color: Dark Orange / Appearance: Slightly Turbid / S.021 / pH: x  Gluc: x / Ketone: Trace  / Bili: Moderate / Urobili: Negative   Blood: x / Protein: 300 mg/dL / Nitrite: Negative   Leuk Esterase: Large / RBC: >50 /hpf / WBC 10 /HPF   Sq Epi: x / Non Sq Epi: 0 /hpf / Bacteria: Many      ======================Micro/Rad/Cardio=================  Telemetry: Reviewed   EKG: Reviewed  CXR: Reviewed  Echo: Reviewed  Cath: Reviewed  ======================================================  PAST MEDICAL & SURGICAL HISTORY:  Afib    COPD (chronic obstructive pulmonary disease)    CHF (congestive heart failure)    Dementia      ====================ASSESSMENT ==============   Patient transferred to [facility] with concern of pericardial effusion, hypotension and NIC.  AFib   NIC  Thrombocytopenia   Severe pulmonary hypertension       Plan:  ====================== NEUROLOGY=====================  Continue to monitor neuro status   Sedated with IV propofol for vent synchrony     propofol Infusion 30 MICROgram(s)/kG/Min (18 mL/Hr) IV Continuous <Continuous>    ==================== RESPIRATORY======================  On full vent support, requiring close monitoring of respiratory rate, breathing pattern, pulse oximetry monitoring, and intermittent blood gas analysis.     Mechanical Ventilation:  Mode: AC/ CMV (Assist Control/ Continuous Mandatory Ventilation)  RR (machine): 14  TV (machine): 550  FiO2: 40  PEEP: 5  ITime: 1  MAP: 11  PIP: 34      ====================CARDIOVASCULAR==================  Persistent cardiopulmonary dysfunction  - Currently on dual pressor support IV levophed and Vasopressin   - Pulmonary hypertension - right heart failure  - continuous pulse oximetry monitoring    norepinephrine Infusion 0.05 MICROgram(s)/kG/Min (9.38 mL/Hr) IV Continuous <Continuous>  vasopressin Infusion 0.05 Unit(s)/Min (3 mL/Hr) IV Continuous <Continuous>    ===================HEMATOLOGIC/ONC ===================  Thrombocytopenia   - Platelets 97k  - Continue to monitor hemoglobin and hematocrit levels.     ===================== RENAL =========================  Continue to monitor I/Os, BUN/Creatinine, and urine output.   Goal net negative fluid balance. Replete lytes PRN. Keep K> 4 and Mg >2.   Diuresis with Bumetanide     buMETAnide Infusion 2 mG/Hr (10 mL/Hr) IV Continuous <Continuous>  ==================== GASTROINTESTINAL===================  Currently NPO    sodium chloride 0.9% lock flush 10 milliLiter(s) IV Push every 1 hour PRN Pre/post blood products, medications, blood draw, and to maintain line patency  sodium chloride 0.9%. 1000 milliLiter(s) (10 mL/Hr) IV Continuous <Continuous>    =======================    ENDOCRINE  =====================  Monitor glucose for need to initiate sliding scale.     ========================INFECTIOUS DISEASE================  Temperature 97.5 with WBC within normal range   - Continue to monitor temperature and WBC     Patient requires continuous monitoring with bedside rhythm monitoring, pulse ox monitoring, and intermittent blood gas analysis. Care plan discussed with ICU care team. Patient remained critical and at risk for life threatening decompensation.  Patient seen, examined and plan discussed with CCU team during rounds.     I have personally provided 35 minutes of critical care time excluding time spent on separate procedures.    By signing my name below, I, Orlando Horan, attest that this documentation has been prepared under the direction and in the presence of ALLISON Krueger.   Electronically signed: Olivia Aburto, 21 @ 19:23    I, ALLISON Krueger, personally performed the services described in this documentation. all medical record entries made by the scribe were at my direction and in my presence. I have reviewed the chart and agree that the record reflects my personal performance and is accurate and complete  Electronically signed: ALLISON Krueger.        SANJIV ROBERTS  MRN-[id number]  Patient is a 75y old  Female who presents with a chief complaint of Transfer from [facility] with pericardial effusion, NIC and hypotension (26 May 2021 19:08)    HPI:  75y.o female with PMHx of CHFpEF, Severe TR with Right CHF and severe pHTN, COPD on home 02, HTN, HLD, Chronic Afib (on Eliquis), OA, CKD3, DM, right eye cataract who was brought in by EMS to [facility] on 21 for AMS. pt A&O x1, per daughter patient with generalized weakness and lethargy since since . Pt had recent in 2021 with CP and NIC. Per chart on arrival to ED pt was hypotensive, obtunded. Chest CT with large pericardial effusion (increased since 2021)- per thoracic surgery consult no indication for pericardial window, pHTN, cirrhotic liver with small ascites, ECHO EF 55% dilated RV with decreased function, severe TN, pHTN, NIC (per renal consult no acute indication for HD), venous duplex NG for DVTs, Head CT revealed chronic involutional and white matter changes, no acute intracranial process, NIC(SCr peaked at 4.6) with  hyperkalemia (5.8) requiring lokelma and insulin.  Of note daughter states over past month patient more and more confused ? dementia and pt is legally blind. Daughter states pt HAS NOT RECEIVED COVID VACCINE as she has been sick these past few months.      Patient transferred to [facility] with concern of pericardial effusion, hypotension and NIC. On arrival patient SBP 80's, A&O to first name but very lethargic.  (26 May 2021 10:16)      Hospital Course:   Patient transferred to [facility] with concern of pericardial effusion, hypotension and NIC.    intubated     24 HOUR EVENTS:    REVIEW OF SYSTEMS:   Unable to obtain, secondary to patient being intubated     ICU Vital Signs Last 24 Hrs  T(C): 36.4 (26 May 2021 17:40), Max: 36.4 (26 May 2021 17:40)  T(F): 97.5 (26 May 2021 17:40), Max: 97.5 (26 May 2021 17:40)  HR: 68 (26 May 2021 18:30) (65 - 89)  BP: 113/68 (26 May 2021 17:40) (113/68 - 123/65)  BP(mean): 94 (26 May 2021 17:40) (89 - 94)  ABP: 100/40 (26 May 2021 18:30) (100/40 - 162/90)  ABP(mean): 62 (26 May 2021 18:30) (20 - 118)  RR: 14 (26 May 2021 18:30) (13 - 28)  SpO2: 100% (26 May 2021 18:30) (89% - 100%)    Mode: AC/ CMV (Assist Control/ Continuous Mandatory Ventilation), RR (machine): 14, TV (machine): 550, FiO2: 40, PEEP: 5, ITime: 1  CVP(mm Hg): 8 (21 @ 18:30) (6 - 316)  CO: --  CI: --  PA: --  PA(mean): --  PA(direct): --  PCWP: --  LA: --  RA: --  SVR: --  SVRI: --  PVR: --  PVRI: --  I&O's Summary    26 May 2021 07:01  -  26 May 2021 19:23  --------------------------------------------------------  IN: 757.4 mL / OUT: 385 mL / NET: 372.4 mL        CAPILLARY BLOOD GLUCOSE    CAPILLARY BLOOD GLUCOSE          PHYSICAL EXAM:   General: Intubated   Eyes: EOMI, PERRLA, conjunctiva and sclera clear  Chest/Lung: CTAB, no wheezes, rales, or rhonchi  Heart: Regular rate, regular rhythm. Normal S1/S2. No murmurs, rubs, or gallops.  Abdomen: Soft, nontender, nondistended. Normal bowel sounds.  Extremites: 2+ peripheral pulses B/L. No clubbing, cyanosis, or edema.  Neurology: Sedated   Skin: No rashes or lesions  ============================I/O===========================   I&O's Detail    26 May 2021 07:01  -  26 May 2021 19:23  --------------------------------------------------------  IN:    Bumetanide: 50 mL    IV PiggyBack: 50 mL    Norepinephrine: 9.4 mL    Plasma: 300 mL    Propofol: 288 mL    sodium chloride 0.9%: 50 mL    Vasopressin: 10 mL  Total IN: 757.4 mL    OUT:    Indwelling Catheter - Urethral (mL): 385 mL    Phenylephrine: 0 mL  Total OUT: 385 mL    Total NET: 372.4 mL        ============================ LABS =========================                        11.4   5.67  )-----------( 97       ( 26 May 2021 10:14 )             36.4         144  |  100  |  68<H>  ----------------------------<  116<H>  4.9   |  31  |  4.87<H>    Ca    8.9      26 May 2021 10:15  Phos  5.9       Mg     2.8         TPro  6.6  /  Alb  3.1<L>  /  TBili  2.1<H>  /  DBili  x   /  AST  43<H>  /  ALT  27  /  AlkPhos  91      LIVER FUNCTIONS - ( 26 May 2021 10:15 )  Alb: 3.1 g/dL / Pro: 6.6 g/dL / ALK PHOS: 91 U/L / ALT: 27 U/L / AST: 43 U/L / GGT: x           PT/INR - ( 26 May 2021 15:00 )   PT: 17.5 sec;   INR: 1.49 ratio         PTT - ( 26 May 2021 15:00 )  PTT:35.6 sec  ABG - ( 26 May 2021 13:35 )  pH, Arterial: 7.54  pH, Blood: x     /  pCO2: 40    /  pO2: 103   / HCO3: 34    / Base Excess: 10.5  /  SaO2: 99                Blood Gas Arterial, Lactate: 1.5 mmol/L (21 @ 13:35)  Blood Gas Arterial, Lactate: 0.7 mmol/L (21 @ 11:10)  Blood Gas Arterial, Lactate: 0.7 mmol/L (21 @ 10:50)  Blood Gas Venous - Lactate: 0.8 mmoL/L (21 @ 10:19)  Blood Gas Arterial, Lactate: 0.7 mmol/L (21 @ 10:00)    Urinalysis Basic - ( 26 May 2021 11:07 )    Color: Dark Orange / Appearance: Slightly Turbid / S.021 / pH: x  Gluc: x / Ketone: Trace  / Bili: Moderate / Urobili: Negative   Blood: x / Protein: 300 mg/dL / Nitrite: Negative   Leuk Esterase: Large / RBC: >50 /hpf / WBC 10 /HPF   Sq Epi: x / Non Sq Epi: 0 /hpf / Bacteria: Many      ======================Micro/Rad/Cardio=================  Telemetry: Reviewed   EKG: Reviewed  CXR: Reviewed  Echo: Reviewed  Cath: Reviewed  ======================================================  PAST MEDICAL & SURGICAL HISTORY:  Afib    COPD (chronic obstructive pulmonary disease)    CHF (congestive heart failure)    Dementia      ====================ASSESSMENT ==============   Patient transferred to [facility] with concern of pericardial effusion, hypotension and NIC.  AFib   NIC  Thrombocytopenia   Severe pulmonary hypertension       Plan:  ====================== NEUROLOGY=====================   Continue to monitor neuro status    Sedated with IV propofol for vent synchrony     propofol Infusion 30 MICROgram(s)/kG/Min (18 mL/Hr) IV Continuous <Continuous>    ==================== RESPIRATORY======================  Intubated   - On full vent support, requiring close monitoring of respiratory rate, breathing pattern, pulse oximetry monitoring, and intermittent blood gas analysis.     Mechanical Ventilation:  Mode: AC/ CMV (Assist Control/ Continuous Mandatory Ventilation)  RR (machine): 14  TV (machine): 550  FiO2: 40  PEEP: 5  ITime: 1  MAP: 11  PIP: 34      ====================CARDIOVASCULAR==================  Persistent cardiopulmonary dysfunction  - Currently on dual pressor support IV levophed and Vasopressin   - Pulmonary hypertension - right heart failure  - continuous pulse oximetry monitoring    norepinephrine Infusion 0.05 MICROgram(s)/kG/Min (9.38 mL/Hr) IV Continuous <Continuous>  vasopressin Infusion 0.05 Unit(s)/Min (3 mL/Hr) IV Continuous <Continuous>    ===================HEMATOLOGIC/ONC ===================  Thrombocytopenia   - Platelets 97k  - Continue to monitor hemoglobin and hematocrit levels.     ===================== RENAL =========================  Continue to monitor I/Os, BUN/Creatinine, and urine output.   Goal net negative fluid balance. Replete lytes PRN. Keep K> 4 and Mg >2.   Diuresis with Bumetanide     buMETAnide Infusion 2 mG/Hr (10 mL/Hr) IV Continuous <Continuous>  ==================== GASTROINTESTINAL===================  Currently NPO    sodium chloride 0.9% lock flush 10 milliLiter(s) IV Push every 1 hour PRN Pre/post blood products, medications, blood draw, and to maintain line patency  sodium chloride 0.9%. 1000 milliLiter(s) (10 mL/Hr) IV Continuous <Continuous>    =======================    ENDOCRINE  =====================  Monitor glucose for need to initiate sliding scale.     ========================INFECTIOUS DISEASE================  Temperature 97.5 with WBC within normal range   - Continue to monitor temperature and WBC     Patient requires continuous monitoring with bedside rhythm monitoring, pulse ox monitoring, and intermittent blood gas analysis. Care plan discussed with ICU care team. Patient remained critical and at risk for life threatening decompensation.  Patient seen, examined and plan discussed with CCU team during rounds.     I have personally provided 35 minutes of critical care time excluding time spent on separate procedures.    By signing my name below, I, Orlando Horan, attest that this documentation has been prepared under the direction and in the presence of ALLISON Krueger.   Electronically signed: Olivia Aburto, 21 @ 19:23    I, ALLISON Krueger, personally performed the services described in this documentation. all medical record entries made by the scribe were at my direction and in my presence. I have reviewed the chart and agree that the record reflects my personal performance and is accurate and complete  Electronically signed: ALLISON Krueger.        SANJIV ROBERTS  MRN-[id number]  Patient is a 75y old  Female who presents with a chief complaint of Transfer from [facility] with pericardial effusion, NIC and hypotension (26 May 2021 19:08)    HPI:  75y.o female with PMHx of CHFpEF, Severe TR with Right CHF and severe pHTN, COPD on home 02, HTN, HLD, Chronic Afib (on Eliquis), OA, CKD3, DM, right eye cataract who was brought in by EMS to [facility] on 21 for AMS. pt A&O x1, per daughter patient with generalized weakness and lethargy since since . Pt had recent in 2021 with CP and NIC. Per chart on arrival to ED pt was hypotensive, obtunded. Chest CT with large pericardial effusion (increased since 2021)- per thoracic surgery consult no indication for pericardial window, pHTN, cirrhotic liver with small ascites, ECHO EF 55% dilated RV with decreased function, severe TN, pHTN, NIC (per renal consult no acute indication for HD), venous duplex NG for DVTs, Head CT revealed chronic involutional and white matter changes, no acute intracranial process, NIC(SCr peaked at 4.6) with  hyperkalemia (5.8) requiring lokelma and insulin.  Of note daughter states over past month patient more and more confused ? dementia and pt is legally blind. Daughter states pt HAS NOT RECEIVED COVID VACCINE as she has been sick these past few months.      Patient transferred to [facility] with concern of pericardial effusion, hypotension and NIC. On arrival patient SBP 80's, A&O to first name but very lethargic.  (26 May 2021 10:16)      Hospital Course:   Patient transferred to [facility] with concern of pericardial effusion, hypotension and NIC.    intubated for hypercarbic respiratory distressed. Transferred from CTU to CICU as no need for pericardiocentesis. Briefly on pressors prior to intubation and since weaned off. RIJ cordis placed- unable to float swan- coiling in RV. RV pressures 50s /10s. Start sildenafil 20 tid. start bumex gtt     24 HOUR EVENTS:  intubated for hypercarbic respiratory distressed. Transferred from CTU to CICU as no need for pericardiocentesis. Briefly on pressors prior to intubation and since weaned off. RIJ cordis placed- unable to float swan- coiling in RV. RV pressures 50s /10s. Start sildenafil 20 tid. start bumex gtt     REVIEW OF SYSTEMS:   Unable to obtain, secondary to patient being intubated     ICU Vital Signs Last 24 Hrs  T(C): 36.4 (26 May 2021 17:40), Max: 36.4 (26 May 2021 17:40)  T(F): 97.5 (26 May 2021 17:40), Max: 97.5 (26 May 2021 17:40)  HR: 68 (26 May 2021 18:30) (65 - 89)  BP: 113/68 (26 May 2021 17:40) (113/68 - 123/65)  BP(mean): 94 (26 May 2021 17:40) (89 - 94)  ABP: 100/40 (26 May 2021 18:30) (100/40 - 162/90)  ABP(mean): 62 (26 May 2021 18:30) (20 - 118)  RR: 14 (26 May 2021 18:30) (13 - 28)  SpO2: 100% (26 May 2021 18:30) (89% - 100%)    Mode: AC/ CMV (Assist Control/ Continuous Mandatory Ventilation), RR (machine): 14, TV (machine): 550, FiO2: 40, PEEP: 5, ITime: 1  CVP(mm Hg): 8 (21 @ 18:30) (6 - 316)    I&O's Summary    26 May 2021 07:01  -  26 May 2021 19:23  --------------------------------------------------------  IN: 757.4 mL / OUT: 385 mL / NET: 372.4 mL        CAPILLARY BLOOD GLUCOSE    CAPILLARY BLOOD GLUCOSE          PHYSICAL EXAM:   General: Intubated   Eyes: EOMI, PERRLA, conjunctiva and sclera clear  Chest/Lung: CTAB, no wheezes, rales, or rhonchi  Heart: Regular rate, regular rhythm. Normal S1/S2. No murmurs, rubs, or gallops.  Abdomen: Soft, nontender, nondistended. Normal bowel sounds.  Extremites: 2+ peripheral pulses B/L. No clubbing, cyanosis, or edema.  Neurology: Sedated, does not follow commands.   Skin: No rashes or lesions  ============================I/O===========================   I&O's Detail    26 May 2021 07:01  -  26 May 2021 19:23  --------------------------------------------------------  IN:    Bumetanide: 50 mL    IV PiggyBack: 50 mL    Norepinephrine: 9.4 mL    Plasma: 300 mL    Propofol: 288 mL    sodium chloride 0.9%: 50 mL    Vasopressin: 10 mL  Total IN: 757.4 mL    OUT:    Indwelling Catheter - Urethral (mL): 385 mL    Phenylephrine: 0 mL  Total OUT: 385 mL    Total NET: 372.4 mL        ============================ LABS =========================                        11.4   5.67  )-----------( 97       ( 26 May 2021 10:14 )             36.4         144  |  100  |  68<H>  ----------------------------<  116<H>  4.9   |  31  |  4.87<H>    Ca    8.9      26 May 2021 10:15  Phos  5.9       Mg     2.8         TPro  6.6  /  Alb  3.1<L>  /  TBili  2.1<H>  /  DBili  x   /  AST  43<H>  /  ALT  27  /  AlkPhos  91      LIVER FUNCTIONS - ( 26 May 2021 10:15 )  Alb: 3.1 g/dL / Pro: 6.6 g/dL / ALK PHOS: 91 U/L / ALT: 27 U/L / AST: 43 U/L / GGT: x           PT/INR - ( 26 May 2021 15:00 )   PT: 17.5 sec;   INR: 1.49 ratio         PTT - ( 26 May 2021 15:00 )  PTT:35.6 sec  ABG - ( 26 May 2021 13:35 )  pH, Arterial: 7.54  pH, Blood: x     /  pCO2: 40    /  pO2: 103   / HCO3: 34    / Base Excess: 10.5  /  SaO2: 99                Blood Gas Arterial, Lactate: 1.5 mmol/L (21 @ 13:35)  Blood Gas Arterial, Lactate: 0.7 mmol/L (21 @ 11:10)  Blood Gas Arterial, Lactate: 0.7 mmol/L (21 @ 10:50)  Blood Gas Venous - Lactate: 0.8 mmoL/L (21 @ 10:19)  Blood Gas Arterial, Lactate: 0.7 mmol/L (21 @ 10:00)    Urinalysis Basic - ( 26 May 2021 11:07 )    Color: Dark Orange / Appearance: Slightly Turbid / S.021 / pH: x  Gluc: x / Ketone: Trace  / Bili: Moderate / Urobili: Negative   Blood: x / Protein: 300 mg/dL / Nitrite: Negative   Leuk Esterase: Large / RBC: >50 /hpf / WBC 10 /HPF   Sq Epi: x / Non Sq Epi: 0 /hpf / Bacteria: Many      ======================Micro/Rad/Cardio=================  Telemetry: Reviewed   EKG: Reviewed  CXR: Reviewed  Echo: Reviewed  Cath: Reviewed  ======================================================  PAST MEDICAL & SURGICAL HISTORY:  Afib    COPD (chronic obstructive pulmonary disease)    CHF (congestive heart failure)    Dementia      ====================ASSESSMENT ==============   Patient transferred to [facility] with concern of pericardial effusion, hypotension and NIC.  AFib   NIC  Thrombocytopenia   Severe pulmonary hypertension       Plan:  ====================== NEUROLOGY=====================   Continue to monitor neuro status   - Sedated with IV propofol for vent synchrony   - daily sedation vacation and SBT once RV unloaded     propofol Infusion 30 MICROgram(s)/kG/Min (18 mL/Hr) IV Continuous <Continuous>    ==================== RESPIRATORY======================  Intubated   - On full vent support, requiring close monitoring of respiratory rate, breathing pattern, pulse oximetry monitoring, and intermittent blood gas analysis. On minimal ventsettings, adjust prn based on abg  - daily sbt trials once RV unloaded   - continue with diuresis with goal net negative     Mechanical Ventilation:  Mode: AC/ CMV (Assist Control/ Continuous Mandatory Ventilation)  RR (machine): 12  TV (machine): 550  FiO2: 30  PEEP: 5  ITime: 1  MAP: 11  PIP: 34      ====================CARDIOVASCULAR==================  HFpEF with severe TR  - Currently off pressors. Briefly on pressors prior to intubation but continues to be HD stable.   - Cordis placed- swan ws unable to be floated as persistently coiling in RV. Subsequently removed. RV pressures 50s/10s. Plan to place swan in cath lab in AM  - VBG sat 76 with negative lactate. continue to monitor vbg sat and other perfusion indicies   - continue diuresis with Bumex gtt at 2mg/hr with goal net negative   - start denisa  - continuous pulse oximetry monitoring    norepinephrine Infusion 0.05 MICROgram(s)/kG/Min (9.38 mL/Hr) IV Continuous <Continuous>  vasopressin Infusion 0.05 Unit(s)/Min (3 mL/Hr) IV Continuous <Continuous>    ===================HEMATOLOGIC/ONC ===================  Thrombocytopenia   - Platelets 97k  - Continue to monitor hemoglobin and hematocrit levels.     ===================== RENAL =========================  Continue to monitor I/Os, BUN/Creatinine, and urine output.   Goal net negative fluid balance. Replete lytes PRN. Keep K> 4 and Mg >2.   Diuresis with Bumetanide     buMETAnide Infusion 2 mG/Hr (10 mL/Hr) IV Continuous <Continuous>  ==================== GASTROINTESTINAL===================  Currently NPO    sodium chloride 0.9% lock flush 10 milliLiter(s) IV Push every 1 hour PRN Pre/post blood products, medications, blood draw, and to maintain line patency  sodium chloride 0.9%. 1000 milliLiter(s) (10 mL/Hr) IV Continuous <Continuous>    =======================    ENDOCRINE  =====================  Monitor glucose for need to initiate sliding scale.     ========================INFECTIOUS DISEASE================  Temperature 97.5 with WBC within normal range   - Continue to monitor temperature and WBC     Patient requires continuous monitoring with bedside rhythm monitoring, pulse ox monitoring, and intermittent blood gas analysis. Care plan discussed with ICU care team. Patient remained critical and at risk for life threatening decompensation.  Patient seen, examined and plan discussed with CCU team during rounds.     I have personally provided 35 minutes of critical care time excluding time spent on separate procedures.    By signing my name below, I, Orlando Horan, attest that this documentation has been prepared under the direction and in the presence of ALLISON Krueger.   Electronically signed: Olivia Aburto, 21 @ 19:23    Luci ANGEL PA, personally performed the services described in this documentation. all medical record entries made by the scribe were at my direction and in my presence. I have reviewed the chart and agree that the record reflects my personal performance and is accurate and complete  Electronically signed: ALLISON Krueger.        SANJIV ROBERTS  MRN-[id number]  Patient is a 75y old  Female who presents with a chief complaint of Transfer from [facility] with pericardial effusion, NIC and hypotension (26 May 2021 19:08)    HPI:  75y.o female with PMHx of CHFpEF, Severe TR with Right CHF and severe pHTN, COPD on home 02, HTN, HLD, Chronic Afib (on Eliquis), OA, CKD3, DM, right eye cataract who was brought in by EMS to [facility] on 21 for AMS. pt A&O x1, per daughter patient with generalized weakness and lethargy since since . Pt had recent in 2021 with CP and NIC. Per chart on arrival to ED pt was hypotensive, obtunded. Chest CT with large pericardial effusion (increased since 2021)- per thoracic surgery consult no indication for pericardial window, pHTN, cirrhotic liver with small ascites, ECHO EF 55% dilated RV with decreased function, severe TN, pHTN, NIC (per renal consult no acute indication for HD), venous duplex NG for DVTs, Head CT revealed chronic involutional and white matter changes, no acute intracranial process, NIC(SCr peaked at 4.6) with  hyperkalemia (5.8) requiring lokelma and insulin.  Of note daughter states over past month patient more and more confused ? dementia and pt is legally blind. Daughter states pt HAS NOT RECEIVED COVID VACCINE as she has been sick these past few months.      Patient transferred to [facility] with concern of pericardial effusion, hypotension and NIC. On arrival patient SBP 80's, A&O to first name but very lethargic.  (26 May 2021 10:16)      Hospital Course:   Patient transferred to [facility] with concern of pericardial effusion, hypotension and NIC.    intubated for hypercarbic respiratory distressed. Transferred from CTU to CICU as no need for pericardiocentesis. Briefly on pressors prior to intubation and since weaned off. RIJ cordis placed- unable to float swan- coiling in RV. RV pressures 50s /10s. Start sildenafil 20 tid. start bumex gtt     24 HOUR EVENTS:  intubated for hypercarbic respiratory distressed. Transferred from CTU to CICU as no need for pericardiocentesis. Briefly on pressors prior to intubation and since weaned off. RIJ cordis placed- unable to float swan- coiling in RV. RV pressures 50s /10s. Start sildenafil 20 tid. start bumex gtt     REVIEW OF SYSTEMS:   Unable to obtain, secondary to patient being intubated     ICU Vital Signs Last 24 Hrs  T(C): 36.4 (26 May 2021 17:40), Max: 36.4 (26 May 2021 17:40)  T(F): 97.5 (26 May 2021 17:40), Max: 97.5 (26 May 2021 17:40)  HR: 68 (26 May 2021 18:30) (65 - 89)  BP: 113/68 (26 May 2021 17:40) (113/68 - 123/65)  BP(mean): 94 (26 May 2021 17:40) (89 - 94)  ABP: 100/40 (26 May 2021 18:30) (100/40 - 162/90)  ABP(mean): 62 (26 May 2021 18:30) (20 - 118)  RR: 14 (26 May 2021 18:30) (13 - 28)  SpO2: 100% (26 May 2021 18:30) (89% - 100%)    Mode: AC/ CMV (Assist Control/ Continuous Mandatory Ventilation), RR (machine): 14, TV (machine): 550, FiO2: 40, PEEP: 5, ITime: 1  CVP(mm Hg): 8 (21 @ 18:30) (6 - 316)    I&O's Summary    26 May 2021 07:01  -  26 May 2021 19:23  --------------------------------------------------------  IN: 757.4 mL / OUT: 385 mL / NET: 372.4 mL        CAPILLARY BLOOD GLUCOSE    CAPILLARY BLOOD GLUCOSE          PHYSICAL EXAM:   General: Intubated   Eyes: EOMI, PERRLA, conjunctiva and sclera clear  Chest/Lung: CTAB, no wheezes, rales, or rhonchi  Heart: Regular rate, regular rhythm. Normal S1/S2. No murmurs, rubs, or gallops.  Abdomen: Soft, nontender, nondistended. Normal bowel sounds.  Extremites: 2+ peripheral pulses B/L. No clubbing, cyanosis, or edema.  Neurology: Sedated, does not follow commands.   Skin: No rashes or lesions  ============================I/O===========================   I&O's Detail    26 May 2021 07:01  -  26 May 2021 19:23  --------------------------------------------------------  IN:    Bumetanide: 50 mL    IV PiggyBack: 50 mL    Norepinephrine: 9.4 mL    Plasma: 300 mL    Propofol: 288 mL    sodium chloride 0.9%: 50 mL    Vasopressin: 10 mL  Total IN: 757.4 mL    OUT:    Indwelling Catheter - Urethral (mL): 385 mL    Phenylephrine: 0 mL  Total OUT: 385 mL    Total NET: 372.4 mL        ============================ LABS =========================                        11.4   5.67  )-----------( 97       ( 26 May 2021 10:14 )             36.4         144  |  100  |  68<H>  ----------------------------<  116<H>  4.9   |  31  |  4.87<H>    Ca    8.9      26 May 2021 10:15  Phos  5.9       Mg     2.8         TPro  6.6  /  Alb  3.1<L>  /  TBili  2.1<H>  /  DBili  x   /  AST  43<H>  /  ALT  27  /  AlkPhos  91      LIVER FUNCTIONS - ( 26 May 2021 10:15 )  Alb: 3.1 g/dL / Pro: 6.6 g/dL / ALK PHOS: 91 U/L / ALT: 27 U/L / AST: 43 U/L / GGT: x           PT/INR - ( 26 May 2021 15:00 )   PT: 17.5 sec;   INR: 1.49 ratio         PTT - ( 26 May 2021 15:00 )  PTT:35.6 sec  ABG - ( 26 May 2021 13:35 )  pH, Arterial: 7.54  pH, Blood: x     /  pCO2: 40    /  pO2: 103   / HCO3: 34    / Base Excess: 10.5  /  SaO2: 99                Blood Gas Arterial, Lactate: 1.5 mmol/L (21 @ 13:35)  Blood Gas Arterial, Lactate: 0.7 mmol/L (21 @ 11:10)  Blood Gas Arterial, Lactate: 0.7 mmol/L (21 @ 10:50)  Blood Gas Venous - Lactate: 0.8 mmoL/L (21 @ 10:19)  Blood Gas Arterial, Lactate: 0.7 mmol/L (21 @ 10:00)    Urinalysis Basic - ( 26 May 2021 11:07 )    Color: Dark Orange / Appearance: Slightly Turbid / S.021 / pH: x  Gluc: x / Ketone: Trace  / Bili: Moderate / Urobili: Negative   Blood: x / Protein: 300 mg/dL / Nitrite: Negative   Leuk Esterase: Large / RBC: >50 /hpf / WBC 10 /HPF   Sq Epi: x / Non Sq Epi: 0 /hpf / Bacteria: Many      ======================Micro/Rad/Cardio=================  Telemetry: Reviewed   EKG: Reviewed  CXR: Reviewed  Echo: Reviewed  Cath: Reviewed  ======================================================  PAST MEDICAL & SURGICAL HISTORY:  Afib    COPD (chronic obstructive pulmonary disease)    CHF (congestive heart failure)    Dementia      ====================ASSESSMENT ==============   Patient transferred to [facility] with concern of pericardial effusion, hypotension and NIC.  AFib   NIC  Thrombocytopenia   Severe pulmonary hypertension       Plan:  ====================== NEUROLOGY=====================   Continue to monitor neuro status   - Sedated with IV propofol for vent synchrony   - daily sedation vacation and SBT once RV unloaded     propofol Infusion 30 MICROgram(s)/kG/Min (18 mL/Hr) IV Continuous <Continuous>    ==================== RESPIRATORY======================  Intubated   - On full vent support, requiring close monitoring of respiratory rate, breathing pattern, pulse oximetry monitoring, and intermittent blood gas analysis. On minimal ventsettings, adjust prn based on abg  - daily sbt trials once RV unloaded   - continue with diuresis with goal net negative     Mechanical Ventilation:  Mode: AC/ CMV (Assist Control/ Continuous Mandatory Ventilation)  RR (machine): 12  TV (machine): 550  FiO2: 30  PEEP: 5  ITime: 1  MAP: 11  PIP: 34      ====================CARDIOVASCULAR==================  HFpEF with severe TR  - Currently off pressors. Briefly on pressors prior to intubation but continues to be HD stable. Restart pressors prn for goal SBP >90   - Cordis placed- swan ws unable to be floated as persistently coiling in RV. Subsequently removed. RV pressures 50s/10s. Plan to place swan in cath lab in AM  - VBG sat 76 with negative lactate. continue to monitor vbg sat and other perfusion indicies   - continue diuresis with Bumex gtt at 2mg/hr with goal net negative   - start sildenafil 20 tid as per HF  - continuous pulse oximetry monitoring  - HF following, recs appreciated     norepinephrine Infusion 0.05 MICROgram(s)/kG/Min (9.38 mL/Hr) IV Continuous <Continuous>  vasopressin Infusion 0.05 Unit(s)/Min (3 mL/Hr) IV Continuous <Continuous>    ===================HEMATOLOGIC/ONC ===================  Thrombocytopenia   - Platelets 97k  - Continue to monitor hemoglobin and hematocrit levels.     ===================== RENAL =========================  Continue to monitor I/Os, BUN/Creatinine, and urine output.   Goal net negative fluid balance. Replete lytes PRN. Keep K> 4 and Mg >2.   Diuresis with Bumetanide     buMETAnide Infusion 2 mG/Hr (10 mL/Hr) IV Continuous <Continuous>  ==================== GASTROINTESTINAL===================  Currently NPO, will start TF    sodium chloride 0.9% lock flush 10 milliLiter(s) IV Push every 1 hour PRN Pre/post blood products, medications, blood draw, and to maintain line patency  sodium chloride 0.9%. 1000 milliLiter(s) (10 mL/Hr) IV Continuous <Continuous>    =======================    ENDOCRINE  =====================  Monitor glucose for need to initiate sliding scale.     ========================INFECTIOUS DISEASE================  Temperature 97.5 with WBC within normal range   - Continue to monitor temperature and WBC     Patient requires continuous monitoring with bedside rhythm monitoring, pulse ox monitoring, and intermittent blood gas analysis. Care plan discussed with ICU care team. Patient remained critical and at risk for life threatening decompensation.  Patient seen, examined and plan discussed with CCU team during rounds.     I have personally provided 35 minutes of critical care time excluding time spent on separate procedures.    By signing my name below, I, Orlando Horan, attest that this documentation has been prepared under the direction and in the presence of ALLISON Krueger.   Electronically signed: Olivia Aburto, 21 @ 19:23    I, ALLISON Krueger, personally performed the services described in this documentation. all medical record entries made by the scribe were at my direction and in my presence. I have reviewed the chart and agree that the record reflects my personal performance and is accurate and complete  Electronically signed: ALLISON Krueger.        SANJIV ROBERTS  MRN-[id number]  Patient is a 75y old  Female who presents with a chief complaint of Transfer from [facility] with pericardial effusion, NIC and hypotension (26 May 2021 19:08)    HPI:  75y.o female with PMHx of CHFpEF, Severe TR with Right CHF and severe pHTN, COPD on home 02, HTN, HLD, Chronic Afib (on Eliquis), OA, CKD3, DM, right eye cataract who was brought in by EMS to [facility] on 21 for AMS. pt A&O x1, per daughter patient with generalized weakness and lethargy since since . Pt had recent in 2021 with CP and NIC. Per chart on arrival to ED pt was hypotensive, obtunded. Chest CT with large pericardial effusion (increased since 2021)- per thoracic surgery consult no indication for pericardial window, pHTN, cirrhotic liver with small ascites, ECHO EF 55% dilated RV with decreased function, severe TN, pHTN, NIC (per renal consult no acute indication for HD), venous duplex NG for DVTs, Head CT revealed chronic involutional and white matter changes, no acute intracranial process, NIC(SCr peaked at 4.6) with  hyperkalemia (5.8) requiring lokelma and insulin.  Of note daughter states over past month patient more and more confused ? dementia and pt is legally blind. Daughter states pt HAS NOT RECEIVED COVID VACCINE as she has been sick these past few months.      Patient transferred to [facility] with concern of pericardial effusion, hypotension and NIC. On arrival patient SBP 80's, A&O to first name but very lethargic.  (26 May 2021 10:16)      Hospital Course:   Patient transferred to [facility] with concern of pericardial effusion, hypotension and NIC.    intubated for hypercarbic respiratory distressed. Transferred from CTU to CICU as no need for pericardiocentesis. Briefly on pressors prior to intubation and since weaned off. RIJ cordis placed- unable to float swan- coiling in RV. RV pressures 50s /10s. Start sildenafil 20 tid. start bumex gtt     24 HOUR EVENTS:  intubated for hypercarbic respiratory distressed. Transferred from CTU to CICU as no need for pericardiocentesis. Briefly on pressors prior to intubation and since weaned off. RIJ cordis placed- unable to float swan- coiling in RV. RV pressures 50s /10s. Start sildenafil 20 tid. start bumex gtt     REVIEW OF SYSTEMS:   Unable to obtain, secondary to patient being intubated     ICU Vital Signs Last 24 Hrs  T(C): 36.4 (26 May 2021 17:40), Max: 36.4 (26 May 2021 17:40)  T(F): 97.5 (26 May 2021 17:40), Max: 97.5 (26 May 2021 17:40)  HR: 68 (26 May 2021 18:30) (65 - 89)  BP: 113/68 (26 May 2021 17:40) (113/68 - 123/65)  BP(mean): 94 (26 May 2021 17:40) (89 - 94)  ABP: 100/40 (26 May 2021 18:30) (100/40 - 162/90)  ABP(mean): 62 (26 May 2021 18:30) (20 - 118)  RR: 14 (26 May 2021 18:30) (13 - 28)  SpO2: 100% (26 May 2021 18:30) (89% - 100%)    Mode: AC/ CMV (Assist Control/ Continuous Mandatory Ventilation), RR (machine): 14, TV (machine): 550, FiO2: 40, PEEP: 5, ITime: 1  CVP(mm Hg): 8 (21 @ 18:30) (6 - 316)    I&O's Summary    26 May 2021 07:01  -  26 May 2021 19:23  --------------------------------------------------------  IN: 757.4 mL / OUT: 385 mL / NET: 372.4 mL        CAPILLARY BLOOD GLUCOSE    CAPILLARY BLOOD GLUCOSE          PHYSICAL EXAM:   General: Intubated   Eyes: EOMI, PERRLA, conjunctiva and sclera clear  Chest/Lung: CTAB, no wheezes, rales, or rhonchi  Heart: Regular rate, regular rhythm. Normal S1/S2. No murmurs, rubs, or gallops.  Abdomen: Soft, nontender, nondistended. Normal bowel sounds.  Extremites: 2+ peripheral pulses B/L. No clubbing, cyanosis, or edema.  Neurology: Sedated, does not follow commands.   Skin: No rashes or lesions  ============================I/O===========================   I&O's Detail    26 May 2021 07:01  -  26 May 2021 19:23  --------------------------------------------------------  IN:    Bumetanide: 50 mL    IV PiggyBack: 50 mL    Norepinephrine: 9.4 mL    Plasma: 300 mL    Propofol: 288 mL    sodium chloride 0.9%: 50 mL    Vasopressin: 10 mL  Total IN: 757.4 mL    OUT:    Indwelling Catheter - Urethral (mL): 385 mL    Phenylephrine: 0 mL  Total OUT: 385 mL    Total NET: 372.4 mL        ============================ LABS =========================                        11.4   5.67  )-----------( 97       ( 26 May 2021 10:14 )             36.4         144  |  100  |  68<H>  ----------------------------<  116<H>  4.9   |  31  |  4.87<H>    Ca    8.9      26 May 2021 10:15  Phos  5.9       Mg     2.8         TPro  6.6  /  Alb  3.1<L>  /  TBili  2.1<H>  /  DBili  x   /  AST  43<H>  /  ALT  27  /  AlkPhos  91      LIVER FUNCTIONS - ( 26 May 2021 10:15 )  Alb: 3.1 g/dL / Pro: 6.6 g/dL / ALK PHOS: 91 U/L / ALT: 27 U/L / AST: 43 U/L / GGT: x           PT/INR - ( 26 May 2021 15:00 )   PT: 17.5 sec;   INR: 1.49 ratio         PTT - ( 26 May 2021 15:00 )  PTT:35.6 sec  ABG - ( 26 May 2021 13:35 )  pH, Arterial: 7.54  pH, Blood: x     /  pCO2: 40    /  pO2: 103   / HCO3: 34    / Base Excess: 10.5  /  SaO2: 99                Blood Gas Arterial, Lactate: 1.5 mmol/L (21 @ 13:35)  Blood Gas Arterial, Lactate: 0.7 mmol/L (21 @ 11:10)  Blood Gas Arterial, Lactate: 0.7 mmol/L (21 @ 10:50)  Blood Gas Venous - Lactate: 0.8 mmoL/L (21 @ 10:19)  Blood Gas Arterial, Lactate: 0.7 mmol/L (21 @ 10:00)    Urinalysis Basic - ( 26 May 2021 11:07 )    Color: Dark Orange / Appearance: Slightly Turbid / S.021 / pH: x  Gluc: x / Ketone: Trace  / Bili: Moderate / Urobili: Negative   Blood: x / Protein: 300 mg/dL / Nitrite: Negative   Leuk Esterase: Large / RBC: >50 /hpf / WBC 10 /HPF   Sq Epi: x / Non Sq Epi: 0 /hpf / Bacteria: Many      ======================Micro/Rad/Cardio=================  Telemetry: Reviewed   EKG: Reviewed  CXR: Reviewed  Echo: Reviewed  Cath: Reviewed  ======================================================  PAST MEDICAL & SURGICAL HISTORY:  Afib    COPD (chronic obstructive pulmonary disease)    CHF (congestive heart failure)    Dementia      ====================ASSESSMENT ==============   Patient transferred to [facility] with concern of pericardial effusion, hypotension and NIC.  AFib   NIC  Thrombocytopenia   Severe pulmonary hypertension       Plan:  ====================== NEUROLOGY=====================   Continue to monitor neuro status   - Sedated with IV propofol for vent synchrony   - daily sedation vacation and SBT once RV unloaded     propofol Infusion 30 MICROgram(s)/kG/Min (18 mL/Hr) IV Continuous <Continuous>    ==================== RESPIRATORY======================  Intubated   - On full vent support, requiring close monitoring of respiratory rate, breathing pattern, pulse oximetry monitoring, and intermittent blood gas analysis. On minimal ventsettings, adjust prn based on abg  - daily sbt trials once RV unloaded   - continue with diuresis with goal net negative     Mechanical Ventilation:  Mode: AC/ CMV (Assist Control/ Continuous Mandatory Ventilation)  RR (machine): 12  TV (machine): 550  FiO2: 30  PEEP: 5  ITime: 1  MAP: 11  PIP: 34      ====================CARDIOVASCULAR==================  HFpEF with severe TR  - Currently off pressors. Briefly on pressors prior to intubation but continues to be HD stable. Restart pressors prn for goal SBP >90   - Cordis placed- swan ws unable to be floated as persistently coiling in RV. Subsequently removed. RV pressures 50s/10s. Plan to place swan in cath lab in AM  - VBG sat 76 with negative lactate. continue to monitor vbg sat and other perfusion indicies   - continue diuresis with Bumex gtt at 2mg/hr with goal net negative   - start sildenafil 20 tid as per HF  - continuous pulse oximetry monitoring  - HF following, recs appreciated     chronic a.fib  - on home Eliquis. Holding AC for now 2/2 pericardial effusion and persistent bleeding from femoral harshil and central line site.   - rate controlled in 60s, continue to monitor     norepinephrine Infusion 0.05 MICROgram(s)/kG/Min (9.38 mL/Hr) IV Continuous <Continuous>  vasopressin Infusion 0.05 Unit(s)/Min (3 mL/Hr) IV Continuous <Continuous>    ===================HEMATOLOGIC/ONC ===================  Thrombocytopenia   - Platelets 97k  - Continue to monitor hemoglobin and hematocrit levels.   - on home Eliquis. Holding AC for now 2/2 pericardial effusion and persistent bleeding from femoral harshil and central line site s/p FFP and vitamin K. Site now soft without bleeding. Consider reinitiation of AC in AM    ===================== RENAL =========================  NIC with creatinine of 4.73. Creatinine last 0.97 in 2021 here.   - Continue to monitor I/Os, BUN/Creatinine, and urine output.   -Goal net negative fluid balance. Replete lytes PRN. Keep K> 4 and Mg >2.   - Diuresis with Bumetanide gtt with good urinary response   - avoid nephrotoxic agents  - Nephro following. No urgent needs for HD at this time     buMETAnide Infusion 2 mG/Hr (10 mL/Hr) IV Continuous <Continuous>  ==================== GASTROINTESTINAL===================  Currently NPO, will start TF    sodium chloride 0.9% lock flush 10 milliLiter(s) IV Push every 1 hour PRN Pre/post blood products, medications, blood draw, and to maintain line patency  sodium chloride 0.9%. 1000 milliLiter(s) (10 mL/Hr) IV Continuous <Continuous>    =======================    ENDOCRINE  =====================  Monitor glucose for need to initiate sliding scale.     ========================INFECTIOUS DISEASE================  Hypothermic to 34 with WBC within normal range   - Continue to monitor temperature and WBC   - Patient hypothermic overnight- BCX x2 pending, ucx pending. Will need combicath and MRSA swab. Start empiric abx with cefepime and vanco by level.      Patient requires continuous monitoring with bedside rhythm monitoring, pulse ox monitoring, and intermittent blood gas analysis. Care plan discussed with ICU care team. Patient remained critical and at risk for life threatening decompensation.  Patient seen, examined and plan discussed with CCU team during rounds.     I have personally provided 35 minutes of critical care time excluding time spent on separate procedures.    By signing my name below, I, Orlando Horan, attest that this documentation has been prepared under the direction and in the presence of ALLISON Krueger.   Electronically signed: Olivia Aburto, 21 @ 19:23    I, ALLISON Krueger, personally performed the services described in this documentation. all medical record entries made by the scribe were at my direction and in my presence. I have reviewed the chart and agree that the record reflects my personal performance and is accurate and complete  Electronically signed: ALLISON Krueger.

## 2021-05-26 NOTE — H&P ADULT - NSHPREVIEWOFSYSTEMS_GEN_ALL_CORE
A&O to first name but very lethargic. Poor historian. Not answering questions  Denies any pain or difficulty breathing, unable to get any additional information.

## 2021-05-27 NOTE — PROCEDURE NOTE - NSPROCDETAILS_GEN_ALL_CORE
guidewire recovered/lumen(s) aspirated and flushed/sterile dressing applied/sterile technique, catheter placed/ultrasound guidance with use of sterile gel and probe cove
location identified, draped/prepped, sterile technique used, needle inserted/introduced/positive blood return obtained via catheter/connected to a pressurized flush line/sutured in place/Seldinger technique/all materials/supplies accounted for at end of procedure
location identified, draped/prepped, sterile technique used, needle inserted/introduced/positive blood return obtained via catheter/connected to a pressurized flush line/sutured in place/hemostasis with direct pressure, dressing applied/Seldinger technique/all materials/supplies accounted for at end of procedure
guidewire recovered/lumen(s) aspirated and flushed/sterile dressing applied/sterile technique, catheter placed

## 2021-05-27 NOTE — PROGRESS NOTE ADULT - ATTENDING COMMENTS
HFpEF with RV failure and severe pulmonary HTN. [place] to be placed today to monitor hemodynamics. For now, aggressive diuresis as patient won't be a candidate for PH therapy until euvolemic. If patient does not diurese, low threshold for dialysis for volume removal.

## 2021-05-27 NOTE — PROGRESS NOTE ADULT - SUBJECTIVE AND OBJECTIVE BOX
Intubated in CCU, s/p kenaan    Vital Signs Last 24 Hrs  T(C): 36 (21 @ 16:00), Max: 36.6 (21 @ 19:00)  T(F): 96.8 (21 @ 16:00), Max: 97.9 (21 @ 19:00)  HR: 90 (21 @ 16:30) (66 - 94)  BP: 113/68 (21 @ 17:40) (113/68 - 113/68)  BP(mean): 94 (21 @ 17:40) (94 - 94)  RR: 12 (21 @ 16:30) (11 - 18)  SpO2: 95% (21 @ 16:30) (89% - 100%)    I&O's Detail    26 May 2021 07:  -  27 May 2021 07:00  --------------------------------------------------------  IN:    Bumetanide: 170 mL    IV PiggyBack: 500 mL    Norepinephrine: 56.9 mL    Plasma: 300 mL    Propofol: 733.2 mL    sodium chloride 0.9%: 170 mL    Vasopressin: 10 mL  Total IN: 1940.1 mL    OUT:    Indwelling Catheter - Urethral (mL): 2235 mL    Phenylephrine: 0 mL  Total OUT: 2235 mL    Total NET: -294.9 mL    27 May 2021 07:01  -  27 May 2021 16:50  --------------------------------------------------------  IN:    Bumetanide: 90 mL    Bumetanide: 15 mL    Enteral Tube Flush: 120 mL    Norepinephrine: 169.6 mL    Propofol: 283.2 mL    sodium chloride 0.9%: 100 mL  Total IN: 777.8 mL    OUT:    Indwelling Catheter - Urethral (mL): 1375 mL  Total OUT: 1375 mL    Total NET: -597.2 mL    Mode: AC/ CMV (Assist Control/ Continuous Mandatory Ventilation)  RR (machine): 12  TV (machine): 350  FiO2: 30  PEEP: 5  ITime: 1  MAP: 9  PIP: 27    s1s2  b/l air entry  soft, ND  edema                        10.8   4.42  )-----------( 103      ( 27 May 2021 00:56 )             32.8     27 May 2021 09:23    141    |  99     |  67     ----------------------------<  104    4.4     |  28     |  4.49     Ca    9.3        27 May 2021 09:23  Phos  4.1       27 May 2021 09:23  Mg     2.5       27 May 2021 09:23    TPro  6.2    /  Alb  2.8    /  TBili  2.1    /  DBili  x      /  AST  50     /  ALT  26     /  AlkPhos  86     27 May 2021 09:23    LIVER FUNCTIONS - ( 27 May 2021 09:23 )  Alb: 2.8 g/dL / Pro: 6.2 g/dL / ALK PHOS: 86 U/L / ALT: 26 U/L / AST: 50 U/L / GGT: x           PT/INR - ( 26 May 2021 15:00 )   PT: 17.5 sec;   INR: 1.49 ratio       PTT - ( 26 May 2021 15:00 )  PTT:35.6 sec  CAPILLARY BLOOD GLUCOSE    CARDIAC MARKERS ( 26 May 2021 10:15 )  x     / x     / 52 U/L / x     / 2.2 ng/mL    Urinalysis Basic - ( 26 May 2021 11:07 )    Color: Dark Orange / Appearance: Slightly Turbid / S.021 / pH: x  Gluc: x / Ketone: Trace  / Bili: Moderate / Urobili: Negative   Blood: x / Protein: 300 mg/dL / Nitrite: Negative   Leuk Esterase: Large / RBC: >50 /hpf / WBC 10 /HPF   Sq Epi: x / Non Sq Epi: 0 /hpf / Bacteria: Many    Culture - Bronchial (collected 27 May 2021 08:51)  Source: Bronch Wash Combicathtrap  Gram Stain (27 May 2021 14:50):    Moderate polymorphonuclear leukocytes per low power field    Rare Squamous epithelial cells per low power field    Moderate Gram Negative Diplococci per oil power field    Rare Gram Positive Cocci in Pairs and Chains per oil power field    Culture - Urine (collected 26 May 2021 17:28)  Source: .Urine Catheterized  Final Report (27 May 2021 13:51):    <10,000 CFU/mL Normal Urogenital Karen    Culture - Blood (collected 26 May 2021 12:41)  Source: .Blood Blood  Preliminary Report (27 May 2021 13:02):    No growth to date.    Culture - Blood (collected 26 May 2021 12:41)  Source: .Blood Blood  Preliminary Report (27 May 2021 13:02):    No growth to date.    atorvastatin 40 milliGRAM(s) Oral at bedtime  buMETAnide Infusion 3 mG/Hr IV Continuous <Continuous>  cefepime   IVPB      cefepime   IVPB 1000 milliGRAM(s) IV Intermittent every 12 hours  chlorhexidine 0.12% Liquid 15 milliLiter(s) Oral Mucosa every 12 hours  chlorhexidine 2% Cloths 1 Application(s) Topical <User Schedule>  heparin  Infusion 1500 Unit(s)/Hr IV Continuous <Continuous>  norepinephrine Infusion 0.05 MICROgram(s)/kG/Min IV Continuous <Continuous>  propofol Infusion 30 MICROgram(s)/kG/Min IV Continuous <Continuous>  sodium chloride 0.9% lock flush 10 milliLiter(s) IV Push every 1 hour PRN  sodium chloride 0.9%. 1000 milliLiter(s) IV Continuous <Continuous>    A/P:    COPD, severe PHTN, CO2 retention, CM, peric effusion, R heart failure, resp failure, intubated  Hemodynamic NIC (Cr 0.97 - 2/3/21)  UO improved on Bumex qtt  Avoid nephrotoxins  Avoid hypotension  No urgent indication for HD, hopefully will avoid  F/u BMP, UO  Further care per CCU team  D/w daughter    [phone]

## 2021-05-27 NOTE — PROCEDURE NOTE - NSPROCNAME_GEN_A_CORE
Arterial Puncture/Cannulation
Central Line Insertion
Arterial Puncture/Cannulation
Central Line Insertion

## 2021-05-27 NOTE — PROCEDURE NOTE - NSINDICATIONS_GEN_A_CORE
critical illness/hemodynamic monitoring
critical illness/emergency venous access/hemodynamic monitoring/venous access/volume resuscitation
arterial puncture to obtain ABG's/critical patient/monitoring purposes
critical patient

## 2021-05-27 NOTE — PROGRESS NOTE ADULT - ASSESSMENT
75F with a pmhx of CHFpEF, Severe TR with Right CHF and severe pHTN, COPD on home 02, HTN, HLD, Chronic Afib (on Eliquis), OA, CKD3, DM, right eye cataract who was brought in by EMS to [facility] on 5/23/21 for AMS, found to have a large pericardial effusion w/o tamponade physiology, without drainable window, intubated (5/26) for acute hypercarbic respiratory failure and worsening AMS, transferred to CICU for hypotension and NIC on CKD likely 2/2 obstructive shock     Plan:    Neuro:  -Mental status: Sedated  -Sedation: prop     Resp:  -(5/26): Intubated   Mechanical Ventilation:  Mode: AC/ CMV (Assist Control/ Continuous Mandatory Ventilation)  RR (machine): 14  TV (machine): 550  FiO2: 40  PEEP: 5  ITime: 1  MAP: 11  PIP: 34    CV:  Persistent cardiopulmonary dysfunction  -Pressors: levophed and Vasopressin     Pulmonary hypertension - right heart failure  - continuous pulse oximetry monitoring    GI:  -Diet: NPO  -Stress ulcer ppx:  -Bowel regimen:    Heme:  Thrombocytopenia   - Platelets 97k  - Continue to monitor hemoglobin and hematocrit levels.   DVT ppx: none    Renal:  -Continue to monitor I/Os, BUN/Creatinine, and urine output.   -Goal net negative fluid balance. Replete lytes PRN. Keep K> 4 and Mg >2.   - c/w Diuresis with Bumetanide gtt @ 2mg/hr    Endo:  - Monitor blood sugar     ID:  Temperature 97.5 with WBC within normal range   - Continue to monitor temperature and WBC     CODE STATUS:  Full code    TUBES/LINES/DRAINS:  -RIJ cordis (5/26-  -R PAC (5/26-  -R fem TLC (5/26-  -R fem a-line (5/26-  -ETT  -OGT  -Hernadez  -PIV   75F with a pmhx of CHFpEF, Severe TR with Right CHF and severe pHTN, COPD on home 02, HTN, HLD, Chronic Afib (on Eliquis), OA, CKD3, DM, right eye cataract who was brought in by EMS to [facility] on 5/23/21 for AMS, found to have a large pericardial effusion w/o tamponade physiology, without drainable window, intubated (5/26) for acute hypercarbic respiratory failure and worsening AMS, transferred to CICU for hypotension and NIC on CKD likely 2/2 obstructive shock     Plan:    Neuro:  -Mental status: Sedated  -Sedation: prop     Resp:  -(5/26): Intubated   Mode: AC/ CMV (Assist Control/ Continuous Mandatory Ventilation)  RR (machine): 12  TV (machine): 350  FiO2: 30  PEEP: 5  ITime: 1  MAP: 9  PIP: 29    CV:  Persistent cardiopulmonary dysfunction  -Pressors: levophed and Vasopressin     Pulmonary hypertension - right heart failure  - continuous pulse oximetry monitoring    GI:  -Diet: NPO  -Stress ulcer ppx:  -Bowel regimen:    Heme:  Thrombocytopenia   - Platelets 97k  - Continue to monitor hemoglobin and hematocrit levels.   DVT ppx: none    Renal:  -Continue to monitor I/Os, BUN/Creatinine, and urine output.   -Goal net negative fluid balance. Replete lytes PRN. Keep K> 4 and Mg >2.   - c/w Diuresis with Bumetanide gtt @ 2mg/hr  - [ ] Ulytes    Endo:  - Monitor blood sugar     ID:  Temperature 97.5 with WBC within normal range   - Continue to monitor temperature and WBC   -(5/26-27): Hypothermic to 34C  - c/w cefepime (5/27- and vanc (5/27  - [ ] UCx  - [ ] BCx  - [ ] SCx  - [ ] MRSA/MSSA swab    CODE STATUS:  Full code    TUBES/LINES/DRAINS:  -RIJ cordis (5/26-  -R PAC (5/26-  -R fem TLC (5/26-  -R fem a-line (5/26-  -ETT  -OGT  -Hernadez  -PIV   75F with a pmhx of CHFpEF, Severe TR with Right CHF and severe pHTN, COPD on home 02, HTN, HLD, Chronic Afib (on Eliquis), OA, CKD3, DM, right eye cataract who was brought in by EMS to [facility] on 5/23/21 for AMS, found to have a large pericardial effusion w/o tamponade physiology, without drainable window, intubated (5/26) for acute hypercarbic respiratory failure and worsening AMS, transferred to CICU for hypotension and NIC on CKD in undifferentiated shock     Plan:    Neuro:  -Mental status: Sedated  -Sedation: prop     Resp:  -(5/26): Intubated   -(5/26): CT chest noncon: Mild-mod pericardial effusion trace R pleural effusion  Mode: AC/ CMV (Assist Control/ Continuous Mandatory Ventilation)  RR (machine): 12  TV (machine): 350  FiO2: 30  PEEP: 5  ITime: 1  MAP: 9  PIP: 29  -Continue pulse ox    CV:  (5/26): TTE: EF 55%, RVH with severe RVSD, severe pulm HTN, large pericardial effusion 3.4cm @ largest diameter, adjacent to RA  ventricular systolic function.  -Pressors: levophed  -(5/27): PAC placement in cath lab  -c/w atorvastatin 40mg qhs  -Telemtry    GI:  -Diet: TF with Jevity, goal 25cc/hr cont.  -Stress ulcer ppx: none  -Bowel regimen: none  -(5/26): CT a/p noncon: mild contoured liver. Anasarca    Heme:  -Thrombocytopenia                         10.8   4.42  )-----------( 103      ( 27 May 2021 00:56 )             32.8   - Continue to monitor hemoglobin and hematocrit levels.   -DVT ppx: none (heparin gtt after PAC)    Renal:  -Continue to monitor I/Os, BUN/Creatinine, and urine output.   -Goal net negative fluid balance: 2L  - Replete lytes PRN. Keep K> 4 and Mg >2  - (5/27): s/p metolazone 5mg x1  - c/w Diuresis with Bumetanide gtt @ 2mg/hr  - (5/27): FeUrea 34.6% (pre-renal, possible intrinsic component)  - (5/27): UoSm: 07, TP 10    Endo:  - Monitor blood sugar     ID:  Temperature 97.5 with WBC within normal range   - Continue to monitor temperature and WBC   -(5/26-27): Hypothermic to 34C  - c/w cefepime (5/27- and vanc (5/27  - [ ] UCx  - [ ] BCx  - [ ] SCx  - [ ] MRSA/MSSA swab    CODE STATUS:  Full code    TUBES/LINES/DRAINS:  -RIJ cordis (5/26-  -L rad a-line (5/27-  -ETT  -OGT  -Hernadez  -PIV

## 2021-05-27 NOTE — PROGRESS NOTE ADULT - ATTENDING COMMENTS
Pulmonary hypertension with R heart failure  Sedated with Propofol, synchronous with the vent  Started Levophed overnight for vasodilatory shock, ? due to vasoplegia from sedation vs. infection - wean as able  Acute hypoxic respiratory failure requiring mechanical ventilation - breathing trials once PA pressures are assessed and RV is unloaded  Start tube feeds  Non-oliguric NIC, Renal following - start Bumex drip to target 2L overall negative  Hemoglobin low but acceptable; start Heparin drip for afib  Hypothermic, pancultured and started on empiric broad spectrum antibiotics  Sugars controlled  RIJ Cordis 5/26 and radial harshil 5/27 - place [place] Pulmonary hypertension with R heart failure  Sedated with Propofol, synchronous with the vent  Started Levophed overnight for vasodilatory shock, ? due to vasoplegia from sedation vs. infection - wean as able  Acute hypoxic respiratory failure requiring mechanical ventilation - breathing trials once PA pressures are assessed and RV is unloaded  May start pulmonary vasodilator post RHC  Start tube feeds  Non-oliguric NIC, Renal following - start Bumex drip to target 2L overall negative  Hemoglobin low but acceptable; start Heparin drip for afib  Hypothermic, pancultured and started on empiric broad spectrum antibiotics  Sugars controlled  RIJ Cordis 5/26 and radial harshil 5/27 - place [place]

## 2021-05-27 NOTE — PROGRESS NOTE ADULT - SUBJECTIVE AND OBJECTIVE BOX
Subjective:  - remains intubated/sedated  - weaned off pressors  - Attempted placement of PAC but unable to advance past RV due to coiling thus removed with plan for placement in cath lab    Medications:  atorvastatin 40 milliGRAM(s) Oral at bedtime  buMETAnide Infusion 2 mG/Hr IV Continuous <Continuous>  cefepime   IVPB      cefepime   IVPB 1000 milliGRAM(s) IV Intermittent every 12 hours  chlorhexidine 0.12% Liquid 15 milliLiter(s) Oral Mucosa every 12 hours  chlorhexidine 2% Cloths 1 Application(s) Topical <User Schedule>  norepinephrine Infusion 0.05 MICROgram(s)/kG/Min IV Continuous <Continuous>  propofol Infusion 30 MICROgram(s)/kG/Min IV Continuous <Continuous>  sodium chloride 0.9% lock flush 10 milliLiter(s) IV Push every 1 hour PRN  sodium chloride 0.9%. 1000 milliLiter(s) IV Continuous <Continuous>    Physical Exam:    Vitals:  Vital Signs Last 24 Hours  T(C): 36.1 (05-27-21 @ 11:00), Max: 36.6 (05-26-21 @ 19:00)  HR: 92 (05-27-21 @ 13:11) (65 - 94)  BP: 113/68 (05-26-21 @ 17:40) (113/68 - 113/68)  RR: 12 (05-27-21 @ 13:11) (11 - 26)  SpO2: 97% (05-27-21 @ 13:11) (89% - 100%)    I&O's Summary    26 May 2021 07:01  -  27 May 2021 07:00  --------------------------------------------------------  IN: 1940.1 mL / OUT: 2235 mL / NET: -294.9 mL    27 May 2021 07:01  -  27 May 2021 13:50  --------------------------------------------------------  IN: 366 mL / OUT: 875 mL / NET: -509 mL    Tele: afib    General: No distress. Comfortable. Intubated/sedated  HEENT: Deferred  Neck: Neck supple. JVP at least mildly elevated. No masses  Chest: Compliant with vent. Diminished bases anteriorly  CV: Irregular. Normal S1 and S2. + RV heave. II/VI SM. Radial pulses normal. +1 BLE edema  Abdomen: Soft, non-distended, non-tender  Skin: No rashes or skin breakdown noted. Warm peripherally.  Neurology: Sedated  Psych: JESSICA    Labs:                        10.8   4.42  )-----------( 103      ( 27 May 2021 00:56 )             32.8     05-27    141  |  99  |  67<H>  ----------------------------<  104<H>  4.4   |  28  |  4.49<H>    Ca    9.3      27 May 2021 09:23  Phos  4.1     05-27  Mg     2.5     05-27    TPro  6.2  /  Alb  2.8<L>  /  TBili  2.1<H>  /  DBili  x   /  AST  50<H>  /  ALT  26  /  AlkPhos  86  05-27    PT/INR - ( 26 May 2021 15:00 )   PT: 17.5 sec;   INR: 1.49 ratio      PTT - ( 26 May 2021 15:00 )  PTT:35.6 sec  CARDIAC MARKERS ( 26 May 2021 10:15 )  x     / x     / 52 U/L / x     / 2.2 ng/mL    Creatine Kinase, Serum: 52 U/L (05-26-21 @ 10:15)  Creatine Kinase, Serum: 52 U/L (05-26-21 @ 10:15)    Oxygen Saturation, Mixed: 76 % (05-26 @ 19:02)    Lactate, Blood: 1.7 mmol/L (05-27 @ 00:56)

## 2021-05-27 NOTE — PROGRESS NOTE ADULT - SUBJECTIVE AND OBJECTIVE BOX
SANJIV ROBERTS  MRN-[id number]  Patient is a 75y old  Female who presents with a chief complaint of Transfer from [facility] with pericardial effusion, NIC and hypotension (27 May 2021 16:49)    HPI:  75y.o female with PMHx of CHFpEF, Severe TR with Right CHF and severe pHTN, COPD on home 02, HTN, HLD, Chronic Afib (on Eliquis), OA, CKD3, DM, right eye cataract who was brought in by EMS to [facility] on 21 for AMS. pt A&O x1, per daughter patient with generalized weakness and lethargy since since . Pt had recent in 2021 with CP and NIC. Per chart on arrival to ED pt was hypotensive, obtunded. Chest CT with large pericardial effusion (increased since 2021)- per thoracic surgery consult no indication for pericardial window, pHTN, cirrhotic liver with small ascites, ECHO EF 55% dilated RV with decreased function, severe TN, pHTN, NIC (per renal consult no acute indication for HD), venous duplex NG for DVTs, Head CT revealed chronic involutional and white matter changes, no acute intracranial process, NIC(SCr peaked at 4.6) with  hyperkalemia (5.8) requiring lokelma and insulin.  Of note daughter states over past month patient more and more confused ? dementia and pt is legally blind. Daughter states pt HAS NOT RECEIVED COVID VACCINE as she has been sick these past few months.      Patient transferred to [facility] with concern of pericardial effusion, hypotension and NIC. On arrival patient SBP 80's, A&O to first name but very lethargic.  (26 May 2021 10:16)      Hospital Course:   Patient transferred to [facility] with concern of pericardial effusion, hypotension and NIC.    intubated for hypercarbic respiratory distressed. Transferred from CTU to CICU as no need for pericardiocentesis. Briefly on pressors prior to intubation and since weaned off. RIJ cordis placed- unable to float swan- coiling in RV. RV pressures 50s /10s. Start sildenafil 20 tid. start bumex gtt    S/P RHC with [place] emmanuel catheter placed.    24 HOUR EVENTS:  S/P RHC with [place] emmanuel catheter placed today, .     REVIEW OF SYSTEMS:   Unable to obtain, pt is intubated and sedated.     ICU Vital Signs Last 24 Hrs  T(C): 36 (27 May 2021 16:00), Max: 36.2 (26 May 2021 23:00)  T(F): 96.8 (27 May 2021 16:00), Max: 97.2 (26 May 2021 23:00)  HR: 98 (27 May 2021 21:15) (66 - 98)  BP: --  BP(mean): --  ABP: 98/48 (27 May 2021 21:15) (70/38 - 128/66)  ABP(mean): 64 (27 May 2021 21:15) (50 - 90)  RR: 12 (27 May 2021 21:15) (11 - 13)  SpO2: 100% (27 May 2021 21:15) (88% - 100%)    Mode: AC/ CMV (Assist Control/ Continuous Mandatory Ventilation), RR (machine): 12, TV (machine): 350, FiO2: 30, PEEP: 5, ITime: 1  CVP(mm Hg): 12 (21 @ 21:15) (6 - 316)    I&O's Summary    26 May 2021 07:01  -  27 May 2021 07:00  --------------------------------------------------------  IN: 1940.1 mL / OUT: 2235 mL / NET: -294.9 mL    27 May 2021 07:01  -  27 May 2021 21:26  --------------------------------------------------------  IN: 1189.6 mL / OUT: 2000 mL / NET: -810.4 mL    PHYSICAL EXAM:   General: Intubated   Eyes: EOMI, PERRLA, conjunctiva and sclera clear  Chest/Lung: + ETT midline. CTAB, no wheezes, rales, or rhonchi  Heart: Regular rate, regular rhythm. Normal S1/S2. No murmurs, rubs, or gallops.  Abdomen: Soft, nontender, nondistended. Normal bowel sounds.  Extremites: 2+ peripheral pulses B/L. No clubbing, cyanosis, or edema.  Neurology: Sedated, does not follow commands.   Skin: No rashes or lesions    ============================I/O===========================   I&O's Detail    26 May 2021 07:  -  27 May 2021 07:00  --------------------------------------------------------  IN:    Bumetanide: 170 mL    IV PiggyBack: 500 mL    Norepinephrine: 56.9 mL    Plasma: 300 mL    Propofol: 733.2 mL    sodium chloride 0.9%: 170 mL    Vasopressin: 10 mL  Total IN: 1940.1 mL    OUT:    Indwelling Catheter - Urethral (mL): 2235 mL    Phenylephrine: 0 mL  Total OUT: 2235 mL    Total NET: -294.9 mL      27 May 2021 07:  -  27 May 2021 21:26  --------------------------------------------------------  IN:    Bumetanide: 90 mL    Bumetanide: 75 mL    Enteral Tube Flush: 120 mL    Heparin: 60 mL    IV PiggyBack: 50 mL    Jevity 1.2: 50 mL    Norepinephrine: 213.6 mL    Propofol: 391 mL    sodium chloride 0.9%: 140 mL  Total IN: 1189.6 mL    OUT:    Indwelling Catheter - Urethral (mL): 2000 mL  Total OUT: 2000 mL    Total NET: -810.4 mL        ============================ LABS =========================                        10.8   4.42  )-----------( 103      ( 27 May 2021 00:56 )             32.8         141  |  99  |  67<H>  ----------------------------<  104<H>  4.4   |  28  |  4.49<H>    Ca    9.3      27 May 2021 09:23  Phos  4.1       Mg     2.5         TPro  6.2  /  Alb  2.8<L>  /  TBili  2.1<H>  /  DBili  x   /  AST  50<H>  /  ALT  26  /  AlkPhos  86      LIVER FUNCTIONS - ( 27 May 2021 09:23 )  Alb: 2.8 g/dL / Pro: 6.2 g/dL / ALK PHOS: 86 U/L / ALT: 26 U/L / AST: 50 U/L / GGT: x           ABG - ( 27 May 2021 18:18 )  pH, Arterial: 7.45  pH, Blood: x     /  pCO2: 50    /  pO2: 147   / HCO3: 34    / Base Excess: 9.2   /  SaO2: 99                Urinalysis Basic - ( 26 May 2021 11:07 )    Color: Dark Orange / Appearance: Slightly Turbid / S.021 / pH: x  Gluc: x / Ketone: Trace  / Bili: Moderate / Urobili: Negative   Blood: x / Protein: 300 mg/dL / Nitrite: Negative   Leuk Esterase: Large / RBC: >50 /hpf / WBC 10 /HPF   Sq Epi: x / Non Sq Epi: 0 /hpf / Bacteria: Many      ======================Micro/Rad/Cardio=================  Telemetry: Reviewed   EKG: Reviewed  CXR: Reviewed  Echo: Reviewed  Cath: Reviewed  ======================================================  PAST MEDICAL & SURGICAL HISTORY:  Afib    COPD (chronic obstructive pulmonary disease)    CHF (congestive heart failure)    Dementia      ====================ASSESSMENT ==============   Txfered to [facility] with concern of pericardial effusion, hypotension and NIC.  Atrial Fibrillation  Acute Kidney Injury  Thrombocytopenia   Severe pulmonary hypertension     Plan:  ====================== NEUROLOGY=====================  Sedated for vent synchrony   -  Continue with IV Propofol   - Daily sedation vacation and SBT once RV unloaded     propofol Infusion 30 MICROgram(s)/kG/Min (18 mL/Hr) IV Continuous <Continuous>  ==================== RESPIRATORY======================  Intubated for acute resp distress  - On full vent support, requiring close monitoring of respiratory rate, breathing pattern, pulse oximetry monitoring, and intermittent blood gas analysis. On minimal vent settings, adjust prn based on ABG's.   - daily sbt trials once RV unloaded   - continue with diuresis with goal net negative     Mechanical Ventilation:  Mode: AC/ CMV (Assist Control/ Continuous Mandatory Ventilation)  RR (machine): 12  TV (machine): 350  FiO2: 30  PEEP: 5  ITime: 1  MAP: 9  PIP: 27    ====================CARDIOVASCULAR==================  HFpEF with severe TR  - Continue pressor support with IV Levophed infusion for goal SBP >90   - S/P RHC with [place] emmanuel catheter placed today, .   - Continue to monitor vbg sat and other perfusion indicies   - Continue diuresis with Bumex gtt at 3mg/hr with goal net negative   - Continue Sildenafil 10mg TID as per HF  - Continuous pulse oximetry monitoring  - HF following, recs appreciated     Chronic Afib  - on home Eliquis. Holding AC for now 2/2 pericardial effusion and persistent bleeding from femoral harshil and central line site.   - rate controlled in 60s, continue to monitor     norepinephrine Infusion 0.05 MICROgram(s)/kG/Min (9.38 mL/Hr) IV Continuous <Continuous>  sildenafil (REVATIO) 10 milliGRAM(s) Oral every 8 hours  ===================HEMATOLOGIC/ONC ===================  Thrombocytopenia   - Platelets 97k  - Continue to monitor hemoglobin and hematocrit levels.   - on home Eliquis. Holding AC for now 2/2 pericardial effusion and persistent bleeding from femoral harshil and central line site s/p FFP and vitamin K. Site now soft without bleeding. Consider reinitiation of AC in AM    VTE prophylaxis   - Continue IV Heparin infusion.     heparin  Infusion 1500 Unit(s)/Hr (15 mL/Hr) IV Continuous <Continuous>  ===================== RENAL =========================  NIC with creatinine of 4.73. Creatinine last 0.97 in 2021 here.   - Continue to monitor I/Os, BUN/Creatinine, and urine output.   -Goal net negative fluid balance. Replete lytes PRN. Keep K> 4 and Mg >2.   - Diuresis with Bumetanide gtt with good urinary response   - avoid nephrotoxic agents  - Nephro following. No urgent needs for HD at this time     buMETAnide Infusion 3 mG/Hr (15 mL/Hr) IV Continuous <Continuous>  ==================== GASTROINTESTINAL===================  Tolerating TF, Jevity 1.2 @ goal 25cc/hr.     sodium chloride 0.9%. 1000 milliLiter(s) (10 mL/Hr) IV Continuous <Continuous>  =======================    ENDOCRINE  =====================  Bgl controlled  - monitor glucose for need to initiate sliding scale.   - F/U A1c level    Hx of HLD  - C/w Lipitor 40mg QHS  - Monitor lipid levels.     atorvastatin 40 milliGRAM(s) Oral at bedtime  ========================INFECTIOUS DISEASE================   Hypothermic to 34 with WBC within normal range   - Continue to monitor temperature and WBC   - Patient hypothermic overnight- BCX x2 pending, ucx pending. Will need combicath and MRSA swab. Start empiric abx with cefepime and vanco by level.      cefepime   IVPB 1000 milliGRAM(s) IV Intermittent every 12 hours      Patient requires continuous monitoring with bedside rhythm monitoring, pulse ox monitoring, and intermittent blood gas analysis. Care plan discussed with ICU care team. Patient remained critical and at risk for life threatening decompensation.  Patient seen, examined and plan discussed with CCU team during rounds.     I have personally provided 35 minutes of critical care time excluding time spent on separate procedures.    By signing my name below, I, Fidelia Leo, attest that this documentation has been prepared under the direction and in the presence of ALLISON Santos.  Electronically signed: Olivia Luna, 21 @ 21:26    I, ALLISON Santos, personally performed the services described in this documentation. all medical record entries made by the gayleiblinda were at my direction and in my presence. I have reviewed the chart and agree that the record reflects my personal performance and is accurate and complete  Electronically signed: ALLISON Santos.       SANJIV ROBERTS  MRN-[id number]  Patient is a 75y old  Female who presents with a chief complaint of Transfer from [facility] with pericardial effusion, NIC and hypotension (27 May 2021 16:49)    HPI:  75y.o female with PMHx of CHFpEF, Severe TR with Right CHF and severe pHTN, COPD on home 02, HTN, HLD, Chronic Afib (on Eliquis), OA, CKD3, DM, right eye cataract who was brought in by EMS to [facility] on 21 for AMS. pt A&O x1, per daughter patient with generalized weakness and lethargy since since . Pt had recent in 2021 with CP and NIC. Per chart on arrival to ED pt was hypotensive, obtunded. Chest CT with large pericardial effusion (increased since 2021)- per thoracic surgery consult no indication for pericardial window, pHTN, cirrhotic liver with small ascites, ECHO EF 55% dilated RV with decreased function, severe TN, pHTN, NIC (per renal consult no acute indication for HD), venous duplex NG for DVTs, Head CT revealed chronic involutional and white matter changes, no acute intracranial process, NIC(SCr peaked at 4.6) with  hyperkalemia (5.8) requiring lokelma and insulin.  Of note daughter states over past month patient more and more confused ? dementia and pt is legally blind. Daughter states pt HAS NOT RECEIVED COVID VACCINE as she has been sick these past few months.      Patient transferred to [facility] with concern of pericardial effusion, hypotension and NIC. On arrival patient SBP 80's, A&O to first name but very lethargic.  (26 May 2021 10:16)      Hospital Course:   Patient transferred to [facility] with concern of pericardial effusion, hypotension and NIC.    intubated for hypercarbic respiratory distressed. Transferred from CTU to CICU as no need for pericardiocentesis. Briefly on pressors prior to intubation and since weaned off. RIJ cordis placed- unable to float swan- coiling in RV. RV pressures 50s /10s. Start sildenafil 20 tid. start bumex gtt    S/P RHC with [place] emmanuel catheter placed.    24 HOUR EVENTS:  S/P RHC with [place] emmanuel catheter placed today, .     REVIEW OF SYSTEMS:   Unable to obtain, pt is intubated and sedated.     ICU Vital Signs Last 24 Hrs  T(C): 36 (27 May 2021 16:00), Max: 36.2 (26 May 2021 23:00)  T(F): 96.8 (27 May 2021 16:00), Max: 97.2 (26 May 2021 23:00)  HR: 98 (27 May 2021 21:15) (66 - 98)  BP: --  BP(mean): --  ABP: 98/48 (27 May 2021 21:15) (70/38 - 128/66)  ABP(mean): 64 (27 May 2021 21:15) (50 - 90)  RR: 12 (27 May 2021 21:15) (11 - 13)  SpO2: 100% (27 May 2021 21:15) (88% - 100%)    Mode: AC/ CMV (Assist Control/ Continuous Mandatory Ventilation), RR (machine): 12, TV (machine): 350, FiO2: 30, PEEP: 5, ITime: 1  CVP(mm Hg): 12 (21 @ 21:15) (6 - 316)    I&O's Summary    26 May 2021 07:01  -  27 May 2021 07:00  --------------------------------------------------------  IN: 1940.1 mL / OUT: 2235 mL / NET: -294.9 mL    27 May 2021 07:01  -  27 May 2021 21:26  --------------------------------------------------------  IN: 1189.6 mL / OUT: 2000 mL / NET: -810.4 mL    PHYSICAL EXAM:   General: Intubated   Eyes: EOMI, PERRLA, conjunctiva and sclera clear  Chest/Lung: + ETT midline. CTAB, no wheezes, rales, or rhonchi  Heart: Regular rate, regular rhythm. Normal S1/S2. No murmurs, rubs, or gallops.  Abdomen: Soft, nontender, nondistended. Normal bowel sounds.  Extremites: 2+ peripheral pulses B/L. No clubbing, cyanosis, or edema.  Neurology: Sedated, does not follow commands.   Skin: No rashes or lesions    ============================I/O===========================   I&O's Detail    26 May 2021 07:  -  27 May 2021 07:00  --------------------------------------------------------  IN:    Bumetanide: 170 mL    IV PiggyBack: 500 mL    Norepinephrine: 56.9 mL    Plasma: 300 mL    Propofol: 733.2 mL    sodium chloride 0.9%: 170 mL    Vasopressin: 10 mL  Total IN: 1940.1 mL    OUT:    Indwelling Catheter - Urethral (mL): 2235 mL    Phenylephrine: 0 mL  Total OUT: 2235 mL    Total NET: -294.9 mL      27 May 2021 07:  -  27 May 2021 21:26  --------------------------------------------------------  IN:    Bumetanide: 90 mL    Bumetanide: 75 mL    Enteral Tube Flush: 120 mL    Heparin: 60 mL    IV PiggyBack: 50 mL    Jevity 1.2: 50 mL    Norepinephrine: 213.6 mL    Propofol: 391 mL    sodium chloride 0.9%: 140 mL  Total IN: 1189.6 mL    OUT:    Indwelling Catheter - Urethral (mL): 2000 mL  Total OUT: 2000 mL    Total NET: -810.4 mL        ============================ LABS =========================                        10.8   4.42  )-----------( 103      ( 27 May 2021 00:56 )             32.8         141  |  99  |  67<H>  ----------------------------<  104<H>  4.4   |  28  |  4.49<H>    Ca    9.3      27 May 2021 09:23  Phos  4.1       Mg     2.5         TPro  6.2  /  Alb  2.8<L>  /  TBili  2.1<H>  /  DBili  x   /  AST  50<H>  /  ALT  26  /  AlkPhos  86      LIVER FUNCTIONS - ( 27 May 2021 09:23 )  Alb: 2.8 g/dL / Pro: 6.2 g/dL / ALK PHOS: 86 U/L / ALT: 26 U/L / AST: 50 U/L / GGT: x           ABG - ( 27 May 2021 18:18 )  pH, Arterial: 7.45  pH, Blood: x     /  pCO2: 50    /  pO2: 147   / HCO3: 34    / Base Excess: 9.2   /  SaO2: 99                Urinalysis Basic - ( 26 May 2021 11:07 )    Color: Dark Orange / Appearance: Slightly Turbid / S.021 / pH: x  Gluc: x / Ketone: Trace  / Bili: Moderate / Urobili: Negative   Blood: x / Protein: 300 mg/dL / Nitrite: Negative   Leuk Esterase: Large / RBC: >50 /hpf / WBC 10 /HPF   Sq Epi: x / Non Sq Epi: 0 /hpf / Bacteria: Many      ======================Micro/Rad/Cardio=================  Telemetry: Reviewed   EKG: Reviewed  CXR: Reviewed  Echo: Reviewed  Cath: Reviewed  ======================================================  PAST MEDICAL & SURGICAL HISTORY:  Afib    COPD (chronic obstructive pulmonary disease)    CHF (congestive heart failure)    Dementia      ====================ASSESSMENT ==============   Txfered to [facility] with concern of pericardial effusion, hypotension and NIC.  Atrial Fibrillation  Acute Kidney Injury  Thrombocytopenia   Severe pulmonary hypertension     Plan:  ====================== NEUROLOGY=====================  Sedated for vent synchrony   -  Continue with IV Propofol   - Daily sedation vacation and SBT once RV unloaded     propofol Infusion 30 MICROgram(s)/kG/Min (18 mL/Hr) IV Continuous <Continuous>  ==================== RESPIRATORY======================  Intubated for acute resp distress  - On full vent support, requiring close monitoring of respiratory rate, breathing pattern, pulse oximetry monitoring, and intermittent blood gas analysis. On minimal vent settings, adjust prn based on ABG's.   - daily sbt trials once RV unloaded   - continue with diuresis with goal net negative     Mechanical Ventilation:  Mode: AC/ CMV (Assist Control/ Continuous Mandatory Ventilation)  RR (machine): 12  TV (machine): 350  FiO2: 30  PEEP: 5  ITime: 1  MAP: 9  PIP: 27    ====================CARDIOVASCULAR==================  HFpEF with severe TR  - Continue pressor support with IV Levophed infusion for goal SBP >90   - S/P RHC with [place] emmanuel catheter placed today, .   - Continue to monitor vbg sat and other perfusion indicies   - Continue diuresis with Bumex gtt at 3mg/hr with goal net negative   - Continue Sildenafil 10mg TID as per HF  - Continuous pulse oximetry monitoring  - HF following, recs appreciated     Chronic Afib  - On Eliquis at home. C/w Heparin gtt.    - rate controlled, continue to monitor     norepinephrine Infusion 0.05 MICROgram(s)/kG/Min (9.38 mL/Hr) IV Continuous <Continuous>  sildenafil (REVATIO) 10 milliGRAM(s) Oral every 8 hours  ===================HEMATOLOGIC/ONC ===================  Thrombocytopenia   - Platelets 103k  - Continue to monitor PLT levels.     VTE prophylaxis   - Continue IV Heparin infusion.     heparin  Infusion 1500 Unit(s)/Hr (15 mL/Hr) IV Continuous <Continuous>  ===================== RENAL =========================  NIC with creatinine of 4.49. Creatinine last 0.97 in 2021 here.   - Continue to monitor I/Os, BUN/Creatinine, and urine output.   - Goal net negative fluid balance. Replete lytes PRN. Keep K> 4 and Mg >2.   - Diuresis with IV Bumex gtt with good urinary response   - avoid nephrotoxic agents  - Nephro following. No urgent needs for HD at this time     buMETAnide Infusion 3 mG/Hr (15 mL/Hr) IV Continuous <Continuous>  ==================== GASTROINTESTINAL===================  Tolerating TF, Jevity 1.2 @ goal 25cc/hr.     sodium chloride 0.9%. 1000 milliLiter(s) (10 mL/Hr) IV Continuous <Continuous>  =======================    ENDOCRINE  =====================  Bgl controlled  - monitor glucose for need to initiate sliding scale.   - F/U A1c level    Hx of HLD  - C/w Lipitor 40mg QHS  - Monitor lipid levels.     atorvastatin 40 milliGRAM(s) Oral at bedtime  ========================INFECTIOUS DISEASE================   Afebrile, WBC within normal limits.   - Continue to monitor temperature and WBC   - Patient hypothermic overnight- BCX x2 5/26: NGTD, UCx : no growth. S/P MRSA swab : neg.   - C/w empiric abx with cefepime and Vanco by level.      cefepime   IVPB 1000 milliGRAM(s) IV Intermittent every 12 hours      Patient requires continuous monitoring with bedside rhythm monitoring, pulse ox monitoring, and intermittent blood gas analysis. Care plan discussed with ICU care team. Patient remained critical and at risk for life threatening decompensation.  Patient seen, examined and plan discussed with CCU team during rounds.     I have personally provided 35 minutes of critical care time excluding time spent on separate procedures.    By signing my name below, I, Fidelia Leo, attest that this documentation has been prepared under the direction and in the presence of ALLISON Santos.  Electronically signed: Olivia Luna, 21 @ 21:26    I, ALLISON Santos, personally performed the services described in this documentation. all medical record entries made by the scribe were at my direction and in my presence. I have reviewed the chart and agree that the record reflects my personal performance and is accurate and complete  Electronically signed: ALLISON Santos.       SANJIV ROBERTS  MRN-[id number]  Patient is a 75y old  Female who presents with a chief complaint of Transfer from [facility] with pericardial effusion, NIC and hypotension (27 May 2021 16:49)    HPI:  75y.o female with PMHx of CHFpEF, Severe TR with Right CHF and severe pHTN, COPD on home 02, HTN, HLD, Chronic Afib (on Eliquis), OA, CKD3, DM, right eye cataract who was brought in by EMS to [facility] on 21 for AMS. pt A&O x1, per daughter patient with generalized weakness and lethargy since since . Pt had recent in 2021 with CP and NIC. Per chart on arrival to ED pt was hypotensive, obtunded. Chest CT with large pericardial effusion (increased since 2021)- per thoracic surgery consult no indication for pericardial window, pHTN, cirrhotic liver with small ascites, ECHO EF 55% dilated RV with decreased function, severe TN, pHTN, NIC (per renal consult no acute indication for HD), venous duplex NG for DVTs, Head CT revealed chronic involutional and white matter changes, no acute intracranial process, NIC(SCr peaked at 4.6) with  hyperkalemia (5.8) requiring lokelma and insulin.  Of note daughter states over past month patient more and more confused ? dementia and pt is legally blind. Daughter states pt HAS NOT RECEIVED COVID VACCINE as she has been sick these past few months.      Patient transferred to [facility] with concern of pericardial effusion, hypotension and NIC. On arrival patient SBP 80's, A&O to first name but very lethargic.  (26 May 2021 10:16)      Hospital Course:   Patient transferred to [facility] with concern of pericardial effusion, hypotension and NIC.    intubated for hypercarbic respiratory distressed. Transferred from CTU to CICU as no need for pericardiocentesis. Briefly on pressors prior to intubation and since weaned off. RIJ cordis placed- unable to float swan- coiling in RV. RV pressures 50s /10s. Start sildenafil 20 tid. start bumex gtt    S/P RHC with [place] emmanuel catheter placed.    24 HOUR EVENTS:  S/P RHC with [place] emmanuel catheter placed today, .     REVIEW OF SYSTEMS:   Unable to obtain, pt is intubated and sedated.     ICU Vital Signs Last 24 Hrs  T(C): 36 (27 May 2021 16:00), Max: 36.2 (26 May 2021 23:00)  T(F): 96.8 (27 May 2021 16:00), Max: 97.2 (26 May 2021 23:00)  HR: 98 (27 May 2021 21:15) (66 - 98)  BP: --  BP(mean): --  ABP: 98/48 (27 May 2021 21:15) (70/38 - 128/66)  ABP(mean): 64 (27 May 2021 21:15) (50 - 90)  RR: 12 (27 May 2021 21:15) (11 - 13)  SpO2: 100% (27 May 2021 21:15) (88% - 100%)    Mode: AC/ CMV (Assist Control/ Continuous Mandatory Ventilation), RR (machine): 12, TV (machine): 350, FiO2: 30, PEEP: 5, ITime: 1  CVP(mm Hg): 12 (21 @ 21:15) (6 - 316)    I&O's Summary    26 May 2021 07:01  -  27 May 2021 07:00  --------------------------------------------------------  IN: 1940.1 mL / OUT: 2235 mL / NET: -294.9 mL    27 May 2021 07:01  -  27 May 2021 21:26  --------------------------------------------------------  IN: 1189.6 mL / OUT: 2000 mL / NET: -810.4 mL    PHYSICAL EXAM:   General: Intubated   Eyes: EOMI, PERRLA, conjunctiva and sclera clear  Chest/Lung: + ETT midline. CTAB, no wheezes, rales, or rhonchi  Heart: Regular rate, regular rhythm. Normal S1/S2. No murmurs, rubs, or gallops.  Abdomen: Soft, nontender, nondistended. Normal bowel sounds.  Extremites: 2+ peripheral pulses B/L. No clubbing, cyanosis, or edema.  Neurology: Sedated, does not follow commands.   Skin: No rashes or lesions    ============================I/O===========================   I&O's Detail    26 May 2021 07:  -  27 May 2021 07:00  --------------------------------------------------------  IN:    Bumetanide: 170 mL    IV PiggyBack: 500 mL    Norepinephrine: 56.9 mL    Plasma: 300 mL    Propofol: 733.2 mL    sodium chloride 0.9%: 170 mL    Vasopressin: 10 mL  Total IN: 1940.1 mL    OUT:    Indwelling Catheter - Urethral (mL): 2235 mL    Phenylephrine: 0 mL  Total OUT: 2235 mL    Total NET: -294.9 mL      27 May 2021 07:  -  27 May 2021 21:26  --------------------------------------------------------  IN:    Bumetanide: 90 mL    Bumetanide: 75 mL    Enteral Tube Flush: 120 mL    Heparin: 60 mL    IV PiggyBack: 50 mL    Jevity 1.2: 50 mL    Norepinephrine: 213.6 mL    Propofol: 391 mL    sodium chloride 0.9%: 140 mL  Total IN: 1189.6 mL    OUT:    Indwelling Catheter - Urethral (mL): 2000 mL  Total OUT: 2000 mL    Total NET: -810.4 mL        ============================ LABS =========================                        10.8   4.42  )-----------( 103      ( 27 May 2021 00:56 )             32.8         141  |  99  |  67<H>  ----------------------------<  104<H>  4.4   |  28  |  4.49<H>    Ca    9.3      27 May 2021 09:23  Phos  4.1       Mg     2.5         TPro  6.2  /  Alb  2.8<L>  /  TBili  2.1<H>  /  DBili  x   /  AST  50<H>  /  ALT  26  /  AlkPhos  86      LIVER FUNCTIONS - ( 27 May 2021 09:23 )  Alb: 2.8 g/dL / Pro: 6.2 g/dL / ALK PHOS: 86 U/L / ALT: 26 U/L / AST: 50 U/L / GGT: x           ABG - ( 27 May 2021 18:18 )  pH, Arterial: 7.45  pH, Blood: x     /  pCO2: 50    /  pO2: 147   / HCO3: 34    / Base Excess: 9.2   /  SaO2: 99                Urinalysis Basic - ( 26 May 2021 11:07 )    Color: Dark Orange / Appearance: Slightly Turbid / S.021 / pH: x  Gluc: x / Ketone: Trace  / Bili: Moderate / Urobili: Negative   Blood: x / Protein: 300 mg/dL / Nitrite: Negative   Leuk Esterase: Large / RBC: >50 /hpf / WBC 10 /HPF   Sq Epi: x / Non Sq Epi: 0 /hpf / Bacteria: Many      ======================Micro/Rad/Cardio=================  Telemetry: Reviewed   EKG: Reviewed  CXR: Reviewed  Echo: Reviewed  Cath: Reviewed  ======================================================  PAST MEDICAL & SURGICAL HISTORY:  Afib    COPD (chronic obstructive pulmonary disease)    CHF (congestive heart failure)    Dementia      ====================ASSESSMENT ==============   Txfered to [facility] with concern of pericardial effusion, hypotension and NIC.  Atrial Fibrillation  Acute Kidney Injury  Thrombocytopenia   Severe pulmonary hypertension     Plan:  ====================== NEUROLOGY=====================  Sedated for vent synchrony   -  Continue with IV Propofol   - Daily sedation vacation and SBT once RV unloaded     propofol Infusion 30 MICROgram(s)/kG/Min (18 mL/Hr) IV Continuous <Continuous>  ==================== RESPIRATORY======================  Acute Hypercapnic Respiratory Failure req Intubation   - On full vent support, requiring close monitoring of respiratory rate, breathing pattern, pulse oximetry monitoring, and intermittent blood gas analysis. On minimal vent settings, adjust prn based on ABG's.   - daily sbt trials once RV unloaded   - continue with diuresis with goal net negative     Severe pulmonary HTN, likely group 3 from COPD   - sildenafil 10 q8h started    - monitor PA pressures and SvO2 as below     Mechanical Ventilation:  Mode: AC/ CMV (Assist Control/ Continuous Mandatory Ventilation)  RR (machine): 12  TV (machine): 350  FiO2: 30  PEEP: 5  ITime: 1  MAP: 9  PIP: 27    ====================CARDIOVASCULAR==================  HFpEF with severe TR and severely decr RVSF radha 2/2 pulm HTN   - S/P RHC with [place] emmanuel catheter placed today, .   - Continue to monitor ScvO2, lact, PA pressures  - Continue diuresis with Bumex gtt at 3mg/hr + spot dosing of metolazone, with goal net negative 1-2L  - Continue Sildenafil 10mg TID, if no efficacy w/ sildenafil based on perfusion indices and RV off-loading would potentially start inotrope (Milrinone may not be best for level of renal dysfunction)    Hypotension, sedation related +/- distributive-like shock/vasoplegia   - Levophed, wean as able     Chronic Afib  - On Eliquis at home. C/w Heparin gtt  - rate controlled, continue to monitor     norepinephrine Infusion 0.05 MICROgram(s)/kG/Min (9.38 mL/Hr) IV Continuous <Continuous>  sildenafil (REVATIO) 10 milliGRAM(s) Oral every 8 hours  ===================HEMATOLOGIC/ONC ===================  Thrombocytopenia   - Platelets 103k  - Continue to monitor PLT levels.     VTE prophylaxis   - Continue IV Heparin infusion.     heparin  Infusion 1500 Unit(s)/Hr (15 mL/Hr) IV Continuous <Continuous>  ===================== RENAL =========================  Non-oliguric NIC with creatinine of 4.49. Baseline 0.97 in 2021 here.   - Continue to monitor I/Os, BUN/Creatinine, and urine output.   - Goal net negative fluid balance. Replete lytes PRN. Keep K> 4 and Mg >2.   - Diuresis with IV Bumex gtt as above   - avoid nephrotoxic agents  - Nephro following. No urgent needs for HD at this time     buMETAnide Infusion 3 mG/Hr (15 mL/Hr) IV Continuous <Continuous>  ==================== GASTROINTESTINAL===================  Tolerating TF, Jevity 1.2 @ goal 25cc/hr.   - BM x2 since admission on , would hold bowel regimen for the time being     sodium chloride 0.9%. 1000 milliLiter(s) (10 mL/Hr) IV Continuous <Continuous>  =======================    ENDOCRINE  =====================  Bgl controlled  - monitor glucose for need to initiate sliding scale.   - f/u A1c    Hx of HLD  - C/w Lipitor 40mg QHS  - Monitor lipid levels.     atorvastatin 40 milliGRAM(s) Oral at bedtime  ========================INFECTIOUS DISEASE================   PNA?  - Combicath  w/ moderate GN diplococci and rare GPC in pairs and chains   - Pt hypothermic and no leukocytosis   - BCX x2 : NGTD, UCx : no growth. S/P MRSA swab : neg.   - Would potentially consider switch to 3rd gen cephalosporin (Rocephin) in setting of GN diplococci (Neisseria)    cefepime   IVPB 1000 milliGRAM(s) IV Intermittent every 12 hours      Patient requires continuous monitoring with bedside rhythm monitoring, pulse ox monitoring, and intermittent blood gas analysis. Care plan discussed with ICU care team. Patient remained critical and at risk for life threatening decompensation.  Patient seen, examined and plan discussed with CCU team during rounds.     I have personally provided 35 minutes of critical care time excluding time spent on separate procedures.    By signing my name below, I, Fidelia Leo, attest that this documentation has been prepared under the direction and in the presence of ALLISON Santos.  Electronically signed: Olivia Luna, 21 @ 21:26    I, ALLISON Santos, personally performed the services described in this documentation. all medical record entries made by the scribe were at my direction and in my presence. I have reviewed the chart and agree that the record reflects my personal performance and is accurate and complete  Electronically signed: ALLISON Santos.

## 2021-05-27 NOTE — PROGRESS NOTE ADULT - SUBJECTIVE AND OBJECTIVE BOX
INTERVAL HPI/OVERNIGHT EVENTS: unable to float swan. Will attempt in cath lab. Restarted levo at 4a 2/2 hypotension. hypothermic to 34C -> cefepime + vanc started, BCxs/UCxs/MRSA swab/SCx sent.    SUBJECTIVE: Patient seen and examined at bedside. Intubated.    OBJECTIVE:    VITAL SIGNS:  ICU Vital Signs Last 24 Hrs  T(C): 35.4 (27 May 2021 07:00), Max: 36.6 (26 May 2021 19:00)  T(F): 95.7 (27 May 2021 07:00), Max: 97.9 (26 May 2021 19:00)  HR: 84 (27 May 2021 06:45) (65 - 89)  BP: 113/68 (26 May 2021 17:40) (113/68 - 123/65)  BP(mean): 94 (26 May 2021 17:40) (89 - 94)  ABP: 98/52 (27 May 2021 06:45) (70/38 - 162/90)  ABP(mean): 68 (27 May 2021 06:45) (20 - 118)  RR: 12 (27 May 2021 06:45) (11 - 28)  SpO2: 98% (27 May 2021 06:45) (89% - 100%)    Mode: AC/ CMV (Assist Control/ Continuous Mandatory Ventilation), RR (machine): 12, TV (machine): 350, FiO2: 30, PEEP: 5, ITime: 1, MAP: 9, PIP: 29    05-26 @ 07:01  -   @ 07:00  --------------------------------------------------------  IN: 1937.8 mL / OUT: 2235 mL / NET: -297.2 mL      CAPILLARY BLOOD GLUCOSE          PHYSICAL EXAM:    GENERAL: NAD  HEENT: NC/AT, PERRL, EOMI, no conjunctival pallor or scleral icterus, moist mucous membranes  NECK: supple  CV: +S1/S2, RRR  RESPIRATORY: CTA b/l, no w/r/r  ABDOMEN: soft, NTND  MSK: Able to move all 4 extremities  NEURO: AAOx4 (person, place, time, event). Able to follow commands.  SKIN: WWP, 2+ peripheral pulses, no LE edema    MEDICATIONS:  MEDICATIONS  (STANDING):  atorvastatin 40 milliGRAM(s) Oral at bedtime  buMETAnide Infusion 2 mG/Hr (10 mL/Hr) IV Continuous <Continuous>  cefepime   IVPB      cefepime   IVPB 1000 milliGRAM(s) IV Intermittent every 12 hours  chlorhexidine 0.12% Liquid 15 milliLiter(s) Oral Mucosa every 12 hours  chlorhexidine 2% Cloths 1 Application(s) Topical <User Schedule>  norepinephrine Infusion 0.05 MICROgram(s)/kG/Min (9.38 mL/Hr) IV Continuous <Continuous>  propofol Infusion 30 MICROgram(s)/kG/Min (18 mL/Hr) IV Continuous <Continuous>  sodium chloride 0.9%. 1000 milliLiter(s) (10 mL/Hr) IV Continuous <Continuous>    MEDICATIONS  (PRN):  sodium chloride 0.9% lock flush 10 milliLiter(s) IV Push every 1 hour PRN Pre/post blood products, medications, blood draw, and to maintain line patency      ALLERGIES:  Allergies    No Known Allergies    Intolerances        LABS:                        10.8   4.42  )-----------( 103      ( 27 May 2021 00:56 )             32.8         144  |  100  |  70<H>  ----------------------------<  93  3.8   |  26  |  4.73<H>    Ca    8.8      27 May 2021 00:56  Phos  3.4       Mg     2.5         TPro  5.8<L>  /  Alb  2.6<L>  /  TBili  2.1<H>  /  DBili  x   /  AST  47<H>  /  ALT  23  /  AlkPhos  80      PT/INR - ( 26 May 2021 15:00 )   PT: 17.5 sec;   INR: 1.49 ratio         PTT - ( 26 May 2021 15:00 )  PTT:35.6 sec  Urinalysis Basic - ( 26 May 2021 11:07 )    Color: Dark Orange / Appearance: Slightly Turbid / S.021 / pH: x  Gluc: x / Ketone: Trace  / Bili: Moderate / Urobili: Negative   Blood: x / Protein: 300 mg/dL / Nitrite: Negative   Leuk Esterase: Large / RBC: >50 /hpf / WBC 10 /HPF   Sq Epi: x / Non Sq Epi: 0 /hpf / Bacteria: Many          RADIOLOGY & ADDITIONAL TESTS: Reviewed.

## 2021-05-27 NOTE — PROGRESS NOTE ADULT - ASSESSMENT
Pt is a 75 year old woman with a PMHx of HFpEF, severe pHTN, severe TR with right-sided HF, COPD on 3L home O2, HTN, HLD, chronic afib (on Eliquis), OA, CKD3, DM, right eye cataract who was brought in by EMS to [facility] on 5/23/21 for AMS transferred here for hypotension w/o concern for pericardial tamponade. On arrival here she was hypotensive requiring jorge a/levo/vaso. She was intubated for hypercarbic respiratory failure, with subsequent improvement in BP, now off vasopressors. She is currently volume overloaded in the setting of RV failure and pulmonary hypertension. She is responding to bumex gtt with 2.2L UOP. Despite normal CI her Cr remains elevated. Bedside hemodynamics this morning show CVP 10-12 with a central venous sat of 84% corresponding to a cardiac index of 5.7. RVSP 52 when swan was in RV but full set of hemodynamics has not yet been done.

## 2021-05-27 NOTE — PROGRESS NOTE ADULT - PROBLEM SELECTOR PLAN 1
- Continue to aggressively diurese with CVP goal < 10  - Recommend giving dose of metolazone to agument diuresis  - [place] to be placed in cath lab for ongoing hemodynamic monitoring  - Trend hemodynamics and perfusion labs  - Would not drain pericardial effusion as it is likely compensatory in setting of RV failure - Continue to aggressively diurese with CVP goal < 10  - Recommend giving dose of metolazone to augument diuresis  - [place] to be placed in cath lab for ongoing hemodynamic monitoring  - Trend hemodynamics and perfusion labs  - Would not drain pericardial effusion as it is likely compensatory in setting of RV failure

## 2021-05-27 NOTE — PROCEDURE NOTE - NSICDXPROCEDURE_GEN_ALL_CORE_FT
PROCEDURES:  Insertion, catheter, non-tunneled, age 5 years or older 26-May-2021 18:52:52  Eva Martel  Percutaneous catheterization of artery 27-May-2021 09:04:44  Eva Martel

## 2021-05-27 NOTE — CHART NOTE - NSCHARTNOTEFT_GEN_A_CORE
Padua Prediction Score for VTE Risk within 24hours of admission:    Active malignancy:                                                    [  ] YES +3, [ x ] NO   Previous VTE (Excluding Superficial Vein Thrombosis): [  ] YES +3, [ x ] NO  Reduced mobility:                                                     [x  ] YES +3, [  ] NO  Already known thrombophilic condition:                     [  ] YES +3, [  x] NO  Recent (</=1 month trauma and/or surgery):             [  ] YES +2, [x  ] NO  Elderly age (>/=70):                                                  [ x ] YES +1, [  ] NO  Heart and/or Respiratory Failure:                               [ x ] YES +1, [  ] NO   Acute MI and/or ischemic CVA:                                  [  ] YES +1, [x  ] NO   Acute infection and/or rheumatologic disorder:          [  ] YES +1, [  x] NO   BMI>/= 30:                                                              [  ] YES +1, [ x ] NO   Ongoing hormonal treatment:                                   [  ] YES +1, [x ] NO    Total Score: [5  ]  points    [  ] Padua Score <  3: Low Risk of VTE         - Chemical Thromboprophylaxis should be considered on case-by-case basis  [ x ] Padua Score >/= 4: High Risk of VTE         - Chemical Thromboprophylaxis is recommended for nonpregnant patients without contraindications (Major bleeding, thrombocytopenia) who are >/=  18 years of age                         VTE Prophylaxis Recommendations:  Mechanical Pneumatic Compression Devices                                [  ]  Yes,  [  ] No, Contraindicated    Chemical VTE Prophylaxis (Heparin/ Lovenox/ Fondaparinux)        [x   ] Yes,  [  ] No              [ ] Contraindicated, because _____              [ ] Already receiving Systemic Anticoagulation

## 2021-05-27 NOTE — PROCEDURE NOTE - NSPOSTCAREGUIDE_GEN_A_CORE
Care for catheter as per unit/ICU protocols
Verbal/written post procedure instructions were given to patient/caregiver/Instructed patient/caregiver to follow-up with primary care physician/Instructed patient/caregiver regarding signs and symptoms of infection/Keep the cast/splint/dressing clean and dry/Care for catheter as per unit/ICU protocols
Care for catheter as per unit/ICU protocols
Verbal/written post procedure instructions were given to patient/caregiver/Instructed patient/caregiver to follow-up with primary care physician/Instructed patient/caregiver regarding signs and symptoms of infection/Keep the cast/splint/dressing clean and dry/Care for catheter as per unit/ICU protocols

## 2021-05-28 NOTE — CONSULT NOTE ADULT - SUBJECTIVE AND OBJECTIVE BOX
VASCULAR SURGERY CONSULT NOTE  --------------------------------------------------------------------------------------------  HPI:  75y.o female with PMHx of CHFpEF, Severe TR with Right CHF and severe pHTN, COPD on home , HTN, HLD, Chronic Afib (on Eliquis), OA, CKD3, DM, right eye cataract who was brought in by EMS to [facility] on 21 for AMS. pt A&O x1, per daughter patient with generalized weakness and lethargy since since . Pt had recent in 2021 with CP and NIC. Per chart on arrival to ED pt was hypotensive, obtunded. Chest CT with large pericardial effusion (increased since 2021)- per thoracic surgery consult no indication for pericardial window, pHTN, cirrhotic liver with small ascites, ECHO EF 55% dilated RV with decreased function, severe TN, pHTN, NIC (per renal consult no acute indication for HD), venous duplex NG for DVTs, Head CT revealed chronic involutional and white matter changes, no acute intracranial process, NIC(SCr peaked at 4.6) with  hyperkalemia (5.8) requiring lokelma and insulin.  Of note daughter states over past month patient more and more confused ? dementia and pt is legally blind. Daughter states pt HAS NOT RECEIVED COVID VACCINE as she has been sick these past few months.      Patient transferred to [facility] with concern of pericardial effusion, hypotension and NIC. On arrival patient SBP 80's, A&O to first name but very lethargic.     Patient had central line and Marina in her right groin, she was on hep gtt and was supratherapeutic, he had a expanding hematoma in the right groin and vascualr Surgery was called for possible extravasation.     Patient denies fevers/chills, denies lightheadedness/dizziness, denies SOB/chest pain, denies nausea/vomiting, denies constipation/diarrhea.      ROS: 10-system review is otherwise negative except HPI above.      PAST MEDICAL & SURGICAL HISTORY:  Afib    COPD (chronic obstructive pulmonary disease)    CHF (congestive heart failure)    Dementia      FAMILY HISTORY:  unable to assess    SOCIAL HISTORY:   unable to assess    ALLERGIES: No Known Allergies    HOME MEDICATIONS:    CURRENT MEDICATIONS  MEDICATIONS (STANDING): atorvastatin 40 milliGRAM(s) Oral at bedtime  cefepime   IVPB      cefepime   IVPB 1000 milliGRAM(s) IV Intermittent every 12 hours  propofol Infusion 30 MICROgram(s)/kG/Min IV Continuous <Continuous>  sodium chloride 0.9%. 1000 milliLiter(s) IV Continuous <Continuous>  vasopressin Infusion 0.04 Unit(s)/Min IV Continuous <Continuous>    MEDICATIONS (PRN):  --------------------------------------------------------------------------------------------    Vitals:   T(C): 34.7 (21 @ 14:00), Max: 37 (21 @ 23:00)  HR: 64 (21 @ 16:15) (64 - 158)  BP: 105/44 (21 @ 10:00) (105/44 - 105/44)  RR: 7 (21 @ 16:15) (5 - 40)  SpO2: 99% (21 @ 16:15) (88% - 100%)  CAPILLARY BLOOD GLUCOSE           @ 07:01  -   @ 07:00  --------------------------------------------------------  IN:    Bumetanide: 90 mL    Bumetanide: 202.5 mL    Enteral Tube Flush: 120 mL    Heparin: 155 mL    IV PiggyBack: 150 mL    Jevity 1.2: 150 mL    Norepinephrine: 586.6 mL    Propofol: 593 mL    sodium chloride 0.9%: 240 mL    Vasopressin: 10.8 mL  Total IN: 2297.9 mL    OUT:    Indwelling Catheter - Urethral (mL): 3585 mL  Total OUT: 3585 mL    Total NET: -1287.1 mL       @ 07:01  -   @ 16:34  --------------------------------------------------------  IN:    IV PiggyBack: 100 mL    Norepinephrine: 210.8 mL    PRBCs (Packed Red Blood Cells): 250 mL    Propofol: 175.7 mL    Vasopressin: 21.6 mL  Total IN: 758.1 mL    OUT:    Indwelling Catheter - Urethral (mL): 1400 mL    Jevity 1.2: 0 mL    sodium chloride 0.9%: 0 mL  Total OUT: 1400 mL    Total NET: -641.9 mL        Height (cm): 165.1 ( @ 00:00)  Weight (kg): 112.4 ( @ 00:00)  BMI (kg/m2): 41.2 ( @ 00:00)  BSA (m2): 2.17 ( @ 00:00)    PHYSICAL EXAM:   General: intubated and sedated  Resp: ibtubated  Vascular: RLE DP/PT signals, LLE DP/PT signals. Right groin soft, increase size compared to left.   Musculoskeletal: All 4 extremities moving spontaneously, no limitations.    --------------------------------------------------------------------------------------------    LABS  CBC ( @ 14:59)                              11.7                           8.96    )----------------(  93<L>      82.9<H>% Neutrophils, 6.8<L>% Lymphocytes, ANC: 7.43<H>                              33.6<L>  CBC (05-28 @ 11:56)                              11.5                           8.33    )----------------(  94<L>      --    % Neutrophils, --    % Lymphocytes, ANC: --                                  34.3<L>    BMP ()             144     |  101     |  56<H> 		Ca++ --      Ca 10.4               ---------------------------------( 169<H>		Mg 2.0                3.4<L>  |  24      |  3.21<H>			Ph 5.9<H>  BMP (:)             147<H>  |  101     |  49<H> 		Ca++ --      Ca 7.3<L>             ---------------------------------( 236<H>		Mg 1.8                3.7     |  18<L>   |  2.66<H>			Ph 6.1<H>    LFTs (:)      TPro 5.9<L> / Alb 3.3 / TBili 2.2<H> / DBili -- / AST 42<H> / ALT 25 / AlkPhos 82  LFTs (:57)      TPro 4.3<L> / Alb 2.3<L> / TBili 0.9 / DBili -- / AST 23 / ALT 14 / AlkPhos 42    Coags (:)  aPTT 40.4<H> / INR -- / PT --  Coags (:57)  aPTT 28.0 / INR -- / PT --      ABG (:)     7.51<H> / 38 / 127<H> / 30<H> / 7.2<H> / 99<H>%     Lactate:    ABG ( 11:49)     7.44 / 45 / 189<H> / 30<H> / 5.1<H> / 100<H>%     Lactate:      VBG ( 06:27)     7.45 / 50 / 52<H> / 35<H> / 9.6<H> / 84%     Lactate: 1.6    --------------------------------------------------------------------------------------------    MICROBIOLOGY  Urinalysis ( @ 11:07):     Color: Dark Orange<!> / Appearance: Slightly Turbid<!> / S.021 / pH: 6.0 / Gluc: Negative / Ketones: Trace / Bili: Moderate<!> / Urobili: Negative / Protein :300 mg/dL<!> / Nitrites: Negative / Leuk.Est: Large<!> / RBC: >50<H> / WBC: 10<H> / Sq Epi:  / Non Sq Epi: 0 / Bacteria Many<!>       -> Bronch Wash Combicathtrap Culture ( @ 08:51)       Moderate polymorphonuclear leukocytes per low power field  Rare Squamous epithelial cells per low power field  Moderate Gram Negative Diplococci per oil power field  Rare Gram Positive Cocci in Pairs and Chains per oil power field    NG    Normal Respiratory Karen present    -> .Urine Catheterized Culture ( @ 17:28)     NG    NG    <10,000 CFU/mL Normal Urogenital Karen    -> .Blood Blood Culture ( @ 12:41)     NG    NG    No growth to date.      --------------------------------------------------------------------------------------------    IMAGING  < from: US Duplex Arterial Lower Ext Ltd, Right (21 @ 14:43) >  Impression: Limited study at the bedside. Right groin hematoma. No evidence of pseudoaneurysm.    < end of copied text >

## 2021-05-28 NOTE — CONSULT NOTE ADULT - ATTENDING COMMENTS
Patient seen/examined. Consulted for right groin hematoma following cardiac intervention in thrombocytopenic patient supra-therapeutic on heparin gtt. Patient's coagulopathy corrected. No evidence of active bleeding. Duplex shows hematoma without evidence of bleeding or pseudoaneurysm. If concern for ongoing bleeding please obtain CTA abdomen/pelvis.

## 2021-05-28 NOTE — DIETITIAN INITIAL EVALUATION ADULT. - OTHER INFO
Pt currently prescribed propofol; infusing at 16.8ml/hr. If to continue, will provide ~444kcal daily. Additionally, pt receiving IVF NaCl 0.9% at 10ml/hr.     Dosing wt (5/27): 247.3 lbs.   Per [facility] HIE: (1/30/21): 238 lb.   Per previous RD note 2/21, UBW ~220 lb. Pt noted with hx of wt fluctuations, likely r/t CHF; hx of diuretic therapy/fluid shifts    Edema: 1+ generalized  Skin: stage II sacrum  GI: Last BM (5/27): x 2. Off apparent bowel regimen at this time.

## 2021-05-28 NOTE — DIETITIAN INITIAL EVALUATION ADULT. - PERTINENT MEDS FT
Cefepime, Maxipime, Peridex, NaCl 0.9%, Calcium Gluconate, Protamine, Vasopressin, Lipitor, Revatio, Levophed, Propofol

## 2021-05-28 NOTE — PROGRESS NOTE ADULT - SUBJECTIVE AND OBJECTIVE BOX
SANJIV ROBERTS  MRN-[id number]  Patient is a 75y old  Female who presents with a chief complaint of Transfer from [facility] with pericardial effusion, NIC and hypotension (28 May 2021 17:18)    HPI:  75y.o female with PMHx of CHFpEF, Severe TR with Right CHF and severe pHTN, COPD on home 02, HTN, HLD, Chronic Afib (on Eliquis), OA, CKD3, DM, right eye cataract who was brought in by EMS to [facility] on 5/23/21 for AMS. pt A&O x1, per daughter patient with generalized weakness and lethargy since since 5/22. Pt had recent in Jan 2021 with CP and NIC. Per chart on arrival to ED pt was hypotensive, obtunded. Chest CT with large pericardial effusion (increased since Jan 2021)- per thoracic surgery consult no indication for pericardial window, pHTN, cirrhotic liver with small ascites, ECHO EF 55% dilated RV with decreased function, severe TN, pHTN, NIC (per renal consult no acute indication for HD), venous duplex NG for DVTs, Head CT revealed chronic involutional and white matter changes, no acute intracranial process, NIC(SCr peaked at 4.6) with  hyperkalemia (5.8) requiring lokelma and insulin.  Of note daughter states over past month patient more and more confused ? dementia and pt is legally blind. Daughter states pt HAS NOT RECEIVED COVID VACCINE as she has been sick these past few months.     5/26 Patient transferred to [facility] with concern of pericardial effusion, hypotension and NIC. On arrival patient SBP 80's, A&O to first name but very lethargic.  (26 May 2021 10:16)      Hospital Course:  5/26 Patient transferred to [facility] with concern of pericardial effusion, hypotension and NIC.   5/26 intubated for hypercarbic respiratory distressed. Transferred from CTU to CICU as no need for pericardiocentesis. Briefly on pressors prior to intubation and since weaned off. RIJ cordis placed- unable to float swan- coiling in RV. RV pressures 50s /10s. Start sildenafil 20 tid. start bumex gtt   5/27 S/P RHC with [place] emmanuel catheter placed.  5/28 Dc'ed Bumex, Sildenafil, and Levophed weaned off.     24 HOUR EVENTS:    REVIEW OF SYSTEMS:   Unable to obtain, pt is intubated and sedated.     ICU Vital Signs Last 24 Hrs  T(C): 34.7 (28 May 2021 14:00), Max: 37 (27 May 2021 23:00)  T(F): 94.5 (28 May 2021 14:00), Max: 98.6 (27 May 2021 23:00)  HR: 58 (28 May 2021 18:15) (58 - 158)  BP: 105/44 (28 May 2021 10:00) (105/44 - 105/44)  BP(mean): 61 (28 May 2021 10:15) (61 - 101)  ABP: 110/54 (28 May 2021 18:15) (58/36 - 158/84)  ABP(mean): 70 (28 May 2021 18:15) (42 - 112)  RR: 20 (28 May 2021 18:15) (4 - 40)  SpO2: 99% (28 May 2021 18:15) (88% - 100%)    Mode: AC/ CMV (Assist Control/ Continuous Mandatory Ventilation), RR (machine): 14, TV (machine): 350, FiO2: 30, PEEP: 5, ITime: 1  CVP(mm Hg): 15 (05-28-21 @ 18:15) (-10 - 30)  CO: --  CI: --  PA: --  PA(mean): --  PA(direct): --  PCWP: --  LA: --  RA: --  SVR: --  SVRI: --  PVR: --  PVRI: --  I&O's Summary    27 May 2021 07:01  -  28 May 2021 07:00  --------------------------------------------------------  IN: 2297.9 mL / OUT: 3585 mL / NET: -1287.1 mL    28 May 2021 07:01  -  28 May 2021 20:37  --------------------------------------------------------  IN: 848.9 mL / OUT: 1560 mL / NET: -711.1 mL        CAPILLARY BLOOD GLUCOSE    CAPILLARY BLOOD GLUCOSE          PHYSICAL EXAM:   General: Sedated and Intubated  Eyes: EOMI, conjunctiva and sclera clear  Chest/Lung: + ETT midline, CTAB, no wheezes, rales, or rhonchi  Heart: Regular rate, regular rhythm. Normal S1/S2. No murmurs, rubs, or gallops.  Abdomen: Soft, nontender, nondistended. Normal bowel sounds.  Extremites: 2+ peripheral pulses B/L. No clubbing, cyanosis, or edema.  Neurology: Sedated  Skin: No rashes or lesions  ============================I/O===========================   I&O's Detail    27 May 2021 07:01  -  28 May 2021 07:00  --------------------------------------------------------  IN:    Bumetanide: 90 mL    Bumetanide: 202.5 mL    Enteral Tube Flush: 120 mL    Heparin: 155 mL    IV PiggyBack: 150 mL    Jevity 1.2: 150 mL    Norepinephrine: 586.6 mL    Propofol: 593 mL    sodium chloride 0.9%: 240 mL    Vasopressin: 10.8 mL  Total IN: 2297.9 mL    OUT:    Indwelling Catheter - Urethral (mL): 3585 mL  Total OUT: 3585 mL    Total NET: -1287.1 mL      28 May 2021 07:01  -  28 May 2021 20:37  --------------------------------------------------------  IN:    IV PiggyBack: 150 mL    Norepinephrine: 210.8 mL    PRBCs (Packed Red Blood Cells): 250 mL    Propofol: 216.5 mL    Vasopressin: 21.6 mL  Total IN: 848.9 mL    OUT:    Indwelling Catheter - Urethral (mL): 1560 mL    Jevity 1.2: 0 mL    sodium chloride 0.9%: 0 mL  Total OUT: 1560 mL    Total NET: -711.1 mL        ============================ LABS =========================                        11.7   8.96  )-----------( 93       ( 28 May 2021 14:59 )             33.6     05-28    144  |  101  |  58<H>  ----------------------------<  104<H>  3.3<L>   |  27  |  3.42<H>    Ca    9.7      28 May 2021 17:32  Phos  5.1     05-28  Mg     2.1     05-28    TPro  5.6<L>  /  Alb  3.0<L>  /  TBili  3.4<H>  /  DBili  x   /  AST  44<H>  /  ALT  27  /  AlkPhos  83  05-28    LIVER FUNCTIONS - ( 28 May 2021 17:32 )  Alb: 3.0 g/dL / Pro: 5.6 g/dL / ALK PHOS: 83 U/L / ALT: 27 U/L / AST: 44 U/L / GGT: x           PTT - ( 28 May 2021 11:56 )  PTT:40.4 sec  ABG - ( 28 May 2021 17:25 )  pH, Arterial: 7.49  pH, Blood: x     /  pCO2: 44    /  pO2: 130   / HCO3: 33    / Base Excess: 8.7   /  SaO2: 99                ======================Micro/Rad/Cardio=================  Telemetry: Reviewed   EKG: Reviewed  CXR: Reviewed  Culture: Reviewed   Echo: Reviewed  Cath: Reviewed  ======================================================  PAST MEDICAL & SURGICAL HISTORY:  Afib    COPD (chronic obstructive pulmonary disease)    CHF (congestive heart failure)    Dementia      ====================ASSESSMENT ==============  5/26 Txfered to [facility] with concern of pericardial effusion, hypotension and NIC.  Atrial Fibrillation  Acute Kidney Injury  Thrombocytopenia   Severe pulmonary hypertension     Plan:  ====================== NEUROLOGY=====================  Sedated for vent synchrony   -  Continue with IV Propofol   - Daily sedation vacation and SBT once RV unloaded     propofol Infusion 30 MICROgram(s)/kG/Min (18 mL/Hr) IV Continuous <Continuous>    ==================== RESPIRATORY======================  Acute Hypercapnic Respiratory Failure req Intubation   - On full vent support, requiring close monitoring of respiratory rate, breathing pattern, pulse oximetry monitoring, and intermittent blood gas analysis. On minimal vent settings, adjust prn based on ABG's.   - daily sbt trials once RV unloaded     Severe pulmonary HTN, likely group 3 from COPD   - monitor PA pressures and SvO2 as below     Mechanical Ventilation:  Mode: AC/ CMV (Assist Control/ Continuous Mandatory Ventilation)  RR (machine): 14  TV (machine): 350  FiO2: 30  PEEP: 5  ITime: 1  MAP: 8  PIP: 27      ====================CARDIOVASCULAR==================  HFpEF with severe TR and severely decr RVSF radha 2/2 pulm HTN   - S/P RHC with [place] emmanuel catheter placed today, 5/27.   - Continue to monitor ScvO2, lact, PA pressures  - Dc'ed Bumex gtt at 3mg/hr and Dc'ed spot dosing of metolazone.  - Dc'ed Sildenafil 10mg TID, if no efficacy w/ sildenafil based on perfusion indices and RV off-loading would potentially start inotrope (Milrinone may not be best for level of renal dysfunction)    Hypotension, sedation related +/- distributive-like shock/vasoplegia   - Levophed, weaned off    Chronic Afib  - rate controlled, continue to monitor       ===================HEMATOLOGIC/ONC ===================  H/H, stable   - Continue to monitor CBC    Thrombocytopenia   - Platelets 93k  - Continue to monitor PLT levels.       ===================== RENAL =========================  Non-oliguric NIC with creatinine of 4.49. Baseline 0.97 in 02/2021 here.   - Continue to monitor I/Os, BUN/Creatinine, and urine output.   - Goal net negative fluid balance. Replete lytes PRN. Keep K> 4 and Mg >2.   - Dc'ed Diuresis with IV Bumex gtt   - avoid nephrotoxic agents  - Nephro following. No urgent needs for HD at this time     ==================== GASTROINTESTINAL===================  Tolerating TF, Nepro with Carb steady @ goal 40cc/hr.   - BM x2 since admission on 5/26, would hold bowel regimen for the time being     potassium chloride  20 mEq/100 mL IVPB 20 milliEquivalent(s) IV Intermittent every 2 hours  sodium chloride 0.9%. 1000 milliLiter(s) (10 mL/Hr) IV Continuous <Continuous>    =======================    ENDOCRINE  =====================  Bgl controlled  - monitor glucose for need to initiate sliding scale.   - f/u A1c    Hx of HLD  - C/w Lipitor 40mg QHS  - Monitor lipid levels.     atorvastatin 40 milliGRAM(s) Oral at bedtime  ========================INFECTIOUS DISEASE================  PNA?  - Combicath 5/26 w/ moderate GN diplococci and rare GPC in pairs and chains   - Pt hypothermic and no leukocytosis   - cont. IV Cefepime   - BCX x2 5/26: NGTD, UCx 5/26: no growth. S/P MRSA swab 5/27: neg.   - Would potentially consider switch to 3rd gen cephalosporin (Rocephin) in setting of GN diplococci (Neisseria)    cefepime   IVPB      cefepime   IVPB 1000 milliGRAM(s) IV Intermittent every 12 hours      Patient requires continuous monitoring with bedside rhythm monitoring, pulse ox monitoring, and intermittent blood gas analysis. Care plan discussed with ICU care team. Patient remained critical and at risk for life threatening decompensation.  Patient seen, examined and plan discussed with CCU team during rounds.     I have personally provided 35 minutes of critical care time excluding time spent on separate procedures.    By signing my name below, I, Mirtha Brody, attest that this documentation has been prepared under the direction and in the presence of ALLISON Santos  Electronically signed: Cj Elam, 05-28-21 @ 20:37    I, ALLISON Santos, personally performed the services described in this documentation. all medical record entries made by the cj were at my direction and in my presence. I have reviewed the chart and agree that the record reflects my personal performance and is accurate and complete  Electronically signed: ALLISON Santos       SANJIV ROBERTS  MRN-[id number]  Patient is a 75y old  Female who presents with a chief complaint of Transfer from [facility] with pericardial effusion, NIC and hypotension (28 May 2021 17:18)    HPI:  75y.o female with PMHx of CHFpEF, Severe TR with Right CHF and severe pHTN, COPD on home 02, HTN, HLD, Chronic Afib (on Eliquis), OA, CKD3, DM, right eye cataract who was brought in by EMS to [facility] on 5/23/21 for AMS. pt A&O x1, per daughter patient with generalized weakness and lethargy since since 5/22. Pt had recent in Jan 2021 with CP and NIC. Per chart on arrival to ED pt was hypotensive, obtunded. Chest CT with large pericardial effusion (increased since Jan 2021)- per thoracic surgery consult no indication for pericardial window, pHTN, cirrhotic liver with small ascites, ECHO EF 55% dilated RV with decreased function, severe TN, pHTN, NIC (per renal consult no acute indication for HD), venous duplex NG for DVTs, Head CT revealed chronic involutional and white matter changes, no acute intracranial process, NIC(SCr peaked at 4.6) with  hyperkalemia (5.8) requiring lokelma and insulin.  Of note daughter states over past month patient more and more confused ? dementia and pt is legally blind. Daughter states pt HAS NOT RECEIVED COVID VACCINE as she has been sick these past few months.     5/26 Patient transferred to [facility] with concern of pericardial effusion, hypotension and NIC. On arrival patient SBP 80's, A&O to first name but very lethargic.  (26 May 2021 10:16)      Hospital Course:  5/26 Patient transferred to [facility] with concern of pericardial effusion, hypotension and NIC.   5/26 intubated for hypercarbic respiratory distressed. Transferred from CTU to CICU as no need for pericardiocentesis. Briefly on pressors prior to intubation and since weaned off. RIJ cordis placed- unable to float swan- coiling in RV. RV pressures 50s /10s. Start sildenafil 20 tid. start bumex gtt   5/27 S/P RHC with [place] emmanuel catheter placed.  5/28 MTP called for R Femoral A bleed from previous A line site s/p 11U PRBC, 8 plasma, 2 plts.     24 HOUR EVENTS:    REVIEW OF SYSTEMS:   Unable to obtain, pt is intubated and sedated.     ICU Vital Signs Last 24 Hrs  T(C): 34.7 (28 May 2021 14:00), Max: 37 (27 May 2021 23:00)  T(F): 94.5 (28 May 2021 14:00), Max: 98.6 (27 May 2021 23:00)  HR: 58 (28 May 2021 18:15) (58 - 158)  BP: 105/44 (28 May 2021 10:00) (105/44 - 105/44)  BP(mean): 61 (28 May 2021 10:15) (61 - 101)  ABP: 110/54 (28 May 2021 18:15) (58/36 - 158/84)  ABP(mean): 70 (28 May 2021 18:15) (42 - 112)  RR: 20 (28 May 2021 18:15) (4 - 40)  SpO2: 99% (28 May 2021 18:15) (88% - 100%)    Mode: AC/ CMV (Assist Control/ Continuous Mandatory Ventilation), RR (machine): 14, TV (machine): 350, FiO2: 30, PEEP: 5, ITime: 1  CVP(mm Hg): 15 (05-28-21 @ 18:15) (-10 - 30)  CO: --  CI: --  PA: --  PA(mean): --  PA(direct): --  PCWP: --  LA: --  RA: --  SVR: --  SVRI: --  PVR: --  PVRI: --  I&O's Summary    27 May 2021 07:01  -  28 May 2021 07:00  --------------------------------------------------------  IN: 2297.9 mL / OUT: 3585 mL / NET: -1287.1 mL    28 May 2021 07:01  -  28 May 2021 20:37  --------------------------------------------------------  IN: 848.9 mL / OUT: 1560 mL / NET: -711.1 mL        CAPILLARY BLOOD GLUCOSE    CAPILLARY BLOOD GLUCOSE          PHYSICAL EXAM:   General: Sedated and Intubated  Chest/Lung: + ETT midline, CTAB, no wheezes, rales, or rhonchi  Heart: Regular rate, regular rhythm. Normal S1/S2. No murmurs, rubs, or gallops.  Abdomen: Soft, nontender, nondistended. Normal bowel sounds.  Extremites: R inguinal area with ecchymosis and skin breakdown, no active bleeding, induration or worsening ecchymosis. Peripheral pitting edema 2+. b/l LE/UE with 1+ palpable pulses  Skin: No rashes or lesions. see extremities   ============================I/O===========================   I&O's Detail    27 May 2021 07:01  -  28 May 2021 07:00  --------------------------------------------------------  IN:    Bumetanide: 90 mL    Bumetanide: 202.5 mL    Enteral Tube Flush: 120 mL    Heparin: 155 mL    IV PiggyBack: 150 mL    Jevity 1.2: 150 mL    Norepinephrine: 586.6 mL    Propofol: 593 mL    sodium chloride 0.9%: 240 mL    Vasopressin: 10.8 mL  Total IN: 2297.9 mL    OUT:    Indwelling Catheter - Urethral (mL): 3585 mL  Total OUT: 3585 mL    Total NET: -1287.1 mL      28 May 2021 07:01  -  28 May 2021 20:37  --------------------------------------------------------  IN:    IV PiggyBack: 150 mL    Norepinephrine: 210.8 mL    PRBCs (Packed Red Blood Cells): 250 mL    Propofol: 216.5 mL    Vasopressin: 21.6 mL  Total IN: 848.9 mL    OUT:    Indwelling Catheter - Urethral (mL): 1560 mL    Jevity 1.2: 0 mL    sodium chloride 0.9%: 0 mL  Total OUT: 1560 mL    Total NET: -711.1 mL        ============================ LABS =========================                        11.7   8.96  )-----------( 93       ( 28 May 2021 14:59 )             33.6     05-28    144  |  101  |  58<H>  ----------------------------<  104<H>  3.3<L>   |  27  |  3.42<H>    Ca    9.7      28 May 2021 17:32  Phos  5.1     05-28  Mg     2.1     05-28    TPro  5.6<L>  /  Alb  3.0<L>  /  TBili  3.4<H>  /  DBili  x   /  AST  44<H>  /  ALT  27  /  AlkPhos  83  05-28    LIVER FUNCTIONS - ( 28 May 2021 17:32 )  Alb: 3.0 g/dL / Pro: 5.6 g/dL / ALK PHOS: 83 U/L / ALT: 27 U/L / AST: 44 U/L / GGT: x           PTT - ( 28 May 2021 11:56 )  PTT:40.4 sec  ABG - ( 28 May 2021 17:25 )  pH, Arterial: 7.49  pH, Blood: x     /  pCO2: 44    /  pO2: 130   / HCO3: 33    / Base Excess: 8.7   /  SaO2: 99                ======================Micro/Rad/Cardio=================  Telemetry: Reviewed   EKG: Reviewed  CXR: Reviewed  Culture: Reviewed   Echo: Reviewed  Cath: Reviewed  ======================================================  PAST MEDICAL & SURGICAL HISTORY:  Afib    COPD (chronic obstructive pulmonary disease)    CHF (congestive heart failure)    Dementia      ====================ASSESSMENT ==============  5/26 Txfered to [facility] with concern of pericardial effusion, hypotension and NIC.  Atrial Fibrillation  Acute Kidney Injury  Thrombocytopenia   Severe pulmonary hypertension     Plan:  ====================== NEUROLOGY=====================  Sedated for vent synchrony   -  Continue with IV Propofol   - Daily sedation vacation and SBT once RV unloaded     propofol Infusion 30 MICROgram(s)/kG/Min (18 mL/Hr) IV Continuous <Continuous>    ==================== RESPIRATORY======================  Acute Hypercapnic Respiratory Failure req Intubation   - On full vent support, requiring close monitoring of respiratory rate, breathing pattern, pulse oximetry monitoring, and intermittent blood gas analysis. On minimal vent settings, adjust prn based on ABG's.   - daily sbt trials once RV unloaded     Severe pulmonary HTN, likely group 3 from COPD   - monitor PA pressures and SvO2 as below   - c/w sildenafil?     Mechanical Ventilation:  Mode: AC/ CMV (Assist Control/ Continuous Mandatory Ventilation)  RR (machine): 14  TV (machine): 350  FiO2: 30  PEEP: 5  ITime: 1  MAP: 8  PIP: 27      ====================CARDIOVASCULAR==================  HFpEF with severe TR and severely decr RVSF radha 2/2 pulm HTN   - S/P RHC with [place] emmanuel catheter placed today, 5/27.   - Continue to monitor ScvO2, lact, PA pressures  - now s/p mass fluid and blood product resuscitation -> needs to be diouresed: Bumex IVP x2 today w/ minimal respones, started on Bumex gtt @2mg/hr   - Would probably continue Sildenafil 10mg TID, if no efficacy w/ sildenafil based on perfusion indices and RV off-loading would potentially start inotrope (Milrinone may not be best for level of renal dysfunction), or advance therapies to [place]    Hypotension, hemorrhagic, hypovolemic in setting of active bleeding, resolved   - s/p multiple products of blood transfused 2/2 RFA bleed   - Levophed, weaned off    Chronic Afib  - rate controlled, continue to monitor       ===================HEMATOLOGIC/ONC ===================  Hemorrhagic Shock, resolved   - RFA s/p removal of Beto with large hematoma and active bleeding s/p supratherapeutic ptt   - direct pressure applied and manual pressure of hematoma. FemStop applied w/ no relief of hematoma accumulation   - MTP called s/p 11U PRBC, 8U plasma, 2 plts. Protamine x2 + 2.5L IVF resuscitated   - continue to monitor CBC q6h overnight and hold AC until stability     VTE ppx   - SCDs       ===================== RENAL =========================  Non-oliguric NIC with creatinine peak of 4.49. Baseline 0.97 in 02/2021   - Cr worsened > 3 s/p hemorrhagic shock   - Goal net negative fluid balance. Replete lytes PRN. Keep K> 4 and Mg >2.   - Bumex gtt @2mg/hr   - avoid nephrotoxic agents  - Nephro following. No urgent needs for HD at this time     ==================== GASTROINTESTINAL===================  Diet: NPO w/ TFS- Nepro with Carb steady goal 40cc/hr.   - BM x2 since admission on 5/26, would hold bowel regimen for the time being     potassium chloride  20 mEq/100 mL IVPB 20 milliEquivalent(s) IV Intermittent every 2 hours  sodium chloride 0.9%. 1000 milliLiter(s) (10 mL/Hr) IV Continuous <Continuous>    =======================    ENDOCRINE  =====================  Bgl controlled  - monitor glucose for need to initiate sliding scale.     Hx of HLD  - C/w Lipitor 40mg QHS    atorvastatin 40 milliGRAM(s) Oral at bedtime  ========================INFECTIOUS DISEASE================  PNA?  - Combicath 5/26 w/ moderate GN diplococci and rare GPC in pairs and chains   - Pt hypothermic and no leukocytosis   - cont. IV Cefepime   - BCX x2 5/26: NGTD, UCx 5/26: no growth. S/P MRSA swab 5/27: neg.   - Would potentially consider switch to 3rd gen cephalosporin (Rocephin) in setting of GN diplococci (Neisseria)    cefepime   IVPB      cefepime   IVPB 1000 milliGRAM(s) IV Intermittent every 12 hours      Patient requires continuous monitoring with bedside rhythm monitoring, pulse ox monitoring, and intermittent blood gas analysis. Care plan discussed with ICU care team. Patient remained critical and at risk for life threatening decompensation.  Patient seen, examined and plan discussed with CCU team during rounds.     I have personally provided 35 minutes of critical care time excluding time spent on separate procedures.    By signing my name below, I, Mirtha Brody, attest that this documentation has been prepared under the direction and in the presence of ALLISON Santos  Electronically signed: Olivia Elam, 05-28-21 @ 20:37    I, ALLISON Santos, personally performed the services described in this documentation. all medical record entries made by the gayleiblinda were at my direction and in my presence. I have reviewed the chart and agree that the record reflects my personal performance and is accurate and complete  Electronically signed: ALLISON Santos

## 2021-05-28 NOTE — CONSULT NOTE ADULT - ASSESSMENT
Patient is a 75y old f transferred for acute heart failure, intubated, on supratheraputic hep gtt had a recent CVC and harshil in right groin, now with worsening shock, anemia, and expanding hematoma.     PLAN:   - No acute surgical intervention  - Reverse coagulopathy   - Duplex - no pseudoaneurysm   - CTA  - Plan discussed with Attending, Dr. Suzan Fermin MD  General Surgery, PGY 2

## 2021-05-28 NOTE — DIETITIAN INITIAL EVALUATION ADULT. - PROBLEM SELECTOR PLAN 1
CXR r/o widened mediastinum  ECHO r/o pericardial effusion/tamponade  CT scan if pericardial effusion to assess for possible IR drainage  Beto, Central line  Labs, type and screen, covid PCR and antibodies  UA  Add levo and vaso for MAP goal >65   consider reversing coaglation in setting of pericardial effusion pending labs

## 2021-05-28 NOTE — PROGRESS NOTE ADULT - ASSESSMENT
ASSESSMENT: Patient is a 75y old f transferred for acute heart failure, intubated, on hep gtt had a recent     PLAN:   -   -   -   -   - Patient seen/examined or Plan Discussed with Fellow,    - Plan to be discussed with Attending,     ASSESSMENT: Patient is a 75y old f transferred for acute heart failure, intubated, on supratheraputic hep gtt had a recent CVC and harshil in right groin, now with worsening shock, anemia, and expanding hematoma.     PLAN:   - No acute surgical intervention  - Reverse coagulopathy   - Duplex - no pseudoaneurysm   - CTA  - Plan discussed with Attending, Dr. Mares    ASSESSMENT: Patient is a 75y old f transferred for acute heart failure, intubated, on supratheraputic hep gtt had a recent CVC and harshil in right groin, now with worsening shock, anemia, and expanding hematoma.     PLAN:   - No acute surgical intervention  - Reverse coagulopathy   - Duplex - no pseudoaneurysm   - CTA  - Plan discussed with Attending, Dr. Suzan Fermin MD  General Surgery, PGY 2

## 2021-05-28 NOTE — DIETITIAN INITIAL EVALUATION ADULT. - PERTINENT LABORATORY DATA
(5/28): Hgb 8.8, Hct 26.2, BUN 63, Cr 3.92, Glu 180, Ca 8.1, Total Protein 4.9, Alb 1.8, GFR 10, Phos 4.8; Blood Gas: Ionized Ca 1.08, Glu 170, Hgb 10.7, Hct 33

## 2021-05-28 NOTE — DIETITIAN INITIAL EVALUATION ADULT. - ETIOLOGY
increased physiological demand of nutrient in context of wound healing predicted excessive energy intake PTA with subsequent hx of fluid retention in context of congestive heart failure

## 2021-05-28 NOTE — PROGRESS NOTE ADULT - SUBJECTIVE AND OBJECTIVE BOX
Intubated in CCU, s/p PRBCs    Vital Signs Last 24 Hrs  T(C): 34.7 (05-28-21 @ 14:00), Max: 37 (05-27-21 @ 23:00)  T(F): 94.5 (05-28-21 @ 14:00), Max: 98.6 (05-27-21 @ 23:00)  HR: 64 (05-28-21 @ 17:00) (60 - 158)  BP: 105/44 (05-28-21 @ 10:00) (105/44 - 105/44)  BP(mean): 61 (05-28-21 @ 10:15) (61 - 101)  RR: 11 (05-28-21 @ 17:00) (4 - 40)  SpO2: 100% (05-28-21 @ 17:00) (88% - 100%)    I&O's Detail    27 May 2021 07:01  -  28 May 2021 07:00  --------------------------------------------------------  IN:    Bumetanide: 90 mL    Bumetanide: 202.5 mL    Enteral Tube Flush: 120 mL    Heparin: 155 mL    IV PiggyBack: 150 mL    Jevity 1.2: 150 mL    Norepinephrine: 586.6 mL    Propofol: 593 mL    sodium chloride 0.9%: 240 mL    Vasopressin: 10.8 mL  Total IN: 2297.9 mL    OUT:    Indwelling Catheter - Urethral (mL): 3585 mL  Total OUT: 3585 mL    Total NET: -1287.1 mL    28 May 2021 07:01  -  28 May 2021 17:28  --------------------------------------------------------  IN:    IV PiggyBack: 150 mL    Norepinephrine: 210.8 mL    PRBCs (Packed Red Blood Cells): 250 mL    Propofol: 196.1 mL    Vasopressin: 21.6 mL  Total IN: 828.5 mL    OUT:    Indwelling Catheter - Urethral (mL): 1500 mL    Jevity 1.2: 0 mL    sodium chloride 0.9%: 0 mL  Total OUT: 1500 mL    Total NET: -671.5 mL    Mode: AC/ CMV (Assist Control/ Continuous Mandatory Ventilation)  RR (machine): 14  TV (machine): 350  FiO2: 30  PEEP: 5  ITime: 1  MAP: 8  PIP: 27    s1s2  b/l air entry  soft, ND  edema                               11.7   8.96  )-----------( 93       ( 28 May 2021 14:59 )             33.6     28 May 2021 11:56    144    |  101    |  56     ----------------------------<  169    3.4     |  24     |  3.21     Ca    10.4       28 May 2021 11:56  Phos  5.9       28 May 2021 11:56  Mg     2.0       28 May 2021 11:56    TPro  5.9    /  Alb  3.3    /  TBili  2.2    /  DBili  x      /  AST  42     /  ALT  25     /  AlkPhos  82     28 May 2021 11:56    LIVER FUNCTIONS - ( 28 May 2021 11:56 )  Alb: 3.3 g/dL / Pro: 5.9 g/dL / ALK PHOS: 82 U/L / ALT: 25 U/L / AST: 42 U/L / GGT: x           Culture - Bronchial (collected 27 May 2021 08:51)  Source: Bronch Wash Combicathtrap  Gram Stain (27 May 2021 14:50):    Moderate polymorphonuclear leukocytes per low power field    Rare Squamous epithelial cells per low power field    Moderate Gram Negative Diplococci per oil power field    Rare Gram Positive Cocci in Pairs and Chains per oil power field  Preliminary Report (28 May 2021 07:03):    Normal Respiratory Karen present    Culture - Urine (collected 26 May 2021 17:28)  Source: .Urine Catheterized  Final Report (27 May 2021 13:51):    <10,000 CFU/mL Normal Urogenital Karen    Culture - Blood (collected 26 May 2021 12:41)  Source: .Blood Blood  Preliminary Report (27 May 2021 13:02):    No growth to date.    Culture - Blood (collected 26 May 2021 12:41)  Source: .Blood Blood  Preliminary Report (27 May 2021 13:02):    No growth to date.    atorvastatin 40 milliGRAM(s) Oral at bedtime  cefepime   IVPB      cefepime   IVPB 1000 milliGRAM(s) IV Intermittent every 12 hours  chlorhexidine 0.12% Liquid 15 milliLiter(s) Oral Mucosa every 12 hours  chlorhexidine 2% Cloths 1 Application(s) Topical <User Schedule>  propofol Infusion 30 MICROgram(s)/kG/Min IV Continuous <Continuous>  sodium chloride 0.9%. 1000 milliLiter(s) IV Continuous <Continuous>    A/P:    COPD, severe PHTN, CO2 retention, CM, peric effusion, R heart failure, resp failure, intubated  Hemodynamic NIC (Cr 0.97 - 2/3/21)  Good UO w/diuretics  Avoid nephrotoxins  Avoid hypotension  F/u CBC, BMP, UO  Further care per CCU team    [phone]

## 2021-05-28 NOTE — DIETITIAN INITIAL EVALUATION ADULT. - ENTERAL
Consider change feeds to Nepro at GOAL rate 40ml/hr x 24 hrs + No Carb Prosource TF 1x daily. With provision of propofol (+444kcal) will provide (based on .5lbs/62.5kg): 960ml, 2212kcal (35kcal/kg) and 89g protein (1.4g protein/kg).

## 2021-05-28 NOTE — DIETITIAN INITIAL EVALUATION ADULT. - CHIEF COMPLAINT
Pt is a 74 yo F with PMH: CHFpEF, severe TR with right CHF and severe pHTN, COPD on home O2, HTN, hyperlipidemia, chronic A.Fib, OA, CKD3, DM, right eye cataract. Admitted to OSH 5/23/21 with AMS, generalized weakness, lethargy since 5/22. Chest CT with large pericardial effusion. Noted with pHTN, cirrhotic liver with small ascites, ECHO EF 55% dilated RV with decreased function, NIC. Transferred to [facility] 5/26 with concern of pericardial effusion, hypotension and NIC. Intubated for hypercarbic respiratory distress. Transferred from CTU to CICU as no need for pericardiocentesis. RHC with [place] emmanuel catheter placed 5/27. S/P Bumex gtt; goal net negative 1-2L. Nephro following. No urgent need for HD at this time. Continues on antibiotics as ordered.

## 2021-05-28 NOTE — PROGRESS NOTE ADULT - ASSESSMENT
75F with a pmhx of CHFpEF, Severe TR with Right CHF and severe pHTN, COPD on home 02, HTN, HLD, Chronic Afib (on Eliquis), OA, CKD3, DM, right eye cataract who was brought in by EMS to [facility] on 5/23/21 for AMS, found to have a large pericardial effusion w/o tamponade physiology, without drainable window, intubated (5/26) for acute hypercarbic respiratory failure and worsening AMS, transferred to CICU for hypotension and NIC on CKD in undifferentiated shock, course c/b expanding R thigh hematoma, s/p MTP, with ratio predom targeted towards FFP, s/p protamine for hep reversal.    Plan:    Neuro:  -Mental status: Sedated  -Sedation: prop     Resp:  -(5/26): Intubated   -(5/26): CT chest noncon: Mild-mod pericardial effusion trace R pleural effusion  Mode: AC/ CMV (Assist Control/ Continuous Mandatory Ventilation)  RR (machine): 16  TV (machine): 350  FiO2: 100  PEEP: 8  ITime: 1  MAP: 10  PIP: 24  -Continue pulse ox    CV:  (5/26): TTE: EF 55%, RVH with severe RVSD, severe pulm HTN, large pericardial effusion 3.4cm @ largest diameter, adjacent to RA  ventricular systolic function.  -Pressors: levophed, vaso  -(5/27): PAC placement in cath lab  -c/w atorvastatin 40mg qhs  -c/w sildenafil 10mg q8h  -Systolic goal: >90, MAP >65  Telemetry    GI:  -Diet: TF with Jevity, goal 25cc/hr cont.  -Stress ulcer ppx: none -> consider protonix 40mg IVP q12hr (if blood from OGT)  -Bowel regimen: none  -(5/26): CT a/p noncon: mild contoured liver. Anasarca    Heme:  -Thrombocytopenia  -(5/28): Acute drop in H/H 10->8, 35->24 -> s/p MTP 9 pRBC, 2 plt, 8 FFP  -coagulopathy: aPTT >200 ->  protamine sulfate: 50mg x2 -> aPTT 28  -Continue to monitor hemoglobin and hematocrit levels.                         8.1    2.69  )-----------( 57       ( 28 May 2021 09:57 )             24.3     -Keep T&S updated  -[ ] (5/28): VA duplex arterial and venous RLE:  -DVT ppx: scd    Renal:  -Continue to monitor I/Os, BUN/Creatinine, and urine output.   -Goal net negative fluid balance: 3L  - Replete lytes PRN. Keep K> 4 and Mg >2  - (5/27): s/p metolazone 5mg x1  - (5/27): FeUrea 34.6% (pre-renal, possible intrinsic component)  - (5/27): UoSm: 07, TP 10  -AG 28 likely 2/2 citrate from blood products and lactate -> given calcium gluc: 2mg x2, Calcium chloride 1g x1, NaHCO3- 50mEq x2  - c/w Diuresis: -bumex 2mg x1 - no improvement -> 2mg x1, metolazone 5mg, + bumex gtt @ 2mg/hr    Endo:  - Monitor blood sugar     ID:  Temperature 97.5 with WBC within normal range   - Continue to monitor temperature and WBC   -(5/26-27): Hypothermic to 34C  - c/w cefepime (5/27-5/28) and vanc (5/27 x1)  - (5/26): UCx, BCx, SCx, MRSA/MSSA swab: NEG    CODE STATUS:  Full code    TUBES/LINES/DRAINS:  -DEBBIE cordis (5/26-  -L rad a-line (5/27-  -ETT  -OGT  -Hernadez  -PIV     75F with a pmhx of CHFpEF, Severe TR with Right CHF and severe pHTN, COPD on home 02, HTN, HLD, Chronic Afib (on Eliquis), OA, CKD3, DM, right eye cataract who was brought in by EMS to [facility] on 5/23/21 for AMS, found to have a large pericardial effusion w/o tamponade physiology, without drainable window, intubated (5/26) for acute hypercarbic respiratory failure and worsening AMS, transferred to CICU for hypotension and NIC on CKD in undifferentiated shock, course c/b expanding R thigh hematoma, s/p MTP, with ratio predom targeted towards FFP, s/p protamine for hep reversal.    Plan:    Neuro:  -Mental status: Sedated  -Sedation: prop     Resp:  -(5/26): Intubated   -(5/26): CT chest noncon: Mild-mod pericardial effusion trace R pleural effusion  Mode: AC/ CMV (Assist Control/ Continuous Mandatory Ventilation)  RR (machine): 16, TV (machine): 350, FiO2: 100, PEEP:8 now weaned down to 30% and peep 5. Repeat ABG on new vent settings.     CV:  (5/26): TTE: EF 55%, RVH with severe RVSD, severe pulm HTN, large pericardial effusion 3.4cm @ largest diameter, adjacent to RA  ventricular systolic function.  -Pressors: levophed, vaso  -(5/27): PAC placement in cath lab  -c/w atorvastatin 40mg qhs  -Per HF sildenafil discontinued. Now off pressors after resuscitation.   -Systolic goal: >90, MAP >65  Telemetry    GI:  -Diet: TF with Jevity, goal 25cc/hr cont.  -Stress ulcer ppx: none -> consider protonix 40mg IVP q12hr (if blood from OGT)  -Bowel regimen: none  -(5/26): CT a/p noncon: mild contoured liver. Anasarca    Heme:  -Thrombocytopenia  -(5/28): Acute drop in H/H 10->8, 35->24 -> s/p MTP 9 pRBC, 2 plt, 8 FFP  -coagulopathy: aPTT >200 ->  protamine sulfate: 50mg x2 -> aPTT 28  -Continue to monitor hemoglobin and hematocrit levels.                         8.1    2.69  )-----------( 57       ( 28 May 2021 09:57 )             24.3     -Keep T&S updated  -[ ] (5/28): VA duplex arterial and venous RLE:  -DVT ppx: scd    Renal:  -Continue to monitor I/Os, BUN/Creatinine, and urine output.   -Goal net negative fluid balance: 3L  - Replete lytes PRN. Keep K> 4 and Mg >2  - (5/27): s/p metolazone 5mg x1  - (5/27): FeUrea 34.6% (pre-renal, possible intrinsic component)  - (5/27): UoSm: 07, TP 10  -AG 28 likely 2/2 citrate from blood products and lactate -> given calcium gluc: 2mg x2, Calcium chloride 1g x1, NaHCO3- 50mEq x2  - c/w Diuresis: -bumex 2mg x1 - no improvement -> 2mg x1, metolazone 5mg, + bumex gtt @ 2mg/hr    Endo:  - Monitor blood sugar     ID:  Temperature 97.5 with WBC within normal range   - Continue to monitor temperature and WBC   -(5/26-27): Hypothermic to 34C  - c/w cefepime (5/27-5/28) and vanc (5/27 x1)  - (5/26): UCx, BCx, SCx, MRSA/MSSA swab: NEG    CODE STATUS:  Full code    TUBES/LINES/DRAINS:  -RIJ cordis (5/26-  -L rad a-line (5/27-  -ETT  -OGT  -Hernadez  -PIV     75F with a pmhx of CHFpEF, Severe TR with Right CHF and severe pHTN, COPD on home 02, HTN, HLD, Chronic Afib (on Eliquis), OA, CKD3, DM, right eye cataract who was brought in by EMS to [facility] on 5/23/21 for AMS, found to have a large pericardial effusion w/o tamponade physiology, without drainable window, intubated (5/26) for acute hypercarbic respiratory failure and worsening AMS, transferred to CICU for hypotension and NIC on CKD in undifferentiated shock, course c/b expanding R thigh hematoma, s/p MTP, with ratio predom targeted towards FFP, s/p protamine for hep reversal.    Plan:    Neuro:  -Mental status: Sedated  -Sedation: prop     Resp:  -(5/26): Intubated   -(5/26): CT chest noncon: Mild-mod pericardial effusion trace R pleural effusion  Mode: AC/ CMV (Assist Control/ Continuous Mandatory Ventilation)  RR (machine): 16, TV (machine): 350, FiO2: 100, PEEP:8 now weaned down to 30% and peep 5. Repeat ABG on new vent settings.     CV:  (5/26): TTE: EF 55%, RVH with severe RVSD, severe pulm HTN, large pericardial effusion 3.4cm @ largest diameter, adjacent to RA  ventricular systolic function.  - Pt was on Pressors: levophed, vaso. After aggressve fluid resuscitatton now off pressors with hemodynamics as follows: CVP 4, PAP 60/12, SBP 130s with MAPS 80s. Bumex 2 mg given with good UO  -(5/27): PAC placement in cath lab, trend central sats  -c/w atorvastatin 40mg qhs  -Per HF sildenafil discontinued. Now off pressors after resuscitation.   -Systolic goal: >90, MAP >65  Telemetry    GI:  -Diet: TF with Jevity, goal 25cc/hr cont. Wll switch to Nepro per Nutrtion recs  -Stress ulcer ppx: will add PPI  -Bowel regimen: none  -(5/26): CT a/p noncon: mild contoured liver. Anasarca    Heme:  -Thrombocytopenia  -(5/28): Acute drop in H/H 10->8, 35->24 -> s/p MTP 9 pRBC, 2 plt, 8 FFP  -coagulopathy: aPTT >200 ->  protamine sulfate: 50mg x2 -> aPTT 28  -Continue to monitor hemoglobin and hematocrit levels.                         8.1    2.69  )-----------( 57       ( 28 May 2021 09:57 )             24.3     -Keep T&S updated  -[ ] (5/28): VA duplex arterial and venous RLE:  -DVT ppx: scd    Renal:  -Continue to monitor I/Os, BUN/Creatinine, and urine output.   -Goal net negative fluid balance: 3L  - Replete lytes PRN. Keep K> 4 and Mg >2  - (5/27): s/p metolazone 5mg x1  - (5/27): FeUrea 34.6% (pre-renal, possible intrinsic component)  - (5/27): UoSm: 07, TP 10  -AG 28 likely 2/2 citrate from blood products and lactate -> given calcium gluc: 2mg x2, Calcium chloride 1g x1, NaHCO3- 50mEq x2  - c/w Diuresis: -bumex 2mg x1 - no improvement -> 2mg x1, metolazone 5mg, + bumex gtt @ 2mg/hr    Endo:  - Monitor blood sugar     ID:  Temperature 97.5 with WBC within normal range   - Continue to monitor temperature and WBC   -(5/26-27): Hypothermic to 34C  - c/w cefepime (5/27-5/28) and vanc (5/27 x1)  - (5/26): UCx, BCx, SCx, MRSA/MSSA swab: NEG    CODE STATUS:  Full code    TUBES/LINES/DRAINS:  -RIJ cordis (5/26-  -L rad a-line (5/27-  -ETT  -OGT  -Hernadez  -PIV     75F with a pmhx of CHFpEF, Severe TR with Right CHF and severe pHTN, COPD on home 02, HTN, HLD, Chronic Afib (on Eliquis), OA, CKD3, DM, right eye cataract who was brought in by EMS to [facility] on 5/23/21 for AMS, found to have a large pericardial effusion w/o tamponade physiology, without drainable window, intubated (5/26) for acute hypercarbic respiratory failure and worsening AMS, transferred to CICU for hypotension and NIC on CKD in undifferentiated shock, course c/b expanding R thigh hematoma, s/p MTP, with ratio predom targeted towards FFP, s/p protamine for hep reversal.    Plan:    Neuro:  -Mental status: Sedated  -Sedation: prop     Resp:  -(5/26): Intubated   -(5/26): CT chest noncon: Mild-mod pericardial effusion trace R pleural effusion  Mode: AC/ CMV (Assist Control/ Continuous Mandatory Ventilation)  RR (machine): 16, TV (machine): 350, FiO2: 100, PEEP:8 now weaned down to 30% and peep 5. Repeat ABG on new vent settings.     CV:  (5/26): TTE: EF 55%, RVH with severe RVSD, severe pulm HTN, large pericardial effusion 3.4cm @ largest diameter, adjacent to RA  ventricular systolic function.  - Pt was on Pressors: levophed, vaso. After aggressve fluid resuscitatton now off pressors with hemodynamics as follows: CVP 4, PAP 60/12, SBP 130s with MAPS 80s. Bumex 2 mg given with good UO  -(5/27): PAC placement in cath lab, trend central sats  -c/w atorvastatin 40mg qhs  -Per HF sildenafil discontinued. Now off pressors after resuscitation.   -Systolic goal: >90, MAP >65  Telemetry    GI:  -Diet: TF with Jevity, goal 25cc/hr cont. Wll switch to Nepro per Nutrtion recs  -Stress ulcer ppx: will add PPI  -Bowel regimen: none  -(5/26): CT a/p noncon: mild contoured liver. Anasarca    Heme:  -Thrombocytopenia  -(5/28): Acute drop in H/H 10->8, 35->24 -> s/p MTP 11 pRBCs , 8 U FFP, 2 U PLTS  -coagulopathy: aPTT >200 ->  protamine sulfate: 50mg x2 -> aPTT 28  -Continue to monitor hemoglobin and hematocrit levels Q 4 hours  -[ ] (5/28): VA duplex arterial and venous RLE: no pseudo  -DVT ppx: scd, hold heparin    Renal:  -Continue to monitor I/Os, BUN/Creatinine, and urine output. Cr bmped to 3.6. Nephro following and diuresing well.   - CVP 4 no further diuresis  - Replete lytes PRN. Keep K> 4 and Mg >2    Endo:  - Monitor blood sugar     ID:  Temperature 97.5 with WBC within normal range   - Continue to monitor temperature and WBC   -(5/26-27): Hypothermic to 34C  - c/w cefepime (5/27-5/28) and vanc (5/27 x1)  - (5/26): UCx, BCx, SCx, MRSA/MSSA swab: NEG    CODE STATUS:  Full code    TUBES/LINES/DRAINS:  -ETT  -OGT  -Hernadez  -PIV

## 2021-05-28 NOTE — PROGRESS NOTE ADULT - ATTENDING COMMENTS
Pulmonary hypertension with R heart failure  Supertherapeutic PTT after starting Heparin drip, prior R femoral harshil site began to bleed and she became unstable  Massive transfusion protocol initiated x2, Protamine given, Levophed requirement as high as 1.5 mcg/kg/min, now off  Sedated with Propofol, synchronous with the vent  Acute hypoxic respiratory failure requiring mechanical ventilation, now with much higher vent requirements post massive resuscitation  FiO2 100%, PEEP 8 - diurese for 2L overall  Tolerating Sildenafil - will continue   Hold tube feeds until more stable  Non-oliguric NIC, Renal following - diurese as above  Hemoglobin low but acceptable post transfusions; hold Heparin drip  Hypothermic, pancultured and started on empiric broad spectrum antibiotics  Sugars controlled  RIJ Cordis 5/26, Victor 5/27 and radial harshil 5/27

## 2021-05-28 NOTE — PROGRESS NOTE ADULT - SUBJECTIVE AND OBJECTIVE BOX
Events:    Review Of Systems:  Constitutional: denies fever, chills, Fatigue   HEENT: denies Blurred vision, Eye Pain, Headache   Respiratory: denies Cough, Wheezing , Shortness of breath  Cardiovascular: denies Chest Pain, Palpitations,  BERMEO   Gastrointestinal: denies Abdominal Pain, Diarrhea, Constipation   Genitourinary: denies Nocturia, Dysuria, Incontinence  Extremities: denies Swelling, Joint Pain  Neurologic: denies Focal deficit, Paresthesias, Syncope  Lymphatic: denies Swelling, Lymphadenopathy   Skin: denies Rash, Ecchymoses, Wounds   Psychiatry: denies Depression, Suicidal/Homicidal Ideation, anxiety  [X ] 10 point review of systems is otherwise negative except as mentioned above         Medications:  atorvastatin 40 milliGRAM(s) Oral at bedtime  cefepime   IVPB      cefepime   IVPB 1000 milliGRAM(s) IV Intermittent every 12 hours  chlorhexidine 0.12% Liquid 15 milliLiter(s) Oral Mucosa every 12 hours  chlorhexidine 2% Cloths 1 Application(s) Topical <User Schedule>  lactated ringers Bolus 500 milliLiter(s) IV Bolus once  norepinephrine Infusion 0.05 MICROgram(s)/kG/Min IV Continuous <Continuous>  propofol Infusion 30 MICROgram(s)/kG/Min IV Continuous <Continuous>  protamine  IVPB 50 milliGRAM(s) IV Intermittent once  sildenafil (REVATIO) 10 milliGRAM(s) Oral every 8 hours  sodium chloride 0.9%. 1000 milliLiter(s) IV Continuous <Continuous>  vasopressin Infusion 0.04 Unit(s)/Min IV Continuous <Continuous>    PMH/PSH/FH/SH: [ ] Unchanged  Vitals:  T(C): 36.9 (05-28-21 @ 03:00), Max: 37 (05-27-21 @ 23:00)  HR: 104 (05-28-21 @ 06:30) (86 - 108)  BP: --  BP(mean): --  RR: 16 (05-28-21 @ 06:30) (11 - 40)  SpO2: 100% (05-28-21 @ 06:30) (88% - 100%)  Wt(kg): --  Daily     Daily   I&O's Summary    27 May 2021 07:01  -  28 May 2021 07:00  --------------------------------------------------------  IN: 2297.9 mL / OUT: 3585 mL / NET: -1287.1 mL        Physical Exam:  Appearance: [ ] Normal [ ] NAD  Eyes: [ ] PERRL [ ] EOMI  HENT: [ ] Normal oral muscosa [ ]NC/AT  Cardiovascular: [ ] S1 [ ] S2 [ ] RRR [ ] No m/r/g [ ]No edema [ ] JVP  Procedural Access Site: [ ] No hematoma [ ] Non-tender to palpation [ ] 2+ pulse [ ] No bruit [ ] No Ecchymosis  Respiratory: [ ] Clear to auscultation bilaterally  Gastrointestinal: [ ] Soft [ ] Non-tender [ ] Non-distended [ ] BS+  Musculoskeletal: [ ] No clubbing [ ] No joint deformity   Neurologic: [ ] Non-focal  Lymphatic: [ ] No lymphadenopathy  Psychiatry: [ ] AAOx3 [ ] Mood & affect appropriate  Skin: [ ] No rashes [ ] No ecchymoses [ ] No cyanosis    05-28    140  |  96  |  66<H>  ----------------------------<  134<H>  4.1   |  29  |  4.10<H>    Ca    9.0      28 May 2021 00:28  Phos  5.1     05-28  Mg     2.4     05-28    TPro  6.4  /  Alb  2.6<L>  /  TBili  2.3<H>  /  DBili  x   /  AST  50<H>  /  ALT  29  /  AlkPhos  92  05-28    PT/INR - ( 26 May 2021 15:00 )   PT: 17.5 sec;   INR: 1.49 ratio         PTT - ( 28 May 2021 00:30 )  PTT:>200.0 sec  CARDIAC MARKERS ( 26 May 2021 10:15 )  x     / x     / 52 U/L / x     / 2.2 ng/mL              ECG:    Echo:    Stress Testing:     Cath:    Imaging:    Interpretation of Telemetry:   HPI:  75y.o female with PMHx of CHFpEF, Severe TR with Right CHF and severe pHTN, COPD on home 02, HTN, HLD, Chronic Afib (on Eliquis), OA, CKD3, DM, right eye cataract who was brought in by EMS to [facility] on 5/23/21 for AMS. pt A&O x1, per daughter patient with generalized weakness and lethargy since since 5/22. Pt had recent in Jan 2021 with CP and NIC. Per chart on arrival to ED pt was hypotensive, obtunded. Chest CT with large pericardial effusion (increased since Jan 2021)- per thoracic surgery consult no indication for pericardial window, pHTN, cirrhotic liver with small ascites, ECHO EF 55% dilated RV with decreased function, severe TN, pHTN, NIC (per renal consult no acute indication for HD), venous duplex NG for DVTs, Head CT revealed chronic involutional and white matter changes, no acute intracranial process, NIC(SCr peaked at 4.6) with  hyperkalemia (5.8) requiring lokelma and insulin.  Of note daughter states over past month patient more and more confused ? dementia and pt is legally blind. Daughter states pt HAS NOT RECEIVED COVID VACCINE as she has been sick these past few months.     5/26 Patient transferred to [facility] with concern of pericardial effusion, hypotension and NIC. On arrival patient SBP 80's, A&O to first name but very lethargic.  (26 May 2021 10:16)      Events:    Review Of Systems:  Constitutional: denies fever, chills, Fatigue   HEENT: denies Blurred vision, Eye Pain, Headache   Respiratory: denies Cough, Wheezing , Shortness of breath  Cardiovascular: denies Chest Pain, Palpitations,  BERMEO   Gastrointestinal: denies Abdominal Pain, Diarrhea, Constipation   Genitourinary: denies Nocturia, Dysuria, Incontinence  Extremities: denies Swelling, Joint Pain  Neurologic: denies Focal deficit, Paresthesias, Syncope  Lymphatic: denies Swelling, Lymphadenopathy   Skin: denies Rash, Ecchymoses, Wounds   Psychiatry: denies Depression, Suicidal/Homicidal Ideation, anxiety  [X ] 10 point review of systems is otherwise negative except as mentioned above         Medications:  atorvastatin 40 milliGRAM(s) Oral at bedtime  cefepime   IVPB      cefepime   IVPB 1000 milliGRAM(s) IV Intermittent every 12 hours  chlorhexidine 0.12% Liquid 15 milliLiter(s) Oral Mucosa every 12 hours  chlorhexidine 2% Cloths 1 Application(s) Topical <User Schedule>  lactated ringers Bolus 500 milliLiter(s) IV Bolus once  norepinephrine Infusion 0.05 MICROgram(s)/kG/Min IV Continuous <Continuous>  propofol Infusion 30 MICROgram(s)/kG/Min IV Continuous <Continuous>  protamine  IVPB 50 milliGRAM(s) IV Intermittent once  sildenafil (REVATIO) 10 milliGRAM(s) Oral every 8 hours  sodium chloride 0.9%. 1000 milliLiter(s) IV Continuous <Continuous>  vasopressin Infusion 0.04 Unit(s)/Min IV Continuous <Continuous>    PMH/PSH/FH/SH: [ ] Unchanged  Vitals:  T(C): 36.9 (05-28-21 @ 03:00), Max: 37 (05-27-21 @ 23:00)  HR: 104 (05-28-21 @ 06:30) (86 - 108)  BP: --  BP(mean): --  RR: 16 (05-28-21 @ 06:30) (11 - 40)  SpO2: 100% (05-28-21 @ 06:30) (88% - 100%)  Wt(kg): --  Daily     Daily   I&O's Summary    27 May 2021 07:01  -  28 May 2021 07:00  --------------------------------------------------------  IN: 2297.9 mL / OUT: 3585 mL / NET: -1287.1 mL        Physical Exam:  Appearance: [ ] Normal [ ] NAD  Eyes: [ ] PERRL [ ] EOMI  HENT: [ ] Normal oral muscosa [ ]NC/AT  Cardiovascular: [ ] S1 [ ] S2 [ ] RRR [ ] No m/r/g [ ]No edema [ ] JVP  Procedural Access Site: [ ] No hematoma [ ] Non-tender to palpation [ ] 2+ pulse [ ] No bruit [ ] No Ecchymosis  Respiratory: [ ] Clear to auscultation bilaterally  Gastrointestinal: [ ] Soft [ ] Non-tender [ ] Non-distended [ ] BS+  Musculoskeletal: [ ] No clubbing [ ] No joint deformity   Neurologic: [ ] Non-focal  Lymphatic: [ ] No lymphadenopathy  Psychiatry: [ ] AAOx3 [ ] Mood & affect appropriate  Skin: [ ] No rashes [ ] No ecchymoses [ ] No cyanosis    05-28    140  |  96  |  66<H>  ----------------------------<  134<H>  4.1   |  29  |  4.10<H>    Ca    9.0      28 May 2021 00:28  Phos  5.1     05-28  Mg     2.4     05-28    TPro  6.4  /  Alb  2.6<L>  /  TBili  2.3<H>  /  DBili  x   /  AST  50<H>  /  ALT  29  /  AlkPhos  92  05-28    PT/INR - ( 26 May 2021 15:00 )   PT: 17.5 sec;   INR: 1.49 ratio         PTT - ( 28 May 2021 00:30 )  PTT:>200.0 sec  CARDIAC MARKERS ( 26 May 2021 10:15 )  x     / x     / 52 U/L / x     / 2.2 ng/mL              ECG:    Echo:    Stress Testing:     Cath:    Imaging:    Interpretation of Telemetry:   HPI:  75y.o female with PMHx of CHFpEF, Severe TR with Right CHF and severe pHTN, COPD on home 02, HTN, HLD, Chronic Afib (on Eliquis), OA, CKD3, DM, right eye cataract who was brought in by EMS to [facility] on 5/23/21 for AMS. pt A&O x1, per daughter patient with generalized weakness and lethargy since since 5/22. Pt had recent in Jan 2021 with CP and NIC. Per chart on arrival to ED pt was hypotensive, obtunded. Chest CT with large pericardial effusion (increased since Jan 2021)- per thoracic surgery consult no indication for pericardial window, pHTN, cirrhotic liver with small ascites, ECHO EF 55% dilated RV with decreased function, severe TN, pHTN, NIC (per renal consult no acute indication for HD), venous duplex NG for DVTs, Head CT revealed chronic involutional and white matter changes, no acute intracranial process, NIC(SCr peaked at 4.6) with  hyperkalemia (5.8) requiring lokelma and insulin.Of note daughter states over past month patient more and more confused ? dementia and pt is legally blind. Daughter states pt HAS NOT RECEIVED COVID VACCINE as she has been sick these past few months. 5/26 Patient transferred to [facility] with concern of pericardial effusion, hypotension and NIC. On arrival patient SBP 80's, A&O to first name but very lethargic.  (26 May 2021 10:16). Echo revealed a pericardial effusion with no tamponade physiology. She was intubated for hypercarbic respiratory failure, with subsequent improvement in BP, now off vasopressors. She is currently volume overloaded in the setting of RV failure and pulmonary hypertension. She was started on IV bumex and sildenafil.     Events:     Review Of Systems:  Constitutional: denies fever, chills, Fatigue   HEENT: denies Blurred vision, Eye Pain, Headache   Respiratory: denies Cough, Wheezing , Shortness of breath  Cardiovascular: denies Chest Pain, Palpitations,  BERMEO   Gastrointestinal: denies Abdominal Pain, Diarrhea, Constipation   Genitourinary: denies Nocturia, Dysuria, Incontinence  Extremities: denies Swelling, Joint Pain  Neurologic: denies Focal deficit, Paresthesias, Syncope  Lymphatic: denies Swelling, Lymphadenopathy   Skin: denies Rash, Ecchymoses, Wounds   Psychiatry: denies Depression, Suicidal/Homicidal Ideation, anxiety  [X ] 10 point review of systems is otherwise negative except as mentioned above         Medications:  atorvastatin 40 milliGRAM(s) Oral at bedtime  cefepime   IVPB      cefepime   IVPB 1000 milliGRAM(s) IV Intermittent every 12 hours  chlorhexidine 0.12% Liquid 15 milliLiter(s) Oral Mucosa every 12 hours  chlorhexidine 2% Cloths 1 Application(s) Topical <User Schedule>  lactated ringers Bolus 500 milliLiter(s) IV Bolus once  norepinephrine Infusion 0.05 MICROgram(s)/kG/Min IV Continuous <Continuous>  propofol Infusion 30 MICROgram(s)/kG/Min IV Continuous <Continuous>  protamine  IVPB 50 milliGRAM(s) IV Intermittent once  sildenafil (REVATIO) 10 milliGRAM(s) Oral every 8 hours  sodium chloride 0.9%. 1000 milliLiter(s) IV Continuous <Continuous>  vasopressin Infusion 0.04 Unit(s)/Min IV Continuous <Continuous>    PMH/PSH/FH/SH: [ ] Unchanged  Vitals:  T(C): 36.9 (05-28-21 @ 03:00), Max: 37 (05-27-21 @ 23:00)  HR: 104 (05-28-21 @ 06:30) (86 - 108)  BP: --  BP(mean): --  RR: 16 (05-28-21 @ 06:30) (11 - 40)  SpO2: 100% (05-28-21 @ 06:30) (88% - 100%)  Wt(kg): --  Daily     Daily   I&O's Summary    27 May 2021 07:01  -  28 May 2021 07:00  --------------------------------------------------------  IN: 2297.9 mL / OUT: 3585 mL / NET: -1287.1 mL        Physical Exam:  Appearance: [ ] Normal [ ] NAD  Eyes: [ ] PERRL [ ] EOMI  HENT: [ ] Normal oral muscosa [ ]NC/AT  Cardiovascular: [ ] S1 [ ] S2 [ ] RRR [ ] No m/r/g [ ]No edema [ ] JVP  Procedural Access Site: [ ] No hematoma [ ] Non-tender to palpation [ ] 2+ pulse [ ] No bruit [ ] No Ecchymosis  Respiratory: [ ] Clear to auscultation bilaterally  Gastrointestinal: [ ] Soft [ ] Non-tender [ ] Non-distended [ ] BS+  Musculoskeletal: [ ] No clubbing [ ] No joint deformity   Neurologic: [ ] Non-focal  Lymphatic: [ ] No lymphadenopathy  Psychiatry: [ ] AAOx3 [ ] Mood & affect appropriate  Skin: [ ] No rashes [ ] No ecchymoses [ ] No cyanosis    05-28    140  |  96  |  66<H>  ----------------------------<  134<H>  4.1   |  29  |  4.10<H>    Ca    9.0      28 May 2021 00:28  Phos  5.1     05-28  Mg     2.4     05-28    TPro  6.4  /  Alb  2.6<L>  /  TBili  2.3<H>  /  DBili  x   /  AST  50<H>  /  ALT  29  /  AlkPhos  92  05-28    PT/INR - ( 26 May 2021 15:00 )   PT: 17.5 sec;   INR: 1.49 ratio         PTT - ( 28 May 2021 00:30 )  PTT:>200.0 sec  CARDIAC MARKERS ( 26 May 2021 10:15 )  x     / x     / 52 U/L / x     / 2.2 ng/mL              ECG:    Echo:    Stress Testing:     Cath:    Imaging:    Interpretation of Telemetry:   HPI:  75y.o female with PMHx of CHFpEF, Severe TR with Right CHF and severe pHTN, COPD on home 02, HTN, HLD, Chronic Afib (on Eliquis), OA, CKD3, DM, right eye cataract who was brought in by EMS to [facility] on 5/23/21 for AMS. pt A&O x1, per daughter patient with generalized weakness and lethargy since since 5/22. Pt had recent in Jan 2021 with CP and NIC. Per chart on arrival to ED pt was hypotensive, obtunded. Chest CT with large pericardial effusion (increased since Jan 2021)- per thoracic surgery consult no indication for pericardial window, pHTN, cirrhotic liver with small ascites, ECHO EF 55% dilated RV with decreased function, severe TN, pHTN, NIC (per renal consult no acute indication for HD), venous duplex NG for DVTs, Head CT revealed chronic involutional and white matter changes, no acute intracranial process, NIC(SCr peaked at 4.6) with  hyperkalemia (5.8) requiring lokelma and insulin.Of note daughter states over past month patient more and more confused ? dementia and pt is legally blind. Daughter states pt HAS NOT RECEIVED COVID VACCINE as she has been sick these past few months. 5/26 Patient transferred to [facility] with concern of pericardial effusion, hypotension and NIC. On arrival patient SBP 80's, A&O to first name but very lethargic.  (26 May 2021 10:16). Echo revealed a pericardial effusion with no tamponade physiology. She was intubated for hypercarbic respiratory failure, with subsequent improvement in BP, now off vasopressors. She is currently volume overloaded in the setting of RV failure and pulmonary hypertension. She was started on IV bumex and sildenafil.     Events:  Overnight Levophed requirements continued to rise from 0.9 to .3. R groin was noted to be swollen and concerning for a hematoma from groin lines that were removed the day prior . Of note her pTT was noted to be > 200 at 4 AM. Heparin gtt was stopped and manual pressure applied for 1 hour. 2 L of LR given and 1 U of PRBCS. At this time pt continued ot become unstable with blood pressure dropping into systolics of 50s and HR up to 160s and CVP reading 0. Everytime manual pressure was stopped hematoma would form again. Vascular surgery called and MTP protocol was applied. Decision was made to reverse coagulopathy first given that both groin lines were small catheter frenches. In total the patient received 11 U of PRBCs, 8 FFP, 2 U PLTS, 3 Calciums and protamine x 2. After coagulopathy was reversed pt stabilized. Lactate peaked at 5.     Review Of Systems:  Unable to attain.     Medications:  atorvastatin 40 milliGRAM(s) Oral at bedtime  cefepime   IVPB      cefepime   IVPB 1000 milliGRAM(s) IV Intermittent every 12 hours  chlorhexidine 0.12% Liquid 15 milliLiter(s) Oral Mucosa every 12 hours  chlorhexidine 2% Cloths 1 Application(s) Topical <User Schedule>  lactated ringers Bolus 500 milliLiter(s) IV Bolus once  norepinephrine Infusion 0.05 MICROgram(s)/kG/Min IV Continuous <Continuous>  propofol Infusion 30 MICROgram(s)/kG/Min IV Continuous <Continuous>  protamine  IVPB 50 milliGRAM(s) IV Intermittent once  sildenafil (REVATIO) 10 milliGRAM(s) Oral every 8 hours  sodium chloride 0.9%. 1000 milliLiter(s) IV Continuous <Continuous>  vasopressin Infusion 0.04 Unit(s)/Min IV Continuous <Continuous>    Vitals:  T(C): 36.9 (05-28-21 @ 03:00), Max: 37 (05-27-21 @ 23:00)  HR: 104 (05-28-21 @ 06:30) (86 - 108)  RR: 16 (05-28-21 @ 06:30) (11 - 40)  SpO2: 100% (05-28-21 @ 06:30) (88% - 100%)    Daily     Daily   I&O's Summary    27 May 2021 07:01  -  28 May 2021 07:00  --------------------------------------------------------  IN: 2297.9 mL / OUT: 3585 mL / NET: -1287.1 mL    Physical Exam:  Appearance: [ X] Normal [X ] NAD  Eyes: [X] PERRL [X ] EOMI  HENT: [X ] Normal oral muscosa [X ]NC/AT  Cardiovascular: [X ] S1 [ X] S2 [ X] RRR. NO edema, NO JVD   Procedural Access Site: RIJ pulmonary artery catheter C/D/I without bleeding, induration, or hematoma.   Respiratory: [X ] Clear to auscultation bilaterally  Gastrointestinal: [X ] Soft [X ] Non-tender [X ] Non-distended. Obese and soft  Musculoskeletal: [ X] No clubbing [X ] No joint deformity   Neurologic: [ X] Non-focal  Lymphatic: [X ] No lymphadenopathy  Psychiatry: [X ] AAOx3 [ X] Mood & affect appropriate  Skin: [X ] No rashes [ X] No ecchymoses [X ] No cyanosis    05-28    140  |  96  |  66<H>  ----------------------------<  134<H>  4.1   |  29  |  4.10<H>    Ca    9.0      28 May 2021 00:28  Phos  5.1     05-28  Mg     2.4     05-28    TPro  6.4  /  Alb  2.6<L>  /  TBili  2.3<H>  /  DBili  x   /  AST  50<H>  /  ALT  29  /  AlkPhos  92  05-28    PT/INR - ( 26 May 2021 15:00 )   PT: 17.5 sec;   INR: 1.49 ratio       PTT - ( 28 May 2021 00:30 )  PTT:>200.0 sec  CARDIAC MARKERS ( 26 May 2021 10:15 )  x     / x     / 52 U/L / x     / 2.2 ng/mL    ECG: < from: 12 Lead ECG (05.27.21 @ 07:32) >  Diagnosis Line ATRIAL FIBRILLATION  INCOMPLETE RIGHT BUNDLE BRANCH BLOCK    Echo: < from: Transthoracic Echocardiogram (05.26.21 @ 09:44) >  1. Endocardium not well visualized; overall preserved left  ventricular systolic function. Septal flattening with  paradoxical septal motion consistent with right ventricular  overload.  2. Severe right ventricular enlargement with severely  decreased right ventricular systolic function.  3.Estimated right ventricular systolic pressure equals 67  mm Hg, assuming right atrial pressure equals 15 mm Hg,  consistent with severe pulmonary hypertension.  4. Large pericardial effusion seen adjacent to the right  atrium. The effusion measures up to approximately 3.4 cm  adjacent to the RA in its largest dimension. Thickened  pericardium with small-moderate pericardial effusion seen  posterior and lateral to the LV. The effusion measures up  to approximately 1.5 cm posterior to the LV. No  echocardiographic evidence of pericardial tamponade.    Cath: `< from: Cardiac Cath Lab - Adult (05.27.21 @ 14:49) >  DIAGNOSTIC RECOMMENDATIONS: Kerrick Dm placement via RIJ (catheter was  placed in the ICU prior to the procedure).  CI 1.82 by Janes.  Management as per primary team and ICU.  INTERVENTIONAL RECOMMENDATIONS: Kerrick Dm placement via RIJ (catheter was  placed in the ICU prior to the procedure).  CI 1.82 by Janes.    Imaging: `< from: Xray Chest 1 View- PORTABLE-Urgent (Xray Chest 1 View- PORTABLE-Urgent .) (05.28.21 @ 10:29) >  IMPRESSION:  ET tube tip appropriately positioned above the terry  Stable marked cardiomegaly.  NG tube in stomach - tip is off the film.  Right IJ venous approach Kerrick-Dm catheter tip in right interlobar pulmonary artery.  Interval development of mild pulmonary congestion.  No bilateral focal consolidations.  No large pleural effusion. No pneumothorax.    Interpretation of Telemetry:    HPI:  75y.o female with PMHx of CHFpEF, Severe TR with Right CHF and severe pHTN, COPD on home 02, HTN, HLD, Chronic Afib (on Eliquis), OA, CKD3, DM, right eye cataract who was brought in by EMS to [facility] on 5/23/21 for AMS. pt A&O x1, per daughter patient with generalized weakness and lethargy since since 5/22. Pt had recent in Jan 2021 with CP and NIC. Per chart on arrival to ED pt was hypotensive, obtunded. Chest CT with large pericardial effusion (increased since Jan 2021)- per thoracic surgery consult no indication for pericardial window, pHTN, cirrhotic liver with small ascites, ECHO EF 55% dilated RV with decreased function, severe TN, pHTN, NIC (per renal consult no acute indication for HD), venous duplex NG for DVTs, Head CT revealed chronic involutional and white matter changes, no acute intracranial process, NIC(SCr peaked at 4.6) with  hyperkalemia (5.8) requiring lokelma and insulin.Of note daughter states over past month patient more and more confused ? dementia and pt is legally blind. Daughter states pt HAS NOT RECEIVED COVID VACCINE as she has been sick these past few months. 5/26 Patient transferred to [facility] with concern of pericardial effusion, hypotension and NIC. On arrival patient SBP 80's, A&O to first name but very lethargic.  (26 May 2021 10:16). Echo revealed a pericardial effusion with no tamponade physiology. She was intubated for hypercarbic respiratory failure, with subsequent improvement in BP, now off vasopressors. She is currently volume overloaded in the setting of RV failure and pulmonary hypertension. She was started on IV bumex and sildenafil.     Events:  Overnight Levophed requirements continued to rise from 0.9 to .3. R groin was noted to be swollen and concerning for a hematoma from groin lines that were removed the day prior . Of note her pTT was noted to be > 200 at 4 AM. Heparin gtt was stopped and manual pressure applied for 1 hour. 2 L of LR given and 1 U of PRBCS. At this time pt continued ot become unstable with blood pressure dropping into systolics of 50s and HR up to 160s and CVP reading 0. Everytime manual pressure was stopped hematoma would form again. Vascular surgery called and MTP protocol was applied. Decision was made to reverse coagulopathy first given that both groin lines were small catheter frenches. In total the patient received 11 U of PRBCs, 8 FFP, 2 U PLTS, 3 Calciums and protamine x 2. After coagulopathy was reversed pt stabilized. Lactate peaked at 5.     Review Of Systems:  Unable to attain.     Medications:  atorvastatin 40 milliGRAM(s) Oral at bedtime  cefepime   IVPB      cefepime   IVPB 1000 milliGRAM(s) IV Intermittent every 12 hours  chlorhexidine 0.12% Liquid 15 milliLiter(s) Oral Mucosa every 12 hours  chlorhexidine 2% Cloths 1 Application(s) Topical <User Schedule>  lactated ringers Bolus 500 milliLiter(s) IV Bolus once  norepinephrine Infusion 0.05 MICROgram(s)/kG/Min IV Continuous <Continuous>  propofol Infusion 30 MICROgram(s)/kG/Min IV Continuous <Continuous>  protamine  IVPB 50 milliGRAM(s) IV Intermittent once  sildenafil (REVATIO) 10 milliGRAM(s) Oral every 8 hours  sodium chloride 0.9%. 1000 milliLiter(s) IV Continuous <Continuous>  vasopressin Infusion 0.04 Unit(s)/Min IV Continuous <Continuous>    Vitals:  T(C): 36.9 (05-28-21 @ 03:00), Max: 37 (05-27-21 @ 23:00)  HR: 104 (05-28-21 @ 06:30) (86 - 108)  RR: 16 (05-28-21 @ 06:30) (11 - 40)  SpO2: 100% (05-28-21 @ 06:30) (88% - 100%)    Daily     Daily   I&O's Summary    27 May 2021 07:01  -  28 May 2021 07:00  --------------------------------------------------------  IN: 2297.9 mL / OUT: 3585 mL / NET: -1287.1 mL    Physical Exam:  Appearance: [ X] Normal [X ] NAD  Eyes: [X] PERRL [X ] EOMI  HENT: [X ] Normal oral muscosa [X ]NC/AT  Cardiovascular: [X ] S1 [ X] S2 [ X] RRR. NO edema, NO JVD   Procedural Access Site: RIJ pulmonary artery catheter C/D/I without bleeding, induration, or hematoma.   Respiratory: [X ] Clear to auscultation bilaterally  Gastrointestinal: [X ] Soft [X ] Non-tender [X ] Non-distended. Obese and soft  Musculoskeletal: [ X] No clubbing [X ] No joint deformity   Neurologic: [ X] Non-focal  Lymphatic: [X ] No lymphadenopathy  Psychiatry: [X ] AAOx3 [ X] Mood & affect appropriate  Skin: [X ] No rashes [ X] No ecchymoses [X ] No cyanosis    05-28    140  |  96  |  66<H>  ----------------------------<  134<H>  4.1   |  29  |  4.10<H>    Ca    9.0      28 May 2021 00:28  Phos  5.1     05-28  Mg     2.4     05-28    TPro  6.4  /  Alb  2.6<L>  /  TBili  2.3<H>  /  DBili  x   /  AST  50<H>  /  ALT  29  /  AlkPhos  92  05-28    PT/INR - ( 26 May 2021 15:00 )   PT: 17.5 sec;   INR: 1.49 ratio       PTT - ( 28 May 2021 00:30 )  PTT:>200.0 sec  CARDIAC MARKERS ( 26 May 2021 10:15 )  x     / x     / 52 U/L / x     / 2.2 ng/mL    ECG: < from: 12 Lead ECG (05.27.21 @ 07:32) >  Diagnosis Line ATRIAL FIBRILLATION  INCOMPLETE RIGHT BUNDLE BRANCH BLOCK    Echo: < from: Transthoracic Echocardiogram (05.26.21 @ 09:44) >  1. Endocardium not well visualized; overall preserved left  ventricular systolic function. Septal flattening with  paradoxical septal motion consistent with right ventricular  overload.  2. Severe right ventricular enlargement with severely  decreased right ventricular systolic function.  3.Estimated right ventricular systolic pressure equals 67  mm Hg, assuming right atrial pressure equals 15 mm Hg,  consistent with severe pulmonary hypertension.  4. Large pericardial effusion seen adjacent to the right  atrium. The effusion measures up to approximately 3.4 cm  adjacent to the RA in its largest dimension. Thickened  pericardium with small-moderate pericardial effusion seen  posterior and lateral to the LV. The effusion measures up  to approximately 1.5 cm posterior to the LV. No  echocardiographic evidence of pericardial tamponade.    Cath: `< from: Cardiac Cath Lab - Adult (05.27.21 @ 14:49) >  DIAGNOSTIC RECOMMENDATIONS: [place] Dm placement via RIJ (catheter was  placed in the ICU prior to the procedure).  CI 1.82 by Janes.  Management as per primary team and ICU.  INTERVENTIONAL RECOMMENDATIONS: [place] Dm placement via RIJ (catheter was  placed in the ICU prior to the procedure).  CI 1.82 by Janes.    Imaging: `< from: Xray Chest 1 View- PORTABLE-Urgent (Xray Chest 1 View- PORTABLE-Urgent .) (05.28.21 @ 10:29) >  IMPRESSION:  ET tube tip appropriately positioned above the terry  Stable marked cardiomegaly.  NG tube in stomach - tip is off the film.  Right IJ venous approach [place]-Dm catheter tip in right interlobar pulmonary artery.  Interval development of mild pulmonary congestion.  No bilateral focal consolidations.  No large pleural effusion. No pneumothorax.    Interpretation of Telemetry: AF w/ PVCs , episode of VT during MTP

## 2021-05-28 NOTE — DIETITIAN INITIAL EVALUATION ADULT. - ORAL INTAKE PTA/DIET HISTORY
Pt intubated/sedated. Unable to obtain subjective wt/diet history at this time. Per previous RD note 2/2021, "Daughter did the shopping/cooking, diet PTA: Low sodium, no sugar. Pt c good appetite, ate well PTA & @ present, requested to food when pt was seen". Interim hx unknown. Currently prescribed EN feeds of Jevity 1.2 at 25ml/hr; infusing at 10ml/hr at time of RD visit. No documented food allergies; no indication of prior vitamin/supplement use.

## 2021-05-28 NOTE — PROGRESS NOTE ADULT - SUBJECTIVE AND OBJECTIVE BOX
VASCULAR SURGERY CONSULT NOTE  --------------------------------------------------------------------------------------------  HPI:  75y.o female with PMHx of CHFpEF, Severe TR with Right CHF and severe pHTN, COPD on home , HTN, HLD, Chronic Afib (on Eliquis), OA, CKD3, DM, right eye cataract who was brought in by EMS to [facility] on 21 for AMS. pt A&O x1, per daughter patient with generalized weakness and lethargy since since . Pt had recent in 2021 with CP and NIC. Per chart on arrival to ED pt was hypotensive, obtunded. Chest CT with large pericardial effusion (increased since 2021)- per thoracic surgery consult no indication for pericardial window, pHTN, cirrhotic liver with small ascites, ECHO EF 55% dilated RV with decreased function, severe TN, pHTN, NIC (per renal consult no acute indication for HD), venous duplex NG for DVTs, Head CT revealed chronic involutional and white matter changes, no acute intracranial process, NIC(SCr peaked at 4.6) with  hyperkalemia (5.8) requiring lokelma and insulin.  Of note daughter states over past month patient more and more confused ? dementia and pt is legally blind. Daughter states pt HAS NOT RECEIVED COVID VACCINE as she has been sick these past few months.      Patient transferred to [facility] with concern of pericardial effusion, hypotension and NIC. On arrival patient SBP 80's, A&O to first name but very lethargic.     Patient had central line and Marina in her right groin, she was on hep gtt and was supratherapeutic, he had a expanding hematoma in the right groin and vascualr Surgery was called for possible extravasation.     Patient denies fevers/chills, denies lightheadedness/dizziness, denies SOB/chest pain, denies nausea/vomiting, denies constipation/diarrhea.      ROS: 10-system review is otherwise negative except HPI above.      PAST MEDICAL & SURGICAL HISTORY:  Afib    COPD (chronic obstructive pulmonary disease)    CHF (congestive heart failure)    Dementia      FAMILY HISTORY:  unable to assess    SOCIAL HISTORY:   unable to assess    ALLERGIES: No Known Allergies    HOME MEDICATIONS:    CURRENT MEDICATIONS  MEDICATIONS (STANDING): atorvastatin 40 milliGRAM(s) Oral at bedtime  cefepime   IVPB      cefepime   IVPB 1000 milliGRAM(s) IV Intermittent every 12 hours  propofol Infusion 30 MICROgram(s)/kG/Min IV Continuous <Continuous>  sodium chloride 0.9%. 1000 milliLiter(s) IV Continuous <Continuous>  vasopressin Infusion 0.04 Unit(s)/Min IV Continuous <Continuous>    MEDICATIONS (PRN):  --------------------------------------------------------------------------------------------    Vitals:   T(C): 34.7 (21 @ 14:00), Max: 37 (21 @ 23:00)  HR: 64 (21 @ 16:15) (64 - 158)  BP: 105/44 (21 @ 10:00) (105/44 - 105/44)  RR: 7 (21 @ 16:15) (5 - 40)  SpO2: 99% (21 @ 16:15) (88% - 100%)  CAPILLARY BLOOD GLUCOSE           @ 07:01  -   @ 07:00  --------------------------------------------------------  IN:    Bumetanide: 90 mL    Bumetanide: 202.5 mL    Enteral Tube Flush: 120 mL    Heparin: 155 mL    IV PiggyBack: 150 mL    Jevity 1.2: 150 mL    Norepinephrine: 586.6 mL    Propofol: 593 mL    sodium chloride 0.9%: 240 mL    Vasopressin: 10.8 mL  Total IN: 2297.9 mL    OUT:    Indwelling Catheter - Urethral (mL): 3585 mL  Total OUT: 3585 mL    Total NET: -1287.1 mL       @ 07:01  -   @ 16:34  --------------------------------------------------------  IN:    IV PiggyBack: 100 mL    Norepinephrine: 210.8 mL    PRBCs (Packed Red Blood Cells): 250 mL    Propofol: 175.7 mL    Vasopressin: 21.6 mL  Total IN: 758.1 mL    OUT:    Indwelling Catheter - Urethral (mL): 1400 mL    Jevity 1.2: 0 mL    sodium chloride 0.9%: 0 mL  Total OUT: 1400 mL    Total NET: -641.9 mL        Height (cm): 165.1 ( @ 00:00)  Weight (kg): 112.4 ( @ 00:00)  BMI (kg/m2): 41.2 ( @ 00:00)  BSA (m2): 2.17 ( @ 00:00)    PHYSICAL EXAM:   General: intubated and sedated  Resp: ibtubated  Vascular: RLE DP/PT signals, LLE DP/PT signals. Right groin soft, increase size compared to left.   Musculoskeletal: All 4 extremities moving spontaneously, no limitations.    --------------------------------------------------------------------------------------------    LABS  CBC ( @ 14:59)                              11.7                           8.96    )----------------(  93<L>      82.9<H>% Neutrophils, 6.8<L>% Lymphocytes, ANC: 7.43<H>                              33.6<L>  CBC (05-28 @ 11:56)                              11.5                           8.33    )----------------(  94<L>      --    % Neutrophils, --    % Lymphocytes, ANC: --                                  34.3<L>    BMP ()             144     |  101     |  56<H> 		Ca++ --      Ca 10.4               ---------------------------------( 169<H>		Mg 2.0                3.4<L>  |  24      |  3.21<H>			Ph 5.9<H>  BMP (:)             147<H>  |  101     |  49<H> 		Ca++ --      Ca 7.3<L>             ---------------------------------( 236<H>		Mg 1.8                3.7     |  18<L>   |  2.66<H>			Ph 6.1<H>    LFTs (:)      TPro 5.9<L> / Alb 3.3 / TBili 2.2<H> / DBili -- / AST 42<H> / ALT 25 / AlkPhos 82  LFTs (:57)      TPro 4.3<L> / Alb 2.3<L> / TBili 0.9 / DBili -- / AST 23 / ALT 14 / AlkPhos 42    Coags (:)  aPTT 40.4<H> / INR -- / PT --  Coags (:57)  aPTT 28.0 / INR -- / PT --      ABG (:)     7.51<H> / 38 / 127<H> / 30<H> / 7.2<H> / 99<H>%     Lactate:    ABG ( 11:49)     7.44 / 45 / 189<H> / 30<H> / 5.1<H> / 100<H>%     Lactate:      VBG ( 06:27)     7.45 / 50 / 52<H> / 35<H> / 9.6<H> / 84%     Lactate: 1.6    --------------------------------------------------------------------------------------------    MICROBIOLOGY  Urinalysis ( @ 11:07):     Color: Dark Orange<!> / Appearance: Slightly Turbid<!> / S.021 / pH: 6.0 / Gluc: Negative / Ketones: Trace / Bili: Moderate<!> / Urobili: Negative / Protein :300 mg/dL<!> / Nitrites: Negative / Leuk.Est: Large<!> / RBC: >50<H> / WBC: 10<H> / Sq Epi:  / Non Sq Epi: 0 / Bacteria Many<!>       -> Bronch Wash Combicathtrap Culture ( @ 08:51)       Moderate polymorphonuclear leukocytes per low power field  Rare Squamous epithelial cells per low power field  Moderate Gram Negative Diplococci per oil power field  Rare Gram Positive Cocci in Pairs and Chains per oil power field    NG    Normal Respiratory Karen present    -> .Urine Catheterized Culture ( @ 17:28)     NG    NG    <10,000 CFU/mL Normal Urogenital Karen    -> .Blood Blood Culture ( @ 12:41)     NG    NG    No growth to date.      --------------------------------------------------------------------------------------------    IMAGING  < from: US Duplex Arterial Lower Ext Ltd, Right (21 @ 14:43) >  Impression: Limited study at the bedside. Right groin hematoma. No evidence of pseudoaneurysm.    < end of copied text >

## 2021-05-28 NOTE — DIETITIAN INITIAL EVALUATION ADULT. - ADD RECOMMEND
1. Obtain further subjective wt/diet history from pt/family PRN. 2. Monitor provision of propofol and GI tolerance. RD to remain available to adjust EN formulary, volume/rate PRN. 3. Monitor wt trends, nutrition related labs, skin integrity, hydration status and bowel regularity. 4. Consider Nephro-dylan or multivitamin to aid in prevention of micronutrient deficiencies (if no medication contraindications noted).

## 2021-05-29 NOTE — PROGRESS NOTE ADULT - ASSESSMENT
75F with a pmhx of CHFpEF, Severe TR with Right CHF and severe pHTN, COPD on home 02, HTN, HLD, Chronic Afib (on Eliquis), OA, CKD3, DM, right eye cataract who was brought in by EMS to [facility] on 5/23/21 for AMS, found to have a large pericardial effusion w/o tamponade physiology, without drainable window, intubated (5/26) for acute hypercarbic respiratory failure and worsening AMS, transferred to CICU for hypotension and NIC on CKD in undifferentiated shock, course c/b expanding R thigh hematoma, s/p MTP, with ratio predom targeted towards FFP, s/p protamine for hep reversal.    Neuro:  -Mental status: Sedated on prop at 15. Start daily sedation vacations to assess mental status and readiness for extubation.     Resp:  -(5/26): Intubated , RR (machine): 12, TV (machine): 350, FiO2: 30,  PEEP:5 . ABG 7.45/50/94/34.       CV:  (5/26): TTE: EF 55%, RVH with severe RVSD, severe pulm HTN, large pericardial effusion 3.4cm @ largest diameter, adjacent to RA  ventricular systolic function.  - Pt was on Pressors: levophed, vaso. After aggressve fluid resuscitatton now off pressors with hemodynamics as follows: CVP 4, PAP 60/12, SBP 130s with MAPS 80s. Bumex 2 mg given with good UO  -(5/27): PAC placement in cath lab, trend central sats  -c/w atorvastatin 40mg qhs  -Per HF sildenafil discontinued. Now off pressors after resuscitation.   -Systolic goal: >90, MAP >65  Telemetry    GI:  -Diet: TF with Jevity, goal 25cc/hr cont. Wll switch to Nepro per Nutrtion recs  -Stress ulcer ppx: will add PPI  -Bowel regimen: none  -(5/26): CT a/p noncon: mild contoured liver. Anasarca    Heme:  -Thrombocytopenia  -(5/28): Acute drop in H/H 10->8, 35->24 -> s/p MTP 11 pRBCs , 8 U FFP, 2 U PLTS  -coagulopathy: aPTT >200 ->  protamine sulfate: 50mg x2 -> aPTT 28  -Continue to monitor hemoglobin and hematocrit levels Q 4 hours  -[ ] (5/28): VA duplex arterial and venous RLE: no pseudo  -DVT ppx: scd, hold heparin    Renal:  -Continue to monitor I/Os, BUN/Creatinine, and urine output. Cr bmped to 3.6. Nephro following and diuresing well.   - CVP 4 no further diuresis  - Replete lytes PRN. Keep K> 4 and Mg >2    Endo:  - Monitor blood sugar     ID:  Temperature 97.5 with WBC within normal range   - Continue to monitor temperature and WBC   -(5/26-27): Hypothermic to 34C  - c/w cefepime (5/27-5/28) and vanc (5/27 x1)  - (5/26): UCx, BCx, SCx, MRSA/MSSA swab: NEG    CODE STATUS:  Full code    TUBES/LINES/DRAINS:  -ETT  -OGT  -Hernadez  -PIV 75F with a pmhx of CHFpEF, Severe TR with Right CHF and severe pHTN, COPD on home 02, HTN, HLD, Chronic Afib (on Eliquis), OA, CKD3, DM, right eye cataract who was brought in by EMS to [facility] on 5/23/21 for AMS, found to have a large pericardial effusion w/o tamponade physiology, without drainable window, intubated (5/26) for acute hypercarbic respiratory failure and worsening AMS, transferred to CICU for hypotension and NIC on CKD in undifferentiated shock, course c/b expanding R thigh hematoma, s/p MTP, with ratio predom targeted towards FFP, s/p protamine for hep reversal.    Neuro:  -Mental status: Sedated on prop at 15. Start daily sedation vacations to assess mental status and readiness for extubation.     Resp:  -(5/26): Intubated , RR (machine): 12, TV (machine): 350, FiO2: 30,  PEEP:5 . ABG 7.45/50/94/34. Chest XR showing pulmonary congestion now being diuresed. Daily spontaneous breathing trails.       CV:  (5/26): TTE: EF 55%, RVH with severe RVSD, severe pulm HTN, large pericardial effusion 3.4cm @ largest diameter, adjacent to RA  ventricular systolic function.  - Pt was on Pressors: levophed, vaso. After aggressve fluid resuscitatton now off pressors with hemodynamics as follows: CVP 4, PAP 60/12, SBP 130s with MAPS 80s. Bumex 2 mg given with good UO  -(5/27): PAC placement in cath lab, trend central sats  -c/w atorvastatin 40mg qhs  -Per HF sildenafil discontinued. Now off pressors after resuscitation.   -Systolic goal: >90, MAP >65  Telemetry    GI:  -Diet: TF with Jevity, goal 25cc/hr cont. Wll switch to Nepro per Nutrtion recs  -Stress ulcer ppx: will add PPI  -Bowel regimen: none  -(5/26): CT a/p noncon: mild contoured liver. Anasarca    Heme:  -Thrombocytopenia  -(5/28): Acute drop in H/H 10->8, 35->24 -> s/p MTP 11 pRBCs , 8 U FFP, 2 U PLTS  -coagulopathy: aPTT >200 ->  protamine sulfate: 50mg x2 -> aPTT 28  -Continue to monitor hemoglobin and hematocrit levels Q 4 hours  -[ ] (5/28): VA duplex arterial and venous RLE: no pseudo  -DVT ppx: scd, hold heparin    Renal:  -Continue to monitor I/Os, BUN/Creatinine, and urine output. Cr bmped to 3.6. Nephro following and diuresing well.   - CVP 4 no further diuresis  - Replete lytes PRN. Keep K> 4 and Mg >2    Endo:  - Monitor blood sugar     ID:  Temperature 97.5 with WBC within normal range   - Continue to monitor temperature and WBC   -(5/26-27): Hypothermic to 34C  - c/w cefepime (5/27-5/28) and vanc (5/27 x1)  - (5/26): UCx, BCx, SCx, MRSA/MSSA swab: NEG    CODE STATUS:  Full code    TUBES/LINES/DRAINS:  -ETT  -OGT  -Hernadez  -PIV 75F with a pmhx of CHFpEF, Severe TR with Right CHF and severe pHTN, COPD on home 02, HTN, HLD, Chronic Afib (on Eliquis), OA, CKD3, DM, right eye cataract who was brought in by EMS to [facility] on 5/23/21 for AMS, found to have a large pericardial effusion w/o tamponade physiology, without drainable window, intubated (5/26) for acute hypercarbic respiratory failure and worsening AMS, transferred to CICU for hypotension and NIC on CKD in undifferentiated shock, course c/b expanding R thigh hematoma, s/p MTP, with ratio predom targeted towards FFP, s/p protamine for hep reversal.    Neuro:  -Mental status: Sedated on prop at 15. Start daily sedation vacations to assess mental status and readiness for extubation.     Resp:  -(5/26): Intubated , RR (machine): 12, TV (machine): 350, FiO2: 30,  PEEP:5 . ABG 7.45/50/94/34. Chest XR showing pulmonary congestion now being diuresed. Daily spontaneous breathing trails.       CV:  (5/26): TTE: EF 55%, RVH with severe RVSD, severe pulm HTN, large pericardial effusion 3.4cm @ largest diameter, adjacent to RA  ventricular systolic function.  - Pt was on Pressors: levophed, vaso. After aggressve fluid resuscitatton now off pressors with hemodynamics as follows: CVP 17, PAP   -(5/27): PAC placement in cath lab, trend central sats  -c/w atorvastatin 40mg qhs  -Per HF sildenafil discontinued. Now off pressors after resuscitation.   -Systolic goal: >90, MAP >65  Telemetry    GI:  -Diet: TF with Jevity, goal 25cc/hr cont. Wll switch to Nepro per Nutrtion recs  -Stress ulcer ppx: will add PPI  -Bowel regimen: none  -(5/26): CT a/p noncon: mild contoured liver. Anasarca    Heme:  -Thrombocytopenia  -(5/28): Acute drop in H/H 10->8, 35->24 -> s/p MTP 11 pRBCs , 8 U FFP, 2 U PLTS  -coagulopathy: aPTT >200 ->  protamine sulfate: 50mg x2 -> aPTT 28  -Continue to monitor hemoglobin and hematocrit levels Q 4 hours  -[ ] (5/28): VA duplex arterial and venous RLE: no pseudo  -DVT ppx: scd, hold heparin    Renal:  -Continue to monitor I/Os, BUN/Creatinine, and urine output. Cr bmped to 3.6. Nephro following and diuresing well.   - CVP 4 no further diuresis  - Replete lytes PRN. Keep K> 4 and Mg >2    Endo:  - Monitor blood sugar     ID:  Temperature 97.5 with WBC within normal range   - Continue to monitor temperature and WBC   -(5/26-27): Hypothermic to 34C  - c/w cefepime (5/27-5/28) and vanc (5/27 x1)  - (5/26): UCx, BCx, SCx, MRSA/MSSA swab: NEG    CODE STATUS:  Full code    TUBES/LINES/DRAINS:  -ETT  -OGT  -Hernadez  -PIV 75F with a pmhx of CHFpEF, Severe TR with Right CHF and severe pHTN, COPD on home 02, HTN, HLD, Chronic Afib (on Eliquis), OA, CKD3, DM, right eye cataract who was brought in by EMS to [facility] on 5/23/21 for AMS, found to have a large pericardial effusion w/o tamponade physiology, without drainable window, intubated (5/26) for acute hypercarbic respiratory failure and worsening AMS, transferred to CICU for hypotension and NIC on CKD in undifferentiated shock, course c/b expanding R thigh hematoma, s/p MTP, with ratio predom targeted towards FFP, s/p protamine for hep reversal.    Neuro:  -Mental status: Sedated on prop at 15. Start daily sedation vacations to assess mental status and readiness for extubation.     Resp:  -(5/26): Intubated , RR (machine): 12, TV (machine): 350, FiO2: 30,  PEEP:5 . ABG 7.45/50/94/34. Chest XR showing pulmonary congestion now being diuresed. Daily spontaneous breathing trails.       CV:  (5/26): TTE: EF 55%, RVH with severe RVSD, severe pulm HTN, large pericardial effusion 3.4cm @ largest diameter, adjacent to RA  ventricular systolic function.  - Pt was on Pressors: levophed, vaso. Now on minimal Levophed at 0.09.  After aggressve fluid resuscitatton now with hemodynamics as follows: CVP 17, PAP 60/15, Wedge 18 Janes CO 10, CI 4.5 . No lactate. Central sat 82.  -(5/27): PAC placement in cath lab, trend central sats  -c/w atorvastatin 40mg qhs  -Sildenafil was reinitiated however she appears to be in a high output state and sildenafil may be contributing to her vasodilation and pressor requirements. Will discuss with heart failure.   -Systolic goal: >90, MAP >65  Telemetry    GI:  -Diet: Continue Nepro at 40 and will add prosource  -Stress ulcer ppx:continue PPI  -Bowel regimen: none  -(5/26): CT a/p noncon: mild contoured liver. Anasarca      - Hernadez catheter in place for critical care monitoring    Heme:  -Thrombocytopenia  -(5/28): Acute drop in H/H 10->8, 35->24 -> s/p MTP 11 pRBCs , 8 U FFP, 2 U PLTS  -coagulopathy: aPTT >200 ->  protamine sulfate: 50mg x2 -> aPTT 28  -Continue to monitor hemoglobin and hematocrit levels Q 4 hours  -[ ] (5/28): VA duplex arterial and venous RLE: no pseudo  -DVT ppx: scd, hold heparin    Renal:  -Continue to monitor I/Os, BUN/Creatinine, and urine output. Cr bmped to 3.6. Nephro following and diuresing well.   - CVP 4 no further diuresis  - Replete lytes PRN. Keep K> 4 and Mg >2    Endo:  - Monitor blood sugar     ID:  Temperature 97.5 with WBC within normal range   - Continue to monitor temperature and WBC   -(5/26-27): Hypothermic to 34C  - c/w cefepime (5/27-5/28) and vanc (5/27 x1)  - (5/26): UCx, BCx, SCx, MRSA/MSSA swab: NEG    CODE STATUS:  Full code    TUBES/LINES/DRAINS:  -ETT  -OGT  -Hernadez  -PIV 75F with a pmhx of CHFpEF, Severe TR with Right CHF and severe pHTN, COPD on home 02, HTN, HLD, Chronic Afib (on Eliquis), OA, CKD3, DM, right eye cataract who was brought in by EMS to [facility] on 5/23/21 for AMS, found to have a large pericardial effusion w/o tamponade physiology, without drainable window, intubated (5/26) for acute hypercarbic respiratory failure and worsening AMS, transferred to CICU for hypotension and NIC on CKD in undifferentiated shock, course c/b expanding R thigh hematoma, s/p MTP, with ratio predom targeted towards FFP, s/p protamine for hep reversal.    Neuro:  -Mental status: Sedated on prop at 15. Start daily sedation vacations to assess mental status and readiness for extubation.     Resp:  -(5/26): Intubated , RR (machine): 12, TV (machine): 350, FiO2: 30,  PEEP:5 . ABG 7.45/50/94/34. Chest XR showing pulmonary congestion now being diuresed. Daily spontaneous breathing trails.       CV:  (5/26): TTE: EF 55%, RVH with severe RVSD, severe pulm HTN, large pericardial effusion 3.4cm @ largest diameter, adjacent to RA  ventricular systolic function.  - Pt was on Pressors: levophed, vaso. Now on minimal Levophed at 0.09.  After aggressve fluid resuscitatton now with hemodynamics as follows: CVP 17, PAP 60/15, Wedge 18 Janes CO 10, CI 4.5 . No lactate. Central sat 82.  -(5/27): PAC placement in cath lab, trend central sats  -c/w atorvastatin 40mg qhs  -Sildenafil was reinitiated however she appears to be in a high output state and sildenafil may be contributing to her vasodilation and pressor requirements. Will discuss with heart failure.   -Systolic goal: >90, MAP >65  Telemetry    GI:  -Diet: Continue Nepro at 40 and will add prosource  -Stress ulcer ppx:continue PPI  -Bowel regimen: none  -(5/26): CT a/p noncon: mild contoured liver. Anasarca      - Hernadez catheter in place for critical care monitoring    Heme:  -Thrombocytopenia  -(5/28): Acute drop in H/H 10->8,  with expanding thigh hematoma 35->24 -> s/p MTP 11 pRBCs , 8 U FFP, 2 U PLTS and protamine x 2 due to supratherapeutic pTT > 200 --> 28  -Continue to monitor hemoglobin and hematocrit levels Q 6 hours  -[ ] (5/28): VA duplex arterial and venous RLE: no pseudo  -DVT ppx: scd, hold heparin for now.     Renal:  - Continue to monitor I/Os, BUN/Creatinine, and urine output. Cr bmped to 3.6 now 3.3 Nephro following and diuresing well.   - CVP 17 continue bumex gtt.   - Replete lytes PRN. Keep K> 4 and Mg >2    Endo:  - Monitor blood sugar     ID:  Temperature 97.5 with WBC within normal range   - Continue to monitor temperature and WBC   -(5/26-27): Hypothermic to 34C  - c/w cefepime (5/27-5/28) and vanc (5/27 x1)  - (5/26): UCx, BCx, SCx, MRSA/MSSA swab: NEG    CODE STATUS:  Full code    TUBES/LINES/DRAINS:  -ETT  -OGT  -Hernadez  -PIV 75F with a pmhx of CHFpEF, Severe TR with Right CHF and severe pHTN, COPD on home 02, HTN, HLD, Chronic Afib (on Eliquis), OA, CKD3, DM, right eye cataract who was brought in by EMS to [facility] on 5/23/21 for AMS, found to have a large pericardial effusion w/o tamponade physiology, without drainable window, intubated (5/26) for acute hypercarbic respiratory failure and worsening AMS, transferred to CICU for hypotension and NIC on CKD in undifferentiated shock, course c/b expanding R thigh hematoma, s/p MTP, with ratio predom targeted towards FFP, s/p protamine for hep reversal.    Neuro:  -Mental status: Sedated on prop at 15. Start daily sedation vacations to assess mental status and readiness for extubation.     Resp:  -(5/26): Intubated , RR (machine): 12, TV (machine): 350, FiO2: 30,  PEEP:5 . ABG 7.45/50/94/34. Chest XR showing pulmonary congestion now being diuresed. Daily spontaneous breathing trails.       CV:  (5/26): TTE: EF 55%, RVH with severe RVSD, severe pulm HTN, large pericardial effusion 3.4cm @ largest diameter, adjacent to RA  ventricular systolic function.  - Pt was on Pressors: levophed, vaso. Now on minimal Levophed at 0.09.  After aggressve fluid resuscitatton now with hemodynamics as follows: CVP 17, PAP 60/15, Wedge 18 Janes CO 10, CI 4.5 . No lactate. Central sat 82.  -(5/27): PAC placement in cath lab, trend central sats  -c/w atorvastatin 40mg qhs  -Sildenafil was reinitiated however she appears to be in a high output state and sildenafil may be contributing to her vasodilation and pressor requirements. Will discuss with heart failure.   -Systolic goal: >90, MAP >65  Telemetry    GI:  -Diet: Continue Nepro at 40 and will add prosource  -Stress ulcer ppx:continue PPI  -Bowel regimen: none  -(5/26): CT a/p noncon: mild contoured liver. Anasarca      - Hernadez catheter in place for critical care monitoring    Heme:  -Thrombocytopenia  -(5/28): Acute drop in H/H 10->8,  with expanding thigh hematoma 35->24 -> s/p MTP 11 pRBCs , 8 U FFP, 2 U PLTS and protamine x 2 due to supratherapeutic pTT > 200 --> 28  -Continue to monitor hemoglobin and hematocrit levels Q 6 hours  -[ ] (5/28): VA duplex arterial and venous RLE: no pseudo  -DVT ppx: scd, hold heparin for now.     Renal:  - Continue to monitor I/Os, BUN/Creatinine, and urine output. Cr bmped to 3.6 now 3.3 Nephro following and diuresing well.   - CVP 17 continue bumex gtt.   - Replete lytes PRN. Keep K> 4 and Mg >2    Endo:  - Monitor blood sugar 100-130    ID:  Hypothermic 92 with WBC within normal range   - Continue to monitor temperature and WBC   - c/w cefepime (5/27-5/28) and vanc (5/27 x1)  - (5/26): UCx, BCx, SCx, MRSA/MSSA swab: NEG    CODE STATUS:  Full code    TUBES/LINES/DRAINS:  -ETT  -OGT  -Hernadez  -PIV 75F with a pmhx of CHFpEF, Severe TR with Right CHF and severe pHTN, COPD on home 02, HTN, HLD, Chronic Afib (on Eliquis), OA, CKD3, DM, right eye cataract who was brought in by EMS to [facility] on 5/23/21 for AMS, found to have a large pericardial effusion w/o tamponade physiology, without drainable window, intubated (5/26) for acute hypercarbic respiratory failure and worsening AMS, transferred to CICU for hypotension and NIC on CKD in undifferentiated shock, course c/b expanding R thigh hematoma, s/p MTP, with ratio predom targeted towards FFP, s/p protamine for hep reversal.    Neuro:  -Mental status: Sedated on prop at 15. Start daily sedation vacations to assess mental status and readiness for extubation.     Resp:  -(5/26): Intubated , RR (machine): 12, TV (machine): 350, FiO2: 30,  PEEP:5 . ABG 7.45/50/94/34. Chest XR showing pulmonary congestion now being diuresed. Daily spontaneous breathing trails.       CV:  (5/26): TTE: EF 55%, RVH with severe RVSD, severe pulm HTN, large pericardial effusion 3.4cm @ largest diameter, adjacent to RA  ventricular systolic function.  - Pt was on Pressors: levophed, vaso. Now on minimal Levophed at 0.09.  After aggressve fluid resuscitatton now with hemodynamics as follows: CVP 17, PAP 60/15, Wedge 18 Janes CO 10, CI 4.5 . No lactate. Central sat 82.  -(5/27): PAC placement in cath lab, trend central sats  -c/w atorvastatin 40mg qhs  -Sildenafil was reinitiated however she appears to be in a high output state and sildenafil may be contributing to her vasodilation and pressor requirements. Will discuss with heart failure.   -Systolic goal: >90, MAP >65  Telemetry    GI:  -Diet: Continue Nepro at 40 and will add prosource  -Stress ulcer ppx:continue PPI  -Bowel regimen: none  -(5/26): CT a/p noncon: mild contoured liver. Anasarca      - Hernadez catheter in place for critical care monitoring    Heme:  -Thrombocytopenia  -(5/28): Acute drop in H/H 10->8,  with expanding thigh hematoma 35->24 -> s/p MTP 11 pRBCs , 8 U FFP, 2 U PLTS and protamine x 2 due to supratherapeutic pTT > 200 --> 28  -Continue to monitor hemoglobin and hematocrit levels Q 6 hours  -[ ] (5/28): VA duplex arterial and venous RLE: no pseudo  -DVT ppx: scd, hold heparin for now.     Renal:  - Continue to monitor I/Os, BUN/Creatinine, and urine output. Cr bmped to 3.6 now 3.3 Nephro following and diuresing well.   - CVP 17 continue bumex gtt.   - Replete lytes PRN. Keep K> 4 and Mg >2    Endo:  - Monitor blood sugar 100-130    ID:  Hypothermic 92 with WBC within normal range   - Continue to monitor temperature and WBC   - c/w cefepime (5/27- ,- Combicath 5/26 w/ moderate GN diplococci and rare GPC in pairs and chains   - (5/26): UCx, BCx, SCx, MRSA/MSSA swab: NEG    CODE STATUS:  Full code    TUBES/LINES/DRAINS:  -ETT  -OGT  -Hernadez  -PIV 75F with a pmhx of CHFpEF, Severe TR with Right CHF and severe pHTN, COPD on home 02, HTN, HLD, Chronic Afib (on Eliquis), OA, CKD3, DM, right eye cataract who was brought in by EMS to [facility] on 5/23/21 for AMS, found to have a large pericardial effusion w/o tamponade physiology, without drainable window, intubated (5/26) for acute hypercarbic respiratory failure and worsening AMS, transferred to CICU for hypotension and NIC on CKD in undifferentiated shock, course c/b expanding R thigh hematoma, s/p MTP, with ratio predom targeted towards FFP, s/p protamine for hep reversal.    Neuro:  -Mental status: Sedated on prop at 15. Start daily sedation vacations to assess mental status and readiness for extubation.     Resp:  -(5/26): Intubated , RR (machine): 12, TV (machine): 350, FiO2: 30,  PEEP:5 . ABG 7.45/50/94/34. Chest XR showing pulmonary congestion now being diuresed. Daily spontaneous breathing trails.       CV:  (5/26): TTE: EF 55%, RVH with severe RVSD, severe pulm HTN, large pericardial effusion 3.4cm @ largest diameter, adjacent to RA  ventricular systolic function.  - Pt was on Pressors: levophed, vaso. Now on minimal Levophed at 0.09.  After aggressve fluid resuscitatton now with hemodynamics as follows: CVP 17, PAP 60/15, Wedge 18 Janes CO 10, CI 4.5 . No lactate. Central sat 82.  -(5/27): PAC placement in cath lab, trend central sats  -c/w atorvastatin 40mg qhs  -Sildenafil was reinitiated however she appears to be in a high output state and sildenafil may be contributing to her vasodilation and pressor requirements. Will discuss with heart failure.   -Systolic goal: >90, MAP >65  Telemetry    GI:  -Diet: Continue Nepro at 40 and will add prosource  -Stress ulcer ppx:continue PPI  -Bowel regimen: none  -(5/26): CT a/p noncon: mild contoured liver. Anasarca      - Hernadez catheter in place for critical care monitoring    Heme:  -Thrombocytopenia  -(5/28): Acute drop in H/H 10->8,  with expanding thigh hematoma 35->24 -> s/p MTP 11 pRBCs , 8 U FFP, 2 U PLTS and protamine x 2 due to supratherapeutic pTT > 200 --> 28  -Continue to monitor hemoglobin and hematocrit levels Q 6 hours  -[ ] (5/28): VA duplex arterial and venous RLE: no pseudo  -DVT ppx: scd, hold heparin for now.     Renal:  - Continue to monitor I/Os, BUN/Creatinine, and urine output. Cr bmped to 3.6 now 3.3 Nephro following and diuresing well.   - CVP 17 continue bumex gtt.   - Replete lytes PRN. Keep K> 4 and Mg >2    Endo:  - Monitor blood sugar 100-130    ID:  Hypothermic 92 with WBC within normal range   - Continue to monitor temperature and WBC   - c/w cefepime (5/27- ,- Combicath 5/26 w/ moderate GN diplococci and rare GPC in pairs and chains . Will discuss with attending whether switching to a 3rd generation cephalosporin  - (5/26): UCx, BCx, SCx, MRSA/MSSA swab: NEG    CODE STATUS:  Full code    TUBES/LINES/DRAINS:  -ETT  -OGT  -Hernadez  -PIV

## 2021-05-29 NOTE — PROGRESS NOTE ADULT - ATTENDING COMMENTS
Pulmonary hypertension with R heart failure  Supertherapeutic PTT after starting Heparin drip, prior R femoral harshil site began to bleed and she became unstable  Massive transfusion protocol initiated x2, Protamine given, Levophed requirement as high as 1.5 mcg/kg/min, now off  Sedated with Propofol, synchronous with the vent - wean sedation  Acute hypoxic respiratory failure requiring mechanical ventilation with minimal vent requirements - attempt breathing trials  PASP 50s-60s, tolerating Sildenafil - will continue   Tolerating tube feeds  Non-oliguric NIC, Renal following - diurese for 1-2L overall negative  Hemoglobin low but acceptable post transfusions yesterday; hold anticoagulation  Hypothermic, unclear etiology; pancultured on empiric broad spectrum antibiotics  Sugars controlled  RIJ Cordis 5/26, [place] 5/27 and radial harshil 5/27 Pulmonary hypertension with R heart failure  Sedated with Propofol, synchronous with the vent - wean sedation off to assess mental status  Vasodilatory shock requiring Levophed infusion - wean as able  Acute hypoxic respiratory failure requiring mechanical ventilation with minimal vent requirements - attempt breathing trials  Sildenafil off due to pressor requirement  Tolerating tube feeds  Non-oliguric NIC, Renal following - diurese for 1-2L overall negative  Hemoglobin low but acceptable post transfusions yesterday; hold anticoagulation  Hypothermic, unclear etiology; pancultured on empiric broad spectrum antibiotics  Sugars controlled  RIJ Cordis 5/26, [place] 5/27 and radial harshil 5/27

## 2021-05-29 NOTE — PROGRESS NOTE ADULT - PROBLEM SELECTOR PLAN 1
- Titrate bumex drip for gentle negative balance  - Once extubated and off sedation, would try and wean off pressors  - Once off pressors would start sildenafil and uptitrate 10 to 20 to 30 tid as tolerated  - Trend hemodynamics and perfusion labs  - Would not drain pericardial effusion as it is likely compensatory in setting of RV failure

## 2021-05-29 NOTE — PROGRESS NOTE ADULT - ASSESSMENT
Pt is a 75 year old woman with a PMHx of HFpEF, severe pHTN, severe TR with right-sided HF, COPD on 3L home O2, HTN, HLD, chronic afib (on Eliquis), OA, CKD3, DM, right eye cataract who was brought in by EMS to [facility] on 5/23/21 for AMS transferred here for hypotension w/o concern for pericardial tamponade. On arrival here she was hypotensive requiring jorge a/levo/vaso. She was intubated for hypercarbic respiratory failure, with subsequent improvement in BP and weaned off vasopressors. She was diuresing on a Bumex gtt and started on sildenafil on 5/27, but on 5/28 she was found to have profound bleeding from femoral a-line site in the setting of supratherapeutic aPTT. MTP initiated x2 and is s/p 11U PRBC, 8 plasma, 2 plts. She remains intubated on minimal vent settings. She is diuresing on a Bumex gtt and remains on low dose pressors.    Bedside hemodynamics:  5/29: CVP 14, PA 58/15/31, PAsat 82%, AOsat 97%, CO/CI (F) 11.6/5.1, MAP 79, SVR 1020

## 2021-05-29 NOTE — PROGRESS NOTE ADULT - SUBJECTIVE AND OBJECTIVE BOX
Events:    Review Of Systems:  Constitutional: denies fever, chills, Fatigue   HEENT: denies Blurred vision, Eye Pain, Headache   Respiratory: denies Cough, Wheezing , Shortness of breath  Cardiovascular: denies Chest Pain, Palpitations,  BERMEO   Gastrointestinal: denies Abdominal Pain, Diarrhea, Constipation   Genitourinary: denies Nocturia, Dysuria, Incontinence  Extremities: denies Swelling, Joint Pain  Neurologic: denies Focal deficit, Paresthesias, Syncope  Lymphatic: denies Swelling, Lymphadenopathy   Skin: denies Rash, Ecchymoses, Wounds   Psychiatry: denies Depression, Suicidal/Homicidal Ideation, anxiety  [X ] 10 point review of systems is otherwise negative except as mentioned above         Medications:  atorvastatin 40 milliGRAM(s) Oral at bedtime  buMETAnide Infusion 2 mG/Hr IV Continuous <Continuous>  cefepime   IVPB      cefepime   IVPB 1000 milliGRAM(s) IV Intermittent every 12 hours  chlorhexidine 0.12% Liquid 15 milliLiter(s) Oral Mucosa every 12 hours  chlorhexidine 2% Cloths 1 Application(s) Topical <User Schedule>  norepinephrine Infusion 0.03 MICROgram(s)/kG/Min IV Continuous <Continuous>  propofol Infusion 25 MICROgram(s)/kG/Min IV Continuous <Continuous>  sildenafil (REVATIO) 10 milliGRAM(s) Oral every 8 hours  sodium chloride 0.9%. 1000 milliLiter(s) IV Continuous <Continuous>    PMH/PSH/FH/SH: [ ] Unchanged  Vitals:  T(C): 34.7 (21 @ 06:00), Max: 36.3 (21 @ 08:00)  HR: 76 (21 @ 06:45) (56 - 158)  BP: 105/44 (21 @ 10:00) (105/44 - 105/44)  BP(mean): 61 (21 @ 10:15) (61 - 101)  RR: 12 (21 @ 06:45) (4 - 38)  SpO2: 100% (21 @ 06:45) (88% - 100%)  Wt(kg): --  Daily     Daily Weight in k.5 (29 May 2021 04:00)  I&O's Summary    27 May 2021 07:01  -  28 May 2021 07:00  --------------------------------------------------------  IN: 2297.9 mL / OUT: 3585 mL / NET: -1287.1 mL    28 May 2021 07:01  -  29 May 2021 06:58  --------------------------------------------------------  IN: 1700.1 mL / OUT: 2495 mL / NET: -794.9 mL        Physical Exam:  Appearance: [ ] Normal [ ] NAD  Eyes: [ ] PERRL [ ] EOMI  HENT: [ ] Normal oral muscosa [ ]NC/AT  Cardiovascular: [ ] S1 [ ] S2 [ ] RRR [ ] No m/r/g [ ]No edema [ ] JVP  Procedural Access Site: [ ] No hematoma [ ] Non-tender to palpation [ ] 2+ pulse [ ] No bruit [ ] No Ecchymosis  Respiratory: [ ] Clear to auscultation bilaterally  Gastrointestinal: [ ] Soft [ ] Non-tender [ ] Non-distended [ ] BS+  Musculoskeletal: [ ] No clubbing [ ] No joint deformity   Neurologic: [ ] Non-focal  Lymphatic: [ ] No lymphadenopathy  Psychiatry: [ ] AAOx3 [ ] Mood & affect appropriate  Skin: [ ] No rashes [ ] No ecchymoses [ ] No cyanosis        144  |  101  |  60<H>  ----------------------------<  91  4.0   |  27  |  3.31<H>    Ca    9.6      29 May 2021 03:37  Phos  5.4       Mg     2.1         TPro  5.6<L>  /  Alb  3.1<L>  /  TBili  4.1<H>  /  DBili  x   /  AST  41<H>  /  ALT  26  /  AlkPhos  84      PTT - ( 29 May 2021 03:37 )  PTT:32.5 sec              ECG:    Echo:    Stress Testing:     Cath:    Imaging:    Interpretation of Telemetry:   75y.o female with PMHx of CHFpEF, Severe TR with Right CHF and severe pHTN, COPD on home , HTN, HLD, Chronic Afib (on Eliquis), OA, CKD3, DM, right eye cataract who was brought in by EMS to [facility] on 21 for AMS. pt A&O x1, per daughter patient with generalized weakness and lethargy since since . Pt had recent in 2021 with CP and NIC. Per chart on arrival to ED pt was hypotensive, obtunded. Chest CT with large pericardial effusion (increased since 2021)- per thoracic surgery consult no indication for pericardial window, pHTN, cirrhotic liver with small ascites, ECHO EF 55% dilated RV with decreased function, severe TN, pHTN, NIC (per renal consult no acute indication for HD), venous duplex NG for DVTs, Head CT revealed chronic involutional and white matter changes, no acute intracranial process, NIC(SCr peaked at 4.6) with  hyperkalemia (5.8) requiring lokelma and insulin.Of note daughter states over past month patient more and more confused ? dementia and pt is legally blind. Daughter states pt HAS NOT RECEIVED COVID VACCINE as she has been sick these past few months.  Patient transferred to [facility] with concern of pericardial effusion, hypotension and NIC. On arrival patient SBP 80's, A&O to first name but very lethargic.  (26 May 2021 10:16). Echo revealed a pericardial effusion with no tamponade physiology. She was intubated for hypercarbic respiratory failure, with subsequent improvement in BP, now off vasopressors. She is currently volume overloaded in the setting of RV failure and pulmonary hypertension. She was started on IV bumex and sildenafil.     oN  Levophed requirements continued to rise from 0.9 to .3. R groin was noted to be swollen and concerning for a hematoma from groin lines that were removed the day prior . Of note her pTT was noted to be > 200 at 4 AM. Heparin gtt was stopped and manual pressure applied for 1 hour. 2 L of LR given and 1 U of PRBCS. At this time pt continued ot become unstable with blood pressure dropping into systolics of 50s and HR up to 160s and CVP reading 0. Everytime manual pressure was stopped hematoma would form again. Vascular surgery called and MTP protocol was applied. Decision was made to reverse coagulopathy first given that both groin lines were small catheter frenches. In total the patient received 11 U of PRBCs, 8 FFP, 2 U PLTS, 3 Calciums and protamine x 2. After coagulopathy was reversed pt stabilized. Lactate peaked at 5.     Events: oVERNIGHT PT cvp 17, CBC stabilized and Bumex gtt started with good urine output. Pt has remained hypothermic overnight with jimmy hugger placed.     Review Of Systems:  Unable to attain as pt is sedated.     Medications:  atorvastatin 40 milliGRAM(s) Oral at bedtime  buMETAnide Infusion 2 mG/Hr IV Continuous <Continuous>  cefepime   IVPB      cefepime   IVPB 1000 milliGRAM(s) IV Intermittent every 12 hours  chlorhexidine 0.12% Liquid 15 milliLiter(s) Oral Mucosa every 12 hours  chlorhexidine 2% Cloths 1 Application(s) Topical <User Schedule>  norepinephrine Infusion 0.03 MICROgram(s)/kG/Min IV Continuous <Continuous>  propofol Infusion 25 MICROgram(s)/kG/Min IV Continuous <Continuous>  sildenafil (REVATIO) 10 milliGRAM(s) Oral every 8 hours  sodium chloride 0.9%. 1000 milliLiter(s) IV Continuous <Continuous>    Vitals:  T(C): 34.7 (21 @ 06:00), Max: 36.3 (21 @ 08:00)  HR: 76 (21 @ 06:45) (56 - 158)  BP: 105/44 (21 @ 10:00) (105/44 - 105/44)  BP(mean): 61 (21 @ 10:15) (61 - 101)  RR: 12 (21 @ 06:45) (4 - 38)  SpO2: 100% (21 @ 06:45) (88% - 100%)    Daily     Daily Weight in k.5 (29 May 2021 04:00)  I&O's Summary    27 May 2021 07:01  -  28 May 2021 07:00  --------------------------------------------------------  IN: 2297.9 mL / OUT: 3585 mL / NET: -1287.1 mL    28 May 2021 07:01  -  29 May 2021 06:58  --------------------------------------------------------  IN: 1700.1 mL / OUT: 2495 mL / NET: -794.9 mL    Physical Exam:  Appearance: sedated and intubated   Eyes: [ X] PERRL [X ] EOMI. R eye with gray discoloration  HENT: [ X] Normal oral muscosa [X ]NC/AT  Cardiovascular: [ X] S1 [ X] S2 [ X] RRR . No JVD . +3 edema B/L LE  Procedural Access Site: R groin access site with resolving hematoma. Ecchymosis, redness noted in the medial thigh. No active bleeding. Soft. Some blistering noted. No drainage,   Respiratory: [X ] Clear to auscultation bilaterally, intubated   Gastrointestinal: [ X] Soft [X ] Non-tender [ X] Non-distended   Musculoskeletal: [X ] No clubbing [ X] No joint deformity   Neurologic: [X ] Non-focal  Lymphatic: [ X] No lymphadenopathy  Psychiatry: [X ] AAOx3 [ X] Mood & affect appropriate  Skin: see above        144  |  101  |  60<H>  ----------------------------<  91  4.0   |  27  |  3.31<H>    Ca    9.6      29 May 2021 03:37  Phos  5.4       Mg     2.1         TPro  5.6<L>  /  Alb  3.1<L>  /  TBili  4.1<H>  /  DBili  x   /  AST  41<H>  /  ALT  26  /  AlkPhos  84      PTT - ( 29 May 2021 03:37 )  PTT:32.5 sec    ECG:     Echo:    Cath:    Imaging:    Interpretation of Telemetry:   75y.o female with PMHx of CHFpEF, Severe TR with Right CHF and severe pHTN, COPD on home , HTN, HLD, Chronic Afib (on Eliquis), OA, CKD3, DM, right eye cataract who was brought in by EMS to [facility] on 21 for AMS. pt A&O x1, per daughter patient with generalized weakness and lethargy since since . Pt had recent in 2021 with CP and NIC. Per chart on arrival to ED pt was hypotensive, obtunded. Chest CT with large pericardial effusion (increased since 2021)- per thoracic surgery consult no indication for pericardial window, pHTN, cirrhotic liver with small ascites, ECHO EF 55% dilated RV with decreased function, severe TN, pHTN, NIC (per renal consult no acute indication for HD), venous duplex NG for DVTs, Head CT revealed chronic involutional and white matter changes, no acute intracranial process, NIC(SCr peaked at 4.6) with  hyperkalemia (5.8) requiring lokelma and insulin.Of note daughter states over past month patient more and more confused ? dementia and pt is legally blind. Daughter states pt HAS NOT RECEIVED COVID VACCINE as she has been sick these past few months.  Patient transferred to [facility] with concern of pericardial effusion, hypotension and NIC. On arrival patient SBP 80's, A&O to first name but very lethargic.  (26 May 2021 10:16). Echo revealed a pericardial effusion with no tamponade physiology. She was intubated for hypercarbic respiratory failure, with subsequent improvement in BP, now off vasopressors. She is currently volume overloaded in the setting of RV failure and pulmonary hypertension. She was started on IV bumex and sildenafil.     oN  Levophed requirements continued to rise from 0.9 to .3. R groin was noted to be swollen and concerning for a hematoma from groin lines that were removed the day prior . Of note her pTT was noted to be > 200 at 4 AM. Heparin gtt was stopped and manual pressure applied for 1 hour. 2 L of LR given and 1 U of PRBCS. At this time pt continued ot become unstable with blood pressure dropping into systolics of 50s and HR up to 160s and CVP reading 0. Everytime manual pressure was stopped hematoma would form again. Vascular surgery called and MTP protocol was applied. Decision was made to reverse coagulopathy first given that both groin lines were small catheter frenches. In total the patient received 11 U of PRBCs, 8 FFP, 2 U PLTS, 3 Calciums and protamine x 2. After coagulopathy was reversed pt stabilized. Lactate peaked at 5.     Events: oVERNIGHT PT cvp 17, CBC stabilized and Bumex gtt started with good urine output. Pt has remained hypothermic overnight with jimmy hugger placed.     Review Of Systems:  Unable to attain as pt is sedated.     Medications:  atorvastatin 40 milliGRAM(s) Oral at bedtime  buMETAnide Infusion 2 mG/Hr IV Continuous <Continuous>  cefepime   IVPB      cefepime   IVPB 1000 milliGRAM(s) IV Intermittent every 12 hours  chlorhexidine 0.12% Liquid 15 milliLiter(s) Oral Mucosa every 12 hours  chlorhexidine 2% Cloths 1 Application(s) Topical <User Schedule>  norepinephrine Infusion 0.03 MICROgram(s)/kG/Min IV Continuous <Continuous>  propofol Infusion 25 MICROgram(s)/kG/Min IV Continuous <Continuous>  sildenafil (REVATIO) 10 milliGRAM(s) Oral every 8 hours  sodium chloride 0.9%. 1000 milliLiter(s) IV Continuous <Continuous>    Vitals:  T(C): 34.7 (21 @ 06:00), Max: 36.3 (21 @ 08:00)  HR: 76 (21 @ 06:45) (56 - 158)  BP: 105/44 (21 @ 10:00) (105/44 - 105/44)  BP(mean): 61 (21 @ 10:15) (61 - 101)  RR: 12 (21 @ 06:45) (4 - 38)  SpO2: 100% (21 @ 06:45) (88% - 100%)    Daily     Daily Weight in k.5 (29 May 2021 04:00)  I&O's Summary    27 May 2021 07:01  -  28 May 2021 07:00  --------------------------------------------------------  IN: 2297.9 mL / OUT: 3585 mL / NET: -1287.1 mL    28 May 2021 07:01  -  29 May 2021 06:58  --------------------------------------------------------  IN: 1700.1 mL / OUT: 2495 mL / NET: -794.9 mL    Physical Exam:  Appearance: sedated and intubated   Eyes: [ X] PERRL [X ] EOMI. R eye with gray discoloration  HENT: [ X] Normal oral muscosa [X ]NC/AT  Cardiovascular: [ X] S1 [ X] S2 [ X] RRR . No JVD . +3 edema B/L LE  Procedural Access Site: R groin access site with resolving hematoma. Ecchymosis, redness noted in the medial thigh. No active bleeding. Soft. Some blistering noted. No drainage,   Respiratory: [X ] Clear to auscultation bilaterally, intubated   Gastrointestinal: [ X] Soft [X ] Non-tender [ X] Non-distended   Musculoskeletal: [X ] No clubbing [ X] No joint deformity   Neurologic: [X ] Non-focal  Lymphatic: [ X] No lymphadenopathy  Psychiatry: [X ] AAOx3 [ X] Mood & affect appropriate  Skin: see above        144  |  101  |  60<H>  ----------------------------<  91  4.0   |  27  |  3.31<H>    Ca    9.6      29 May 2021 03:37  Phos  5.4       Mg     2.1         TPro  5.6<L>  /  Alb  3.1<L>  /  TBili  4.1<H>  /  DBili  x   /  AST  41<H>  /  ALT  26  /  AlkPhos  84      PTT - ( 29 May 2021 03:37 )  PTT:32.5 sec    ECG: < from: 12 Lead ECG (21 @ 07:32) >  Diagnosis Line ATRIAL FIBRILLATION  INCOMPLETE RIGHT BUNDLE BRANCH BLOCK  SEPTAL INFARCT , AGE UNDETERMINED  ABNORMAL ECG    Echo: < from: Transthoracic Echocardiogram (21 @ 09:44) >  1. Endocardium not well visualized; overall preserved left  ventricular systolic function. Septal flattening with  paradoxical septal motion consistent with right ventricular  overload.  2. Severe right ventricular enlargement with severely  decreased right ventricular systolic function.  3.Estimated right ventricular systolic pressure equals 67  mm Hg, assuming right atrial pressure equals 15 mm Hg,  consistent with severe pulmonary hypertension.  4. Large pericardial effusion seen adjacent to the right  atrium. The effusion measures up to approximately 3.4 cm  adjacent to the RA in its largest dimension. Thickened  pericardium with small-moderate pericardial effusion seen  posterior and lateral to the LV. The effusion measures up  to approximately 1.5 cm posterior to the LV. No  echocardiographic evidence of pericardial tamponade.  Results discussed with physician assistant Brayan from  CTICU.    Cath: < from: Cardiac Cath Lab - Adult (21 @ 14:49) >  DIAGNOSTIC RECOMMENDATIONS: [place] Dm placement via RIJ (catheter was  placed in the ICU prior to the procedure).  CI 1.82 by Janes.  Management as per primary team and ICU.  INTERVENTIONAL RECOMMENDATIONS: [place] Dm placement via RIJ (catheter was  placed in the ICU prior to the procedure).  CI 1.82 by Janes.    Imaging: < from: Xray Chest 1 View- PORTABLE-Urgent (Xray Chest 1 View- PORTABLE-Urgent .) (21 @ 10:29) >  IMPRESSION:  ET tube tip appropriately positioned above the terry  Stable marked cardiomegaly.  NG tube in stomach - tip is off the film.  Right IJ venous approach [place]-Dm catheter tip in right interlobar pulmonary artery.  Interval development of mild pulmonary congestion.  No bilateral focal consolidations.  No large pleural effusion. No pneumothorax.    Interpretation of Telemetry: SR 90 w/ occasional PVCs and bigeminy   75y.o female with PMHx of CHFpEF, Severe TR with Right CHF and severe pHTN, COPD on home , HTN, HLD, Chronic Afib (on Eliquis), OA, CKD3, DM, right eye cataract who was brought in by EMS to [facility] on 21 for AMS. pt A&O x1, per daughter patient with generalized weakness and lethargy since since . Pt had recent in 2021 with CP and NIC. Per chart on arrival to ED pt was hypotensive, obtunded. Chest CT with large pericardial effusion (increased since 2021)- per thoracic surgery consult no indication for pericardial window, pHTN, cirrhotic liver with small ascites, ECHO EF 55% dilated RV with decreased function, severe TN, pHTN, NIC (per renal consult no acute indication for HD), venous duplex NG for DVTs, Head CT revealed chronic involutional and white matter changes, no acute intracranial process, NIC(SCr peaked at 4.6) with  hyperkalemia (5.8) requiring lokelma and insulin.Of note daughter states over past month patient more and more confused ? dementia and pt is legally blind. Daughter states pt HAS NOT RECEIVED COVID VACCINE as she has been sick these past few months.  Patient transferred to [facility] with concern of pericardial effusion, hypotension and NIC. On arrival patient SBP 80's, A&O to first name but very lethargic.  (26 May 2021 10:16). Echo revealed a pericardial effusion with no tamponade physiology. She was intubated for hypercarbic respiratory failure, with subsequent improvement in BP, now off vasopressors. She is currently volume overloaded in the setting of RV failure and pulmonary hypertension. She was started on IV bumex and sildenafil.     oN  Levophed requirements continued to rise from 0.9 to .3. R groin was noted to be swollen and concerning for a hematoma from groin lines that were removed the day prior . Of note her pTT was noted to be > 200 at 4 AM. Heparin gtt was stopped and manual pressure applied for 1 hour. 2 L of LR given and 1 U of PRBCS. At this time pt continued ot become unstable with blood pressure dropping into systolics of 50s and HR up to 160s and CVP reading 0. Everytime manual pressure was stopped hematoma would form again. Vascular surgery called and MTP protocol was applied. Decision was made to reverse coagulopathy first given that both groin lines were small catheter frenches. In total the patient received 11 U of PRBCs, 8 FFP, 2 U PLTS, 3 Calciums and protamine x 2. After coagulopathy was reversed pt stabilized. Lactate peaked at 5.     Events: oVERNIGHT PT cvp 17, CBC stabilized and Bumex gtt started with good urine output. Pt has remained hypothermic overnight with jimmy hugger placed. On Levophed at 0.09 mcg.     Review Of Systems:  Unable to attain as pt is sedated.     Medications:  atorvastatin 40 milliGRAM(s) Oral at bedtime  buMETAnide Infusion 2 mG/Hr IV Continuous <Continuous>  cefepime   IVPB      cefepime   IVPB 1000 milliGRAM(s) IV Intermittent every 12 hours  chlorhexidine 0.12% Liquid 15 milliLiter(s) Oral Mucosa every 12 hours  chlorhexidine 2% Cloths 1 Application(s) Topical <User Schedule>  norepinephrine Infusion 0.03 MICROgram(s)/kG/Min IV Continuous <Continuous>  propofol Infusion 25 MICROgram(s)/kG/Min IV Continuous <Continuous>  sildenafil (REVATIO) 10 milliGRAM(s) Oral every 8 hours  sodium chloride 0.9%. 1000 milliLiter(s) IV Continuous <Continuous>    Vitals:  T(C): 34.7 (21 @ 06:00), Max: 36.3 (21 @ 08:00)  HR: 76 (21 @ 06:45) (56 - 158)  BP: 105/44 (21 @ 10:00) (105/44 - 105/44)  BP(mean): 61 (21 @ 10:15) (61 - 101)  RR: 12 (21 @ 06:45) (4 - 38)  SpO2: 100% (21 @ 06:45) (88% - 100%)    Daily     Daily Weight in k.5 (29 May 2021 04:00)  I&O's Summary    27 May 2021 07:01  -  28 May 2021 07:00  --------------------------------------------------------  IN: 2297.9 mL / OUT: 3585 mL / NET: -1287.1 mL    28 May 2021 07:01  -  29 May 2021 06:58  --------------------------------------------------------  IN: 1700.1 mL / OUT: 2495 mL / NET: -794.9 mL    Physical Exam:  Appearance: sedated and intubated   Eyes: [ X] PERRL [X ] EOMI. R eye with gray discoloration  HENT: [ X] Normal oral muscosa [X ]NC/AT  Cardiovascular: [ X] S1 [ X] S2 [ X] RRR . No JVD . +3 edema B/L LE  Procedural Access Site: R groin access site with resolving hematoma. Ecchymosis, redness noted in the medial thigh. No active bleeding. Soft. Some blistering noted. No drainage,   Respiratory: [X ] Clear to auscultation bilaterally, intubated   Gastrointestinal: [ X] Soft [X ] Non-tender [ X] Non-distended   Musculoskeletal: [X ] No clubbing [ X] No joint deformity   Neurologic: [X ] Non-focal  Lymphatic: [ X] No lymphadenopathy  Psychiatry: [X ] AAOx3 [ X] Mood & affect appropriate  Skin: see above        144  |  101  |  60<H>  ----------------------------<  91  4.0   |  27  |  3.31<H>    Ca    9.6      29 May 2021 03:37  Phos  5.4       Mg     2.1         TPro  5.6<L>  /  Alb  3.1<L>  /  TBili  4.1<H>  /  DBili  x   /  AST  41<H>  /  ALT  26  /  AlkPhos  84      PTT - ( 29 May 2021 03:37 )  PTT:32.5 sec    ECG: < from: 12 Lead ECG (21 @ 07:32) >  Diagnosis Line ATRIAL FIBRILLATION  INCOMPLETE RIGHT BUNDLE BRANCH BLOCK  SEPTAL INFARCT , AGE UNDETERMINED  ABNORMAL ECG    Echo: < from: Transthoracic Echocardiogram (21 @ 09:44) >  1. Endocardium not well visualized; overall preserved left  ventricular systolic function. Septal flattening with  paradoxical septal motion consistent with right ventricular  overload.  2. Severe right ventricular enlargement with severely  decreased right ventricular systolic function.  3.Estimated right ventricular systolic pressure equals 67  mm Hg, assuming right atrial pressure equals 15 mm Hg,  consistent with severe pulmonary hypertension.  4. Large pericardial effusion seen adjacent to the right  atrium. The effusion measures up to approximately 3.4 cm  adjacent to the RA in its largest dimension. Thickened  pericardium with small-moderate pericardial effusion seen  posterior and lateral to the LV. The effusion measures up  to approximately 1.5 cm posterior to the LV. No  echocardiographic evidence of pericardial tamponade.  Results discussed with physician assistant Schmidt from  CTICU.    Cath: < from: Cardiac Cath Lab - Adult (21 @ 14:49) >  DIAGNOSTIC RECOMMENDATIONS: [place] Md placement via RIJ (catheter was  placed in the ICU prior to the procedure).  CI 1.82 by Janes.  Management as per primary team and ICU.  INTERVENTIONAL RECOMMENDATIONS: [place] Dm placement via RIJ (catheter was  placed in the ICU prior to the procedure).  CI 1.82 by Janes.    Imaging: < from: Xray Chest 1 View- PORTABLE-Urgent (Xray Chest 1 View- PORTABLE-Urgent .) (21 @ 10:29) >  IMPRESSION:  ET tube tip appropriately positioned above the terry  Stable marked cardiomegaly.  NG tube in stomach - tip is off the film.  Right IJ venous approach [place]-Dm catheter tip in right interlobar pulmonary artery.  Interval development of mild pulmonary congestion.  No bilateral focal consolidations.  No large pleural effusion. No pneumothorax.    Interpretation of Telemetry: SR 90 w/ occasional PVCs and bigeminy

## 2021-05-29 NOTE — PROGRESS NOTE ADULT - ATTENDING COMMENTS
patient remains intubated on minimal vent setting undergoing breathing trial.  large volume bleed after removal of femoral a line requiring 11 X PRBCs. 3 Plts , 8 FFP  meds: bumex 2/hr, low dose pressors, IV cefipime, sildenfil 10 Q8,   RA 14, PA 58/8 31, PA sat 82.   HR SR 60-70 ectopy, 102/-114/ afebrile.   UO 2500  05-29    143  |  100  |  60<H>  ----------------------------<  101<H>  3.6   |  26  |  3.32<H>    Ca    10.0      29 May 2021 09:39  Phos  5.3     05-29  Mg     2.1     05-29    TPro  6.5  /  Alb  3.3  /  TBili  4.4<H>  /  DBili  x   /  AST  42<H>  /  ALT  26  /  AlkPhos  95  05-29                        12.9   9.12  )-----------( 73       ( 29 May 2021 09:39 )             37.7   CT scan 5.26: small mod PE, trace pleural effusion, mild bibasilar atelaxasis. anasarca, right femoral hematoma.   TTE 5.26: LA 4.4, LVEDD 3.6, LVEF 55, severe RV enlargement with severe dysfunction. TV dilation. mod-sev TR. mod PI.  mild mod MR. MAC. large pericardial effusion. thickened pericarium.   discussed with Dr Milton. Left sided pressures now low after femoral bleed. 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   We should use the opportunity of the indwelling RHC to uptitrate sildenafil.   Titrate bumex drip for gentle negative balance.   could uptitrate sildenafil 10 to 20 to 30 tid when off pressors.   Work up for PTHN still ongoing, suggest:  collagen vascular serology. HIV.   venous dopplers of LE.   Please ask Dr Patel to consult next week.   Jered Farooq patient remains intubated on minimal vent setting undergoing breathing trial.  large volume bleed after removal of femoral a line requiring 11 X PRBCs. 3 Plts , 8 FFP  meds: bumex 2/hr, low dose pressors, IV cefipime, sildenfil 10 Q8,   RA 14, PA 58/15 31, PA sat 82.   HR SR 60-70 ectopy, 102/-114/ afebrile.   UO 2500  05-29    143  |  100  |  60<H>  ----------------------------<  101<H>  3.6   |  26  |  3.32<H>    Ca    10.0      29 May 2021 09:39  Phos  5.3     05-29  Mg     2.1     05-29    TPro  6.5  /  Alb  3.3  /  TBili  4.4<H>  /  DBili  x   /  AST  42<H>  /  ALT  26  /  AlkPhos  95  05-29                        12.9   9.12  )-----------( 73       ( 29 May 2021 09:39 )             37.7   CT scan 5.26: small mod PE, trace pleural effusion, mild bibasilar atelaxasis. anasarca, right femoral hematoma.   TTE 5.26: LA 4.4, LVEDD 3.6, LVEF 55, severe RV enlargement with severe dysfunction. TV dilation. mod-sev TR. mod PI.  mild mod MR. MAC. large pericardial effusion. thickened pericarium.   discussed with Dr Milton. Left sided pressures now low after femoral bleed. 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   We should use the opportunity of the indwelling RHC to uptitrate sildenafil.   Titrate bumex drip for gentle negative balance.   could uptitrate sildenafil 10 to 20 to 30 tid when off pressors.   Work up for PTHN still ongoing, suggest:  collagen vascular serology. HIV.   venous dopplers of LE.   Please ask Dr Patel to consult next week.   Jered Farooq patient remains intubated on minimal vent setting undergoing breathing trial.  large volume bleed after removal of femoral a line requiring 11 X PRBCs. 3 Plts , 8 FFP  meds: bumex 2/hr, low dose pressors, IV cefipime, sildenfil 10 Q8,   RA 14, PA 58/15 31, PA sat 82.   HR SR 60-70 ectopy, 102/-114/ afebrile.   UO 2500  05-29    143  |  100  |  60<H>  ----------------------------<  101<H>  3.6   |  26  |  3.32<H>    Ca    10.0      29 May 2021 09:39  Phos  5.3     05-29  Mg     2.1     05-29    TPro  6.5  /  Alb  3.3  /  TBili  4.4<H>  /  DBili  x   /  AST  42<H>  /  ALT  26  /  AlkPhos  95  05-29                        12.9   9.12  )-----------( 73       ( 29 May 2021 09:39 )             37.7   CT scan 5.26: small mod PE, trace pleural effusion, mild bibasilar atelectasis. anasarca, right femoral hematoma.   TTE 5.26: LA 4.4, LVEDD 3.6, LVEF 55, severe RV enlargement with severe dysfunction. TV dilation. mod-sev TR. mod PI.  mild mod MR. MAC. large pericardial effusion. thickened pericardium.   discussed with Dr Milton. Left sided pressures now low after femoral bleed. 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   We should use the opportunity of the indwelling RHC to uptitrate sildenafil.   Titrate bumex drip for gentle negative balance.   could uptitrate sildenafil 10 to 20 to 30 tid when off pressors.   Work up for PHTN still ongoing, suggest:  collagen vascular serology. HIV.   venous dopplers of LE.   Please ask Dr Patel to consult next week.   Jered Farooq

## 2021-05-29 NOTE — PROGRESS NOTE ADULT - SUBJECTIVE AND OBJECTIVE BOX
Subjective:      Medications:  atorvastatin 40 milliGRAM(s) Oral at bedtime  buMETAnide Infusion 2 mG/Hr IV Continuous <Continuous>  cefepime   IVPB      cefepime   IVPB 1000 milliGRAM(s) IV Intermittent every 12 hours  chlorhexidine 0.12% Liquid 15 milliLiter(s) Oral Mucosa every 12 hours  chlorhexidine 2% Cloths 1 Application(s) Topical <User Schedule>  norepinephrine Infusion 0.03 MICROgram(s)/kG/Min IV Continuous <Continuous>  propofol Infusion 25 MICROgram(s)/kG/Min IV Continuous <Continuous>  sildenafil (REVATIO) 10 milliGRAM(s) Oral every 8 hours  sodium chloride 0.9%. 1000 milliLiter(s) IV Continuous <Continuous>      ICU Vital Signs Last 24 Hrs  T(C): 34.7, Max: 34.7 ( @ 14:00)  HR: 76 (56 - 158)  BP: 105/44 (105/44 - 105/44)  BP(mean): 61 (61 - 101)  ABP: 104/50 (94/58 - 158/84)  ABP(mean): 68 (52 - 112)  RR: 12 (4 - 38)  SpO2: 100% (88% - 100%)    Weight in k.5 (21)      I&O's Summary Last 24 Hrs    IN: 1700.1 mL / OUT: 2495 mL / NET: -794.9 mL      Tele:    Physical Exam:    General: No distress. Comfortable.  HEENT: EOM intact.  Neck: Neck supple. JVP not elevated.  Chest: Clear to auscultation bilaterally  CV: Normal S1 and S2. No murmurs, rub, or gallops. Radial pulses normal.  Abdomen: Soft, non-distended, non-tender  Skin: No rashes or skin breakdown  Extremities: Warm, no edema  Neurology: Alert and oriented times three. Sensation intact  Psych: Affect normal    Labs:    ( 21 @ 03:37 )               12.1   8.10  )--------( 70                   35.4     ( 21 @ 03:37 )     144  |  101  |  60  ---------------------<  91  4.0  |  27  |  3.31    Ca 9.6  Phos 5.4  Mg 2.1    ( 21 @ 03:37 )  TPro  5.6  /  Alb  3.1  /  TBili  4.1  /  DBili  x   /  AST  41  /  ALT  26  /  AlkPhos  84  ( 21 @ 22:41 )  TPro  5.7  /  Alb  3.1  /  TBili  3.7  /  DBili  x   /  AST  43  /  ALT  27  /  AlkPhos  83    PTT/PT/INR - ( 21 @ 03:37 )  PTT: 32.5 sec / PT: x     / INR: x        ( 21 @ 10:15 )  TropHS 100   / CK 52    / CKMB x            VBG - ( 21 @ 03:35 )  pH: 7.42  / pCO2: 55    / pO2: 47    / Oxygen saturation: 82      VBG - ( 21 @ 22:38 )  pH: 7.42  / pCO2: 53    / pO2: 42    / Oxygen saturation: 77      VBG - ( 21 @ 17:25 )  pH: 7.44  / pCO2: 51    / pO2: 39    / Oxygen saturation: 75        ABG - ( 21 @ 03:35 )  pH: 7.45  / pCO2: 50    / pO2: 94    / HCO3: 34    / Base Excess: 8.6   / SaO2: 97          Subjective:  - NAEO, remains intubated and sedated    Medications:  atorvastatin 40 milliGRAM(s) Oral at bedtime  buMETAnide Infusion 2 mG/Hr IV Continuous <Continuous>  cefepime   IVPB      cefepime   IVPB 1000 milliGRAM(s) IV Intermittent every 12 hours  chlorhexidine 0.12% Liquid 15 milliLiter(s) Oral Mucosa every 12 hours  chlorhexidine 2% Cloths 1 Application(s) Topical <User Schedule>  norepinephrine Infusion 0.03 MICROgram(s)/kG/Min IV Continuous <Continuous>  propofol Infusion 25 MICROgram(s)/kG/Min IV Continuous <Continuous>  sildenafil (REVATIO) 10 milliGRAM(s) Oral every 8 hours  sodium chloride 0.9%. 1000 milliLiter(s) IV Continuous <Continuous>      ICU Vital Signs Last 24 Hrs  T(C): 34.7, Max: 34.7 ( @ 14:00)  HR: 76 (56 - 158)  BP: 105/44 (105/44 - 105/44)  BP(mean): 61 (61 - 101)  ABP: 104/50 (94/58 - 158/84)  ABP(mean): 68 (52 - 112)  RR: 12 (4 - 38)  SpO2: 100% (88% - 100%)    Weight in k.5 (21)      I&O's Summary Last 24 Hrs    IN: 1700.1 mL / OUT: 2495 mL / NET: -794.9 mL      Tele: SR 60-70s, bigem, PVCs    Physical Exam:    General: No distress. Comfortable. Intubated/sedated  HEENT: PERRL  Neck: Neck supple. JVP mildly elevated.   Chest: Compliant with vent.   CV: Normal S1 and S2. + RV heave. II/VI SM. Radial pulses normal. +1 BLE edema  Abdomen: Soft, non-distended, non-tender  Skin: No rashes or skin breakdown noted. Warm peripherally.  Neurology: Sedated  Psych: JESSICA    Labs:    ( 21 @ 03:37 )               12.1   8.10  )--------( 70                   35.4     ( 21 @ 03:37 )     144  |  101  |  60  ---------------------<  91  4.0  |  27  |  3.31    Ca 9.6  Phos 5.4  Mg 2.1    ( 21 @ 03:37 )  TPro  5.6  /  Alb  3.1  /  TBili  4.1  /  DBili  x   /  AST  41  /  ALT  26  /  AlkPhos  84  ( 21 @ 22:41 )  TPro  5.7  /  Alb  3.1  /  TBili  3.7  /  DBili  x   /  AST  43  /  ALT  27  /  AlkPhos  83    PTT/PT/INR - ( 21 @ 03:37 )  PTT: 32.5 sec / PT: x     / INR: x        ( 21 @ 10:15 )  TropHS 100   / CK 52    / CKMB x        VBG - ( 21 @ 03:35 )  pH: 7.42  / pCO2: 55    / pO2: 47    / Oxygen saturation: 82      VBG - ( 21 @ 22:38 )  pH: 7.42  / pCO2: 53    / pO2: 42    / Oxygen saturation: 77      VBG - ( 21 @ 17:25 )  pH: 7.44  / pCO2: 51    / pO2: 39    / Oxygen saturation: 75        ABG - ( 21 @ 03:35 )  pH: 7.45  / pCO2: 50    / pO2: 94    / HCO3: 34    / Base Excess: 8.6   / SaO2: 97

## 2021-05-29 NOTE — PROGRESS NOTE ADULT - SUBJECTIVE AND OBJECTIVE BOX
SANJIV ROBERTS  MRN-[id number]  Patient is a 75y old  Female who presents with a chief complaint of Transfer from [facility] with pericardial effusion, NIC and hypotension (29 May 2021 16:00)    HPI:  75y.o female with PMHx of CHFpEF, Severe TR with Right CHF and severe pHTN, COPD on home 02, HTN, HLD, Chronic Afib (on Eliquis), OA, CKD3, DM, right eye cataract who was brought in by EMS to [facility] on 5/23/21 for AMS. pt A&O x1, per daughter patient with generalized weakness and lethargy since since 5/22. Pt had recent in Jan 2021 with CP and NIC. Per chart on arrival to ED pt was hypotensive, obtunded. Chest CT with large pericardial effusion (increased since Jan 2021)- per thoracic surgery consult no indication for pericardial window, pHTN, cirrhotic liver with small ascites, ECHO EF 55% dilated RV with decreased function, severe TN, pHTN, NIC (per renal consult no acute indication for HD), venous duplex NG for DVTs, Head CT revealed chronic involutional and white matter changes, no acute intracranial process, NIC(SCr peaked at 4.6) with  hyperkalemia (5.8) requiring lokelma and insulin.  Of note daughter states over past month patient more and more confused ? dementia and pt is legally blind. Daughter states pt HAS NOT RECEIVED COVID VACCINE as she has been sick these past few months.     5/26 Patient transferred to [facility] with concern of pericardial effusion, hypotension and NIC. On arrival patient SBP 80's, A&O to first name but very lethargic.  (26 May 2021 10:16)      Hospital Course:  5/26 Patient transferred to [facility] with concern of pericardial effusion, hypotension and NIC.   5/26 intubated for hypercarbic respiratory distressed. Transferred from CTU to CICU as no need for pericardiocentesis. Briefly on pressors prior to intubation and since weaned off. RIJ cordis placed- unable to float swan- coiling in RV. RV pressures 50s /10s. Start sildenafil 20 tid. start bumex gtt   5/27 S/P RHC with [place] emmnauel catheter placed.  5/28 MTP called for R Femoral A bleed from previous A line site s/p 11U PRBC, 8 plasma, 2 plts.   5/29 started IV Levophed, sildenafil dc'ed    24 HOUR EVENTS:    REVIEW OF SYSTEMS:   Unable to obtain, pt is intubated and sedated.     ICU Vital Signs Last 24 Hrs  T(C): 36.2 (29 May 2021 16:00), Max: 36.2 (29 May 2021 16:00)  T(F): 97.2 (29 May 2021 16:00), Max: 97.2 (29 May 2021 16:00)  HR: 94 (29 May 2021 19:00) (56 - 100)  BP: --  BP(mean): --  ABP: 128/54 (29 May 2021 19:00) (94/44 - 138/58)  ABP(mean): 74 (29 May 2021 19:00) (42 - 84)  RR: 16 (29 May 2021 19:00) (9 - 28)  SpO2: 100% (29 May 2021 19:00) (98% - 100%)    Mode: AC/ CMV (Assist Control/ Continuous Mandatory Ventilation), RR (machine): 12, TV (machine): 350, FiO2: 30, PEEP: 5, ITime: 1  CVP(mm Hg): 10 (05-29-21 @ 19:00) (-10 - 30)  CO: --  CI: --  PA: 78/21 (05-29-21 @ 19:00) (56/- - 78/21)  PA(mean): 51 (05-29-21 @ 19:00) (24 - 51)  PA(direct): --  PCWP: --  LA: --  RA: --  SVR: --  SVRI: --  PVR: --  PVRI: --  I&O's Summary    28 May 2021 07:01  -  29 May 2021 07:00  --------------------------------------------------------  IN: 1700.1 mL / OUT: 2595 mL / NET: -894.9 mL    29 May 2021 07:01  -  29 May 2021 19:22  --------------------------------------------------------  IN: 746.9 mL / OUT: 1325 mL / NET: -578.1 mL        CAPILLARY BLOOD GLUCOSE    CAPILLARY BLOOD GLUCOSE          PHYSICAL EXAM:   General: Sedated and Intubated  Chest/Lung: + ETT midline, CTAB, no wheezes, rales, or rhonchi  Heart: Regular rate, regular rhythm. Normal S1/S2. No murmurs, rubs, or gallops.  Abdomen: Soft, nontender, nondistended. Normal bowel sounds.  Extremites: R inguinal area with ecchymosis and skin breakdown, no active bleeding, induration or worsening ecchymosis. Peripheral pitting edema 2+. b/l LE/UE with 1+ palpable pulses  Skin: No rashes or lesions. see extremities   lesions  ============================I/O===========================   I&O's Detail    28 May 2021 07:01  -  29 May 2021 07:00  --------------------------------------------------------  IN:    Bumetanide: 60 mL    Enteral Tube Flush: 60 mL    IV PiggyBack: 466 mL    Nepro with Carb Steady: 150 mL    Norepinephrine: 210.8 mL    Norepinephrine: 26.4 mL    PRBCs (Packed Red Blood Cells): 250 mL    Propofol: 290.9 mL    Propofol: 94.4 mL    sodium chloride 0.9%: 70 mL    Vasopressin: 21.6 mL  Total IN: 1700.1 mL    OUT:    Indwelling Catheter - Urethral (mL): 2595 mL    Jevity 1.2: 0 mL  Total OUT: 2595 mL    Total NET: -894.9 mL      29 May 2021 07:01  -  29 May 2021 19:22  --------------------------------------------------------  IN:    Bumetanide: 110 mL    Enteral Tube Flush: 80 mL    IV PiggyBack: 50 mL    Nepro with Carb Steady: 310 mL    Norepinephrine: 125.1 mL    Propofol: 16.8 mL    sodium chloride 0.9%: 55 mL  Total IN: 746.9 mL    OUT:    Indwelling Catheter - Urethral (mL): 1325 mL  Total OUT: 1325 mL    Total NET: -578.1 mL        ============================ LABS =========================                        13.2   9.67  )-----------( 86       ( 29 May 2021 14:29 )             39.0     05-29    145  |  101  |  62<H>  ----------------------------<  118<H>  4.2   |  27  |  3.29<H>    Ca    9.7      29 May 2021 14:29  Phos  5.1     05-29  Mg     2.2     05-29    TPro  6.4  /  Alb  3.2<L>  /  TBili  4.1<H>  /  DBili  x   /  AST  42<H>  /  ALT  28  /  AlkPhos  107  05-29    LIVER FUNCTIONS - ( 29 May 2021 14:29 )  Alb: 3.2 g/dL / Pro: 6.4 g/dL / ALK PHOS: 107 U/L / ALT: 28 U/L / AST: 42 U/L / GGT: x           PTT - ( 29 May 2021 03:37 )  PTT:32.5 sec  ABG - ( 29 May 2021 14:26 )  pH, Arterial: 7.44  pH, Blood: x     /  pCO2: 52    /  pO2: 93    / HCO3: 35    / Base Excess: 9.1   /  SaO2: 98                ======================Micro/Rad/Cardio=================  Telemetry: Reviewed   EKG: Reviewed  CXR: Reviewed  Culture: Reviewed   Echo: Reviewed  Cath: Reviewed  ======================================================  PAST MEDICAL & SURGICAL HISTORY:  Afib    COPD (chronic obstructive pulmonary disease)    CHF (congestive heart failure)    Dementia      ====================ASSESSMENT ==============  5/26 Txfered to [facility] with concern of pericardial effusion, hypotension and NIC.  Atrial Fibrillation  Acute Kidney Injury  Thrombocytopenia   Severe pulmonary hypertension     Plan:  ====================== NEUROLOGY=====================  Sedated for vent synchrony   -  Continue with IV Propofol   - Daily sedation vacation and SBT once RV unloaded     propofol Infusion 25 MICROgram(s)/kG/Min (16.9 mL/Hr) IV Continuous <Continuous>    ==================== RESPIRATORY======================  Acute Hypercapnic Respiratory Failure req Intubation   - On full vent support, requiring close monitoring of respiratory rate, breathing pattern, pulse oximetry monitoring, and intermittent blood gas analysis. On minimal vent settings, adjust prn based on ABG's.   - daily sbt trials once RV unloaded     Severe pulmonary HTN, likely group 3 from COPD   - monitor PA pressures and SvO2 as below   - sildenafil dc'ed    Mechanical Ventilation:  Mode: AC/ CMV (Assist Control/ Continuous Mandatory Ventilation)  RR (machine): 12  TV (machine): 350  FiO2: 30  PEEP: 5  ITime: 1  MAP: 11  PIP: 28      ====================CARDIOVASCULAR==================  HFpEF with severe TR and severely decr RVSF radha 2/2 pulm HTN   - S/P RHC with [place] emmanuel catheter placed on 5/27.   - Continue to monitor ScvO2, lact, PA pressures  - now s/p mass fluid and blood product resuscitation -> needs to be diouresed: Bumex IVP x2 today w/ minimal respones, started on Bumex gtt @2mg/hr   - Dc'ed Sildenafil 10mg TID, since no efficacy w/ sildenafil based on perfusion indices and RV off-loading would potentially start inotrope (Milrinone may not be best for level of renal dysfunction), or advance therapies to [place]    Hypotension, hemorrhagic, hypovolemic in setting of active bleeding, resolved   - s/p multiple products of blood transfused 2/2 RFA bleed   - Started IV Levophed     Chronic Afib  - rate controlled, continue to monitor     norepinephrine Infusion 0.03 MICROgram(s)/kG/Min (3.16 mL/Hr) IV Continuous <Continuous>    ===================HEMATOLOGIC/ONC ===================  Hemorrhagic Shock, resolved   - RFA s/p removal of Beto with large hematoma and active bleeding s/p supratherapeutic ptt   - direct pressure applied and manual pressure of hematoma. FemStop applied w/ no relief of hematoma accumulation   - MTP called s/p 11U PRBC, 8U plasma, 2 plts. Protamine x2 + 2.5L IVF resuscitated   - continue to monitor CBC q6h overnight and hold AC until stability     VTE ppx   - SCDs     ===================== RENAL =========================  Non-oliguric NIC with creatinine peak of 4.49 currently 3.29. Baseline 0.97 in 02/2021   - Cr worsened > 3 s/p hemorrhagic shock   - Goal net negative fluid balance. Replete lytes PRN. Keep K> 4 and Mg >2.   - Bumex gtt @2mg/hr   - avoid nephrotoxic agents  - Nephro following. No urgent needs for HD at this time     buMETAnide Infusion 2 mG/Hr (10 mL/Hr) IV Continuous <Continuous>  ==================== GASTROINTESTINAL===================  Diet: NPO w/ TFS- Nepro with Carb steady goal 40cc/hr.   - BM x2 since admission on 5/26, would hold bowel regimen for the time being     sodium chloride 0.9%. 1000 milliLiter(s) (10 mL/Hr) IV Continuous <Continuous>  =======================    ENDOCRINE  =====================  Bgl controlled  - monitor glucose for need to initiate sliding scale.     Hx of HLD  - C/w Lipitor 40mg QHS    atorvastatin 40 milliGRAM(s) Oral at bedtime  ========================INFECTIOUS DISEASE================  PNA?  - Combicath 5/26 w/ moderate GN diplococci and rare GPC in pairs and chains   - Pt hypothermic and no leukocytosis   - cont. IV Cefepime   - BCX x2 5/26: NGTD, UCx 5/26: no growth. S/P MRSA swab 5/27: neg.   - Would potentially consider switch to 3rd gen cephalosporin (Rocephin) in setting of GN diplococci (Neisseria)      cefepime   IVPB      cefepime   IVPB 1000 milliGRAM(s) IV Intermittent every 12 hours      Patient requires continuous monitoring with bedside rhythm monitoring, pulse ox monitoring, and intermittent blood gas analysis. Care plan discussed with ICU care team. Patient remained critical and at risk for life threatening decompensation.  Patient seen, examined and plan discussed with CCU team during rounds.     I have personally provided 35 minutes of critical care time excluding time spent on separate procedures.    By signing my name below, I, Mirtha Brody, attest that this documentation has been prepared under the direction and in the presence of ALLISON Santos  Electronically signed: Olivia Elam, 05-29-21 @ 19:22    I, ALLISON Santos, personally performed the services described in this documentation. all medical record entries made by the scribe were at my direction and in my presence. I have reviewed the chart and agree that the record reflects my personal performance and is accurate and complete  Electronically signed: ALLISON Santos

## 2021-05-29 NOTE — PROGRESS NOTE ADULT - SUBJECTIVE AND OBJECTIVE BOX
Intubated in CCU, s/p PRBCs    Vital Signs Last 24 Hrs  T(C): 36.1 (05-29-21 @ 15:00), Max: 36.1 (05-29-21 @ 15:00)  T(F): 97 (05-29-21 @ 15:00), Max: 97 (05-29-21 @ 15:00)  HR: 94 (05-29-21 @ 15:00) (56 - 98)  RR: 26 (05-29-21 @ 15:00) (4 - 28)  SpO2: 99% (05-29-21 @ 15:00) (98% - 100%)    I&O's Detail    28 May 2021 07:01  -  29 May 2021 07:00  --------------------------------------------------------  IN:    Bumetanide: 60 mL    Enteral Tube Flush: 60 mL    IV PiggyBack: 466 mL    Nepro with Carb Steady: 150 mL    Norepinephrine: 210.8 mL    Norepinephrine: 26.4 mL    PRBCs (Packed Red Blood Cells): 250 mL    Propofol: 290.9 mL    Propofol: 94.4 mL    sodium chloride 0.9%: 70 mL    Vasopressin: 21.6 mL  Total IN: 1700.1 mL    OUT:    Indwelling Catheter - Urethral (mL): 2595 mL    Jevity 1.2: 0 mL  Total OUT: 2595 mL    Total NET: -894.9 mL    29 May 2021 07:01  -  29 May 2021 16:01  --------------------------------------------------------  IN:    Bumetanide: 80 mL    Enteral Tube Flush: 80 mL    Nepro with Carb Steady: 200 mL    Norepinephrine: 90.4 mL    Propofol: 10.1 mL    sodium chloride 0.9%: 40 mL  Total IN: 500.5 mL    OUT:    Indwelling Catheter - Urethral (mL): 800 mL  Total OUT: 800 mL    Total NET: -299.5 mL    Mode: CPAP with PS  FiO2: 30  PEEP: 5  PS: 10  MAP: 8  PIP: 16    s1s2  b/l air entry  soft, ND  edema, R groin hematoma                                      13.2   9.67  )-----------( 86       ( 29 May 2021 14:29 )             39.0     29 May 2021 14:29    145    |  101    |  62     ----------------------------<  118    4.2     |  27     |  3.29     Ca    9.7        29 May 2021 14:29  Phos  5.1       29 May 2021 14:29  Mg     2.2       29 May 2021 14:29    TPro  6.4    /  Alb  3.2    /  TBili  4.1    /  DBili  x      /  AST  42     /  ALT  28     /  AlkPhos  107    29 May 2021 14:29    LIVER FUNCTIONS - ( 29 May 2021 14:29 )  Alb: 3.2 g/dL / Pro: 6.4 g/dL / ALK PHOS: 107 U/L / ALT: 28 U/L / AST: 42 U/L / GGT: x           Culture - Bronchial (collected 27 May 2021 08:51)  Source: Bronch Wash Combicathtrap  Gram Stain (27 May 2021 14:50):    Moderate polymorphonuclear leukocytes per low power field    Rare Squamous epithelial cells per low power field    Moderate Gram Negative Diplococci per oil power field    Rare Gram Positive Cocci in Pairs and Chains per oil power field  Final Report (29 May 2021 10:55):    Normal Respiratory Karen present    Culture - Urine (collected 26 May 2021 17:28)  Source: .Urine Catheterized  Final Report (27 May 2021 13:51):    <10,000 CFU/mL Normal Urogenital Karen    atorvastatin 40 milliGRAM(s) Oral at bedtime  buMETAnide Infusion 2 mG/Hr IV Continuous <Continuous>  cefepime   IVPB      cefepime   IVPB 1000 milliGRAM(s) IV Intermittent every 12 hours  chlorhexidine 0.12% Liquid 15 milliLiter(s) Oral Mucosa every 12 hours  chlorhexidine 2% Cloths 1 Application(s) Topical <User Schedule>  norepinephrine Infusion 0.03 MICROgram(s)/kG/Min IV Continuous <Continuous>  propofol Infusion 25 MICROgram(s)/kG/Min IV Continuous <Continuous>  sodium chloride 0.9%. 1000 milliLiter(s) IV Continuous <Continuous>    A/P:    COPD, severe PHTN, CO2 retention, CM, peric effusion, R heart failure, resp failure, intubated  Hemodynamic NIC (Cr 0.97 - 2/3/21)  Good UO w/diuretics  Cr has improved from peak of 4.87 - 3/26/21  Avoid nephrotoxins  Avoid hypotension  R groin hematoma, s/p PRBCs  F/u CBC, BMP, UO  Further care per CCU team    [phone]

## 2021-05-30 NOTE — PROGRESS NOTE ADULT - ATTENDING COMMENTS
Pulmonary hypertension with R heart failure  Sedated with Propofol, synchronous with the vent - wean sedation off to assess mental status  Vasodilatory shock requiring Levophed infusion - wean as able  Acute hypoxic respiratory failure requiring mechanical ventilation with minimal vent requirements - attempt breathing trials  Sildenafil off due to pressor requirement  Pulmonary Hypertension service has been consulted  Tolerating tube feeds  Non-oliguric NIC that is improving, Renal following - diurese for 1-2L overall negative  Hemoglobin low but acceptable post transfusions - start HSQ for DVT prophylaxis   Hypothermia resolved; cultures negative on empiric broad spectrum antibiotics - stop antibiotics  Sugars controlled  RIJ Cordis 5/26, [place] 5/27 and radial harshil 5/27

## 2021-05-30 NOTE — PROGRESS NOTE ADULT - SUBJECTIVE AND OBJECTIVE BOX
Intubated in CCU    Vital Signs Last 24 Hrs  T(C): 36.7 (05-30-21 @ 08:00), Max: 36.7 (05-30-21 @ 08:00)  T(F): 98.1 (05-30-21 @ 08:00), Max: 98.1 (05-30-21 @ 08:00)  HR: 86 (05-30-21 @ 16:00) (77 - 104)  RR: 11 (05-30-21 @ 16:00) (11 - 27)  SpO2: 97% (05-30-21 @ 16:00) (95% - 100%)    I&O's Detail    29 May 2021 07:01  -  30 May 2021 07:00  --------------------------------------------------------  IN:    Bumetanide: 240 mL    Enteral Tube Flush: 140 mL    IV PiggyBack: 250 mL    Nepro with Carb Steady: 830 mL    Norepinephrine: 144.1 mL    Norepinephrine: 30.6 mL    Propofol: 117.8 mL    sodium chloride 0.9%: 120 mL  Total IN: 1872.5 mL    OUT:    Indwelling Catheter - Urethral (mL): 3465 mL  Total OUT: 3465 mL    Total NET: -1592.5 mL    30 May 2021 07:01  -  30 May 2021 16:40  --------------------------------------------------------  IN:    Bumetanide: 100 mL    Nepro with Carb Steady: 80 mL    Norepinephrine: 23.5 mL    sodium chloride 0.9%: 30 mL  Total IN: 233.5 mL    OUT:    Indwelling Catheter - Urethral (mL): 1725 mL    Propofol: 0 mL  Total OUT: 1725 mL    Total NET: -1491.5 mL    Mode: AC/ CMV (Assist Control/ Continuous Mandatory Ventilation)  RR (machine): 12  TV (machine): 350  FiO2: 30  PEEP: 4.7  ITime: 1  MAP: 9  PIP: 31    s1s2  b/l air entry  soft, ND  edema, R groin hematoma                                              11.8   8.58  )-----------( 70       ( 30 May 2021 02:04 )             35.1     30 May 2021 02:04    145    |  101    |  60     ----------------------------<  85     3.9     |  28     |  3.16     Ca    9.1        30 May 2021 02:04  Phos  4.6       30 May 2021 02:04  Mg     2.1       30 May 2021 02:04    TPro  5.7    /  Alb  2.9    /  TBili  2.9    /  DBili  x      /  AST  35     /  ALT  24     /  AlkPhos  119    30 May 2021 02:04    LIVER FUNCTIONS - ( 30 May 2021 02:04 )  Alb: 2.9 g/dL / Pro: 5.7 g/dL / ALK PHOS: 119 U/L / ALT: 24 U/L / AST: 35 U/L / GGT: x           PTT - ( 30 May 2021 02:04 )  PTT:31.7 sec  CAPILLARY BLOOD GLUCOSE    atorvastatin 40 milliGRAM(s) Oral at bedtime  buMETAnide Infusion 2 mG/Hr IV Continuous <Continuous>  chlorhexidine 0.12% Liquid 15 milliLiter(s) Oral Mucosa every 12 hours  chlorhexidine 2% Cloths 1 Application(s) Topical <User Schedule>  norepinephrine Infusion 0.04 MICROgram(s)/kG/Min IV Continuous <Continuous>  propofol Infusion 25 MICROgram(s)/kG/Min IV Continuous <Continuous>  sodium chloride 0.9%. 1000 milliLiter(s) IV Continuous <Continuous>    A/P:    COPD, severe PHTN, CO2 retention, CM, peric effusion, R heart failure, resp failure, intubated  Hemodynamic NIC (Cr 0.97 - 2/3/21)  Good UO w/diuresis  Cr continues to improve from peak of 4.87 - 3/26/21  Avoid nephrotoxins  Avoid hypotension  R groin hematoma, s/p PRBCs  F/u CBC, BMP, UO  Further care per CCU team    [phone]

## 2021-05-30 NOTE — PROGRESS NOTE ADULT - SUBJECTIVE AND OBJECTIVE BOX
SANJIV ROBERTS  MRN-[id number]  Patient is a 75y old  Female who presents with a chief complaint of Transfer from [facility] with pericardial effusion, NIC and hypotension (30 May 2021 16:40)    HPI:  75y.o female with PMHx of CHFpEF, Severe TR with Right CHF and severe pHTN, COPD on home 02, HTN, HLD, Chronic Afib (on Eliquis), OA, CKD3, DM, right eye cataract who was brought in by EMS to [facility] on 5/23/21 for AMS. pt A&O x1, per daughter patient with generalized weakness and lethargy since since 5/22. Pt had recent in Jan 2021 with CP and NIC. Per chart on arrival to ED pt was hypotensive, obtunded. Chest CT with large pericardial effusion (increased since Jan 2021)- per thoracic surgery consult no indication for pericardial window, pHTN, cirrhotic liver with small ascites, ECHO EF 55% dilated RV with decreased function, severe TN, pHTN, NIC (per renal consult no acute indication for HD), venous duplex NG for DVTs, Head CT revealed chronic involutional and white matter changes, no acute intracranial process, NIC(SCr peaked at 4.6) with  hyperkalemia (5.8) requiring lokelma and insulin.  Of note daughter states over past month patient more and more confused ? dementia and pt is legally blind. Daughter states pt HAS NOT RECEIVED COVID VACCINE as she has been sick these past few months.     5/26 Patient transferred to [facility] with concern of pericardial effusion, hypotension and NIC. On arrival patient SBP 80's, A&O to first name but very lethargic.  (26 May 2021 10:16)      Hospital Course:  5/26 Patient transferred to [facility] with concern of pericardial effusion, hypotension and NIC.   5/26 intubated for hypercarbic respiratory distressed. Transferred from CTU to CICU as no need for pericardiocentesis. Briefly on pressors prior to intubation and since weaned off. RIJ cordis placed- unable to float swan- coiling in RV. RV pressures 50s /10s. Start sildenafil 20 tid. start bumex gtt   5/27 S/P RHC with [place] emmanuel catheter placed.  5/28 MTP called for R Femoral A bleed from previous A line site s/p 11U PRBC, 8 plasma, 2 plts.   5/29 started IV Levophed, sildenafil dc'ed    24 HOUR EVENTS:    REVIEW OF SYSTEMS:   Unable to obtain, pt is intubated and sedated.     ICU Vital Signs Last 24 Hrs  T(C): 37.1 (30 May 2021 19:00), Max: 37.1 (30 May 2021 19:00)  T(F): 98.8 (30 May 2021 19:00), Max: 98.8 (30 May 2021 19:00)  HR: 86 (30 May 2021 20:00) (77 - 104)  BP: --  BP(mean): --  ABP: 96/40 (30 May 2021 20:00) (86/38 - 136/58)  ABP(mean): 54 (30 May 2021 20:00) (52 - 82)  RR: 16 (30 May 2021 20:00) (11 - 27)  SpO2: 99% (30 May 2021 20:00) (95% - 100%)    Mode: AC/ CMV (Assist Control/ Continuous Mandatory Ventilation), RR (machine): 12, TV (machine): 350, FiO2: 30, PEEP: 4.7, ITime: 1  CVP(mm Hg): 8 (05-30-21 @ 20:00) (7 - 18)  CO: 10 (05-30-21 @ 02:15) (10 - 10)  CI: 4.6 (05-30-21 @ 02:15) (4.6 - 4.7)  PA: 75/23 (05-30-21 @ 20:00) (7/- - 202/153)  PA(mean): 42 (05-30-21 @ 20:00) (14 - 163)  PA(direct): --  PCWP: --  LA: --  RA: --  SVR: 399 (05-30-21 @ 02:15) (399 - 478)  SVRI: 868 (05-30-21 @ 02:15) (868 - 868)  PVR: --  PVRI: --  I&O's Summary    29 May 2021 07:01  -  30 May 2021 07:00  --------------------------------------------------------  IN: 1872.5 mL / OUT: 3465 mL / NET: -1592.5 mL    30 May 2021 07:01  -  30 May 2021 20:39  --------------------------------------------------------  IN: 707.8 mL / OUT: 2400 mL / NET: -1692.2 mL        CAPILLARY BLOOD GLUCOSE    CAPILLARY BLOOD GLUCOSE          PHYSICAL EXAM:   General: Sedated and intubated  Eyes: EOMI, PERRLA, conjunctiva and sclera clear  Chest/Lung: +ETT midline CTAB, no wheezes, rales, or rhonchi  Heart: Regular rate, regular rhythm. Normal S1/S2. No murmurs, rubs, or gallops.  Abdomen: Soft, nontender, nondistended. Normal bowel sounds.  Extremites:R inguinal area with ecchymosis and skin breakdown, no active bleeding, induration or worsening ecchymosis. Peripheral pitting edema 2+ peripheral pulses B/L. No clubbing, cyanosis, or edema.  Neurology: A&O x3, no focal deficits  Skin: No rashes or lesions  ============================I/O===========================   I&O's Detail    29 May 2021 07:01  -  30 May 2021 07:00  --------------------------------------------------------  IN:    Bumetanide: 240 mL    Enteral Tube Flush: 140 mL    IV PiggyBack: 250 mL    Nepro with Carb Steady: 830 mL    Norepinephrine: 144.1 mL    Norepinephrine: 30.6 mL    Propofol: 117.8 mL    sodium chloride 0.9%: 120 mL  Total IN: 1872.5 mL    OUT:    Indwelling Catheter - Urethral (mL): 3465 mL  Total OUT: 3465 mL    Total NET: -1592.5 mL      30 May 2021 07:01  -  30 May 2021 20:39  --------------------------------------------------------  IN:    Bumetanide: 130 mL    Nepro with Carb Steady: 520 mL    Norepinephrine: 27.8 mL    sodium chloride 0.9%: 30 mL  Total IN: 707.8 mL    OUT:    Indwelling Catheter - Urethral (mL): 2400 mL    Propofol: 0 mL  Total OUT: 2400 mL    Total NET: -1692.2 mL        ============================ LABS =========================                        11.8   8.58  )-----------( 70       ( 30 May 2021 02:04 )             35.1     05-30    146<H>  |  102  |  66<H>  ----------------------------<  124<H>  3.6   |  31  |  3.09<H>    Ca    9.0      30 May 2021 18:07  Phos  4.6     05-30  Mg     2.0     05-30    TPro  5.7<L>  /  Alb  2.8<L>  /  TBili  2.4<H>  /  DBili  x   /  AST  32  /  ALT  21  /  AlkPhos  136<H>  05-30    LIVER FUNCTIONS - ( 30 May 2021 18:07 )  Alb: 2.8 g/dL / Pro: 5.7 g/dL / ALK PHOS: 136 U/L / ALT: 21 U/L / AST: 32 U/L / GGT: x           ABG - ( 30 May 2021 02:03 )  pH, Arterial: 7.43  pH, Blood: x     /  pCO2: 54    /  pO2: 146   / HCO3: 35    / Base Excess: 9.3   /  SaO2: 99                  ======================Micro/Rad/Cardio=================  Telemetry: Reviewed   EKG: Reviewed  CXR: Reviewed  Culture: Reviewed   Echo: Reviewed  Cath: Reviewed  ======================================================  PAST MEDICAL & SURGICAL HISTORY:  Afib    COPD (chronic obstructive pulmonary disease)    CHF (congestive heart failure)    Dementia      ====================ASSESSMENT ==============  5/26 Txfered to [facility] with concern of pericardial effusion, hypotension and NIC.  Atrial Fibrillation  Acute Kidney Injury  Thrombocytopenia   Severe pulmonary hypertension     Plan:  ====================== NEUROLOGY=====================  Sedated for vent synchrony   -  Continue with IV Propofol   - Daily sedation vacation and SBT once RV unloaded     ==================== RESPIRATORY======================  Acute Hypercapnic Respiratory Failure req Intubation   - On full vent support, requiring close monitoring of respiratory rate, breathing pattern, pulse oximetry monitoring, and intermittent blood gas analysis. On minimal vent settings, adjust prn based on ABG's.   - daily sbt trials once RV unloaded     Severe pulmonary HTN, likely group 3 from COPD   - monitor PA pressures and SvO2 as below     Mechanical Ventilation:  Mode: AC/ CMV (Assist Control/ Continuous Mandatory Ventilation)  RR (machine): 12  TV (machine): 350  FiO2: 30  PEEP: 4.7  ITime: 1  MAP: 9  PIP: 31      ====================CARDIOVASCULAR==================  HFpEF with severe TR and severely decr RVSF lenaley 2/2 pulm HTN   - S/P RHC with [place] emmanuel catheter placed on 5/27.   - Continue to monitor ScvO2, lact, PA pressures  - now s/p mass fluid and blood product resuscitation, started on Bumex gtt @2mg/hr     Hypotension, hemorrhagic, hypovolemic in setting of active bleeding, resolved   - s/p multiple products of blood transfused 2/2 RFA bleed   - Started IV Levophed     Chronic Afib  - rate controlled, continue to monitor   -Continue Lipitor    ===================HEMATOLOGIC/ONC ===================  Hemorrhagic Shock, resolved   - RFA s/p removal of Beto with large hematoma and active bleeding s/p supratherapeutic ptt   - direct pressure applied and manual pressure of hematoma. FemStop applied w/ no relief of hematoma accumulation   - MTP called s/p 11U PRBC, 8U plasma, 2 plts. Protamine x2 + 2.5L IVF resuscitated     VTE ppx   - SCDs     ===================== RENAL =========================  Non-oliguric NIC with creatinine peak of 4.49 currently 3.29. Baseline 0.97 in 02/2021   - Cr worsened > 3 s/p hemorrhagic shock   - Goal net negative fluid balance. Replete lytes PRN. Keep K> 4 and Mg >2.   - Bumex gtt @2mg/hr   - avoid nephrotoxic agents  - Nephro following. No urgent needs for HD at this time   -Continue monitoring urine output    ==================== GASTROINTESTINAL===================  Diet: NPO w/ TFS- Nepro with Carb steady goal 40cc/hr.   - BM x2 since admission on 5/26, would hold bowel regimen for the time being     =======================    ENDOCRINE  =====================  Bgl controlled  - monitor glucose for need to initiate sliding scale.     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 IV Cefepime   - BCX x2 5/26: NGTD, UCx 5/26: no growth. S/P MRSA swab 5/27: neg.   - Would potentially consider switch to 3rd gen cephalosporin (Rocephin) in setting of GN diplococci (Neisseria)      Patient requires continuous monitoring with bedside rhythm monitoring, pulse ox monitoring, and intermittent blood gas analysis. Care plan discussed with ICU care team. Patient remained critical and at risk for life threatening decompensation.  Patient seen, examined and plan discussed with CCU team during rounds.     I have personally provided 35 minutes of critical care time excluding time spent on separate procedures.    By signing my name below, I, James Serrato, attest that this documentation has been prepared under the direction and in the presence of Ale Vernon NP  Electronically signed: Olivia Giron, 05-30-21 @ 20:39    I, Ale Vernon NP, personally performed the services described in this documentation. all medical record entries made by the scribe were at my direction and in my presence. I have reviewed the chart and agree that the record reflects my personal performance and is accurate and complete  Electronically signed: Ale Vernon NP

## 2021-05-30 NOTE — PROGRESS NOTE ADULT - ASSESSMENT
75F with a pmhx of CHFpEF, Severe TR with Right CHF and severe pHTN, COPD on home 02, HTN, HLD, Chronic Afib (on Eliquis), OA, CKD3, DM, right eye cataract who was brought in by EMS to [facility] on 5/23/21 for AMS, found to have a large pericardial effusion w/o tamponade physiology, without drainable window, intubated (5/26) for acute hypercarbic respiratory failure and worsening AMS, transferred to CICU for hypotension and NCI on CKD in undifferentiated shock, course c/b expanding R thigh hematoma, s/p MTP, with ratio predom targeted towards FFP, s/p protamine for hep reversal.    Neuro:  -Mental status: Start daily sedation vacations to assess mental status and readiness for extubation. Keep off propofol.     Resp:  -(5/26): Intubated , RR (machine): 12, TV (machine): 350, FiO2: 30,  PEEP:5 . ABG 7.45/50/94/34. Chest XR showing pulmonary congestion now being diuresed. Daily spontaneous breathing trails.       CV:  (5/26): TTE: EF 55%, RVH with severe RVSD, severe pulm HTN, large pericardial effusion 3.4cm @ largest diameter, adjacent to RA  ventricular systolic function.  - Pt was on Pressors: levophed, vaso. Now on minimal Levophed at 0.09.  After aggressve fluid resuscitatton now with hemodynamics as follows: CVP 17, PAP 60/15, Wedge 18 Janes CO 10, CI 4.5 . No lactate. Central sat 82.  -(5/27): PAC placement in cath lab, trend central sats  -c/w atorvastatin 40mg qhs  -Sildenafil was reinitiated however she appears to be in a high output state and sildenafil may be contributing to her vasodilation and pressor requirements. Will discuss with heart failure.   -Systolic goal: >90, MAP >65  Telemetry    GI:  -Diet: Continue Nepro at 40 and will add prosource  -Stress ulcer ppx:continue PPI  -Bowel regimen: none  -(5/26): CT a/p noncon: mild contoured liver. Anasarca      - Hernadez catheter in place for critical care monitoring    Heme:  -Thrombocytopenia  -(5/28): Acute drop in H/H 10->8,  with expanding thigh hematoma 35->24 -> s/p MTP 11 pRBCs , 8 U FFP, 2 U PLTS and protamine x 2 due to supratherapeutic pTT > 200 --> 28  -Continue to monitor hemoglobin and hematocrit levels Q 6 hours  -[ ] (5/28): VA duplex arterial and venous RLE: no pseudo  -DVT ppx: scd, hold heparin for now.     Renal:  - Continue to monitor I/Os, BUN/Creatinine, and urine output. Cr bmped to 3.6 now 3.3 Nephro following and diuresing well.   - CVP 17 continue bumex gtt.   - Replete lytes PRN. Keep K> 4 and Mg >2    Endo:  - Monitor blood sugar 100-130    ID:  Hypothermic 92 with WBC within normal range   - Continue to monitor temperature and WBC   - c/w cefepime (5/27- ,- Combicath 5/26 w/ moderate GN diplococci and rare GPC in pairs and chains . Will discuss with attending whether switching to a 3rd generation cephalosporin  - (5/26): UCx, BCx, SCx, MRSA/MSSA swab: NEG    CODE STATUS:  Full code    TUBES/LINES/DRAINS:  -ETT  -OGT  -Hernadez  -PIV 75F with a pmhx of CHFpEF, Severe TR with Right CHF and severe pHTN, COPD on home 02, HTN, HLD, Chronic Afib (on Eliquis), OA, CKD3, DM, right eye cataract who was brought in by EMS to [facility] on 5/23/21 for AMS, found to have a large pericardial effusion w/o tamponade physiology, without drainable window, intubated (5/26) for acute hypercarbic respiratory failure and worsening AMS, transferred to CICU for hypotension and NIC on CKD in undifferentiated shock, course c/b expanding R thigh hematoma, s/p MTP, with ratio predom targeted towards FFP, s/p protamine for hep reversal.    Neuro:  -Mental status: Start daily sedation vacations to assess mental status and readiness for extubation. Keep off propofol.     Resp:  -(5/26): Intubated , RR (machine): 12, TV (machine): 350, FiO2: 30,  PEEP:5 . ABG 7.45/50/94/34. Chest XR showing pulmonary congestion now being diuresed. Daily spontaneous breathing trails. Yesterday was on 12/5 /30%. Will continue these settings for now. Chest XR still showing pulm congestion with high filling pressures based on her [place] numbers.   - Dr. Patel consulted for pulm HTN.       CV:  (5/26): TTE: EF 55%, RVH with severe RVSD, severe pulm HTN, large pericardial effusion 3.4cm @ largest diameter, adjacent to RA  ventricular systolic function.  - Pt was on Pressors: levophed, vaso. Now on minimal Levophed at 0.09.  After aggressve fluid resuscitatton now with hemodynamics as follows: CVP 16, PAP 60/15, Janes CO 10, CI 4.5 . No lactate. Central sat 82.  -(5/27): PAC placement in cath lab, trend central sats. Based on Chest XR will pull Pulm cathter back by 2 cm  -c/w atorvastatin 40mg qhs  -Sildenafil was reinitiated however she appears to be in a high output state and sildenafil may be contributing to her vasodilation and pressor requirements. Will discuss with heart failure.   -Systolic goal: >90, MAP >65  Telemetry    GI:  -Diet: Continue Nepro at 40 and will add prosource  -Stress ulcer ppx:continue PPI  -Bowel regimen: none  -(5/26): CT a/p noncon: mild contoured liver. Anasarca      - Hernadez catheter in place for critical care monitoring    Heme:  -Thrombocytopenia  -(5/28): Acute drop in H/H 10->8,  with expanding thigh hematoma 35->24 -> s/p MTP 11 pRBCs , 8 U FFP, 2 U PLTS and protamine x 2 due to supratherapeutic pTT > 200 --> 28  -Continue to monitor hemoglobin and hematocrit levels Q 6 hours  -[ ] (5/28): VA duplex arterial and venous RLE: no pseudo  -DVT ppx: scd, hold heparin for now.     Renal:  - Continue to monitor I/Os, BUN/Creatinine, and urine output. Cr bmped to 3.6 now 3.3 Nephro following and diuresing well.   - CVP 17 continue bumex gtt.   - Replete lytes PRN. Keep K> 4 and Mg >2    Endo:  - Monitor blood sugar 100-130    ID:  Hypothermic 92 with WBC within normal range   - Continue to monitor temperature and WBC   - c/w cefepime (5/27- ,- Combicath 5/26 w/ moderate GN diplococci and rare GPC in pairs and chains . Will discuss with attending whether switching to a 3rd generation cephalosporin  - (5/26): UCx, BCx, SCx, MRSA/MSSA swab: NEG    CODE STATUS:  Full code    TUBES/LINES/DRAINS:  -ETT  -OGT  -Hernadez  -PIV 75F with a pmhx of CHFpEF, Severe TR with Right CHF and severe pHTN, COPD on home 02, HTN, HLD, Chronic Afib (on Eliquis), OA, CKD3, DM, right eye cataract who was brought in by EMS to [facility] on 5/23/21 for AMS, found to have a large pericardial effusion w/o tamponade physiology, without drainable window, intubated (5/26) for acute hypercarbic respiratory failure and worsening AMS, transferred to CICU for hypotension and NIC on CKD in undifferentiated shock, course c/b expanding R thigh hematoma, s/p MTP, with ratio predom targeted towards FFP, s/p protamine for hep reversal.    Neuro:  -Mental status: Start daily sedation vacations to assess mental status and readiness for extubation. Keep off propofol.     Resp:  -(5/26): Intubated , RR (machine): 12, TV (machine): 350, FiO2: 30,  PEEP:5 . ABG 7.45/50/94/34. Chest XR showing pulmonary congestion now being diuresed. Daily spontaneous breathing trails. Yesterday was on 12/5 /30%. Will continue these settings for now. Chest XR still showing pulm congestion with high filling pressures based on her [place] numbers.   - Dr. Patel consulted for pulm HTN.       CV:  (5/26): TTE: EF 55%, RVH with severe RVSD, severe pulm HTN, large pericardial effusion 3.4cm @ largest diameter, adjacent to RA  ventricular systolic function.  - Pt was on Pressors: levophed, vaso. Now on minimal Levophed at 0.09.  After aggressve fluid resuscitatton now with hemodynamics as follows: CVP 16, PAP 60/15, Janes CO 10, CI 4.5 . No lactate. Central sat 82.  -(5/27): PAC placement in cath lab, trend central sats. Based on Chest XR will pull Pulm cathter back by 2 cm  -c/w atorvastatin 40mg qhs  -Sildenafil was reinitiated however she appears to be in a high output state and sildenafil may be contributing to her vasodilation and pressor requirements and so it has been discontinued. Will resume sildenafil when off pressors.   -Systolic goal: >90, MAP >65  Telemetry    GI:  -Diet: Continue Nepro at 40 and will add prosource  -Stress ulcer ppx:continue PPI  -Bowel regimen: none  -(5/26): CT a/p noncon: mild contoured liver. Anasarca      - Hernadez catheter in place for critical care monitoring    Heme:  -Thrombocytopenia  -(5/28): Acute drop in H/H 10->8,  with expanding thigh hematoma 35->24 -> s/p MTP 11 pRBCs , 8 U FFP, 2 U PLTS and protamine x 2 due to supratherapeutic pTT > 200 --> 28  -Continue to monitor hemoglobin and hematocrit levels Q 8 hours  -[ ] (5/28): VA duplex arterial and venous RLE: no pseudo  -DVT ppx: scd, hold heparin for now.     Renal:  - Continue to monitor I/Os, BUN/Creatinine, and urine output. Cr bmped to 3.6 now 3.16. Nephro following and diuresing well.   - CVP 17 continue bumex gtt.   - Replete lytes PRN. Keep K> 4 and Mg >2    Endo:  - Monitor blood sugar 100-130    ID:  - c/w cefepime (5/27- ,- Combicath 5/26 w/ moderate GN diplococci and rare GPC in pairs and chains .   - (5/26): UCx, BCx, SCx, MRSA/MSSA swab: NEG    CODE STATUS:  Full code    TUBES/LINES/DRAINS:  -ETT  -OGT  -Hernadez  -PIV

## 2021-05-30 NOTE — PROGRESS NOTE ADULT - SUBJECTIVE AND OBJECTIVE BOX
Events:    Review Of Systems:  Constitutional: denies fever, chills, Fatigue   HEENT: denies Blurred vision, Eye Pain, Headache   Respiratory: denies Cough, Wheezing , Shortness of breath  Cardiovascular: denies Chest Pain, Palpitations,  BERMEO   Gastrointestinal: denies Abdominal Pain, Diarrhea, Constipation   Genitourinary: denies Nocturia, Dysuria, Incontinence  Extremities: denies Swelling, Joint Pain  Neurologic: denies Focal deficit, Paresthesias, Syncope  Lymphatic: denies Swelling, Lymphadenopathy   Skin: denies Rash, Ecchymoses, Wounds   Psychiatry: denies Depression, Suicidal/Homicidal Ideation, anxiety  [X ] 10 point review of systems is otherwise negative except as mentioned above         Medications:  atorvastatin 40 milliGRAM(s) Oral at bedtime  buMETAnide Infusion 2 mG/Hr IV Continuous <Continuous>  cefepime   IVPB      cefepime   IVPB 1000 milliGRAM(s) IV Intermittent every 12 hours  chlorhexidine 0.12% Liquid 15 milliLiter(s) Oral Mucosa every 12 hours  chlorhexidine 2% Cloths 1 Application(s) Topical <User Schedule>  norepinephrine Infusion 0.04 MICROgram(s)/kG/Min IV Continuous <Continuous>  propofol Infusion 25 MICROgram(s)/kG/Min IV Continuous <Continuous>  sodium chloride 0.9%. 1000 milliLiter(s) IV Continuous <Continuous>    PMH/PSH/FH/SH: [ ] Unchanged  Vitals:  T(C): 36.4 (21 @ 05:00), Max: 36.4 (21 @ 05:00)  HR: 92 (21 @ 06:00) (76 - 100)  BP: --  BP(mean): --  RR: 17 (21 @ 06:00) (11 - 27)  SpO2: 96% (21 @ 06:00) (96% - 100%)  Wt(kg): --  Daily     Daily Weight in k.6 (30 May 2021 05:00)  I&O's Summary    28 May 2021 07:01  -  29 May 2021 07:00  --------------------------------------------------------  IN: 1700.1 mL / OUT: 2595 mL / NET: -894.9 mL    29 May 2021 07:01  -  30 May 2021 06:38  --------------------------------------------------------  IN: 1817.5 mL / OUT: 3465 mL / NET: -1647.5 mL        Physical Exam:  Appearance: [ ] Normal [ ] NAD  Eyes: [ ] PERRL [ ] EOMI  HENT: [ ] Normal oral [place] [ ]NC/AT  Cardiovascular: [ ] S1 [ ] S2 [ ] RRR [ ] No m/r/g [ ]No edema [ ] JVP  Procedural Access Site: [ ] No hematoma [ ] Non-tender to palpation [ ] 2+ pulse [ ] No bruit [ ] No Ecchymosis  Respiratory: [ ] Clear to auscultation bilaterally  Gastrointestinal: [ ] Soft [ ] Non-tender [ ] Non-distended [ ] BS+  Musculoskeletal: [ ] No clubbing [ ] No joint deformity   Neurologic: [ ] Non-focal  Lymphatic: [ ] No lymphadenopathy  Psychiatry: [ ] AAOx3 [ ] Mood & affect appropriate  Skin: [ ] No rashes [ ] No ecchymoses [ ] No cyanosis        145  |  101  |  60<H>  ----------------------------<  85  3.9   |  28  |  3.16<H>    Ca    9.1      30 May 2021 02:04  Phos  4.6     05-30  Mg     2.1     05-30    TPro  5.7<L>  /  Alb  2.9<L>  /  TBili  2.9<H>  /  DBili  x   /  AST  35  /  ALT  24  /  AlkPhos  119  05-30    PTT - ( 30 May 2021 02:04 )  PTT:31.7 sec              ECG:    Echo:    Stress Testing:     Cath:    Imaging:    Interpretation of Telemetry:   75y.o female with PMHx of CHFpEF, Severe TR with Right CHF and severe pHTN, COPD on home , HTN, HLD, Chronic Afib (on Eliquis), OA, CKD3, DM, right eye cataract who was brought in by EMS to [facility] on 21 for AMS. pt A&O x1, per daughter patient with generalized weakness and lethargy since since . Pt had recent in 2021 with CP and NIC. Per chart on arrival to ED pt was hypotensive, obtunded. Chest CT with large pericardial effusion (increased since 2021)- per thoracic surgery consult no indication for pericardial window, pHTN, cirrhotic liver with small ascites, ECHO EF 55% dilated RV with decreased function, severe TN, pHTN, NIC (per renal consult no acute indication for HD), venous duplex NG for DVTs, Head CT revealed chronic involutional and white matter changes, no acute intracranial process, NIC(SCr peaked at 4.6) with  hyperkalemia (5.8) requiring lokelma and insulin.Of note daughter states over past month patient more and more confused ? dementia and pt is legally blind. Daughter states pt HAS NOT RECEIVED COVID VACCINE as she has been sick these past few months.  Patient transferred to [facility] with concern of pericardial effusion, hypotension and NIC. On arrival patient SBP 80's, A&O to first name but very lethargic.  (26 May 2021 10:16). Echo revealed a pericardial effusion with no tamponade physiology. She was intubated for hypercarbic respiratory failure, with subsequent improvement in BP, now off vasopressors. She is currently volume overloaded in the setting of RV failure and pulmonary hypertension. She was started on IV bumex and sildenafil.     oN  Levophed requirements continued to rise from 0.9 to .3. R groin was noted to be swollen and concerning for a hematoma from groin lines that were removed the day prior . Of note her pTT was noted to be > 200 at 4 AM. Heparin gtt was stopped and manual pressure applied for 1 hour. 2 L of LR given and 1 U of PRBCS. At this time pt continued ot become unstable with blood pressure dropping into systolics of 50s and HR up to 160s and CVP reading 0. Everytime manual pressure was stopped hematoma would form again. Vascular surgery called and MTP protocol was applied. Decision was made to reverse coagulopathy first given that both groin lines were small catheter frenches. In total the patient received 11 U of PRBCs, 8 FFP, 2 U PLTS, 3 Calciums and protamine x 2. After coagulopathy was reversed pt stabilized. Lactate peaked at 5.     Events: No acute events overnight. Pt remains on Levo at 0.09. Prop has been off since 5 Am without mental status as of yet.    Review Of Systems:  Unable to attain as pt is sedated and intubated.    Medications:  atorvastatin 40 milliGRAM(s) Oral at bedtime  buMETAnide Infusion 2 mG/Hr IV Continuous <Continuous>  cefepime   IVPB      cefepime   IVPB 1000 milliGRAM(s) IV Intermittent every 12 hours  chlorhexidine 0.12% Liquid 15 milliLiter(s) Oral Mucosa every 12 hours  chlorhexidine 2% Cloths 1 Application(s) Topical <User Schedule>  norepinephrine Infusion 0.04 MICROgram(s)/kG/Min IV Continuous <Continuous>  propofol Infusion 25 MICROgram(s)/kG/Min IV Continuous <Continuous>  sodium chloride 0.9%. 1000 milliLiter(s) IV Continuous <Continuous>    Vitals:  T(C): 36.4 (21 @ 05:00), Max: 36.4 (21 @ 05:00)  HR: 92 (21 @ 06:00) (76 - 100)  RR: 17 (21 @ 06:00) (11 - 27)  SpO2: 96% (21 @ 06:00) (96% - 100%)    Daily     Daily Weight in k.6 (30 May 2021 05:00)  I&O's Summary    28 May 2021 07:  -  29 May 2021 07:00  --------------------------------------------------------  IN: 1700.1 mL / OUT: 2595 mL / NET: -894.9 mL    29 May 2021 07:01  -  30 May 2021 06:38  --------------------------------------------------------  IN: 1817.5 mL / OUT: 3465 mL / NET: -1647.5 mL    Physical Exam:  Appearance: sedated and intubated   Eyes: [ X] PERRL [X ] EOMI. R eye with gray discoloration  HENT: [ X] Normal oral muscosa [X ]NC/AT  Cardiovascular: [ X] S1 [ X] S2 [ X] RRR . No JVD . +2 edema B/L LE  Procedural Access Site: R groin access site with resolving hematoma. Ecchymosis, redness noted in the medial thigh. No active bleeding. Soft. Some blistering noted. No drainage,   Respiratory: [X ] Clear to auscultation bilaterally, intubated   Gastrointestinal: [ X] Soft [X ] Non-tender [ X] Non-distended   Musculoskeletal: [X ] No clubbing [ X] No joint deformity   Neurologic: [X ] Non-focal  Lymphatic: [ X] No lymphadenopathy  Psychiatry: [X ] AAOx3 [ X] Mood & affect appropriate  Skin: see above        145  |  101  |  60<H>  ----------------------------<  85  3.9   |  28  |  3.16<H>    Ca    9.1      30 May 2021 02:04  Phos  4.6       Mg     2.1     -30    TPro  5.7<L>  /  Alb  2.9<L>  /  TBili  2.9<H>  /  DBili  x   /  AST  35  /  ALT  24  /  AlkPhos  119  05-30    PTT - ( 30 May 2021 02:04 )  PTT:31.7 sec    ECG: < from: 12 Lead ECG (21 @ 08:25) >  Diagnosis Line ATRIAL FIBRILLATION  RIGHT AXIS DEVIATION  PULMONARY DISEASE PATTERN  INCOMPLETE RIGHT BUNDLE BRANCH BLOCK  POSSIBLE RIGHT VENTRICULAR HYPERTROPHY  SEPTAL INFARCT (CITED ON OR BEFORE 28-MAY-2021)  ABNORMAL ECG    Echo: < from: Transthoracic Echocardiogram (21 @ 09:44) >  1. Endocardium not well visualized; overall preserved left  ventricular systolic function. Septal flattening with  paradoxical septal motion consistent with right ventricular  overload.  2. Severe right ventricular enlargement with severely  decreased right ventricular systolic function.  3.Estimated right ventricular systolic pressure equals 67  mm Hg, assuming right atrial pressure equals 15 mm Hg,  consistent with severe pulmonary hypertension.  4. Large pericardial effusion seen adjacent to the right  atrium. The effusion measures up to approximately 3.4 cm  adjacent to the RA in its largest dimension. Thickened  pericardium with small-moderate pericardial effusion seen  posterior and lateral to the LV. The effusion measures up  to approximately 1.5 cm posterior to the LV. No  echocardiographic evidence of pericardial tamponade.  Results discussed with physician assistant Brayan from  CTICU.    Cath: < from: Cardiac Cath Lab - Adult (21 @ 14:49) >  INTERVENTIONAL RECOMMENDATIONS: [place] Dm placement via RIJ (catheter was  placed in the ICU prior to the procedure).  CI 1.82 by Janes.  Management as per primary team and ICU.  Prepared and signed by  Darrian Chong D.O.  Signed 2021 16:39:13  HEMODYNAMIC TABLES  Pressures:  Baseline  Pressures:  - HR: 57  Pressures:  - Rhythm:  Pressures:  -- Pulmonary Artery (S/D/M): 65/15/30  Pressures:  -- Pulmonary Capillary Wedge: --/28/20  Pressures:  -- Right Atrium (a/v/M): --/15/14  O2 Sats:  Baseline  O2 Sats:  - HR: 57  O2 Sats:  - Rhythm:  O2 Sats:  -- AO: 12.2/100/16.59  O2 Sats:  -- PA: 12.2/55.4/9.19  Outputs:  Baseline  Outputs:  -- CALCULATIONS[age]  Outputs:  -- CALCULATIONS: Body Surface Area: 2.17  Outputs:  -- CALCULATIONS: Height in cm: 165.00  Outputs:  -- CALCULATIONS: Sex: Female  Outputs:  -- CALCULATIONS: Weight in k.40  Outputs:  -- OUTPUTS: CO by Janes: 3.93  Outputs:  -- OUTPUTS: Janes cardiac index: 1.82  Outputs:  -- OUTPUTS: O2 consumption: 291.00  Outputs:  -- OUTPUTS: Vo2 Indexed: 134.36  Outputs:  -- RESISTANCES: Pulmonary vascular index (dsc): 440.49  Outputs:  -- RESISTANCES: Pulmonary vascular index (Wood Units): 5.51  Outputs:  -- RESISTANCES: Pulmonary vascular resistance (dsc): 203.39  Outputs:  -- RESISTANCES: Pulmonary vascular resistance (Wood Units): 2.54  Outputs:  -- RESISTANCES: Right ventricular stroke work: 16.15  Outputs:  -- RESISTANCES: Right ventricular stroke work index: 7.45  Outputs:  -- RESISTANCES: Total pulmonary index (dsc): 1321.48  Outputs:  -- RESISTANCES: Total pulmonary index (Wood Units): 16.52  Outputs:  -- RESISTANCES: Total pulmonary resistance (dsc): 610.16  Outputs:  -- RESISTANCES: Total pulmonary resistance (Wood Units): 7.63  Outputs:  -- SHUNTS: Pulmonary flow: 3.93  Outputs:  -- SHUNTS: Qp Indexed: 1.82  Outputs:  -- SHUNTS: Qs Indexed: 1.82  Outputs:  -- SHUNTS: Systemic flow: 3.93    Imaging: < from: Xray Chest 1 View- PORTABLE-Routine (Xray Chest 1 View- PORTABLE-Routine in AM.) (21 @ 04:49) >    Endotracheal tube tip above the terry. Tip of the right IJ [place]-Dm catheter within right lower lobe pulmonary artery. Enteric tube within the stomach.    Pulmonary edema.    Interpretation of Telemetry: NSR 90 w/ PVCs

## 2021-05-31 NOTE — PROGRESS NOTE ADULT - ASSESSMENT
75F with a pmhx of CHFpEF, Severe TR with Right CHF and severe pHTN, COPD on home 02, HTN, HLD, Chronic Afib (on Eliquis), OA, CKD3, DM, right eye cataract who was brought in by EMS to [facility] on 5/23/21 for AMS, found to have a large pericardial effusion w/o tamponade physiology, without drainable window, intubated (5/26) for acute hypercarbic respiratory failure and worsening AMS, transferred to CICU for hypotension and NIC on CKD in undifferentiated shock, course c/b expanding R thigh hematoma, s/p MTP, with ratio predom targeted towards FFP, s/p protamine for hep reversal.    Neuro:  -Mental status: CTH obtained as pt is not following commands off sedation for > 24 hours. Will discuss with her daughter what the pt's baseline is as she does have a history of worsening dementia.     Resp:  -(5/26): Intubated , RR (machine): 12, TV (machine): 350, FiO2: 30,  PEEP:5 . ABG 7.45/50/94/34. Chest XR showing pulmonary congestion now being diuresed. Daily spontaneous breathing trails. She is tolerating PS trails 8/5/30%.  Will continue these settings for now. Chest XR still showing pulm congestion with high filling pressures based on her [place] numbers.   - Dr. Patel consulted for pulm HTN.       CV:  (5/26): TTE: EF 55%, RVH with severe RVSD, severe pulm HTN, large pericardial effusion 3.4cm @ largest diameter, adjacent to RA  ventricular systolic function.  - Pt was on Pressors: levophed, vaso. Now on minimal Levophed at 0.09.  After aggressve fluid resuscitatton now with hemodynamics as follows: CVP 16, PAP 60/15, Janes CO 10, CI 4.5 . No lactate. Central sat 82.  -(5/27): PAC placement in cath lab, trend central sats. Based on Chest XR will pull Pulm cathter back by 2 cm  -c/w atorvastatin 40mg qhs  -Hold sildenefil as pt is still on Levophed. Currently on Levo 0.25  -Systolic goal: >90, MAP >65  Telemetry    GI:  -Diet: Continue Nepro at 40 and will add prosource  -Stress ulcer ppx:continue PPI  -Bowel regimen: none  -(5/26): CT a/p noncon: mild contoured liver. Anasarca      - Hernadez catheter in place for critical care monitoring    Heme:  -Thrombocytopenia  -(5/28): Acute drop in H/H 10->8,  with expanding thigh hematoma 35->24 -> s/p MTP 11 pRBCs , 8 U FFP, 2 U PLTS and protamine x 2 due to supratherapeutic pTT > 200 --> 28  -Hemoglobin has remained stable now .   -[ ] (5/28): VA duplex arterial and venous RLE: no pseudo  -DVT ppx: scd, will start heparin SQ    Renal:  - Continue to monitor I/Os, BUN/Creatinine, and urine output. Cr bmped to 3.6 now 3.16. Nephro following and diuresing well.   - CVP 17 continue bumex gtt.   - Replete lytes PRN. Keep K> 4 and Mg >2    Endo:  - Monitor blood sugar 100-130    ID:  - Cefepime discontinued as cultures have remained negative    CODE STATUS:  Full code    TUBES/LINES/DRAINS:  -ETT  -OGT  -Hernadez  -PIV

## 2021-05-31 NOTE — PROGRESS NOTE ADULT - SUBJECTIVE AND OBJECTIVE BOX
Events:    Review Of Systems:  Constitutional: denies fever, chills, Fatigue   HEENT: denies Blurred vision, Eye Pain, Headache   Respiratory: denies Cough, Wheezing , Shortness of breath  Cardiovascular: denies Chest Pain, Palpitations,  BERMEO   Gastrointestinal: denies Abdominal Pain, Diarrhea, Constipation   Genitourinary: denies Nocturia, Dysuria, Incontinence  Extremities: denies Swelling, Joint Pain  Neurologic: denies Focal deficit, Paresthesias, Syncope  Lymphatic: denies Swelling, Lymphadenopathy   Skin: denies Rash, Ecchymoses, Wounds   Psychiatry: denies Depression, Suicidal/Homicidal Ideation, anxiety  [X ] 10 point review of systems is otherwise negative except as mentioned above         Medications:  atorvastatin 40 milliGRAM(s) Oral at bedtime  buMETAnide Infusion 2 mG/Hr IV Continuous <Continuous>  chlorhexidine 0.12% Liquid 15 milliLiter(s) Oral Mucosa every 12 hours  chlorhexidine 2% Cloths 1 Application(s) Topical <User Schedule>  sodium chloride 0.9%. 1000 milliLiter(s) IV Continuous <Continuous>    PMH/PSH/FH/SH: [ ] Unchanged  Vitals:  T(C): 36.8 (05-31-21 @ 04:00), Max: 37.2 (05-31-21 @ 00:00)  HR: 110 (05-31-21 @ 06:53) (84 - 110)  BP: --  BP(mean): --  RR: 13 (05-31-21 @ 06:30) (11 - 27)  SpO2: 99% (05-31-21 @ 06:53) (90% - 100%)  Wt(kg): --  Daily     Daily   I&O's Summary    30 May 2021 07:01  -  31 May 2021 07:00  --------------------------------------------------------  IN: 1317.8 mL / OUT: 4200 mL / NET: -2882.2 mL        Physical Exam:  Appearance: [ ] Normal [ ] NAD  Eyes: [ ] PERRL [ ] EOMI  HENT: [ ] Normal oral mus[place]a [ ]NC/AT  Cardiovascular: [ ] S1 [ ] S2 [ ] RRR [ ] No m/r/g [ ]No edema [ ] JVP  Procedural Access Site: [ ] No hematoma [ ] Non-tender to palpation [ ] 2+ pulse [ ] No bruit [ ] No Ecchymosis  Respiratory: [ ] Clear to auscultation bilaterally  Gastrointestinal: [ ] Soft [ ] Non-tender [ ] Non-distended [ ] BS+  Musculoskeletal: [ ] No clubbing [ ] No joint deformity   Neurologic: [ ] Non-focal  Lymphatic: [ ] No lymphadenopathy  Psychiatry: [ ] AAOx3 [ ] Mood & affect appropriate  Skin: [ ] No rashes [ ] No ecchymoses [ ] No cyanosis    05-31    148<H>  |  103  |  67<H>  ----------------------------<  115<H>  3.7   |  31  |  3.06<H>    Ca    9.0      31 May 2021 02:53  Phos  3.4     05-31  Mg     2.1     05-31    TPro  5.8<L>  /  Alb  2.7<L>  /  TBili  2.3<H>  /  DBili  x   /  AST  32  /  ALT  20  /  AlkPhos  156<H>  05-31    PTT - ( 31 May 2021 02:53 )  PTT:32.1 sec              ECG:    Echo:    Stress Testing:     Cath:    Imaging:    Interpretation of Telemetry:   75y.o female with PMHx of CHFpEF, Severe TR with Right CHF and severe pHTN, COPD on home 02, HTN, HLD, Chronic Afib (on Eliquis), OA, CKD3, DM, right eye cataract who was brought in by EMS to [facility] on 5/23/21 for AMS. pt A&O x1, per daughter patient with generalized weakness and lethargy since since 5/22. Pt had recent in Jan 2021 with CP and NIC. Per chart on arrival to ED pt was hypotensive, obtunded. Chest CT with large pericardial effusion (increased since Jan 2021)- per thoracic surgery consult no indication for pericardial window, pHTN, cirrhotic liver with small ascites, ECHO EF 55% dilated RV with decreased function, severe TN, pHTN, NIC (per renal consult no acute indication for HD), venous duplex NG for DVTs, Head CT revealed chronic involutional and white matter changes, no acute intracranial process, NIC(SCr peaked at 4.6) with  hyperkalemia (5.8) requiring lokelma and insulin.Of note daughter states over past month patient more and more confused ? dementia and pt is legally blind. Daughter states pt HAS NOT RECEIVED COVID VACCINE as she has been sick these past few months. 5/26 Patient transferred to [facility] with concern of pericardial effusion, hypotension and NIC. On arrival patient SBP 80's, A&O to first name but very lethargic.  (26 May 2021 10:16). Echo revealed a pericardial effusion with no tamponade physiology. She was intubated for hypercarbic respiratory failure, with subsequent improvement in BP, now off vasopressors. She is currently volume overloaded in the setting of RV failure and pulmonary hypertension. She was started on IV bumex and sildenafil.     oN 5/28 Levophed requirements continued to rise from 0.9 to .3. R groin was noted to be swollen and concerning for a hematoma from groin lines that were removed the day prior . Of note her pTT was noted to be > 200 at 4 AM. Heparin gtt was stopped and manual pressure applied for 1 hour. 2 L of LR given and 1 U of PRBCS. At this time pt continued ot become unstable with blood pressure dropping into systolics of 50s and HR up to 160s and CVP reading 0. Everytime manual pressure was stopped hematoma would form again. Vascular surgery called and MTP protocol was applied. Decision was made to reverse coagulopathy first given that both groin lines were small catheter frenches. In total the patient received 11 U of PRBCs, 8 FFP, 2 U PLTS, 3 Calciums and protamine x 2. After coagulopathy was reversed pt stabilized. Lactate peaked at 5.     Events:    Review Of Systems:  Constitutional: denies fever, chills, Fatigue   HEENT: denies Blurred vision, Eye Pain, Headache   Respiratory: denies Cough, Wheezing , Shortness of breath  Cardiovascular: denies Chest Pain, Palpitations,  BERMEO   Gastrointestinal: denies Abdominal Pain, Diarrhea, Constipation   Genitourinary: denies Nocturia, Dysuria, Incontinence  Extremities: denies Swelling, Joint Pain  Neurologic: denies Focal deficit, Paresthesias, Syncope  Lymphatic: denies Swelling, Lymphadenopathy   Skin: denies Rash, Ecchymoses, Wounds   Psychiatry: denies Depression, Suicidal/Homicidal Ideation, anxiety  [X ] 10 point review of systems is otherwise negative except as mentioned above         Medications:  atorvastatin 40 milliGRAM(s) Oral at bedtime  buMETAnide Infusion 2 mG/Hr IV Continuous <Continuous>  chlorhexidine 0.12% Liquid 15 milliLiter(s) Oral Mucosa every 12 hours  chlorhexidine 2% Cloths 1 Application(s) Topical <User Schedule>  sodium chloride 0.9%. 1000 milliLiter(s) IV Continuous <Continuous>    PMH/PSH/FH/SH: [ ] Unchanged  Vitals:  T(C): 36.8 (05-31-21 @ 04:00), Max: 37.2 (05-31-21 @ 00:00)  HR: 110 (05-31-21 @ 06:53) (84 - 110)  BP: --  BP(mean): --  RR: 13 (05-31-21 @ 06:30) (11 - 27)  SpO2: 99% (05-31-21 @ 06:53) (90% - 100%)  Wt(kg): --  Daily     Daily   I&O's Summary    30 May 2021 07:01  -  31 May 2021 07:00  --------------------------------------------------------  IN: 1317.8 mL / OUT: 4200 mL / NET: -2882.2 mL        Physical Exam:  Appearance: [ ] Normal [ ] NAD  Eyes: [ ] PERRL [ ] EOMI  HENT: [ ] Normal oral muscosa [ ]NC/AT  Cardiovascular: [ ] S1 [ ] S2 [ ] RRR [ ] No m/r/g [ ]No edema [ ] JVP  Procedural Access Site: [ ] No hematoma [ ] Non-tender to palpation [ ] 2+ pulse [ ] No bruit [ ] No Ecchymosis  Respiratory: [ ] Clear to auscultation bilaterally  Gastrointestinal: [ ] Soft [ ] Non-tender [ ] Non-distended [ ] BS+  Musculoskeletal: [ ] No clubbing [ ] No joint deformity   Neurologic: [ ] Non-focal  Lymphatic: [ ] No lymphadenopathy  Psychiatry: [ ] AAOx3 [ ] Mood & affect appropriate  Skin: [ ] No rashes [ ] No ecchymoses [ ] No cyanosis    05-31    148<H>  |  103  |  67<H>  ----------------------------<  115<H>  3.7   |  31  |  3.06<H>    Ca    9.0      31 May 2021 02:53  Phos  3.4     05-31  Mg     2.1     05-31    TPro  5.8<L>  /  Alb  2.7<L>  /  TBili  2.3<H>  /  DBili  x   /  AST  32  /  ALT  20  /  AlkPhos  156<H>  05-31    PTT - ( 31 May 2021 02:53 )  PTT:32.1 sec              ECG:    Echo:    Stress Testing:     Cath:    Imaging:    Interpretation of Telemetry:   75y.o female with PMHx of CHFpEF, Severe TR with Right CHF and severe pHTN, COPD on home 02, HTN, HLD, Chronic Afib (on Eliquis), OA, CKD3, DM, right eye cataract who was brought in by EMS to [facility] on 5/23/21 for AMS. pt A&O x1, per daughter patient with generalized weakness and lethargy since since 5/22. Pt had recent in Jan 2021 with CP and NIC. Per chart on arrival to ED pt was hypotensive, obtunded. Chest CT with large pericardial effusion (increased since Jan 2021)- per thoracic surgery consult no indication for pericardial window, pHTN, cirrhotic liver with small ascites, ECHO EF 55% dilated RV with decreased function, severe TN, pHTN, NIC (per renal consult no acute indication for HD), venous duplex NG for DVTs, Head CT revealed chronic involutional and white matter changes, no acute intracranial process, NIC(SCr peaked at 4.6) with  hyperkalemia (5.8) requiring lokelma and insulin.Of note daughter states over past month patient more and more confused ? dementia and pt is legally blind. Daughter states pt HAS NOT RECEIVED COVID VACCINE as she has been sick these past few months. 5/26 Patient transferred to [facility] with concern of pericardial effusion, hypotension and NIC. On arrival patient SBP 80's, A&O to first name but very lethargic.  (26 May 2021 10:16). Echo revealed a pericardial effusion with no tamponade physiology. She was intubated for hypercarbic respiratory failure, with subsequent improvement in BP, now off vasopressors. She is currently volume overloaded in the setting of RV failure and pulmonary hypertension. She was started on IV bumex and sildenafil.     oN 5/28 Levophed requirements continued to rise from 0.9 to .3. R groin was noted to be swollen and concerning for a hematoma from groin lines that were removed the day prior . Of note her pTT was noted to be > 200 at 4 AM. Heparin gtt was stopped and manual pressure applied for 1 hour. 2 L of LR given and 1 U of PRBCS. At this time pt continued ot become unstable with blood pressure dropping into systolics of 50s and HR up to 160s and CVP reading 0. Everytime manual pressure was stopped hematoma would form again. Vascular surgery called and MTP protocol was applied. Decision was made to reverse coagulopathy first given that both groin lines were small catheter frenches. In total the patient received 11 U of PRBCs, 8 FFP, 2 U PLTS, 3 Calciums and protamine x 2. After coagulopathy was reversed pt stabilized. Lactate peaked at 5. Pt now being diuresed on a Bumex gtt.     Events:     Review Of Systems:  Constitutional: denies fever, chills, Fatigue   HEENT: denies Blurred vision, Eye Pain, Headache   Respiratory: denies Cough, Wheezing , Shortness of breath  Cardiovascular: denies Chest Pain, Palpitations,  BERMEO   Gastrointestinal: denies Abdominal Pain, Diarrhea, Constipation   Genitourinary: denies Nocturia, Dysuria, Incontinence  Extremities: denies Swelling, Joint Pain  Neurologic: denies Focal deficit, Paresthesias, Syncope  Lymphatic: denies Swelling, Lymphadenopathy   Skin: denies Rash, Ecchymoses, Wounds   Psychiatry: denies Depression, Suicidal/Homicidal Ideation, anxiety  [X ] 10 point review of systems is otherwise negative except as mentioned above         Medications:  atorvastatin 40 milliGRAM(s) Oral at bedtime  buMETAnide Infusion 2 mG/Hr IV Continuous <Continuous>  chlorhexidine 0.12% Liquid 15 milliLiter(s) Oral Mucosa every 12 hours  chlorhexidine 2% Cloths 1 Application(s) Topical <User Schedule>  sodium chloride 0.9%. 1000 milliLiter(s) IV Continuous <Continuous>    PMH/PSH/FH/SH: [ ] Unchanged  Vitals:  T(C): 36.8 (05-31-21 @ 04:00), Max: 37.2 (05-31-21 @ 00:00)  HR: 110 (05-31-21 @ 06:53) (84 - 110)  BP: --  BP(mean): --  RR: 13 (05-31-21 @ 06:30) (11 - 27)  SpO2: 99% (05-31-21 @ 06:53) (90% - 100%)  Wt(kg): --  Daily     Daily   I&O's Summary    30 May 2021 07:01  -  31 May 2021 07:00  --------------------------------------------------------  IN: 1317.8 mL / OUT: 4200 mL / NET: -2882.2 mL        Physical Exam:  Appearance: [ ] Normal [ ] NAD  Eyes: [ ] PERRL [ ] EOMI  HENT: [ ] Normal oral muscosa [ ]NC/AT  Cardiovascular: [ ] S1 [ ] S2 [ ] RRR [ ] No m/r/g [ ]No edema [ ] JVP  Procedural Access Site: [ ] No hematoma [ ] Non-tender to palpation [ ] 2+ pulse [ ] No bruit [ ] No Ecchymosis  Respiratory: [ ] Clear to auscultation bilaterally  Gastrointestinal: [ ] Soft [ ] Non-tender [ ] Non-distended [ ] BS+  Musculoskeletal: [ ] No clubbing [ ] No joint deformity   Neurologic: [ ] Non-focal  Lymphatic: [ ] No lymphadenopathy  Psychiatry: [ ] AAOx3 [ ] Mood & affect appropriate  Skin: [ ] No rashes [ ] No ecchymoses [ ] No cyanosis    05-31    148<H>  |  103  |  67<H>  ----------------------------<  115<H>  3.7   |  31  |  3.06<H>    Ca    9.0      31 May 2021 02:53  Phos  3.4     05-31  Mg     2.1     05-31    TPro  5.8<L>  /  Alb  2.7<L>  /  TBili  2.3<H>  /  DBili  x   /  AST  32  /  ALT  20  /  AlkPhos  156<H>  05-31    PTT - ( 31 May 2021 02:53 )  PTT:32.1 sec              ECG:    Echo:    Stress Testing:     Cath:    Imaging:    Interpretation of Telemetry:   75y.o female with PMHx of CHFpEF, Severe TR with Right CHF and severe pHTN, COPD on home , HTN, HLD, Chronic Afib (on Eliquis), OA, CKD3, DM, right eye cataract who was brought in by EMS to [facility] on 21 for AMS. pt A&O x1, per daughter patient with generalized weakness and lethargy since since . Pt had recent in 2021 with CP and NIC. Per chart on arrival to ED pt was hypotensive, obtunded. Chest CT with large pericardial effusion (increased since 2021)- per thoracic surgery consult no indication for pericardial window, pHTN, cirrhotic liver with small ascites, ECHO EF 55% dilated RV with decreased function, severe TN, pHTN, NCI (per renal consult no acute indication for HD), venous duplex NG for DVTs, Head CT revealed chronic involutional and white matter changes, no acute intracranial process, NIC(SCr peaked at 4.6) with  hyperkalemia (5.8) requiring lokelma and insulin.Of note daughter states over past month patient more and more confused ? dementia and pt is legally blind. Daughter states pt HAS NOT RECEIVED COVID VACCINE as she has been sick these past few months.  Patient transferred to [facility] with concern of pericardial effusion, hypotension and NIC. On arrival patient SBP 80's, A&O to first name but very lethargic.  (26 May 2021 10:16). Echo revealed a pericardial effusion with no tamponade physiology. She was intubated for hypercarbic respiratory failure, with subsequent improvement in BP, now off vasopressors. She is currently volume overloaded in the setting of RV failure and pulmonary hypertension. She was started on IV bumex and sildenafil.     oN  Levophed requirements continued to rise from 0.9 to .3. R groin was noted to be swollen and concerning for a hematoma from groin lines that were removed the day prior . Of note her pTT was noted to be > 200 at 4 AM. Heparin gtt was stopped and manual pressure applied for 1 hour. 2 L of LR given and 1 U of PRBCS. At this time pt continued ot become unstable with blood pressure dropping into systolics of 50s and HR up to 160s and CVP reading 0. Everytime manual pressure was stopped hematoma would form again. Vascular surgery called and MTP protocol was applied. Decision was made to reverse coagulopathy first given that both groin lines were small catheter frenches. In total the patient received 11 U of PRBCs, 8 FFP, 2 U PLTS, 3 Calciums and protamine x 2. After coagulopathy was reversed pt stabilized. Lactate peaked at 5. Pt now being diuresed on a Bumex gtt.     Events: No acute events. Pt has no mental status. CTH negative.    Review Of Systems:  Constitutional: denies fever, chills, Fatigue   HEENT: denies Blurred vision, Eye Pain, Headache   Respiratory: denies Cough, Wheezing , Shortness of breath  Cardiovascular: denies Chest Pain, Palpitations,  BERMEO   Gastrointestinal: denies Abdominal Pain, Diarrhea, Constipation   Genitourinary: denies Nocturia, Dysuria, Incontinence  Extremities: denies Swelling, Joint Pain  Neurologic: denies Focal deficit, Paresthesias, Syncope  Lymphatic: denies Swelling, Lymphadenopathy   Skin: denies Rash, Ecchymoses, Wounds   Psychiatry: denies Depression, Suicidal/Homicidal Ideation, anxiety  [X ] 10 point review of systems is otherwise negative except as mentioned above         Medications:  atorvastatin 40 milliGRAM(s) Oral at bedtime  buMETAnide Infusion 2 mG/Hr IV Continuous <Continuous>  chlorhexidine 0.12% Liquid 15 milliLiter(s) Oral Mucosa every 12 hours  chlorhexidine 2% Cloths 1 Application(s) Topical <User Schedule>  sodium chloride 0.9%. 1000 milliLiter(s) IV Continuous <Continuous>    PMH/PSH/FH/SH: [ ] Unchanged  Vitals:  T(C): 36.8 (21 @ 04:00), Max: 37.2 (21 @ 00:00)  HR: 110 (21 @ 06:53) (84 - 110)  RR: 13 (21 @ 06:30) (11 - 27)  SpO2: 99% (21 @ 06:53) (90% - 100%)    Daily     Daily   I&O's Summary    30 May 2021 07:01  -  31 May 2021 07:00  --------------------------------------------------------  IN: 1317.8 mL / OUT: 4200 mL / NET: -2882.2 mL    Physical Exam:  Appearance: sedated and intubated   Eyes: [ X] PERRL [X ] EOMI. R eye with gray discoloration  HENT: [ X] Normal oral muscosa [X ]NC/AT  Cardiovascular: [ X] S1 [ X] S2 [ X] RRR . No JVD . +2 edema B/L LE  Procedural Access Site: R groin access site with resolving hematoma. Ecchymosis, redness noted in the medial thigh. No active bleeding. Soft. Some blistering noted. No drainage,   Respiratory: [X ] Clear to auscultation bilaterally, intubated   Gastrointestinal: [ X] Soft [X ] Non-tender [ X] Non-distended   Musculoskeletal: [X ] No clubbing [ X] No joint deformity   Neurologic: [X ] Non-focal  Lymphatic: [ X] No lymphadenopathy  Psychiatry: [X ] AAOx3 [ X] Mood & affect appropriate  Skin: see above        148<H>  |  103  |  67<H>  ----------------------------<  115<H>  3.7   |  31  |  3.06<H>    Ca    9.0      31 May 2021 02:53  Phos  3.4       Mg     2.1         TPro  5.8<L>  /  Alb  2.7<L>  /  TBili  2.3<H>  /  DBili  x   /  AST  32  /  ALT  20  /  AlkPhos  156<H>      PTT - ( 31 May 2021 02:53 )  PTT:32.1 sec    ECG: < from: 12 Lead ECG (21 @ 08:25) >  Diagnosis Line ATRIAL FIBRILLATION  RIGHT AXIS DEVIATION  PULMONARY DISEASE PATTERN  INCOMPLETE RIGHT BUNDLE BRANCH BLOCK  POSSIBLE RIGHT VENTRICULAR HYPERTROPHY  SEPTAL INFARCT (CITED ON OR BEFORE 28-MAY-2021)  ABNORMAL ECG    Echo: < from: Transthoracic Echocardiogram (21 @ 09:44) >  1. Endocardium not well visualized; overall preserved left  ventricular systolic function. Septal flattening with  paradoxical septal motion consistent with right ventricular  overload.  2. Severe right ventricular enlargement with severely  decreased right ventricular systolic function.  3.Estimated right ventricular systolic pressure equals 67  mm Hg, assuming right atrial pressure equals 15 mm Hg,  consistent with severe pulmonary hypertension.  4. Large pericardial effusion seen adjacent to the right  atrium. The effusion measures up to approximately 3.4 cm  adjacent to the RA in its largest dimension. Thickened  pericardium with small-moderate pericardial effusion seen  posterior and lateral to the LV. The effusion measures up  to approximately 1.5 cm posterior to the LV. No  echocardiographic evidence of pericardial tamponade.  Results discussed with physician assistant Brayan from  CTICU.    Cath: < from: Cardiac Cath Lab - Adult (21 @ 14:49) >  INTERVENTIONAL RECOMMENDATIONS: [place] Dm placement via RIJ (catheter was  placed in the ICU prior to the procedure).  CI 1.82 by Janes.  Management as per primary team and ICU.  Prepared and signed by  Darrian Chong D.O.  Signed 2021 16:39:13  HEMODYNAMIC TABLES  Pressures:  Baseline  Pressures:  - HR: 57  Pressures:  - Rhythm:  Pressures:  -- Pulmonary Artery (S/D/M): 65/15/30  Pressures:  -- Pulmonary Capillary Wedge: --/28/20  Pressures:  -- Right Atrium (a/v/M): --/15/14  O2 Sats:  Baseline  O2 Sats:  - HR: 57  O2 Sats:  - Rhythm:  O2 Sats:  -- AO: 12.2/100/16.59  O2 Sats:  -- PA: 12.2/55.4/9.19  Outputs:  Baseline  Outputs:  -- CALCULATIONS[age]  Outputs:  -- CALCULATIONS: Body Surface Area: 2.17  Outputs:  -- CALCULATIONS: Height in cm: 165.00  Outputs:  -- CALCULATIONS: Sex: Female  Outputs:  -- CALCULATIONS: Weight in k.40  Outputs:  -- OUTPUTS: CO by Janes: 3.93  Outputs:  -- OUTPUTS: Janes cardiac index: 1.82  Outputs:  -- OUTPUTS: O2 consumption: 291.00  Outputs:  -- OUTPUTS: Vo2 Indexed: 134.36  Outputs:  -- RESISTANCES: Pulmonary vascular index (dsc): 440.49  Outputs:  -- RESISTANCES: Pulmonary vascular index (Wood Units): 5.51  Outputs:  -- RESISTANCES: Pulmonary vascular resistance (dsc): 203.39  Outputs:  -- RESISTANCES: Pulmonary vascular resistance (Wood Units): 2.54  Outputs:  -- RESISTANCES: Right ventricular stroke work: 16.15  Outputs:  -- RESISTANCES: Right ventricular stroke work index: 7.45  Outputs:  -- RESISTANCES: Total pulmonary index (dsc): 1321.48  Outputs:  -- RESISTANCES: Total pulmonary index (Wood Units): 16.52  Outputs:  -- RESISTANCES: Total pulmonary resistance (dsc): 610.16  Outputs:  -- RESISTANCES: Total pulmonary resistance (Wood Units): 7.63  Outputs:  -- SHUNTS: Pulmonary flow: 3.93  Outputs:  -- SHUNTS: Qp Indexed: 1.82  Outputs:  -- SHUNTS: Qs Indexed: 1.82  Outputs:  -- SHUNTS: Systemic flow: 3.93    Imaging: < from: Xray Chest 1 View- PORTABLE-Routine (Xray Chest 1 View- PORTABLE-Routine in AM.) (21 @ 04:49) >    Endotracheal tube tip above the terry. Tip of the right IJ [place]-Dm catheter within right lower lobe pulmonary artery. Enteric tube within the stomach.    Pulmonary edema.    Interpretation of Telemetry: NSR 90 w/ PVCs

## 2021-05-31 NOTE — PROGRESS NOTE ADULT - ASSESSMENT
====================ASSESSMENT ==============  5/26 Txfered to [facility] with concern of pericardial effusion, hypotension and NIC.  Atrial Fibrillation  Acute Kidney Injury  Thrombocytopenia   Severe pulmonary hypertension     Plan:  ====================== NEUROLOGY=====================  Sedated for vent synchrony   -  Continue with IV Propofol   - Daily sedation vacation and SBT once RV unloaded     ==================== RESPIRATORY======================  Acute Hypercapnic Respiratory Failure req Intubation   - On full vent support, requiring close monitoring of respiratory rate, breathing pattern, pulse oximetry monitoring, and intermittent blood gas analysis. On minimal vent settings, adjust prn based on ABG's.   - daily sbt trials once RV unloaded     Severe pulmonary HTN, likely group 3 from COPD   - monitor PA pressures and SvO2 as below     Mechanical Ventilation:  Mode: AC/ CMV (Assist Control/ Continuous Mandatory Ventilation)  RR (machine): 12  TV (machine): 350  FiO2: 30  PEEP: 4.7  ITime: 1  MAP: 9  PIP: 31      ====================CARDIOVASCULAR==================  HFpEF with severe TR and severely decr RVSF likley 2/2 pulm HTN   - S/P RHC with Gilman emmanuel catheter placed on 5/27.   - Continue to monitor ScvO2, lact, PA pressures  - now s/p mass fluid and blood product resuscitation, started on Bumex gtt @2mg/hr     Hypotension, hemorrhagic, hypovolemic in setting of active bleeding, resolved   - s/p multiple products of blood transfused 2/2 RFA bleed   - Started IV Levophed     Chronic Afib  - rate controlled, continue to monitor   -Continue Lipitor    ===================HEMATOLOGIC/ONC ===================  Hemorrhagic Shock, resolved   - RFA s/p removal of Beto with large hematoma and active bleeding s/p supratherapeutic ptt   - direct pressure applied and manual pressure of hematoma. FemStop applied w/ no relief of hematoma accumulation   - MTP called s/p 11U PRBC, 8U plasma, 2 plts. Protamine x2 + 2.5L IVF resuscitated     VTE ppx   - SCDs     ===================== RENAL =========================  Non-oliguric NIC with creatinine peak of 4.49 currently 3.29. Baseline 0.97 in 02/2021   - Cr worsened > 3 s/p hemorrhagic shock   - Goal net negative fluid balance. Replete lytes PRN. Keep K> 4 and Mg >2.   - Bumex gtt @2mg/hr   - avoid nephrotoxic agents  - Nephro following. No urgent needs for HD at this time   -Continue monitoring urine output    ==================== GASTROINTESTINAL===================  Diet: NPO w/ TFS- Nepro with Carb steady goal 40cc/hr.   - BM x2 since admission on 5/26, would hold bowel regimen for the time being     =======================    ENDOCRINE  =====================  Bgl controlled  - monitor glucose for need to initiate sliding scale.     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 IV Cefepime   - BCX x2 5/26: NGTD, UCx 5/26: no growth. S/P MRSA swab 5/27: neg.   - Would potentially consider switch to 3rd gen cephalosporin (Rocephin) in setting of GN diplococci (Neisseria)      Ale [facility] [phone]

## 2021-05-31 NOTE — PROGRESS NOTE ADULT - ATTENDING COMMENTS
Pulmonary hypertension with R heart failure  Off sedation for 24 hours but not following commands, head CT benign - remain off sedation  Vasodilatory shock on and off Levophed infusion - wean as able  Acute hypoxic respiratory failure requiring mechanical ventilation with minimal vent requirements - daily breathing trials  Sildenafil off due to pressor requirement  Pulmonary Hypertension service has been consulted  Tolerating tube feeds  Non-oliguric NIC that is improving, Renal following - diurese for 1-2L overall negative  Hemoglobin low but acceptable on HSQ for DVT prophylaxis   Afebrile, no antibiotics  Sugars controlled  RIJ Cordis 5/26, Victor 5/27 and radial harshil 5/27

## 2021-05-31 NOTE — PROGRESS NOTE ADULT - SUBJECTIVE AND OBJECTIVE BOX
Intubated in CCU    Vital Signs Last 24 Hrs  T(C): 36.8 (05-31-21 @ 04:00), Max: 37.2 (05-31-21 @ 00:00)  T(F): 98.2 (05-31-21 @ 04:00), Max: 99 (05-31-21 @ 00:00)  HR: 92 (05-31-21 @ 17:30) (82 - 110)  RR: 23 (05-31-21 @ 17:30) (11 - 28)  SpO2: 99% (05-31-21 @ 17:30) (90% - 100%)    I&O's Detail    30 May 2021 07:01  -  31 May 2021 07:00  --------------------------------------------------------  IN:    Bumetanide: 240 mL    Enteral Tube Flush: 60 mL    Nepro with Carb Steady: 960 mL    Norepinephrine: 27.8 mL    sodium chloride 0.9%: 30 mL  Total IN: 1317.8 mL    OUT:    Indwelling Catheter - Urethral (mL): 4200 mL    Propofol: 0 mL  Total OUT: 4200 mL    Total NET: -2882.2 mL    31 May 2021 07:01  -  31 May 2021 18:34  --------------------------------------------------------  IN:    Bumetanide: 110 mL    Enteral Tube Flush: 50 mL    Nepro with Carb Steady: 440 mL  Total IN: 600 mL    OUT:    Indwelling Catheter - Urethral (mL): 1325 mL  Total OUT: 1325 mL    Total NET: -725 mL    Mode: AC/ CMV (Assist Control/ Continuous Mandatory Ventilation)  RR (machine): 12  TV (machine): 350  FiO2: 50  PEEP: 5  ITime: 1  MAP: 11  PIP: 35    s1s2  b/l air entry  soft, ND  edema, R groin hematoma                                                   11.6   8.43  )-----------( 74       ( 31 May 2021 02:53 )             35.4     31 May 2021 11:54    142    |  97     |  67     ----------------------------<  296    3.7     |  32     |  2.85     Ca    8.5        31 May 2021 11:54  Phos  3.4       31 May 2021 02:53  Mg     2.0       31 May 2021 11:54    TPro  6.0    /  Alb  2.8    /  TBili  2.2    /  DBili  x      /  AST  34     /  ALT  20     /  AlkPhos  190    31 May 2021 11:54    LIVER FUNCTIONS - ( 31 May 2021 11:54 )  Alb: 2.8 g/dL / Pro: 6.0 g/dL / ALK PHOS: 190 U/L / ALT: 20 U/L / AST: 34 U/L / GGT: x           atorvastatin 40 milliGRAM(s) Oral at bedtime  buMETAnide Infusion 2 mG/Hr IV Continuous <Continuous>  chlorhexidine 0.12% Liquid 15 milliLiter(s) Oral Mucosa every 12 hours  chlorhexidine 2% Cloths 1 Application(s) Topical <User Schedule>  heparin   Injectable 5000 Unit(s) SubCutaneous every 8 hours  sodium chloride 0.9%. 1000 milliLiter(s) IV Continuous <Continuous>    A/P:    COPD, severe PHTN, CO2 retention, CM, peric effusion, R heart failure, resp failure, intubated  Hemodynamic NIC (Cr 0.97 - 2/3/21)  Good UO w/diuresis  Cr continues to improve from peak of 4.87 - 3/26/21  Avoid nephrotoxins  Avoid hypotension  R groin hematoma, s/p PRBCs  F/u CBC, BMP, UO  D/w daughter at bedside  Further care per CCU team    [phone]

## 2021-05-31 NOTE — PROGRESS NOTE ADULT - SUBJECTIVE AND OBJECTIVE BOX
SANJIV ROBERTS  MRN-[id number]  Patient is a 75y old  Female who presents with a chief complaint of Transfer from [facility] with pericardial effusion, NIC and hypotension (31 May 2021 18:33)    HPI: 75F Hx CHFpEF, Severe TR with Right CHF and severe pHTN, COPD on home 02, HTN, HLD, Chronic Afib (on Eliquis), OA, CKD3, DM, right eye cataract who was brought in by EMS to [facility] on 21 for AMS. pt A&O x1, per daughter patient with generalized weakness and lethargy since since . Pt had recent in 2021 with CP and NIC. Per chart on arrival to ED pt was hypotensive, obtunded. Chest CT with large pericardial effusion (increased since 2021)- per thoracic surgery consult no indication for pericardial window, pHTN, cirrhotic liver with small ascites, ECHO EF 55% dilated RV with decreased function, severe TN, pHTN, NIC (per renal consult no acute indication for HD), venous duplex NG for DVTs, Head CT revealed chronic involutional and white matter changes, no acute intracranial process, NIC(SCr peaked at 4.6) with  hyperkalemia (5.8) requiring lokelma and insulin.    Hospital Course:   Patient transferred to [facility] with concern of pericardial effusion, hypotension and NIC.    intubated for hypercarbic respiratory distressed. Transferred from CTU to CICU as no need for pericardiocentesis. Briefly on pressors prior to intubation and since weaned off. RIJ cordis placed- unable to float swan- coiling in RV. RV pressures 50s /10s. Start sildenafil 20 tid. start bumex gtt    S/P RHC with [place] dm catheter placed.   MTP called for R Femoral A bleed from previous A line site s/p 11U PRBC, 8 plasma, 2 plts.    started IV Levophed, sildenafil dc'ed    REVIEW OF SYSTEMS:   Unable to obtain, pt is intubated and sedated.     ICU Vital Signs Last 24 Hrs  T(C): 36.8 (31 May 2021 04:00), Max: 37.2 (31 May 2021 00:00)  T(F): 98.2 (31 May 2021 04:00), Max: 99 (31 May 2021 00:00)  HR: 84 (31 May 2021 18:30) (82 - 110)-  ABP: 86/40 (31 May 2021 18:30) (78/36 - 120/62)  ABP(mean): 54 (31 May 2021 18:30) (48 - 80)  RR: 11 (31 May 2021 18:30) (11 - 28)  SpO2: 100% (31 May 2021 18:00) (90% - 100%)    Mode: AC/ CMV (Assist Control/ Continuous Mandatory Ventilation), RR (machine): 12, TV (machine): 350, FiO2: 50, PEEP: 5, ITime: 1  CVP(mm Hg): 33 (21 @ 14:30) (6 - 33)  CO: 10 (21 @ 02:15) (10 - 10)  CI: 4.6 (21 @ 02:15) (4.6 - 4.7)  PA: 28/20 (21 @ 16:00) (/- - 153)  PA(mean): 14 (21 @ 16:00) (1 - 163)  SVR: 399 (21 @ 02:15) (399 - 478)  SVRI: 868 (21 @ 02:15) (868 - 868)      I&O's Summary    30 May 2021 07:  -  31 May 2021 07:00  --------------------------------------------------------  IN: 1317.8 mL / OUT: 4200 mL / NET: -2882.2 mL    31 May 2021 07:  -  31 May 2021 19:15  --------------------------------------------------------  IN: 610 mL / OUT: 1325 mL / NET: -715 mL  ============================I/O===========================   I&O's Detail    30 May 2021 07:  -  31 May 2021 07:00  --------------------------------------------------------  IN:    Bumetanide: 240 mL    Enteral Tube Flush: 60 mL    Nepro with Carb Steady: 960 mL    Norepinephrine: 27.8 mL    sodium chloride 0.9%: 30 mL  Total IN: 1317.8 mL    OUT:    Indwelling Catheter - Urethral (mL): 4200 mL    Propofol: 0 mL  Total OUT: 4200 mL    Total NET: -2882.2 mL      31 May 2021 07:  -  31 May 2021 19:15  --------------------------------------------------------  IN:    Bumetanide: 120 mL    Enteral Tube Flush: 50 mL    Nepro with Carb Steady: 440 mL  Total IN: 610 mL    OUT:    Indwelling Catheter - Urethral (mL): 1325 mL  Total OUT: 1325 mL    Total NET: -715 mL  ============================ LABS ========================                     11.6   8.43  )-----------( 74       ( 31 May 2021 02:53 )             35.4         142  |  97  |  67<H>  ----------------------------<  296<H>  3.7   |  32<H>  |  2.85<H>    Ca    8.5      31 May 2021 11:54  Phos  3.4       Mg     2.0         TPro  6.0  /  Alb  2.8<L>  /  TBili  2.2<H>  /  DBili  x   /  AST  34  /  ALT  20  /  AlkPhos  190<H>      LIVER FUNCTIONS - ( 31 May 2021 11:54 )  Alb: 2.8 g/dL / Pro: 6.0 g/dL / ALK PHOS: 190 U/L / ALT: 20 U/L / AST: 34 U/L / GGT: x           PTT - ( 31 May 2021 02:53 )  PTT:32.1 sec  ABG - ( 31 May 2021 02:54 )  pH, Arterial: 7.44  pH, Blood: x     /  pCO2: 55    /  pO2: 97    / HCO3: 37    / Base Excess: 11.1  /  SaO2: 98        Lactate, Blood: 0.7 mmol/L (21 @ 11:54)  Blood Gas Arterial, Lactate: 1.1 mmol/L (21 @ 02:54)  Blood Gas Arterial, Lactate: 1.1 mmol/L (21 @ 02:03)  Blood Gas Arterial, Lactate: 1.3 mmol/L (21 @ 21:05)  Blood Gas Arterial, Lactate: 1.4 mmol/L (21 @ 14:26)  Blood Gas Arterial, Lactate: 1.5 mmol/L (21 @ 12:42)  Blood Gas Arterial, Lactate: 1.1 mmol/L (21 @ 03:35)  Blood Gas Arterial, Lactate: 1.2 mmol/L (21 @ 22:38)  ======================Micro/Rad/Cardio=================  Telemetry: Reviewed   EKG: Reviewed  CXR: Reviewed  Culture: Reviewed   Echo: Transthoracic Echocardiogram:   Patient name: SANJIV ROBERTS  YOB: 1945   Age: 75 (F)   MR#: [id number]  Study Date: 2021  [id number]: Haydee Cordon RDCS  Study quality: Technically difficult  Referring Physician: Gilmar Welch MD  Blood Pressure: 116/69 mmHg  Height: 168 cm  Weight: 100 kg  BSA: 2.1 m2  ------------------------------------------------------------------------  PROCEDURE: Transthoracic echocardiogram with 2-D, M-Mode  and complete spectral and color flow Doppler.  INDICATION: Cardiac tamponade (I31.4)  ------------------------------------------------------------------------  Dimensions:    Normal Values:  LA:     4.4    2.0 - 4.0 cm  Ao:            2.0 - 3.8 cm  SEPTUM: 0.8    0.6 - 1.2 cm  PWT:    1.0    0.6 - 1.1 cm  LVIDd:  3.6    3.0 - 5.6 cm  LVIDs:  2.2    1.8 - 4.0 cm  Derived variables:  LVMI: 45 g/m2  RWT: 0.55  EF (Visual Estimate): 55 %  ------------------------------------------------------------------------  Observations:  Mitral Valve: Mitral annular calcification, otherwise  normal mitral valve. Mild-moderate mitral regurgitation.  Aortic Valve/Aorta: Calcified trileaflet aortic valve with  normal opening.  Normal aortic root.  Left Atrium: Normal left atrial size.  Left Ventricle: Endocardium not well visualized; overall  preserved left ventricular systolic function. Septal  flattening with paradoxical septal motion consistent with  right ventricular overload. Small left ventricle.  Right Heart: Severe right atrial enlargement. Mobile  interatrial septum. Interatrial septum bows towards the LA  consistent with elevated RA pressure. Severe right  ventricular enlargement with severely decreased right  ventricular systolic function. Tricuspid annular dilatation  with normal leaflet opening. There is central malcoaptation  of the leaflets. Moderate-severe tricuspid regurgitation.  Normal pulmonic valve. Moderate pulmonic regurgitation.  Pericardium/Pleura: Large pericardial effusion seen  adjacent to the right atrium. The effusionmeasures up to  approximately 3.4 cm adjacent to the RA in its largest  dimension. Thickened pericardium with small-moderate  pericardial effusion seen posterior and lateral to the LV.  The effusion measures up to approximately 1.5 cm posterior  to the LV. No echocardiographic evidence of pericardial  tamponade.  Hemodynamic: Dilated IVC (diameterabout 3.4 cm) with less  than 50% respiratory variation in size consistent with  estimated RA pressure 15 mmHg. Estimated right ventricular  systolic pressure equals 67 mm Hg, assuming right atrial  pressure equals 15 mm Hg, consistent with severe pulmonary  hypertension.  ------------------------------------------------------------------------  Conclusions:  1. Endocardium not well visualized; overall preserved left  ventricular systolic function. Septal flattening with  paradoxical septal motion consistent with right ventricular  overload.  2. Severe right ventricular enlargement with severely  decreased right ventricular systolic function.  3.Estimated right ventricular systolic pressure equals 67  mm Hg, assuming right atrial pressure equals 15 mm Hg,  consistent with severe pulmonary hypertension.  4. Large pericardial effusion seen adjacent to the right  atrium. The effusion measures up to approximately 3.4 cm  adjacent to the RA in its largest dimension. Thickened  pericardium with small-moderate pericardial effusion seen  posterior and lateral to the LV. The effusion measures up  to approximately 1.5 cm posterior to the LV. No  echocardiographic evidence of pericardial tamponade.  Results discussed with physician assistant Brayan from  [facility].    Cath: Cardiac Cath Lab - Adult:   [facility]  Department of Cardiology  [address] [place] [id number]  [phone]  Cath Lab Report -- Comprehensive Report  Patient: SANJIV ROBERTS  Study date: 2021  Account number: [id number]  MR number: [id number]  : 1945  Gender: Female  Race:  Amer  Case Physician(s):  Darrian Chong D.O.  Fellow:  Marc Bui M.D.  Referring Physician:  Zeus Milton M.D.  Jered Farooq M.D.  INDICATIONS: Heart failure; unspecified.  HISTORY: There was no prior cardiac history. The patient has renal failure  (not requiring dialysis).  PROCEDURE:  --  Right heart catheterization.  --  [place] Dm Insertion.  TECHNIQUE: The risks and alternatives of the procedures and conscious  sedation were explained to the patient's next of kin and informed consent  was obtained. Cardiac catheterization performed electively.  Local anesthetic given. Right internal jugular vein access. Right heart  catheterization. The procedure was performed utilizing a catheter. [place]  Dm Insertion. RADIATION EXPOSURE: 0.9 min.  COMPLICATIONS: There were no complications.  DIAGNOSTIC RECOMMENDATIONS: [place] Dm placement via RIJ (catheter was  placed in the ICU prior to the procedure).  CI 1.82 by Janes.  Management as per primary team and ICU.  INTERVENTIONAL RECOMMENDATIONS: [place] Dm placement via RIJ (catheter was  placed in the ICU prior to the procedure).  CI 1.82 by Janes.  Management as per primary team and ICU.  Prepared and signed by  Darrian Chong D.O.  Signed 2021 16:39:13  HEMODYNAMIC TABLES  Pressures:  Baseline  Pressures:  - HR: 57  Pressures:  - Rhythm:  Pressures:  -- Pulmonary Artery (S/D/M): 65/15/30  Pressures:  -- Pulmonary Capillary Wedge: --/28/20  Pressures:  -- Right Atrium (a/v/M): --/15/14  O2 Sats:  Baseline  O2 Sats:  - HR: 57  O2 Sats:  - Rhythm:  O2 Sats:  -- AO: 12.2/100/16.59  O2 Sats:  -- PA: 12.2/55.4/9.19  Outputs:  Baseline  Outputs:  -- CALCULATIONS[age]  Outputs:  -- CALCULATIONS: Body Surface Area: 2.17  Outputs:  -- CALCULATIONS: Height in cm: 165.00  Outputs:  -- CALCULATIONS: Sex: Female  Outputs:  -- CALCULATIONS: Weight in k.40  Outputs:  -- OUTPUTS: CO by Janes: 3.93  Outputs:  -- OUTPUTS: Janes cardiac index: 1.82  Outputs:  -- OUTPUTS: O2 consumption: 291.00  Outputs:  -- OUTPUTS: Vo2 Indexed: 134.36  Outputs:  -- RESISTANCES: Pulmonary vascular index (dsc): 440.49  Outputs:  -- RESISTANCES: Pulmonary vascular index (Wood Units): 5.51  Outputs:  -- RESISTANCES: Pulmonary vascular resistance (dsc): 203.39  Outputs:  -- RESISTANCES: Pulmonary vascular resistance (Wood Units): 2.54  Outputs:  -- RESISTANCES: Right ventricular stroke work: 16.15  Outputs:  -- RESISTANCES: Right ventricular stroke work index: 7.45  Outputs:  -- RESISTANCES: Total pulmonary index (dsc): 1321.48  Outputs:  -- RESISTANCES: Total pulmonary index (Wood Units): 16.52  Outputs:  -- RESISTANCES: Total pulmonary resistance (dsc): 610.16  Outputs:  -- RESISTANCES: Total pulmonary resistance (Wood Units): 7.63  Outputs:  -- SHUNTS: Pulmonary flow: 3.93  Outputs:  -- SHUNTS: Qp Indexed: 1.82  Outputs:  -- SHUNTS: Qs Indexed: 1.82  Outputs:  -- SHUNTS: Systemic flow: 3.93 (21 @ 14:49)  ======================================================  PAST MEDICAL & SURGICAL HISTORY:  Afib  COPD (chronic obstructive pulmonary disease)  CHF (congestive heart failure)  Dementia

## 2021-06-01 NOTE — PROGRESS NOTE ADULT - SUBJECTIVE AND OBJECTIVE BOX
[facility] NEPHROLOGY [facility], [facility]  NEPHROLOGY AND HYPERTENSION  [address]  [address]  [place] [address]  [phone]    MD LANG PHAN, MD PUMA MOORE, MD JANAE HWANG, MD KARMEN MONTES, MD JAIDA DUNCAN, MD CORI STAPLETON, MD          Intubated  UO noted     MEDICATIONS  (STANDING):  atorvastatin 40 milliGRAM(s) Oral at bedtime  buMETAnide Infusion 2 mG/Hr (10 mL/Hr) IV Continuous <Continuous>  chlorhexidine 0.12% Liquid 15 milliLiter(s) Oral Mucosa every 12 hours  chlorhexidine 2% Cloths 1 Application(s) Topical <User Schedule>  heparin   Injectable 5000 Unit(s) SubCutaneous every 8 hours  lactulose Syrup 20 Gram(s) Oral every 12 hours  norepinephrine Infusion 0.03 MICROgram(s)/kG/Min (3.16 mL/Hr) IV Continuous <Continuous>  pantoprazole  Injectable 40 milliGRAM(s) IV Push daily  sodium chloride 0.9%. 1000 milliLiter(s) (10 mL/Hr) IV Continuous <Continuous>    MEDICATIONS  (PRN):      05-31-21 @ 07:01  -  06-01-21 @ 07:00  --------------------------------------------------------  IN: 1650 mL / OUT: 3065 mL / NET: -1415 mL    06-01-21 @ 07:01  -  06-01-21 @ 21:41  --------------------------------------------------------  IN: 1138.4 mL / OUT: 1170 mL / NET: -31.6 mL      PHYSICAL EXAM:      T(C): 37.3 (06-01-21 @ 19:00), Max: 37.3 (06-01-21 @ 12:00)  HR: 96 (06-01-21 @ 21:30) (84 - 100)  BP: --  RR: 26 (06-01-21 @ 21:00) (11 - 32)  SpO2: 99% (06-01-21 @ 21:30) (98% - 100%)  Wt(kg): --  Lungs clear  Heart S1S2  Abd soft NT ND  Extremities:   tr edema                                    11.3   9.77  )-----------( 75       ( 01 Jun 2021 03:50 )             35.2     06-01    149<H>  |  102  |  73<H>  ----------------------------<  113<H>  3.9   |  32<H>  |  2.95<H>    Ca    9.3      01 Jun 2021 16:08  Phos  2.7     06-01  Mg     2.2     06-01    TPro  6.4  /  Alb  2.7<L>  /  TBili  2.3<H>  /  DBili  x   /  AST  36  /  ALT  22  /  AlkPhos  193<H>  06-01    ABG - ( 01 Jun 2021 15:52 )  pH, Arterial: 7.46  pH, Blood: x     /  pCO2: 58    /  pO2: 97    / HCO3: 40    / Base Excess: 14.1  /  SaO2: 98                LIVER FUNCTIONS - ( 01 Jun 2021 16:08 )  Alb: 2.7 g/dL / Pro: 6.4 g/dL / ALK PHOS: 193 U/L / ALT: 22 U/L / AST: 36 U/L / GGT: x           Creatinine Trend: 2.95<--, 3.00<--, 2.99<--, 2.99<--, 2.85<--, 3.06<--  Mode: AC/ CMV (Assist Control/ Continuous Mandatory Ventilation)  RR (machine): 12  TV (machine): 350  FiO2: 30  PEEP: 5  ITime: 1  MAP: 11  PIP: 27      A/P:    COPD, severe PHTN, CO2 retention, CM, peric effusion, R heart failure, resp failure, intubated  Hemodynamic NIC (Cr 0.97 - 2/3/21)  Good UO w/diuresis on Bumex Drip  Cr continues to improve from peak of 4.87 - 3/26/21  Avoid nephrotoxins  Avoid hypotension  R groin hematoma, s/p PRBCs  F/u CBC, BMP, UO  Further care per CCU team        Amrik Nobles MD

## 2021-06-01 NOTE — PROGRESS NOTE ADULT - SUBJECTIVE AND OBJECTIVE BOX
Subjective:    Medications:  atorvastatin 40 milliGRAM(s) Oral at bedtime  buMETAnide Infusion 2 mG/Hr IV Continuous <Continuous>  chlorhexidine 0.12% Liquid 15 milliLiter(s) Oral Mucosa every 12 hours  chlorhexidine 2% Cloths 1 Application(s) Topical <User Schedule>  heparin   Injectable 5000 Unit(s) SubCutaneous every 8 hours  sodium chloride 0.9%. 1000 milliLiter(s) IV Continuous <Continuous>      Physical Exam:    Vitals:  Vital Signs Last 24 Hours  T(C): 37 (06-01-21 @ 07:30), Max: 37.2 (06-01-21 @ 00:00)  HR: 88 (06-01-21 @ 09:30) (82 - 100)  BP: --  RR: 16 (06-01-21 @ 09:30) (11 - 32)  SpO2: 100% (06-01-21 @ 09:30) (98% - 100%)        I&O's Summary    31 May 2021 07:01  -  01 Jun 2021 07:00  --------------------------------------------------------  IN: 1650 mL / OUT: 3065 mL / NET: -1415 mL    01 Jun 2021 07:01  -  01 Jun 2021 10:05  --------------------------------------------------------  IN: 100 mL / OUT: 150 mL / NET: -50 mL        Tele:    General: No distress. Comfortable.  HEENT: EOM intact.  Neck: Neck supple. JVP not elevated. No masses  Chest: Clear to auscultation bilaterally  CV: Normal S1 and S2. No murmurs, rub, or gallops. Radial pulses normal.  Abdomen: Soft, non-distended, non-tender  Skin: No rashes or skin breakdown  Neurology: Alert and oriented times three. Sensation intact  Psych: Affect normal    Labs:                        11.3   9.77  )-----------( 75       ( 01 Jun 2021 03:50 )             35.2     06-01    147<H>  |  102  |  74<H>  ----------------------------<  119<H>  4.1   |  32<H>  |  3.00<H>    Ca    9.3      01 Jun 2021 09:16  Phos  2.7     06-01  Mg     2.2     06-01    TPro  6.6  /  Alb  2.8<L>  /  TBili  2.4<H>  /  DBili  x   /  AST  34  /  ALT  21  /  AlkPhos  204<H>  06-01    PTT - ( 01 Jun 2021 03:50 )  PTT:35.1 sec        Oxygen Saturation, Mixed: 77 % (05-31 @ 02:54)  Oxygen Saturation, Mixed: 77 % (05-30 @ 18:05)  Oxygen Saturation, Mixed: 81 % (05-30 @ 02:03)  Oxygen Saturation, Mixed: 81 % (05-29 @ 21:05)      Lactate, Blood: 0.7 mmol/L (05-31 @ 11:54)     Subjective:  - remains intubated, min vent settings and currently CPAPing with daily SBT  - questionable neuro status, not fully responsive despite weaning off sedation since 5/30  - resumed on Levophed this AM for MAPs 60s    Medications:  atorvastatin 40 milliGRAM(s) Oral at bedtime  buMETAnide Infusion 2 mG/Hr IV Continuous <Continuous>  chlorhexidine 0.12% Liquid 15 milliLiter(s) Oral Mucosa every 12 hours  chlorhexidine 2% Cloths 1 Application(s) Topical <User Schedule>  heparin   Injectable 5000 Unit(s) SubCutaneous every 8 hours  sodium chloride 0.9%. 1000 milliLiter(s) IV Continuous <Continuous>      Physical Exam:    Vitals:  Vital Signs Last 24 Hours  T(C): 37 (06-01-21 @ 07:30), Max: 37.2 (06-01-21 @ 00:00)  HR: 88 (06-01-21 @ 09:30) (82 - 100)  BP: --  RR: 16 (06-01-21 @ 09:30) (11 - 32)  SpO2: 100% (06-01-21 @ 09:30) (98% - 100%)        I&O's Summary    31 May 2021 07:01  -  01 Jun 2021 07:00  --------------------------------------------------------  IN: 1650 mL / OUT: 3065 mL / NET: -1415 mL    01 Jun 2021 07:01  -  01 Jun 2021 10:05  --------------------------------------------------------  IN: 100 mL / OUT: 150 mL / NET: -50 mL    Tele: AF 80-90s    General: Intubated.   HEENT: PERRL  Neck: Neck supple. JVD 10 cm H20, large V waves  Chest: breath sounds heard bilaterally   CV: Irregularly irregular. Normal S1 and S2. + RV heave. II/VI SM. Radial pulses normal. +1 BLE edema  Abdomen: Soft, non-distended, non-tender  Skin: No rashes or skin breakdown noted. Warm peripherally.  Neurology: tracks with eyes. No purposeful movement  Psych: JESSICA    Labs:                        11.3   9.77  )-----------( 75       ( 01 Jun 2021 03:50 )             35.2     06-01    147<H>  |  102  |  74<H>  ----------------------------<  119<H>  4.1   |  32<H>  |  3.00<H>    Ca    9.3      01 Jun 2021 09:16  Phos  2.7     06-01  Mg     2.2     06-01    TPro  6.6  /  Alb  2.8<L>  /  TBili  2.4<H>  /  DBili  x   /  AST  34  /  ALT  21  /  AlkPhos  204<H>  06-01    PTT - ( 01 Jun 2021 03:50 )  PTT:35.1 sec        Oxygen Saturation, Mixed: 77 % (05-31 @ 02:54)  Oxygen Saturation, Mixed: 77 % (05-30 @ 18:05)  Oxygen Saturation, Mixed: 81 % (05-30 @ 02:03)  Oxygen Saturation, Mixed: 81 % (05-29 @ 21:05)      Lactate, Blood: 0.7 mmol/L (05-31 @ 11:54)

## 2021-06-01 NOTE — PROGRESS NOTE ADULT - PROBLEM SELECTOR PLAN 1
- Adjust Bumex infusion to target CVP 10-12; CVP this AM 10  - Wean off pressors as tolerate  - extubation once neuro issues resolved  - Trend hemodynamics and perfusion labs (LFTs, lactate, ScV02)  - Would not drain pericardial effusion as it is likely compensatory in setting of RV failure  - Cardiac MRI when stable to further investigate report of thickened pericardium on TTE

## 2021-06-01 NOTE — CONSULT NOTE ADULT - ASSESSMENT
Patient is a 74yo F with pmh of CHFpEF, Severe TR with Right CHF and severe pHTN, COPD on home 02, HTN, HLD, Chronic Afib (on Eliquis), OA, CKD3, DM, right eye cataract who was brought in by EMS to [facility] on 5/23/21 for AMS. pt A&O x1, per daughter patient with generalized weakness and lethargy since  5/22 Per chart review patient had arrived to the ED hypotensive and obtunded. Patient had CT head w/o contrast on 5/32 which was negative. Neurology was consulted for AMS. Patient will need further evaluation for toxic/metabolic cause and will need rEEG.     Impression/Plan  AMS 2/2 to most likely due toxic/metabolic Etiology in the setting of hypercarbia and uremia noted on labs   Recommendations   rEEG   Correction of underlying electrolyte abnormalities   Correction of underlying toxic/metabolic etiology: Hypercarbia and Uremia   B12, tsh, folate, thiamine, Zn, cu levels   Brain MRI w/o contrast if there is no clinical improvement   Will continue to monitor     Case to be discussed with Neurology Attending, Dr. May.

## 2021-06-01 NOTE — PROGRESS NOTE ADULT - ATTENDING COMMENTS
Patient is seen and examined with fellow, NP and the CCU house-staff. I agree with the history, physical and the assessment and plan.    pulmonary HTN of unknown etiology with RV failure s/p intubTION for hypercarbic respiratory distress with course c/b hemorrhagic shock requiring multiple transfusions  sedation has been off since > 48 hrs with no purposeful movement - CT head neg - neuro evaluation pending  CVP ~ 12-13 - will assess venous gas  will continue to cpap - however conserned about the mental status  NGT in place - feeds on hold during the cpap trial Patient is seen and examined with fellow, NP and the CCU house-staff. I agree with the history, physical and the assessment and plan.    pulmonary HTN of unknown etiology with RV failure s/p intubTION for hypercarbic respiratory distress with course c/b hemorrhagic shock requiring multiple transfusions  sedation has been off since > 48 hrs with no purposeful movement - CT head neg - neuro evaluation pending  CVP ~ 12-13 - will assess venous gas  will continue to cpap - however conserned about the mental status  NGT in place - feeds on hold during the cpap trial  concerned for metabolic cause for her neurological delay - will check ammonia level and treat with lactulose

## 2021-06-01 NOTE — PROGRESS NOTE ADULT - ASSESSMENT
75F with a pmhx of CHFpEF, Severe TR with Right CHF and severe pHTN, COPD on home 02, HTN, HLD, Chronic Afib (on Eliquis), OA, CKD3, DM, right eye cataract who was brought in by EMS to [facility] on 5/23/21 for AMS, found to have a large pericardial effusion w/o tamponade physiology, without drainable window, intubated (5/26) for acute hypercarbic respiratory failure and worsening AMS, transferred to CICU for hypotension and NIC on CKD in undifferentiated shock, course c/b expanding R thigh hematoma, s/p MTP, with ratio predom targeted towards FFP, s/p protamine for hep reversal.  Neuro: AMS  -per family, baseline pt performs ADLs independently, sometimes forgetful however functional  -pt off sedation, however, opens eyes only to verbal and painful stimuli; does not follow commands; no spontaneous movements  -CTH 5/31 negative for acute pathology  -cause could be 2/2 metabolic encephalopathy given CT abd showing cirrhosis  -sent ammonia level  -start lactulose   Resp: intubated for hypercarbic respiratory failure, severe pulm HTN  -severe pulmonary hypertension with RV dysfunction, high pulmonary artery pressures, unclear etiology  -Pt tolerates CPAP 10/5  -Currently on minimal vent settings (AC, 350, 12, 5, 30%), f/u ABGs  -f/u pulm recs for pulm HTN (Dr. Patel)  -may need to start sildenafil   CV:  (5/26): TTE: EF 55%, RVH with severe RVSD, severe pulm HTN, large pericardial effusion 3.4cm @ largest diameter, adjacent to RA  ventricular systolic function.  -intermittently on levo, off vaso  -monitor CVPs goal 12-13  -c/w atorvastatin 40mg qhs  -Hold sildenefil as pt is still on Levophed and awaiting pulm recs   GI:  -Diet: Continue Nepro at 40 and will add prosource  -Stress ulcer ppx:continue PPI  -Bowel regimen: lactulose also for possible hepatic encephalopathy  -(5/26): CT a/p noncon: mild contoured liver as seen in cirrhosis  -f/u ammonia level  Heme:  -Thrombocytopenia  -(5/28): Acute drop in H/H 10->8,  with expanding thigh hematoma 35->24 -> s/p MTP 11 pRBCs , 8 U FFP, 2 U PLTS and protamine x 2 due to supratherapeutic pTT > 200 --> 28  -Hemoglobin has remained stable now .   -[ ] (5/28): VA duplex arterial and venous RLE: no pseudo  -DVT ppx: scd, will start heparin SQ  -f/u b/l DVT studies  Renal/  - Continue to monitor I/Os, BUN/Creatinine, and urine output.  -hypernatremia: free water deficit 2.5L, c/w free water 250 cc q6h   -CVP 12-13 continue bumex gtt.   - Replete lytes PRN. Keep K> 4 and Mg >2  Endo:  - Monitor blood sugar 100-130  ID:  - no active issues  CODE STATUS:  Full code

## 2021-06-01 NOTE — CONSULT NOTE ADULT - ATTENDING COMMENTS
Patient seen and examined with housestaff on rounds.  Currently she remains intubated - on no sedation.  Opens eyes to verbal stimulus but does not follow commands.  Localizes nail pressure bilaterally with withdrawal.  Improved compared to exam from yesterday - likely toxic metabolic etiology.  Will follow - if she does not continue to improve may need MRI.

## 2021-06-01 NOTE — CONSULT NOTE ADULT - ATTENDING COMMENTS
75 F with HFpEF, severe pHTN, COPD on Home o2, afib on a/c, CKD here with acute hypercapnic respiratory failure likely due to decompensated heart failure, acute on chronic cor pulmonale, also with hemorraghic shock (resolving). Still intubated.    Opening eyes, but still with acute metabolic encephalopathy with uremia likely contributing.  Agree with EEG.  Taking some spontaneous breaths, can start PS trials just to exercise.    ADHF/pulm htn: RHC done earlier shows elevated left sided pressures, TPG suggestive of Group 2 phtn but this was not done in euvolemic state.  Would repeat RHC once euvolemic and extubated.  Diuresis per CICU; fluid overload was likely cause of her acute hypoxemic and hypercapnic respiratory failure.    KHARI, scleroderma, histone ab sent - would f/u these up prior to sending more workup. Will d/w Dr. Patel.

## 2021-06-01 NOTE — PROGRESS NOTE ADULT - SUBJECTIVE AND OBJECTIVE BOX
HPI:  75y.o female with PMHx of CHFpEF, Severe TR with Right CHF and severe pHTN, COPD on home 02, HTN, HLD, Chronic Afib (on Eliquis), OA, CKD3, DM, right eye cataract who was brought in by EMS to [facility] on 5/23/21 for AMS. pt A&O x1, per daughter patient with generalized weakness and lethargy since since 5/22. Pt had recent in Jan 2021 with CP and NIC. Per chart on arrival to ED pt was hypotensive, obtunded. Chest CT with large pericardial effusion (increased since Jan 2021)- per thoracic surgery consult no indication for pericardial window, pHTN, cirrhotic liver with small ascites, ECHO EF 55% dilated RV with decreased function, severe TN, pHTN, NIC (per renal consult no acute indication for HD), venous duplex NG for DVTs, Head CT revealed chronic involutional and white matter changes, no acute intracranial process, NIC(SCr peaked at 4.6) with  hyperkalemia (5.8) requiring lokelma and insulin.  Of note daughter states over past month patient more and more confused ? dementia and pt is legally blind. Daughter states pt HAS NOT RECEIVED COVID VACCINE as she has been sick these past few months.     5/26 Patient transferred to [facility] with concern of pericardial effusion, hypotension and NIC. On arrival patient SBP 80's, A&O to first name but very lethargic.  (26 May 2021 10:16)    INTERVAL HISTORY:    REVIEW OF SYSTEMS:    MEDICATIONS  (STANDING):  atorvastatin 40 milliGRAM(s) Oral at bedtime  buMETAnide Infusion 2 mG/Hr (10 mL/Hr) IV Continuous <Continuous>  chlorhexidine 0.12% Liquid 15 milliLiter(s) Oral Mucosa every 12 hours  chlorhexidine 2% Cloths 1 Application(s) Topical <User Schedule>  heparin   Injectable 5000 Unit(s) SubCutaneous every 8 hours  lactulose Syrup 20 Gram(s) Oral every 12 hours  norepinephrine Infusion 0.03 MICROgram(s)/kG/Min (3.16 mL/Hr) IV Continuous <Continuous>  pantoprazole  Injectable 40 milliGRAM(s) IV Push daily  sodium chloride 0.9%. 1000 milliLiter(s) (10 mL/Hr) IV Continuous <Continuous>    MEDICATIONS  (PRN):      Objective:  ICU Vital Signs Last 24 Hrs  T(C): 37.1 (01 Jun 2021 16:00), Max: 37.3 (01 Jun 2021 12:00)  T(F): 98.8 (01 Jun 2021 16:00), Max: 99.1 (01 Jun 2021 12:00)  HR: 88 (01 Jun 2021 18:30) (84 - 100)  ABP: 106/46 (01 Jun 2021 18:30) (80/40 - 144/66)  ABP(mean): 64 (01 Jun 2021 18:30) (54 - 98)  RR: 19 (01 Jun 2021 18:30) (11 - 32)  SpO2: 100% (01 Jun 2021 18:30) (98% - 100%)    Mode: AC/ CMV (Assist Control/ Continuous Mandatory Ventilation)  RR (machine): 12  TV (machine): 350  FiO2: 30  PEEP: 5  ITime: 1  MAP: 9  PIP: 36    Adult Advanced Hemodynamics Last 24 Hrs  CVP(mm Hg): 14 (01 Jun 2021 18:15) (7 - 14)        05-31 @ 07:01  -  06-01 @ 07:00  --------------------------------------------------------  IN: 1650 mL / OUT: 3065 mL / NET: -1415 mL    06-01 @ 07:01  - 06-01 @ 19:15  --------------------------------------------------------  IN: 938.4 mL / OUT: 1045 mL / NET: -106.6 mL      PHYSICAL EXAM:      LABS:  ABG - ( 01 Jun 2021 15:52 )  pH, Arterial: 7.46  pH, Blood: x     /  pCO2: 58    /  pO2: 97    / HCO3: 40    / Base Excess: 14.1  /  SaO2: 98                   11.3   9.77  )-----------( 75       ( 01 Jun 2021 03:50 )             35.2     06-01    149<H>  |  102  |  73<H>  ----------------------------<  113<H>  3.9   |  32<H>  |  2.95<H>    Ca    9.3      01 Jun 2021 16:08  Phos  2.7     06-01  Mg     2.2     06-01    TPro  6.4  /  Alb  2.7<L>  /  TBili  2.3<H>  /  DBili  x   /  AST  36  /  ALT  22  /  AlkPhos  193<H>  06-01    LIVER FUNCTIONS - ( 01 Jun 2021 16:08 )  Alb: 2.7 g/dL / Pro: 6.4 g/dL / ALK PHOS: 193 U/L / ALT: 22 U/L / AST: 36 U/L / GGT: x           PTT - ( 01 Jun 2021 03:50 )  PTT:35.1 sec   HPI:  75y.o female with PMHx of CHFpEF, Severe TR with Right CHF and severe pHTN, COPD on home 02, HTN, HLD, Chronic Afib (on Eliquis), OA, CKD3, DM, right eye cataract who was brought in by EMS to [facility] on 5/23/21 for AMS. pt A&O x1, per daughter patient with generalized weakness and lethargy since since 5/22. Pt had recent in Jan 2021 with CP and NIC. Per chart on arrival to ED pt was hypotensive, obtunded. Chest CT with large pericardial effusion (increased since Jan 2021)- per thoracic surgery consult no indication for pericardial window, pHTN, cirrhotic liver with small ascites, ECHO EF 55% dilated RV with decreased function, severe TN, pHTN, NIC (per renal consult no acute indication for HD), venous duplex NG for DVTs, Head CT revealed chronic involutional and white matter changes, no acute intracranial process, NIC(SCr peaked at 4.6) with  hyperkalemia (5.8) requiring lokelma and insulin.  Of note daughter states over past month patient more and more confused ? dementia and pt is legally blind. Daughter states pt HAS NOT RECEIVED COVID VACCINE as she has been sick these past few months.     5/26 Patient transferred to [facility] with concern of pericardial effusion, hypotension and NIC. On arrival patient SBP 80's, A&O to first name but very lethargic.  (26 May 2021 10:16)    INTERVAL HISTORY:  - levophed off @ 7pm    REVIEW OF SYSTEMS:  - patient intubated with AMS    MEDICATIONS  (STANDING):  atorvastatin 40 milliGRAM(s) Oral at bedtime  buMETAnide Infusion 2 mG/Hr (10 mL/Hr) IV Continuous <Continuous>  chlorhexidine 0.12% Liquid 15 milliLiter(s) Oral Mucosa every 12 hours  chlorhexidine 2% Cloths 1 Application(s) Topical <User Schedule>  heparin   Injectable 5000 Unit(s) SubCutaneous every 8 hours  lactulose Syrup 20 Gram(s) Oral every 12 hours  norepinephrine Infusion 0.03 MICROgram(s)/kG/Min (3.16 mL/Hr) IV Continuous <Continuous>  pantoprazole  Injectable 40 milliGRAM(s) IV Push daily  sodium chloride 0.9%. 1000 milliLiter(s) (10 mL/Hr) IV Continuous <Continuous>    Objective:  ICU Vital Signs Last 24 Hrs  T(C): 37.1 (01 Jun 2021 16:00), Max: 37.3 (01 Jun 2021 12:00)  T(F): 98.8 (01 Jun 2021 16:00), Max: 99.1 (01 Jun 2021 12:00)  HR: 88 (01 Jun 2021 18:30) (84 - 100)  ABP: 106/46 (01 Jun 2021 18:30) (80/40 - 144/66)  ABP(mean): 64 (01 Jun 2021 18:30) (54 - 98)  RR: 19 (01 Jun 2021 18:30) (11 - 32)  SpO2: 100% (01 Jun 2021 18:30) (98% - 100%)    Mode: AC/ CMV (Assist Control/ Continuous Mandatory Ventilation)  RR (machine): 12  TV (machine): 350  FiO2: 30  PEEP: 5  ITime: 1  MAP: 9  PIP: 36    Adult Advanced Hemodynamics Last 24 Hrs  CVP(mm Hg): 14 (01 Jun 2021 18:15) (7 - 14)        05-31 @ 07:01  -  06-01 @ 07:00  --------------------------------------------------------  IN: 1650 mL / OUT: 3065 mL / NET: -1415 mL    06-01 @ 07:01 - 06-01 @ 19:15  --------------------------------------------------------  IN: 938.4 mL / OUT: 1045 mL / NET: -106.6 mL      PHYSICAL EXAM:  GENERAL: intubated, in NAD  HEAD Atraumatic, Normocephalic  EYES: PERRL  CHEST/LUNG: coarse mechanical breath sounds, no crackles, wheezing, rales  HEART: RRR no murmur/gallops/rubs  ABDOMEN: +BS, soft, NT, obese abdomen  EXTREMITIES: 2+ LE edema, +2 radial pulses b/l, +2 DP/PT pulses b/l, previous R fem harshil site c/d/i no bleeding or hematoma  NEUROLOGY: opens eyes to voice/on command, spontaneously moving RUE, no movement b/l LE      LABS:  ABG - ( 01 Jun 2021 15:52 )  pH, Arterial: 7.46  pH, Blood: x     /  pCO2: 58    /  pO2: 97    / HCO3: 40    / Base Excess: 14.1  /  SaO2: 98                   11.3   9.77  )-----------( 75       ( 01 Jun 2021 03:50 )             35.2     06-01    149<H>  |  102  |  73<H>  ----------------------------<  113<H>  3.9   |  32<H>  |  2.95<H>    Ca    9.3      01 Jun 2021 16:08  Phos  2.7     06-01  Mg     2.2     06-01    TPro  6.4  /  Alb  2.7<L>  /  TBili  2.3<H>  /  DBili  x   /  AST  36  /  ALT  22  /  AlkPhos  193<H>  06-01    LIVER FUNCTIONS - ( 01 Jun 2021 16:08 )  Alb: 2.7 g/dL / Pro: 6.4 g/dL / ALK PHOS: 193 U/L / ALT: 22 U/L / AST: 36 U/L / GGT: x           PTT - ( 01 Jun 2021 03:50 )  PTT:35.1 sec    ASSESSMENT:  75F with CHFpEF, Severe TR with Right CHF and severe pHTN, COPD on home 02, HTN, HLD, Chronic Afib (on Eliquis), OA, CKD3, DM, presenting with acute RV failure and hypercarbic respiratory failure req intubation c/b hemorrhagic shock requiring MTP and now further c/b AMS.    PLAN:  #Neuro  AMS  - off sedation > 48 hours. On exam opening eyes to command, spontaneously moving RUE but no other purposeful movements. Taking spontaneous breaths on vent  - possible metabolic encephalopathy; CT abd (5/26) showing early cirrhosis, lactulose was started. Ammonia level was wnl. BUN is rising now in the 70s.  - CT head 5/31: no acute hemorrhage or mass effect  - EEG ordered  - neurology consulted appreciate recs. Send B12, TSH, folate, thiamine, zn, cu levels    #Respiratory  Acute hypercapnic respiratory failure requiring intubation on 5/26  - Volume control: 350/12/5/30%  - SBT in the AM  - trend ABG    pHTN  - cont diuresis w/ Bumex gtt @ 2 target CVP 10-12 and net negative  - KHARI, scleroderma, histone AB sent, f/up  - LE dopplers neg for DVT  - appreciate pulm recs    #CV  acute RV failure  - Bumex gtt to target CVP 10-12  - levophed off this PM  - BMP q8 while on bumex gtt, keep K > 4, Mg > 2  - trend ScVO2, perfusion indices. ScVO2 81%, Lactate remains negative  - daily weights. strict I/Os    pericardial effusion  - echo 5/26 no evidence of tamponade. No drain placed.  - close hemodynamic monitoring.     hemorrhagic shock, resolved  - bleeding from femoral arterial line site i/s/o supratherapeutic PTT s/p MTP on 5/28, now resolved  - off levo this PM  - BP via LRA harshil    Afib  - rate controlled 80s    #Renal  Hemodynamic NIC  - good urine output on bumex gtt, /hr  - SCr improving (peak 4.87). Trend BMP  - avoid nephrotoxins  - strict I/Os via ray    Hypernatremia  - free water 250cc q6  - trend BMP q8 while on bumex gtt    #GI  CT a/p 5/26 showing ?early cirrhosis  - ammonia level 41  - cont lactulose    Diet: TF w/ Nepro @ 40  - tolerating well    #Heme  - SQ heparin for DVT ppx  - Trend CBC    #Endo  No active issues  - A1C wnl, BG controlled  - f/up TSH    #ID  No active issues  - afebrile, no leukocytosis; trend  - monitor off abx. Is s/p course of broad spectrum abx i/s/o hypothermia, all cultures ngtd, remains normothermic  HPI:  75y.o female with PMHx of CHFpEF, Severe TR with Right CHF and severe pHTN, COPD on home 02, HTN, HLD, Chronic Afib (on Eliquis), OA, CKD3, DM, right eye cataract who was brought in by EMS to [facility] on 5/23/21 for AMS. pt A&O x1, per daughter patient with generalized weakness and lethargy since since 5/22. Pt had recent in Jan 2021 with CP and NIC. Per chart on arrival to ED pt was hypotensive, obtunded. Chest CT with large pericardial effusion (increased since Jan 2021)- per thoracic surgery consult no indication for pericardial window, pHTN, cirrhotic liver with small ascites, ECHO EF 55% dilated RV with decreased function, severe TN, pHTN, NIC (per renal consult no acute indication for HD), venous duplex NG for DVTs, Head CT revealed chronic involutional and white matter changes, no acute intracranial process, NIC(SCr peaked at 4.6) with  hyperkalemia (5.8) requiring lokelma and insulin.  Of note daughter states over past month patient more and more confused ? dementia and pt is legally blind. Daughter states pt HAS NOT RECEIVED COVID VACCINE as she has been sick these past few months.     5/26 Patient transferred to [facility] with concern of pericardial effusion, hypotension and NIC. On arrival patient SBP 80's, A&O to first name but very lethargic.  (26 May 2021 10:16)    INTERVAL HISTORY:  - levophed off @ 7pm    REVIEW OF SYSTEMS:  - patient intubated with AMS    MEDICATIONS  (STANDING):  atorvastatin 40 milliGRAM(s) Oral at bedtime  buMETAnide Infusion 2 mG/Hr (10 mL/Hr) IV Continuous <Continuous>  chlorhexidine 0.12% Liquid 15 milliLiter(s) Oral Mucosa every 12 hours  chlorhexidine 2% Cloths 1 Application(s) Topical <User Schedule>  heparin   Injectable 5000 Unit(s) SubCutaneous every 8 hours  lactulose Syrup 20 Gram(s) Oral every 12 hours  norepinephrine Infusion 0.03 MICROgram(s)/kG/Min (3.16 mL/Hr) IV Continuous <Continuous>  pantoprazole  Injectable 40 milliGRAM(s) IV Push daily  sodium chloride 0.9%. 1000 milliLiter(s) (10 mL/Hr) IV Continuous <Continuous>    Objective:  ICU Vital Signs Last 24 Hrs  T(C): 37.1 (01 Jun 2021 16:00), Max: 37.3 (01 Jun 2021 12:00)  T(F): 98.8 (01 Jun 2021 16:00), Max: 99.1 (01 Jun 2021 12:00)  HR: 88 (01 Jun 2021 18:30) (84 - 100)  ABP: 106/46 (01 Jun 2021 18:30) (80/40 - 144/66)  ABP(mean): 64 (01 Jun 2021 18:30) (54 - 98)  RR: 19 (01 Jun 2021 18:30) (11 - 32)  SpO2: 100% (01 Jun 2021 18:30) (98% - 100%)    Mode: AC/ CMV (Assist Control/ Continuous Mandatory Ventilation)  RR (machine): 12  TV (machine): 350  FiO2: 30  PEEP: 5  ITime: 1  MAP: 9  PIP: 36    Adult Advanced Hemodynamics Last 24 Hrs  CVP(mm Hg): 14 (01 Jun 2021 18:15) (7 - 14)        05-31 @ 07:01  -  06-01 @ 07:00  --------------------------------------------------------  IN: 1650 mL / OUT: 3065 mL / NET: -1415 mL    06-01 @ 07:01 - 06-01 @ 19:15  --------------------------------------------------------  IN: 938.4 mL / OUT: 1045 mL / NET: -106.6 mL      PHYSICAL EXAM:  GENERAL: intubated, in NAD  HEAD Atraumatic, Normocephalic  EYES: PERRL  CHEST/LUNG: coarse mechanical breath sounds, no crackles, wheezing, rales  HEART: RRR no murmur/gallops/rubs  ABDOMEN: +BS, soft, NT, obese abdomen  EXTREMITIES: 2+ LE edema, +2 radial pulses b/l, +2 DP/PT pulses b/l, previous R fem harshil site c/d/i no bleeding or hematoma  NEUROLOGY: opens eyes to voice/on command, spontaneously moving RUE, no movement b/l LE      LABS:  ABG - ( 01 Jun 2021 15:52 )  pH, Arterial: 7.46  pH, Blood: x     /  pCO2: 58    /  pO2: 97    / HCO3: 40    / Base Excess: 14.1  /  SaO2: 98                   11.3   9.77  )-----------( 75       ( 01 Jun 2021 03:50 )             35.2     06-01    149<H>  |  102  |  73<H>  ----------------------------<  113<H>  3.9   |  32<H>  |  2.95<H>    Ca    9.3      01 Jun 2021 16:08  Phos  2.7     06-01  Mg     2.2     06-01    TPro  6.4  /  Alb  2.7<L>  /  TBili  2.3<H>  /  DBili  x   /  AST  36  /  ALT  22  /  AlkPhos  193<H>  06-01    LIVER FUNCTIONS - ( 01 Jun 2021 16:08 )  Alb: 2.7 g/dL / Pro: 6.4 g/dL / ALK PHOS: 193 U/L / ALT: 22 U/L / AST: 36 U/L / GGT: x           PTT - ( 01 Jun 2021 03:50 )  PTT:35.1 sec    ASSESSMENT:  75F with CHFpEF, Severe TR with Right CHF and severe pHTN, COPD on home 02, HTN, HLD, Chronic Afib (on Eliquis), OA, CKD3, DM, presenting with acute RV failure and hypercarbic respiratory failure req intubation c/b hemorrhagic shock requiring MTP and now further c/b AMS.    PLAN:  #Neuro  AMS  - off sedation > 48 hours. On exam opening eyes to command, spontaneously moving RUE but no other purposeful movements. Taking spontaneous breaths on vent  - possible metabolic encephalopathy; BUN is now in the 70s. CT abd (5/26) showing early cirrhosis, lactulose was started however ammonia level was wnl and pt w/ frequent large volumes of diarrhea -- lactulose d/c.  - CT head 5/31: no acute hemorrhage or mass effect  - EEG ordered  - neurology consulted appreciate recs. Send B12, TSH, folate, thiamine, zn, cu levels    #Respiratory  Acute hypercapnic respiratory failure requiring intubation on 5/26  - Volume control: 350/12/5/30%  - SBT in the AM  - trend ABG    pHTN  - cont diuresis w/ Bumex gtt @ 2 target CVP 10-12 and net negative  - KHARI, scleroderma, histone AB sent, f/up  - LE dopplers neg for DVT  - appreciate pulm recs    #CV  acute RV failure  - Bumex gtt to target CVP 10-12  - levophed off this PM  - BMP q8 while on bumex gtt, keep K > 4, Mg > 2  - trend ScVO2, perfusion indices. ScVO2 81%, Lactate remains negative  - daily weights. strict I/Os    pericardial effusion  - echo 5/26 no evidence of tamponade. No drain placed.  - close hemodynamic monitoring.     hemorrhagic shock, resolved  - bleeding from femoral arterial line site i/s/o supratherapeutic PTT s/p MTP on 5/28, now resolved  - off levo this PM  - BP via LRA harshil    Afib  - rate controlled 80s    #Renal  Hemodynamic NIC  - good urine output on bumex gtt, /hr  - SCr improving (peak 4.87). Trend BMP  - avoid nephrotoxins  - strict I/Os via ray    Hypernatremia  - free water 250cc q6  - trend BMP q8 while on bumex gtt    #GI  CT a/p 5/26 showing ?early cirrhosis  - ammonia level 41, large frequent bouts of diarrhea, lactulose d/c    Diet: TF w/ Nepro @ 40  - tolerating well    #Heme  - SQ heparin for DVT ppx  - Trend CBC    #Endo  No active issues  - A1C wnl, BG controlled  - f/up TSH    #ID  No active issues  - afebrile, no leukocytosis; trend  - monitor off abx. Is s/p course of broad spectrum abx i/s/o hypothermia, all cultures ngtd, remains normothermic

## 2021-06-01 NOTE — CONSULT NOTE ADULT - ASSESSMENT
75F PMH CHFpEF, Severe TR with Right CHF and severe pHTN, COPD on home 02, HTN, HLD, Chronic Afib (on Eliquis), OA, CKD3, DM, right eye cataract who was transferred to [facility] with large pericardial effusion without tamponade in the context of right heart cath identified group 2 pulmonary hypertension    # Pulmonary Hypertension  - Group 2, Mild --> however not performed in euvolemic state.   - continue with aggressive diuretics, bumex 2mg/hr  - strict I/O  - BMP multiple times daily with electrolyte repletion  - As patient is diuresed and right sided pressures decrease, pericardial effusion pressure may > RV pressure and precipitate clinical tamponade. Recommend close hemodynamic monitoring.   - After patient is volume optimized, recommend repeating RHC to re-assess pHTN burden  - Will discuss case with Dr. Jorge Armenta MD  [id number]  Pulmonary and Critical Care Fellow  Pager: [phone]

## 2021-06-01 NOTE — CONSULT NOTE ADULT - SUBJECTIVE AND OBJECTIVE BOX
MRN-[id number]  Patient is a 75y old  Female who presents with a chief complaint of Transfer from [facility] with pericardial effusion, NIC and hypotension (01 Jun 2021 13:06)    HPI:  Patient is a 74yo F with pmh of CHFpEF, Severe TR with Right CHF and severe pHTN, COPD on home 02, HTN, HLD, Chronic Afib (on Eliquis), OA, CKD3, DM, right eye cataract who was brought in by EMS to [facility] on 5/23/21 for AMS. pt A&O x1, per daughter patient with generalized weakness and lethargy since  5/22 Per chart review patient had arrived to the ED hypotensive and obtunded. On admission,  Chest CT was noted to have a large pericardial effusion (increased since Jan 2021)- per thoracic surgery consult no indication for pericardial window. Of note daughter states over past month patient more and more confused ? dementia and pt is legally blind. Daughter states pt HAS NOT RECEIVED COVID VACCINE as she has been sick these past few months.   Neurology was consulted for AMS. Patient is of sedation however is not following commands. Patient exam limited due to inubations.       PAST MEDICAL & SURGICAL HISTORY:  Afib    COPD (chronic obstructive pulmonary disease)    CHF (congestive heart failure)    Dementia      FAMILY HISTORY:    Social Hx:  Nonsmoker, no drug or alcohol use    Home Medications:  Albuterol (Eqv-ProAir HFA) 90 mcg/inh inhalation aerosol: 2 puff(s) inhaled every 6 hours (30 Jan 2021 17:42)  atorvastatin 40 mg oral tablet: 1 tab(s) orally once a day (30 Jan 2021 17:42)  Eliquis 5 mg oral tablet: 1 tab(s) orally 2 times a day (30 Jan 2021 17:42)  losartan 25 mg oral tablet: 1 tab(s) orally once a day (30 Jan 2021 17:42)    MEDICATIONS  (STANDING):  atorvastatin 40 milliGRAM(s) Oral at bedtime  buMETAnide Infusion 2 mG/Hr (10 mL/Hr) IV Continuous <Continuous>  chlorhexidine 0.12% Liquid 15 milliLiter(s) Oral Mucosa every 12 hours  chlorhexidine 2% Cloths 1 Application(s) Topical <User Schedule>  heparin   Injectable 5000 Unit(s) SubCutaneous every 8 hours  lactulose Syrup 20 Gram(s) Oral every 12 hours  norepinephrine Infusion 0.03 MICROgram(s)/kG/Min (3.16 mL/Hr) IV Continuous <Continuous>  pantoprazole  Injectable 40 milliGRAM(s) IV Push daily  sodium chloride 0.9%. 1000 milliLiter(s) (10 mL/Hr) IV Continuous <Continuous>    MEDICATIONS  (PRN):    Allergies  No Known Allergies    Intolerances      REVIEW OF SYSTEMS: unable to assess.     ROS: Pertinent positives in HPI, all other ROS were reviewed and are negative.      Vital Signs Last 24 Hrs  T(C): 37.3 (01 Jun 2021 12:00), Max: 37.3 (01 Jun 2021 12:00)  T(F): 99.1 (01 Jun 2021 12:00), Max: 99.1 (01 Jun 2021 12:00)  HR: 88 (01 Jun 2021 14:00) (84 - 100)  BP: --  BP(mean): --  RR: 14 (01 Jun 2021 14:00) (11 - 32)  SpO2: 100% (01 Jun 2021 14:00) (98% - 100%)    GENERAL EXAM:  Constitutional: awake   HEENT: PERRL. pupils symmetric b/l.     NEUROLOGICAL EXAM:  MS: Eyes open to verbal stimulation. Intubated. Does not follow commands.     CN: VFF, EOMI, PERRL, pupils symmetric b/l.   Cough/gag/corneal reflexes intact. BTT noted b/l.   V1-3 intact,   Motor: Strength: UE were antigravity. Unable to assess LE b/l. .   Tone: normal. Bulk: normal.  Sensation: w/d to noxious stimuli in all extremities   Coordination: unable to assess   Gait: unable to assess.   Babinski negative b/l.         Labs:   cbc                      11.3   9.77  )-----------( 75       ( 01 Jun 2021 03:50 )             35.2     [id number]    147<H>  |  102  |  74<H>  ----------------------------<  119<H>  4.1   |  32<H>  |  3.00<H>    Ca    9.3      01 Jun 2021 09:16  Phos  2.7     06-01  Mg     2.2     06-01    TPro  6.6  /  Alb  2.8<L>  /  TBili  2.4<H>  /  DBili  x   /  AST  34  /  ALT  21  /  AlkPhos  204<H>  06-01    CoagsPTT - ( 01 Jun 2021 03:50 )  PTT:35.1 sec      LFTsLIVER FUNCTIONS - ( 01 Jun 2021 09:16 )  Alb: 2.8 g/dL / Pro: 6.6 g/dL / ALK PHOS: 204 U/L / ALT: 21 U/L / AST: 34 U/L / GGT: x             Radiology:  CT head w/o contrast: IMPRESSION: No evidence of acute hemorrhage mass or mass effect.   MRN-[id number]  Patient is a 75y old  Female who presents with a chief complaint of Transfer from [facility] with pericardial effusion, NIC and hypotension (01 Jun 2021 13:06)    HPI:  Patient is a 76yo F with pmh of CHFpEF, Severe TR with Right CHF and severe pHTN, COPD on home 02, HTN, HLD, Chronic Afib (on Eliquis), OA, CKD3, DM, right eye cataract who was brought in by EMS to [facility] on 5/23/21 for AMS. pt A&O x1, per daughter patient with generalized weakness and lethargy since  5/22 Per chart review patient had arrived to the ED hypotensive and obtunded. On admission,  Chest CT was noted to have a large pericardial effusion (increased since Jan 2021)- per thoracic surgery consult no indication for pericardial window. Of note daughter states over past month patient more and more confused ? dementia and pt is legally blind. Daughter states pt HAS NOT RECEIVED COVID VACCINE as she has been sick these past few months.   Neurology was consulted for AMS. Patient is of sedation however is not following commands. Patient exam limited due to intubation.      PAST MEDICAL & SURGICAL HISTORY:  Afib    COPD (chronic obstructive pulmonary disease)    CHF (congestive heart failure)    Dementia      FAMILY HISTORY:    Social Hx:  Nonsmoker, no drug or alcohol use    Home Medications:  Albuterol (Eqv-ProAir HFA) 90 mcg/inh inhalation aerosol: 2 puff(s) inhaled every 6 hours (30 Jan 2021 17:42)  atorvastatin 40 mg oral tablet: 1 tab(s) orally once a day (30 Jan 2021 17:42)  Eliquis 5 mg oral tablet: 1 tab(s) orally 2 times a day (30 Jan 2021 17:42)  losartan 25 mg oral tablet: 1 tab(s) orally once a day (30 Jan 2021 17:42)    MEDICATIONS  (STANDING):  atorvastatin 40 milliGRAM(s) Oral at bedtime  buMETAnide Infusion 2 mG/Hr (10 mL/Hr) IV Continuous <Continuous>  chlorhexidine 0.12% Liquid 15 milliLiter(s) Oral Mucosa every 12 hours  chlorhexidine 2% Cloths 1 Application(s) Topical <User Schedule>  heparin   Injectable 5000 Unit(s) SubCutaneous every 8 hours  lactulose Syrup 20 Gram(s) Oral every 12 hours  norepinephrine Infusion 0.03 MICROgram(s)/kG/Min (3.16 mL/Hr) IV Continuous <Continuous>  pantoprazole  Injectable 40 milliGRAM(s) IV Push daily  sodium chloride 0.9%. 1000 milliLiter(s) (10 mL/Hr) IV Continuous <Continuous>    MEDICATIONS  (PRN):    Allergies  No Known Allergies    Intolerances      REVIEW OF SYSTEMS: unable to assess.     ROS: Pertinent positives in HPI, all other ROS were reviewed and are negative.      Vital Signs Last 24 Hrs  T(C): 37.3 (01 Jun 2021 12:00), Max: 37.3 (01 Jun 2021 12:00)  T(F): 99.1 (01 Jun 2021 12:00), Max: 99.1 (01 Jun 2021 12:00)  HR: 88 (01 Jun 2021 14:00) (84 - 100)  BP: --  BP(mean): --  RR: 14 (01 Jun 2021 14:00) (11 - 32)  SpO2: 100% (01 Jun 2021 14:00) (98% - 100%)    GENERAL EXAM:  Constitutional: awake   HEENT: PERRL. pupils symmetric b/l.     NEUROLOGICAL EXAM:  MS: Eyes open to verbal stimulation. Intubated. Does not follow commands.     CN: VFF, EOMI, PERRL, pupils symmetric b/l.   Cough/gag/corneal reflexes intact. BTT noted b/l.   V1-3 intact,   Motor: Strength: UE were antigravity. Unable to assess LE b/l. .   Tone: normal. Bulk: normal.  Sensation: w/d to noxious stimuli in all extremities   Coordination: unable to assess   Gait: unable to assess.   Babinski negative b/l.         Labs:   cbc                      11.3   9.77  )-----------( 75       ( 01 Jun 2021 03:50 )             35.2     [id number]    147<H>  |  102  |  74<H>  ----------------------------<  119<H>  4.1   |  32<H>  |  3.00<H>    Ca    9.3      01 Jun 2021 09:16  Phos  2.7     06-01  Mg     2.2     06-01    TPro  6.6  /  Alb  2.8<L>  /  TBili  2.4<H>  /  DBili  x   /  AST  34  /  ALT  21  /  AlkPhos  204<H>  06-01    CoagsPTT - ( 01 Jun 2021 03:50 )  PTT:35.1 sec      LFTsLIVER FUNCTIONS - ( 01 Jun 2021 09:16 )  Alb: 2.8 g/dL / Pro: 6.6 g/dL / ALK PHOS: 204 U/L / ALT: 21 U/L / AST: 34 U/L / GGT: x             Radiology:  CT head w/o contrast: IMPRESSION: No evidence of acute hemorrhage mass or mass effect.

## 2021-06-01 NOTE — PROGRESS NOTE ADULT - ATTENDING COMMENTS
patient remains intubated. spontaneous breathing trials/CPAP.   swan removed.   on intermittent lev. not on pulmonary vasodilators.   meds: levo .03, bumex infusion 2/hr, statin, heparin,   HR 80-90, 84/-98/ MAPs 60s,   I/O: -1.4, -2.8, -1.6, -900  TTE 5.26: LA 4.4, LVEDD 3.6, LVEF 55, severe RA/RV enlarge and dysfunction. mod-sev TR. mod PI. mild mod MR. pericardial effusion. dilated IVC.   06-01    147<H>  |  102  |  74<H>  ----------------------------<  119<H>  4.1   |  32<H>  |  3.00<H>  Cr 3.0, 2.99, (4.87 admission, normal feb)     Ca    9.3      01 Jun 2021 09:16  Phos  2.7     06-01  Mg     2.2     06-01  TPro  6.6  /  Alb  2.8<L>  /  TBili  2.4<H>  /  DBili  x   /  AST  34  /  ALT  21  /  AlkPhos  204<H>  06-01  lactate 0.7  PTT 35                      11.3   9.77  )-----------( 75       ( 01 Jun 2021 03:50 )             35.2   Patient remains ventilated and hemodynamically brittle   Patient has been intubated since 5.26 and strategy re vent weaning should be reviewed. ? need trach. ? pulm/critical care support.   Will be difficult to manage this patient without invasive hemodynamics. Hypotension may be driven by relatively low RA pressures after diuresis and bleed or low cardiac ouput.   Suggest:  Send for indwelling RHC in lab.   Initiate pulmonary vasodilators (sildenafil /flolan) under hemo guidance   PLEASE ASK DR ANTONIO TO CONSULT  PLEASE SEND PRELIM PULM HTN WORK UP: including collagen vascular work up, LE venous dopplers.   Patient should have cardiac MRI when stable as she has reported thickened pericardium   Jered Farooq patient remains intubated. spontaneous breathing trials/CPAP.   questionable MS. poorly responsive off sedation   swan removed. CVPs single digits.   on intermittent lev. not on pulmonary vasodilators.   meds: levo .03, bumex infusion 2/hr, statin, heparin,   HR 80-90, 84/-98/ MAPs 60s,   I/O: -1.4, -2.8, -1.6, -900  TTE 5.26: LA 4.4, LVEDD 3.6, LVEF 55, severe RA/RV enlarge and dysfunction. mod-sev TR. mod PI. mild mod MR. pericardial effusion. dilated IVC.   06-01    147<H>  |  102  |  74<H>  ----------------------------<  119<H>  4.1   |  32<H>  |  3.00<H>  Cr 3.0, 2.99, (4.87 admission, normal feb)     Ca    9.3      01 Jun 2021 09:16  Phos  2.7     06-01  Mg     2.2     06-01  TPro  6.6  /  Alb  2.8<L>  /  TBili  2.4<H>  /  DBili  x   /  AST  34  /  ALT  21  /  AlkPhos  204<H>  06-01  lactate 0.7  PTT 35                      11.3   9.77  )-----------( 75       ( 01 Jun 2021 03:50 )             35.2   Patient remains ventilated and hemodynamically brittle   Mental status underfined. CT head yesterday unremarkable.   Patient has been intubated since 5.26 and strategy re vent weaning should be reviewed. ? need trach. ? pulm/critical care support.   Will be difficult to manage this patient without invasive hemodynamics. Hypotension may be driven by relatively low RA pressures after diuresis and bleed or low cardiac ouput.   Suggest:  In the interim- maintain CVP 10-12, could decrease diuretics.   Please check CVP sat as surrogate for CO and need for milrinone.   Check HIT   IF  neuro status improves could send for indwelling RHC in lab.   Initiate pulmonary vasodilators (sildenafil /flolan) under hemo guidance   PLEASE ASK DR ANTONIO TO CONSULT  PLEASE SEND PRELIM PULM HTN WORK UP: including collagen vascular work up, LE venous dopplers.   Patient should have cardiac MRI when stable as she has reported thickened pericardium   Jered Farooq

## 2021-06-01 NOTE — PROGRESS NOTE ADULT - ASSESSMENT
Pt is a 75 year old woman with a PMHx of HFpEF, severe pHTN, severe TR with right-sided HF, COPD on 3L home O2, HTN, HLD, chronic afib (on Eliquis), OA, CKD3, DM, right eye cataract who was brought in by EMS to [facility] on 5/23/21 for AMS transferred here for hypotension w/o concern for pericardial tamponade. On arrival here she was hypotensive requiring jorge a/levo/vaso. She was intubated for hypercarbic respiratory failure, with subsequent improvement in BP and weaned off vasopressors. She was diuresing on a Bumex gtt and started on sildenafil on 5/27, but on 5/28 she was found to have profound bleeding from femoral a-line site in the setting of supratherapeutic aPTT. MTP initiated x2 and is s/p 11U PRBC, 8 plasma, 2 plts.     She remains intubated on minimal vent settings. Though weaned off sedation, remains encephalopathic with CT head 5/31 unrevealing. Once neuro issues are better addressed, would pursue RHC with leave in PAC to guide initiation/titration of pulmonary vasodilators. TTE was also notable fro thickened pericardium which can be evaluated with cMRI when able.      Bedside hemodynamics:  5/29: CVP 14, PA 58/15/31, PAsat 82%, AOsat 97%, CO/CI (F) 11.6/5.1, MAP 79, SVR 1020

## 2021-06-01 NOTE — CONSULT NOTE ADULT - SUBJECTIVE AND OBJECTIVE BOX
HPI:    PAST MEDICAL & SURGICAL HISTORY:  Afib    COPD (chronic obstructive pulmonary disease)    CHF (congestive heart failure)    Dementia        FAMILY HISTORY:      SOCIAL HISTORY:  Smoking: __ packs x ___ years  EtOH Use:  Marital Status:  Occupation:  Exposures:  Recent Travel:    Allergies    No Known Allergies    Intolerances        HOME MEDICATIONS:    REVIEW OF SYSTEMS:  CONSTITUTIONAL: No weakness, fevers or chills  EYES/ENT: No visual changes;  No vertigo or throat pain   NECK: No pain or stiffness  RESPIRATORY: No cough, wheezing, hemoptysis; No shortness of breath  CARDIOVASCULAR: No chest pain or palpitations  GASTROINTESTINAL: No abdominal or epigastric pain. No nausea, vomiting, or hematemesis; No diarrhea or constipation. No melena or hematochezia.  GENITOURINARY: No dysuria, frequency or hematuria  NEUROLOGICAL: No numbness or weakness  SKIN: No itching, burning, rashes, or lesions   All other review of systems is negative unless indicated above.    OBJECTIVE:  ICU Vital Signs Last 24 Hrs  T(C): 37.3 (01 Jun 2021 12:00), Max: 37.3 (01 Jun 2021 12:00)  T(F): 99.1 (01 Jun 2021 12:00), Max: 99.1 (01 Jun 2021 12:00)  HR: 88 (01 Jun 2021 12:30) (82 - 100)  BP: --  BP(mean): --  ABP: 86/44 (01 Jun 2021 12:30) (80/40 - 140/74)  ABP(mean): 58 (01 Jun 2021 12:30) (54 - 98)  RR: 15 (01 Jun 2021 12:30) (11 - 32)  SpO2: 100% (01 Jun 2021 12:30) (98% - 100%)    Mode: CPAP with PS, FiO2: 30, PEEP: 5, PS: 8, MAP: 9, PIP: 14    05-31 @ 07:01 - 06-01 @ 07:00  --------------------------------------------------------  IN: 1650 mL / OUT: 3065 mL / NET: -1415 mL    06-01 @ 07:01 - 06-01 @ 13:06  --------------------------------------------------------  IN: 257.4 mL / OUT: 400 mL / NET: -142.6 mL      CAPILLARY BLOOD GLUCOSE          PHYSICAL EXAM:  General: WN/WD NAD  Neurology: A&Ox3, nonfocal, MULLINS x 4  Eyes: PERRLA/ EOMI, Gross vision intact  ENT/Neck: Neck supple, trachea midline, No JVD, Gross hearing intact  Respiratory: CTA B/L, No wheezing, rales, rhonchi  CV: RRR, +S1/S2, -S3/S4, no murmurs, rubs or gallops  Abdominal: Soft, NT, ND +BS, No HSM  MSK: 5/5 strength UE/LE bilaterally  Extremities: No edema, 2+ peripheral pulses  Skin: No Rashes, Hematoma, Ecchymosis  Incisions:   Tubes:    HOSPITAL MEDICATIONS:  MEDICATIONS  (STANDING):  atorvastatin 40 milliGRAM(s) Oral at bedtime  buMETAnide Infusion 2 mG/Hr (10 mL/Hr) IV Continuous <Continuous>  chlorhexidine 0.12% Liquid 15 milliLiter(s) Oral Mucosa every 12 hours  chlorhexidine 2% Cloths 1 Application(s) Topical <User Schedule>  heparin   Injectable 5000 Unit(s) SubCutaneous every 8 hours  lactulose Syrup 20 Gram(s) Oral every 12 hours  norepinephrine Infusion 0.03 MICROgram(s)/kG/Min (3.16 mL/Hr) IV Continuous <Continuous>  sodium chloride 0.9%. 1000 milliLiter(s) (10 mL/Hr) IV Continuous <Continuous>    MEDICATIONS  (PRN):      LABS:                        11.3   9.77  )-----------( 75       ( 01 Jun 2021 03:50 )             35.2     06-01    147<H>  |  102  |  74<H>  ----------------------------<  119<H>  4.1   |  32<H>  |  3.00<H>    Ca    9.3      01 Jun 2021 09:16  Phos  2.7     06-01  Mg     2.2     06-01    TPro  6.6  /  Alb  2.8<L>  /  TBili  2.4<H>  /  DBili  x   /  AST  34  /  ALT  21  /  AlkPhos  204<H>  06-01    PTT - ( 01 Jun 2021 03:50 )  PTT:35.1 sec    Arterial Blood Gas:  06-01 @ 09:10  7.39/68/95/40/98/12.5  ABG lactate: --  Arterial Blood Gas:  06-01 @ 03:48  7.43/62/78/40/96/13.7  ABG lactate: --  Arterial Blood Gas:  05-31 @ 02:54  7.44/55/97/37/98/11.1  ABG lactate: --        MICROBIOLOGY:     RADIOLOGY:  [ ] Reviewed by me    Point of Care Ultrasound Findings:    PFT:    EKG:   HPI:  75F PMH CHFpEF, Severe TR with Right CHF and severe pHTN, COPD on home 02, HTN, HLD, Chronic Afib (on Eliquis), OA, CKD3, DM, right eye cataract who was brought in by EMS to [facility] on 21 for AMS. pt A&O x1, per daughter patient with generalized weakness and lethargy since   Per chart review patient had arrived to the ED hypotensive and obtunded. On admission,  Chest CT was noted to have a large pericardial effusion (increased since 2021)- per thoracic surgery consult no indication for pericardial window. Patient had received right sided cath on 21 which demonstrated mPAP: 30, PVR: 2.56 woods units, and TPG: 10 consistent with group 2 pulmonary hypertension. The day prior, patients TTE demonstrated PASP of 67mmHg assuming a RAP of 15mmHg. Pulmonary was consulted for pulmonary hypertension.     On Bedside US: PASP: 72-77mmHg assuming a RAP of 10-15mmHg, which is essentially unchanged since ; however, right atrial septum no longer persistently deviated into LA indicating likely reduction of mPAP. Patient on bumex gtt at 2mg/hr with a net output of 8L since admission.     PAST MEDICAL & SURGICAL HISTORY:  Afib    COPD (chronic obstructive pulmonary disease)    CHF (congestive heart failure)    Dementia        FAMILY HISTORY:      SOCIAL HISTORY:  Smoking: former  EtOH Use: none  Exposures: none  Recent Travel: none    Allergies    No Known Allergies    Intolerances        HOME MEDICATIONS:    REVIEW OF SYSTEMS:  [x] unable to assess as patient is intubated on sedation    OBJECTIVE:  ICU Vital Signs Last 24 Hrs  T(C): 37.3 (2021 12:00), Max: 37.3 (2021 12:00)  T(F): 99.1 (2021 12:00), Max: 99.1 (2021 12:00)  HR: 88 (2021 12:30) (82 - 100)  BP: --  BP(mean): --  ABP: 86/44 (2021 12:30) (80/40 - 140/74)  ABP(mean): 58 (2021 12:30) (54 - 98)  RR: 15 (2021 12:30) (11 - 32)  SpO2: 100% (2021 12:30) (98% - 100%)    Mode: CPAP with PS, FiO2: 30, PEEP: 5, PS: 8, MAP: 9, PIP: 14    05-31 @ 07:  -   @ 07:00  --------------------------------------------------------  IN: 1650 mL / OUT: 3065 mL / NET: -1415 mL     @ 07:01   @ 13:06  --------------------------------------------------------  IN: 257.4 mL / OUT: 400 mL / NET: -142.6 mL      CAPILLARY BLOOD GLUCOSE          PHYSICAL EXAM:  General: no acute distress  Neurology: no lateralizing findings  Eyes: PERRLA  ENT/Neck: Neck supple, trachea midline, +JVD, Gross hearing intact  Respiratory: CTA B/L, No wheezing, rales, rhonchi  CV: RRR, +S1/S2, -S3/S4, no murmurs, rubs or gallops  Abdominal: Soft, NT, ND +BS, No HSM  MSK: 5/5 strength UE/LE bilaterally  Extremities: +edema, 2+ peripheral pulses  Skin: No Rashes, Hematoma, Ecchymosis      HOSPITAL MEDICATIONS:  MEDICATIONS  (STANDING):  atorvastatin 40 milliGRAM(s) Oral at bedtime  buMETAnide Infusion 2 mG/Hr (10 mL/Hr) IV Continuous <Continuous>  chlorhexidine 0.12% Liquid 15 milliLiter(s) Oral Mucosa every 12 hours  chlorhexidine 2% Cloths 1 Application(s) Topical <User Schedule>  heparin   Injectable 5000 Unit(s) SubCutaneous every 8 hours  lactulose Syrup 20 Gram(s) Oral every 12 hours  norepinephrine Infusion 0.03 MICROgram(s)/kG/Min (3.16 mL/Hr) IV Continuous <Continuous>  sodium chloride 0.9%. 1000 milliLiter(s) (10 mL/Hr) IV Continuous <Continuous>    MEDICATIONS  (PRN):      LABS:                        11.3   9.77  )-----------( 75       ( 2021 03:50 )             35.2     06    147<H>  |  102  |  74<H>  ----------------------------<  119<H>  4.1   |  32<H>  |  3.00<H>    Ca    9.3      2021 09:16  Phos  2.7       Mg     2.2         TPro  6.6  /  Alb  2.8<L>  /  TBili  2.4<H>  /  DBili  x   /  AST  34  /  ALT  21  /  AlkPhos  204<H>      PTT - ( 2021 03:50 )  PTT:35.1 sec    Arterial Blood Gas:   @ 09:10  7.39/68/95/40/98/12.5  ABG lactate: --  Arterial Blood Gas:   @ 03:48  7.43/62/78/40/96/13.7  ABG lactate: --  Arterial Blood Gas:   @ 02:54  7.44/55/97/37/98/11.1  ABG lactate: --    Right Heart Cath: 2021  Darrian Chong D.O.  Signed 2021 16:39:13  HEMODYNAMIC TABLES  Pressures:  Baseline  Pressures:  - HR: 57  Pressures:  - Rhythm:  Pressures:  -- Pulmonary Artery (S/D/M): 65/15/30  Pressures:  -- Pulmonary Capillary Wedge: --/28/20  Pressures:  -- Right Atrium (a/v/M): --/1514  O2 Sats:  Baseline  O2 Sats:  - HR: 57  O2 Sats:  - Rhythm:  O2 Sats:  -- AO: 12.2/100/16.59  O2 Sats:  -- PA: 12.2/55.4/9.19  Outputs:  Baseline  Outputs:  -- CALCULATIONS[age]  Outputs:  -- CALCULATIONS: Body Surface Area: 2.17  Outputs:  -- CALCULATIONS: Height in cm: 165.00  Outputs:  -- CALCULATIONS: Sex: Female  Outputs:  -- CALCULATIONS: Weight in k.40  Outputs:  -- OUTPUTS: CO by Janes: 3.93  Outputs:  -- OUTPUTS: Janes cardiac index: 1.82  Outputs:  -- OUTPUTS: O2 consumption: 291.00  Outputs:  -- OUTPUTS: Vo2 Indexed: 134.36  Outputs:  -- RESISTANCES: Pulmonary vascular index (dsc): 440.49  Outputs:  -- RESISTANCES: Pulmonary vascular index (Wood Units): 5.51  Outputs:  -- RESISTANCES: Pulmonary vascular resistance (dsc): 203.39  Outputs:  -- RESISTANCES: Pulmonary vascular resistance (Wood Units): 2.54  Outputs:  -- RESISTANCES: Right ventricular stroke work: 16.15  Outputs:  -- RESISTANCES: Right ventricular stroke work index: 7.45  Outputs:  -- RESISTANCES: Total pulmonary index (dsc): 1321.48  Outputs:  -- RESISTANCES: Total pulmonary index (Wood Units): 16.52  Outputs:  -- RESISTANCES: Total pulmonary resistance (dsc): 610.16  Outputs:  -- RESISTANCES: Total pulmonary resistance (Wood Units): 7.63  Outputs:  -- SHUNTS: Pulmonary flow: 3.93  Outputs:  -- SHUNTS: Qp Indexed: 1.82  Outputs:  -- SHUNTS: Qs Indexed: 1.82  Outputs:  -- SHUNTS: Systemic flow: 3.93          MICROBIOLOGY:     RADIOLOGY:  [x] Reviewed by me    Chest radiograph:  INTERPRETATION:    Enlarged cardiac silhouette. Calcified thoracic aorta.  ET tube tip approximately 3.3 cm above the terry.  Enteric tube extends into the left hemiabdomen. Tip not included on image.  Previous right IJ approach Quebeck-Dm catheter not seen. Right IJ sheath present with tip in the SVC.  Improved pulmonary edema on the right with residual basilar changes.  Persistent obscuration of left hemidiaphragm.  No definite right pleural effusion.  No pneumothorax.      Point of Care Ultrasound Findings:  - A-line predominant pattern  - IVS flattening during systole and diastole consistent with pressure and volume overload  - Severe RV enlargement with decreased systolic function  - PASP 72-77mmHg assuming RAP of 10-15mmHg

## 2021-06-01 NOTE — PROGRESS NOTE ADULT - SUBJECTIVE AND OBJECTIVE BOX
CHIEF COMPLAINT:    Interval Events:    REVIEW OF SYSTEMS:  Constitutional: [ ] negative [ ] fevers [ ] chills [ ] weight loss [ ] weight gain  HEENT: [ ] negative [ ] dry eyes [ ] eye irritation [ ] postnasal drip [ ] nasal congestion  CV: [ ] negative  [ ] chest pain [ ] orthopnea [ ] palpitations [ ] murmur  Resp: [ ] negative [ ] cough [ ] shortness of breath [ ] dyspnea [ ] wheezing [ ] sputum [ ] hemoptysis  GI: [ ] negative [ ] nausea [ ] vomiting [ ] diarrhea [ ] constipation [ ] abd pain [ ] dysphagia   : [ ] negative [ ] dysuria [ ] nocturia [ ] hematuria [ ] increased urinary frequency  Musculoskeletal: [ ] negative [ ] back pain [ ] myalgias [ ] arthralgias [ ] fracture  Skin: [ ] negative [ ] rash [ ] itch  Neurological: [ ] negative [ ] headache [ ] dizziness [ ] syncope [ ] weakness [ ] numbness  Psychiatric: [ ] negative [ ] anxiety [ ] depression  Endocrine: [ ] negative [ ] diabetes [ ] thyroid problem  Hematologic/Lymphatic: [ ] negative [ ] anemia [ ] bleeding problem  Allergic/Immunologic: [ ] negative [ ] itchy eyes [ ] nasal discharge [ ] hives [ ] angioedema  [ ] All other systems negative  [ ] Unable to assess ROS because ________    OBJECTIVE:  ICU Vital Signs Last 24 Hrs  T(C): 37.2 (01 Jun 2021 00:00), Max: 37.2 (01 Jun 2021 00:00)  T(F): 99 (01 Jun 2021 00:00), Max: 99 (01 Jun 2021 00:00)  HR: 92 (01 Jun 2021 06:30) (82 - 100)  BP: --  BP(mean): --  ABP: 98/48 (01 Jun 2021 06:30) (78/36 - 140/74)  ABP(mean): 64 (01 Jun 2021 06:30) (48 - 98)  RR: 22 (01 Jun 2021 06:30) (11 - 32)  SpO2: 100% (01 Jun 2021 06:30) (98% - 100%)    Mode: CPAP with PS, FiO2: 30, PEEP: 5, PS: 8, MAP: 9, PIP: 13    05-31 @ 07:01  -  06-01 @ 07:00  --------------------------------------------------------  IN: 1650 mL / OUT: 2790 mL / NET: -1140 mL      CAPILLARY BLOOD GLUCOSE          PHYSICAL EXAM:  General:   HEENT:   Lymph Nodes:  Neck:   Respiratory:   Cardiovascular:   Abdomen:   Extremities:   Skin:   Neurological:  Psychiatry:    LINES:    HOSPITAL MEDICATIONS:  Standing Meds:  atorvastatin 40 milliGRAM(s) Oral at bedtime  buMETAnide Infusion 2 mG/Hr IV Continuous <Continuous>  chlorhexidine 0.12% Liquid 15 milliLiter(s) Oral Mucosa every 12 hours  chlorhexidine 2% Cloths 1 Application(s) Topical <User Schedule>  heparin   Injectable 5000 Unit(s) SubCutaneous every 8 hours  sodium chloride 0.9%. 1000 milliLiter(s) IV Continuous <Continuous>      PRN Meds:      LABS:                        11.3   9.77  )-----------( 75       ( 01 Jun 2021 03:50 )             35.2     Hgb Trend: 11.3<--, 11.6<--, 11.8<--, 12.3<--, 13.2<--  06-01    148<H>  |  104  |  74<H>  ----------------------------<  124<H>  3.6   |  33<H>  |  2.99<H>    Ca    8.9      01 Jun 2021 03:50  Phos  2.7     06-01  Mg     2.1     06-01    TPro  6.0  /  Alb  2.6<L>  /  TBili  2.3<H>  /  DBili  x   /  AST  32  /  ALT  21  /  AlkPhos  200<H>  06-01    Creatinine Trend: 2.99<--, 2.99<--, 2.85<--, 3.06<--, 3.09<--, 3.16<--  PTT - ( 01 Jun 2021 03:50 )  PTT:35.1 sec    Arterial Blood Gas:  06-01 @ 03:48  7.43/62/78/40/96/13.7  ABG lactate: --  Arterial Blood Gas:  05-31 @ 02:54  7.44/55/97/37/98/11.1  ABG lactate: --        MICROBIOLOGY:     RADIOLOGY:  [ ] Reviewed and interpreted by me    EKG:   CHIEF COMPLAINT: AMS    Interval Events: swan cath removed 5/31. had NSTVT yesterday s/p amio. CPAPing well. intermittently on/off levo.     [ ] Unable to assess ROS because altered and intbuated    OBJECTIVE:  ICU Vital Signs Last 24 Hrs  T(C): 37.2 (01 Jun 2021 00:00), Max: 37.2 (01 Jun 2021 00:00)  T(F): 99 (01 Jun 2021 00:00), Max: 99 (01 Jun 2021 00:00)  HR: 92 (01 Jun 2021 06:30) (82 - 100)  BP: --  BP(mean): --  ABP: 98/48 (01 Jun 2021 06:30) (78/36 - 140/74)  ABP(mean): 64 (01 Jun 2021 06:30) (48 - 98)  RR: 22 (01 Jun 2021 06:30) (11 - 32)  SpO2: 100% (01 Jun 2021 06:30) (98% - 100%)    Mode: CPAP with PS, FiO2: 30, PEEP: 5, PS: 8, MAP: 9, PIP: 13    05-31 @ 07:01  -  06-01 @ 07:00  --------------------------------------------------------  IN: 1650 mL / OUT: 2790 mL / NET: -1140 mL      CAPILLARY BLOOD GLUCOSE    PHYSICAL EXAM:  GENERAL: intubated  HEAD Atraumatic, Normocephalic  EYES: PERRL  CHEST/LUNG: coarse mechanical breath sounds  HEART: RRR no murmur/gallops/rubs  ABDOMEN: +BS, soft, NT, ND  EXTREMITIES: 2+ LE edema, +2 radial pulses b/l, +2 DP/PT pulses b/l  NERVOUS SYSTEM:  opens eyes to painful and verbal stimuli; no spontaneous movements;   SKIN:  No new rashes     HOSPITAL MEDICATIONS:  Standing Meds:  atorvastatin 40 milliGRAM(s) Oral at bedtime  buMETAnide Infusion 2 mG/Hr IV Continuous <Continuous>  chlorhexidine 0.12% Liquid 15 milliLiter(s) Oral Mucosa every 12 hours  chlorhexidine 2% Cloths 1 Application(s) Topical <User Schedule>  heparin   Injectable 5000 Unit(s) SubCutaneous every 8 hours  sodium chloride 0.9%. 1000 milliLiter(s) IV Continuous <Continuous>      PRN Meds:      LABS:                        11.3   9.77  )-----------( 75       ( 01 Jun 2021 03:50 )             35.2     Hgb Trend: 11.3<--, 11.6<--, 11.8<--, 12.3<--, 13.2<--  06-01    148<H>  |  104  |  74<H>  ----------------------------<  124<H>  3.6   |  33<H>  |  2.99<H>    Ca    8.9      01 Jun 2021 03:50  Phos  2.7     06-01  Mg     2.1     06-01    TPro  6.0  /  Alb  2.6<L>  /  TBili  2.3<H>  /  DBili  x   /  AST  32  /  ALT  21  /  AlkPhos  200<H>  06-01    Creatinine Trend: 2.99<--, 2.99<--, 2.85<--, 3.06<--, 3.09<--, 3.16<--  PTT - ( 01 Jun 2021 03:50 )  PTT:35.1 sec    Arterial Blood Gas:  06-01 @ 03:48  7.43/62/78/40/96/13.7  ABG lactate: --  Arterial Blood Gas:  05-31 @ 02:54  7.44/55/97/37/98/11.1  ABG lactate: --        MICROBIOLOGY:     RADIOLOGY:  [ ] Reviewed and interpreted by me    EKG:

## 2021-06-02 NOTE — PROGRESS NOTE ADULT - ASSESSMENT
75F PMH CHFpEF, Severe TR with Right CHF and severe pHTN, COPD on home 02, HTN, HLD, Chronic Afib (on Eliquis), OA, CKD3, DM, right eye cataract who was transferred to [facility] with large pericardial effusion without tamponade in the context of right heart cath identified group 2 pulmonary hypertension    # Pulmonary Hypertension  - Group 2, Mild --> however RHC not performed in euvolemic state.   - Discussed case with Dr. Patel. Likely group 2 etiology. Continue with diuretics. After patient is volume optimized, recommend repeating RHC to re-assess pHTN burden  - As patient is diuresed and right sided pressures decrease, pericardial effusion pressure may > RV pressure and precipitate clinical tamponade. Recommend close hemodynamic monitoring.   - KHARI positive. Unclear significance. DsDNA, histone, RF, scleroderema negative.         Triston Armenta MD  PGY-6  Pulmonary and Critical Care Fellow  Pager: [phone]

## 2021-06-02 NOTE — PROGRESS NOTE ADULT - SUBJECTIVE AND OBJECTIVE BOX
[facility] NEPHROLOGY SERVICES, [facility]  NEPHROLOGY AND HYPERTENSION  [address]  [address]  [place] [address]  [phone]    MD LANG PHAN, MD PUMA MOORE, MD JANAE HWANG, MD KARMEN MONTES, MD JAIDA DUNCAN, MD CORI STAPLETON MD          Patient events noted    MEDICATIONS  (STANDING):  atorvastatin 40 milliGRAM(s) Oral at bedtime  buMETAnide Infusion 2 mG/Hr (10 mL/Hr) IV Continuous <Continuous>  chlorhexidine 0.12% Liquid 15 milliLiter(s) Oral Mucosa every 12 hours  chlorhexidine 2% Cloths 1 Application(s) Topical <User Schedule>  heparin   Injectable 5000 Unit(s) SubCutaneous every 8 hours  lactulose Syrup 20 Gram(s) Oral two times a day  norepinephrine Infusion 0.05 MICROgram(s)/kG/Min (10.5 mL/Hr) IV Continuous <Continuous>  pantoprazole  Injectable 40 milliGRAM(s) IV Push daily  sodium chloride 0.9%. 1000 milliLiter(s) (10 mL/Hr) IV Continuous <Continuous>    MEDICATIONS  (PRN):      06-01-21 @ 07:01  -  06-02-21 @ 07:00  --------------------------------------------------------  IN: 2239.4 mL / OUT: 2770 mL / NET: -530.6 mL    06-02-21 @ 07:01  -  06-02-21 @ 18:09  --------------------------------------------------------  IN: 763.6 mL / OUT: 1140 mL / NET: -376.4 mL      PHYSICAL EXAM:      T(C): 37.1 (06-02-21 @ 16:00), Max: 37.3 (06-01-21 @ 19:00)  HR: 88 (06-02-21 @ 17:00) (86 - 98)  BP: --  RR: 19 (06-02-21 @ 17:00) (12 - 28)  SpO2: 100% (06-02-21 @ 17:00) (99% - 100%)  Wt(kg): --  Lungs clear  Heart S1S2  Abd soft NT ND  Extremities:   2 edema                                    11.1   9.44  )-----------( 89       ( 02 Jun 2021 00:55 )             35.7     06-02    150<H>  |  102  |  76<H>  ----------------------------<  107<H>  3.8   |  36<H>  |  2.94<H>    Ca    9.5      02 Jun 2021 10:18  Phos  2.0     06-02  Mg     2.2     06-02    TPro  6.7  /  Alb  2.9<L>  /  TBili  2.5<H>  /  DBili  x   /  AST  41<H>  /  ALT  24  /  AlkPhos  224<H>  06-02    ABG - ( 02 Jun 2021 15:52 )  pH, Arterial: 7.45  pH, Blood: x     /  pCO2: 60    /  pO2: 122   / HCO3: 41    / Base Excess: 14.3  /  SaO2: 99                LIVER FUNCTIONS - ( 02 Jun 2021 10:18 )  Alb: 2.9 g/dL / Pro: 6.7 g/dL / ALK PHOS: 224 U/L / ALT: 24 U/L / AST: 41 U/L / GGT: x           Creatinine Trend: 2.94<--, 3.02<--, 2.95<--, 3.00<--, 2.99<--, 2.99<--  Mode: CPAP with PS  FiO2: 30  PEEP: 5  PS: 10  MAP: 8  PIP: 16      Assessment    COPD, severe PHTN, CO2 retention, CM, peric effusion, R heart failure, resp failure, intubated  Hemodynamic NIC (Cr 0.97 - 2/3/21)  Good UO w/diuresis on Bumex Drip  Cr continues to improve from peak of 4.87 - 3/26/21 now 2.9    Plan  Avoid nephrotoxins  Avoid hypotension  F/u CBC, BMP, UO  Will follow.        Amrik Nobles MD

## 2021-06-02 NOTE — PROGRESS NOTE ADULT - SUBJECTIVE AND OBJECTIVE BOX
CHIEF COMPLAINT:    Interval Events:    REVIEW OF SYSTEMS:  CONSTITUTIONAL: No weakness, fevers or chills  EYES/ENT: No visual changes;  No vertigo or throat pain   NECK: No pain or stiffness  RESPIRATORY: No cough, wheezing, hemoptysis; No shortness of breath  CARDIOVASCULAR: No chest pain or palpitations  GASTROINTESTINAL: No abdominal or epigastric pain. No nausea, vomiting, or hematemesis; No diarrhea or constipation. No melena or hematochezia.  GENITOURINARY: No dysuria, frequency or hematuria  NEUROLOGICAL: No numbness or weakness  SKIN: No itching, burning, rashes, or lesions   All other review of systems is negative unless indicated above.    OBJECTIVE:  ICU Vital Signs Last 24 Hrs  T(C): 37.2 (02 Jun 2021 04:00), Max: 37.3 (01 Jun 2021 12:00)  T(F): 99 (02 Jun 2021 04:00), Max: 99.1 (01 Jun 2021 12:00)  HR: 92 (02 Jun 2021 07:00) (84 - 98)  BP: --  BP(mean): --  ABP: 106/50 (02 Jun 2021 07:00) (80/40 - 144/66)  ABP(mean): 68 (02 Jun 2021 07:00) (54 - 88)  RR: 15 (02 Jun 2021 07:00) (11 - 26)  SpO2: 100% (02 Jun 2021 07:00) (99% - 100%)    Mode: AC/ CMV (Assist Control/ Continuous Mandatory Ventilation), RR (machine): 12, TV (machine): 350, FiO2: 30, PEEP: 5, ITime: 1, MAP: 11, PIP: 31    06-01 @ 07:01  -  06-02 @ 07:00  --------------------------------------------------------  IN: 2239.4 mL / OUT: 2770 mL / NET: -530.6 mL      CAPILLARY BLOOD GLUCOSE          PHYSICAL EXAM:  General: WN/WD NAD  Neurology: A&Ox3, nonfocal, MULLINS x 4  Eyes: PERRLA/ EOMI, Gross vision intact  ENT/Neck: Neck supple, trachea midline, No JVD, Gross hearing intact  Respiratory: CTA B/L, No wheezing, rales, rhonchi  CV: RRR, +S1/S2, -S3/S4, no murmurs, rubs or gallops  Abdominal: Soft, NT, ND +BS, No HSM  MSK: 5/5 strength UE/LE bilaterally  Extremities: No edema, 2+ peripheral pulses  Skin: No Rashes, Hematoma, Ecchymosis  Incisions:   Tubes:    HOSPITAL MEDICATIONS:  MEDICATIONS  (STANDING):  atorvastatin 40 milliGRAM(s) Oral at bedtime  buMETAnide Infusion 2 mG/Hr (10 mL/Hr) IV Continuous <Continuous>  chlorhexidine 0.12% Liquid 15 milliLiter(s) Oral Mucosa every 12 hours  chlorhexidine 2% Cloths 1 Application(s) Topical <User Schedule>  heparin   Injectable 5000 Unit(s) SubCutaneous every 8 hours  norepinephrine Infusion 0.05 MICROgram(s)/kG/Min (10.5 mL/Hr) IV Continuous <Continuous>  pantoprazole  Injectable 40 milliGRAM(s) IV Push daily  sodium chloride 0.9%. 1000 milliLiter(s) (10 mL/Hr) IV Continuous <Continuous>    MEDICATIONS  (PRN):      LABS:                        11.1   9.44  )-----------( 89       ( 02 Jun 2021 00:55 )             35.7     Hgb Trend: 11.1<--, 11.3<--, 11.6<--, 11.8<--, 12.3<--  06-02    150<H>  |  102  |  75<H>  ----------------------------<  108<H>  3.6   |  34<H>  |  3.02<H>    Ca    9.1      02 Jun 2021 00:55  Phos  2.0     06-02  Mg     2.2     06-02    TPro  6.4  /  Alb  2.7<L>  /  TBili  2.4<H>  /  DBili  x   /  AST  40  /  ALT  22  /  AlkPhos  215<H>  06-02    Creatinine Trend: 3.02<--, 2.95<--, 3.00<--, 2.99<--, 2.99<--, 2.85<--  PTT - ( 01 Jun 2021 03:50 )  PTT:35.1 sec    Arterial Blood Gas:  06-02 @ 05:49  7.48/54/96/40/98/14.4  ABG lactate: --  Arterial Blood Gas:  06-01 @ 15:52  7.46/58/97/40/98/14.1  ABG lactate: --  Arterial Blood Gas:  06-01 @ 13:07  7.38/73/99/42/98/13.6  ABG lactate: --  Arterial Blood Gas:  06-01 @ 09:10  7.39/68/95/40/98/12.5  ABG lactate: --  Arterial Blood Gas:  06-01 @ 03:48  7.43/62/78/40/96/13.7  ABG lactate: --    Venous Blood Gas:  06-02 @ 00:50  7.46/56/40/39/77  VBG Lactate: 0.7  Venous Blood Gas:  06-01 @ 13:07  7.35/78/48/42/81  VBG Lactate: 0.6      MICROBIOLOGY:       RADIOLOGY:  [ ] Reviewed by me    PULMONARY FUNCTION TESTS:    EKG: CHIEF COMPLAINT:    Interval Events:    REVIEW OF SYSTEMS:  CONSTITUTIONAL: No weakness, fevers or chills  EYES/ENT: No visual changes;  No vertigo or throat pain   NECK: No pain or stiffness  RESPIRATORY: No cough, wheezing, hemoptysis; No shortness of breath  CARDIOVASCULAR: No chest pain or palpitations  GASTROINTESTINAL: No abdominal or epigastric pain. No nausea, vomiting, or hematemesis; No diarrhea or constipation. No melena or hematochezia.  GENITOURINARY: No dysuria, frequency or hematuria  NEUROLOGICAL: No numbness or weakness  SKIN: No itching, burning, rashes, or lesions   All other review of systems is negative unless indicated above.    OBJECTIVE:  ICU Vital Signs Last 24 Hrs  T(C): 37.2 (02 Jun 2021 04:00), Max: 37.3 (01 Jun 2021 12:00)  T(F): 99 (02 Jun 2021 04:00), Max: 99.1 (01 Jun 2021 12:00)  HR: 92 (02 Jun 2021 07:00) (84 - 98)  BP: --  BP(mean): --  ABP: 106/50 (02 Jun 2021 07:00) (80/40 - 144/66)  ABP(mean): 68 (02 Jun 2021 07:00) (54 - 88)  RR: 15 (02 Jun 2021 07:00) (11 - 26)  SpO2: 100% (02 Jun 2021 07:00) (99% - 100%)    Mode: AC/ CMV (Assist Control/ Continuous Mandatory Ventilation), RR (machine): 12, TV (machine): 350, FiO2: 30, PEEP: 5, ITime: 1, MAP: 11, PIP: 31    06-01 @ 07:01  -  06-02 @ 07:00  --------------------------------------------------------  IN: 2239.4 mL / OUT: 2770 mL / NET: -530.6 mL      CAPILLARY BLOOD GLUCOSE          PHYSICAL EXAM:  General: WN/WD NAD  Neurology: A&Ox3, nonfocal, MULLINS x 4  Eyes: PERRLA/ EOMI, Gross vision intact  ENT/Neck: Neck supple, trachea midline, No JVD, Gross hearing intact  Respiratory: CTA B/L, No wheezing, rales, rhonchi  CV: RRR, +S1/S2, -S3/S4, no murmurs, rubs or gallops  Abdominal: Soft, NT, ND +BS, No HSM  MSK: 5/5 strength UE/LE bilaterally  Extremities: No edema, 2+ peripheral pulses  Skin: No Rashes, Hematoma, Ecchymosis  Incisions:   Tubes:    HOSPITAL MEDICATIONS:  MEDICATIONS  (STANDING):  atorvastatin 40 milliGRAM(s) Oral at bedtime  buMETAnide Infusion 2 mG/Hr (10 mL/Hr) IV Continuous <Continuous>  chlorhexidine 0.12% Liquid 15 milliLiter(s) Oral Mucosa every 12 hours  chlorhexidine 2% Cloths 1 Application(s) Topical <User Schedule>  heparin   Injectable 5000 Unit(s) SubCutaneous every 8 hours  norepinephrine Infusion 0.05 MICROgram(s)/kG/Min (10.5 mL/Hr) IV Continuous <Continuous>  pantoprazole  Injectable 40 milliGRAM(s) IV Push daily  sodium chloride 0.9%. 1000 milliLiter(s) (10 mL/Hr) IV Continuous <Continuous>    MEDICATIONS  (PRN):      LABS:                        11.1   9.44  )-----------( 89       ( 02 Jun 2021 00:55 )             35.7     Hgb Trend: 11.1<--, 11.3<--, 11.6<--, 11.8<--, 12.3<--  06-02    150<H>  |  102  |  75<H>  ----------------------------<  108<H>  3.6   |  34<H>  |  3.02<H>    Ca    9.1      02 Jun 2021 00:55  Phos  2.0     06-02  Mg     2.2     06-02    TPro  6.4  /  Alb  2.7<L>  /  TBili  2.4<H>  /  DBili  x   /  AST  40  /  ALT  22  /  AlkPhos  215<H>  06-02    Creatinine Trend: 3.02<--, 2.95<--, 3.00<--, 2.99<--, 2.99<--, 2.85<--  PTT - ( 01 Jun 2021 03:50 )  PTT:35.1 sec    Arterial Blood Gas:  06-02 @ 05:49  7.48/54/96/40/98/14.4  ABG lactate: --  Arterial Blood Gas:  06-01 @ 15:52  7.46/58/97/40/98/14.1  ABG lactate: --  Arterial Blood Gas:  06-01 @ 13:07  7.38/73/99/42/98/13.6  ABG lactate: --  Arterial Blood Gas:  06-01 @ 09:10  7.39/68/95/40/98/12.5  ABG lactate: --  Arterial Blood Gas:  06-01 @ 03:48  7.43/62/78/40/96/13.7  ABG lactate: --    Venous Blood Gas:  06-02 @ 00:50  7.46/56/40/39/77  VBG Lactate: 0.7  Venous Blood Gas:  06-01 @ 13:07  7.35/78/48/42/81  VBG Lactate: 0.6      MICROBIOLOGY:       RADIOLOGY:  [x ] Reviewed by me  cxr 6/2: no consolidations  PULMONARY FUNCTION TESTS:    EKG: CHIEF COMPLAINT:    Interval Events: patient more responsive today. following commands. PST 10/5 with 200ccTV and RR of 20.     REVIEW OF SYSTEMS:  [x] unable to obtain as patient is intubated    OBJECTIVE:  ICU Vital Signs Last 24 Hrs  T(C): 37.2 (02 Jun 2021 04:00), Max: 37.3 (01 Jun 2021 12:00)  T(F): 99 (02 Jun 2021 04:00), Max: 99.1 (01 Jun 2021 12:00)  HR: 92 (02 Jun 2021 07:00) (84 - 98)  BP: --  BP(mean): --  ABP: 106/50 (02 Jun 2021 07:00) (80/40 - 144/66)  ABP(mean): 68 (02 Jun 2021 07:00) (54 - 88)  RR: 15 (02 Jun 2021 07:00) (11 - 26)  SpO2: 100% (02 Jun 2021 07:00) (99% - 100%)    Mode: AC/ CMV (Assist Control/ Continuous Mandatory Ventilation), RR (machine): 12, TV (machine): 350, FiO2: 30, PEEP: 5, ITime: 1, MAP: 11, PIP: 31    06-01 @ 07:01  -  06-02 @ 07:00  --------------------------------------------------------  IN: 2239.4 mL / OUT: 2770 mL / NET: -530.6 mL      CAPILLARY BLOOD GLUCOSE          PHYSICAL EXAM:  General: NAD  Neurology: non lateralizing findings  Eyes: PERRLA  ENT/Neck: Neck supple, trachea midline, +JVD, Gross hearing intact  Respiratory: CTA B/L, No wheezing, rales, rhonchi  CV: RRR, +S1/S2, -S3/S4, no murmurs, rubs or gallops  Abdominal: Soft, NT, ND +BS, No HSM  MSK: unable to assess  Extremities: +edema, 2+ peripheral pulses  Skin: No Rashes, Hematoma, Ecchymosis      HOSPITAL MEDICATIONS:  MEDICATIONS  (STANDING):  atorvastatin 40 milliGRAM(s) Oral at bedtime  buMETAnide Infusion 2 mG/Hr (10 mL/Hr) IV Continuous <Continuous>  chlorhexidine 0.12% Liquid 15 milliLiter(s) Oral Mucosa every 12 hours  chlorhexidine 2% Cloths 1 Application(s) Topical <User Schedule>  heparin   Injectable 5000 Unit(s) SubCutaneous every 8 hours  norepinephrine Infusion 0.05 MICROgram(s)/kG/Min (10.5 mL/Hr) IV Continuous <Continuous>  pantoprazole  Injectable 40 milliGRAM(s) IV Push daily  sodium chloride 0.9%. 1000 milliLiter(s) (10 mL/Hr) IV Continuous <Continuous>    MEDICATIONS  (PRN):      LABS:                        11.1   9.44  )-----------( 89       ( 02 Jun 2021 00:55 )             35.7     Hgb Trend: 11.1<--, 11.3<--, 11.6<--, 11.8<--, 12.3<--  06-02    150<H>  |  102  |  75<H>  ----------------------------<  108<H>  3.6   |  34<H>  |  3.02<H>    Ca    9.1      02 Jun 2021 00:55  Phos  2.0     06-02  Mg     2.2     06-02    TPro  6.4  /  Alb  2.7<L>  /  TBili  2.4<H>  /  DBili  x   /  AST  40  /  ALT  22  /  AlkPhos  215<H>  06-02    Creatinine Trend: 3.02<--, 2.95<--, 3.00<--, 2.99<--, 2.99<--, 2.85<--  PTT - ( 01 Jun 2021 03:50 )  PTT:35.1 sec    Arterial Blood Gas:  06-02 @ 05:49  7.48/54/96/40/98/14.4  ABG lactate: --  Arterial Blood Gas:  06-01 @ 15:52  7.46/58/97/40/98/14.1  ABG lactate: --  Arterial Blood Gas:  06-01 @ 13:07  7.38/73/99/42/98/13.6  ABG lactate: --  Arterial Blood Gas:  06-01 @ 09:10  7.39/68/95/40/98/12.5  ABG lactate: --  Arterial Blood Gas:  06-01 @ 03:48  7.43/62/78/40/96/13.7  ABG lactate: --    Venous Blood Gas:  06-02 @ 00:50  7.46/56/40/39/77  VBG Lactate: 0.7  Venous Blood Gas:  06-01 @ 13:07  7.35/78/48/42/81  VBG Lactate: 0.6      MICROBIOLOGY:       RADIOLOGY:  [x ] Reviewed by me  cxr 6/2: no consolidations  PULMONARY FUNCTION TESTS:    EKG:

## 2021-06-02 NOTE — EEG REPORT - NS EEG TEXT BOX
[facility]  Comprehensive Epilepsy Center  Report of Continuous Video EEG    [facility]: [address], [place] [address], Phone [phone]  [facility]: [address] [place], [place] [address] [address], Phone [phone]  [place] Office: [address], [address], [place] [address] Phone [phone]    [facility]: [address] [place] [address], Phone [phone]  [place] [facility]: [address] [place] [address], Phone [phone]    Patient Name: Clara Mckeon    Age: 75 year, : 1945  Patient ID: -, MRN #: MRN [id number], O[place]eda: [facility] 10 [facility] 10  Referring Physician: -  EEG #: [id number]    Study Time/Date: 12:47:08 AM on 2021  	  End Time/Date: 08:00 AM on 2021          			   Duration: 7H    Study Information:    EEG Recording Technique:  The patient underwent continuous Video-EEG monitoring, using Telemetry System hardware on the XLTek Digital System. EEG and video data were stored on a computer hard drive with important events saved in digital archive files. The material was reviewed by a physician (electroencephalographer / epileptologist) on a daily basis. Brandon and seizure detection algorithms were utilized and reviewed. An EEG Technician attended to the patient, and was available throughout daytime work hours.  The epilepsy center neurologist was available in person or on call 24-hours per day.    EEG Placement and Labeling of Electrodes:  The EEG was performed utilizing 20 channel referential EEG connections (coronal over temporal over parasagittal montage) using all standard 10-20 electrode placements, with additional electrodes placed in the inferior temporal region using the modified 10-10 montage electrode placements for elective admissions, or if deemed necessary. Recording was at a sampling rate of 256 samples per second per channel. Time synchronized digital video recording was done simultaneously with EEG recording. A low light infrared camera was used for low light recording.     History:   VEEG performed at bedside   COR: Awake, doesn’t follow commands. unable to answer questions due to intubation  NO PHOTIC/HV PROTOCOL PERFORMED DUE TO ICU  NOTCH FILTER NEEDED DUE TO 60 HZ ARTIFACT    74 y/o Female  HO: Afib, CHF, COPD, Dementia, DM, HTN, HLD, OA, CKD3, R eye cataract      Pertinent Medication  No Data.    Interpretation:    Daily EEG Visual Analysis  Findings: The background was continuous, spontaneously variable and reactive.   No posterior dominant rhythm seen.  Background predominantly consisted of theta, delta and faster activities.    Focal Slowing:   None were present.    Sleep Background:  Drowsiness and stage II sleep transients were not recorded.    Other Non-Epileptiform Findings:  None were present.    Interictal Epileptiform Activity:   None were present.    Events:  Clinical events: None recorded.  Seizures: None recorded.    Activation Procedures:   Hyperventilation was not performed.    Photic stimulation was not performed.     Artifacts:  Intermittent myogenic and movement artifacts were noted.    EEG Summary / Classification:  Abnormal EEG   - Moderate generalized slowing.    EEG Impression / Clinical Correlate:  Abnormal EEG study.  Moderate nonspecific diffuse or multifocal cerebral dysfunction.   No epileptiform pattern or seizure seen.    Dorian De Jesus MD  Attending Physician, [facility] [place]

## 2021-06-02 NOTE — PROGRESS NOTE ADULT - ATTENDING COMMENTS
75 F with HFpEF, severe pHTN, COPD on Home o2, afib on a/c, CKD here with acute hypercapnic respiratory failure likely due to decompensated heart failure, acute on chronic cor pulmonale, also with hemorraghic shock (resolving). Still intubated.    Continues to open eyes, but not following commands. Likely acute metabolic encephalopathy with uremia likely contributing.  f/u EEG.      c/w PS trials as tolerated (on 10/5, pulling 200cc)    ADHF/pulm htn: will need repeat RHC.  Approaching euvolemia.  Diuresis per CICU.  KHARI positive but DSDNA was negative, unclear significance.  Will follow up after repeat RHC.

## 2021-06-02 NOTE — PROGRESS NOTE ADULT - SUBJECTIVE AND OBJECTIVE BOX
SANJIV ROBERTS  MRN-[id number]  Patient is a 75y old  Female who presents with a chief complaint of Transfer from [facility] with pericardial effusion, NIC and hypotension (02 Jun 2021 18:09)    HPI:  75y.o female with PMHx of CHFpEF, Severe TR with Right CHF and severe pHTN, COPD on home 02, HTN, HLD, Chronic Afib (on Eliquis), OA, CKD3, DM, right eye cataract who was brought in by EMS to [facility] on 5/23/21 for AMS. pt A&O x1, per daughter patient with generalized weakness and lethargy since since 5/22. Pt had recent in Jan 2021 with CP and NIC. Per chart on arrival to ED pt was hypotensive, obtunded. Chest CT with large pericardial effusion (increased since Jan 2021)- per thoracic surgery consult no indication for pericardial window, pHTN, cirrhotic liver with small ascites, ECHO EF 55% dilated RV with decreased function, severe TN, pHTN, NIC (per renal consult no acute indication for HD), venous duplex NG for DVTs, Head CT revealed chronic involutional and white matter changes, no acute intracranial process, NIC(SCr peaked at 4.6) with  hyperkalemia (5.8) requiring lokelma and insulin.  Of note daughter states over past month patient more and more confused ? dementia and pt is legally blind. Daughter states pt HAS NOT RECEIVED COVID VACCINE as she has been sick these past few months.     5/26 Patient transferred to [facility] with concern of pericardial effusion, hypotension and NIC. On arrival patient SBP 80's, A&O to first name but very lethargic.  (26 May 2021 10:16)      Hospital Course:  5/26 Patient transferred to [facility] with concern of pericardial effusion, hypotension and NIC.   5/26 intubated for hypercarbic respiratory distressed. Transferred from CTU to CICU as no need for pericardiocentesis. Briefly on pressors prior to intubation and since weaned off. RIJ cordis placed- unable to float swan- coiling in RV. RV pressures 50s /10s. Start sildenafil 20 tid. start bumex gtt   5/27 S/P RHC with [place] emmanuel catheter placed.  5/28 MTP called for R Femoral A bleed from previous A line site s/p 11U PRBC, 8 plasma, 2 plts.   5/29 started IV Levophed, sildenafil dc'ed  6/02 Dc'ed Cefepime    24 HOUR EVENTS:    REVIEW OF SYSTEMS:   Constitutional: + fevers, or chills  Eyes/ENT: No visual changes  Respiratory: No cough, wheezing, hemoptysis  Cardiovascular: No chest pain, no palpitations  Gastrointestinal: No abdominal pain.   Genitourinary: No dysuria  Neurological: No numbness, no weakness  Skin: No itching, rashes    ICU Vital Signs Last 24 Hrs  T(C): 37.5 (02 Jun 2021 19:00), Max: 37.5 (02 Jun 2021 19:00)  T(F): 99.5 (02 Jun 2021 19:00), Max: 99.5 (02 Jun 2021 19:00)  HR: 94 (02 Jun 2021 20:00) (86 - 98)  BP: --  BP(mean): --  ABP: 116/52 (02 Jun 2021 20:00) (88/44 - 126/64)  ABP(mean): 72 (02 Jun 2021 20:00) (56 - 88)  RR: 16 (02 Jun 2021 20:00) (12 - 28)  SpO2: 100% (02 Jun 2021 20:00) (99% - 100%)    Mode: AC/ CMV (Assist Control/ Continuous Mandatory Ventilation), RR (machine): 12, TV (machine): 350, FiO2: 30, PEEP: 5, ITime: 1  CVP(mm Hg): 11 (06-02-21 @ 20:00) (7 - 17)  CO: --  CI: --  PA: --  PA(mean): --  PA(direct): --  PCWP: --  LA: --  RA: --  SVR: --  SVRI: --  PVR: --  PVRI: --  I&O's Summary    01 Jun 2021 07:01  -  02 Jun 2021 07:00  --------------------------------------------------------  IN: 2239.4 mL / OUT: 2770 mL / NET: -530.6 mL    02 Jun 2021 07:01  -  02 Jun 2021 20:11  --------------------------------------------------------  IN: 1424.6 mL / OUT: 1520 mL / NET: -95.4 mL        CAPILLARY BLOOD GLUCOSE    CAPILLARY BLOOD GLUCOSE          PHYSICAL EXAM:   General: No acute distress  Eyes: EOMI, PERRLA, conjunctiva and sclera clear  Chest/Lung: CTAB, no wheezes, rales, or rhonchi  Heart: Regular rate, regular rhythm. Normal S1/S2. No murmurs, rubs, or gallops.  Abdomen: Soft, nontender, nondistended. Normal bowel sounds.  Extremites: 2+ peripheral pulses B/L. No clubbing, cyanosis, or edema.  Neurology: A&O x3, no focal deficits  Skin: No rashes or lesions  ============================I/O===========================   I&O's Detail    01 Jun 2021 07:01  -  02 Jun 2021 07:00  --------------------------------------------------------  IN:    Bumetanide: 240 mL    Enteral Tube Flush: 180 mL    Free Water: 500 mL    IV PiggyBack: 350 mL    Nepro with Carb Steady: 920 mL    Norepinephrine: 21 mL    Norepinephrine: 28.4 mL  Total IN: 2239.4 mL    OUT:    Indwelling Catheter - Urethral (mL): 2770 mL  Total OUT: 2770 mL    Total NET: -530.6 mL      02 Jun 2021 07:01  -  02 Jun 2021 20:11  --------------------------------------------------------  IN:    Bumetanide: 130 mL    Free Water: 800 mL    Nepro with Carb Steady: 440 mL    Norepinephrine: 54.6 mL  Total IN: 1424.6 mL    OUT:    Indwelling Catheter - Urethral (mL): 1520 mL  Total OUT: 1520 mL    Total NET: -95.4 mL        ============================ LABS =========================                        11.1   9.44  )-----------( 89       ( 02 Jun 2021 00:55 )             35.7     06-02    150<H>  |  102  |  76<H>  ----------------------------<  107<H>  3.8   |  36<H>  |  2.94<H>    Ca    9.5      02 Jun 2021 10:18  Phos  2.0     06-02  Mg     2.2     06-02    TPro  6.7  /  Alb  2.9<L>  /  TBili  2.5<H>  /  DBili  x   /  AST  41<H>  /  ALT  24  /  AlkPhos  224<H>  06-02    LIVER FUNCTIONS - ( 02 Jun 2021 10:18 )  Alb: 2.9 g/dL / Pro: 6.7 g/dL / ALK PHOS: 224 U/L / ALT: 24 U/L / AST: 41 U/L / GGT: x           PTT - ( 01 Jun 2021 03:50 )  PTT:35.1 sec  ABG - ( 02 Jun 2021 15:52 )  pH, Arterial: 7.45  pH, Blood: x     /  pCO2: 60    /  pO2: 122   / HCO3: 41    / Base Excess: 14.3  /  SaO2: 99                ======================Micro/Rad/Cardio=================  Telemetry: Reviewed   EKG: Reviewed  CXR: Reviewed  Culture: Reviewed   Echo: Reviewed  Cath: Reviewed  ======================================================  PAST MEDICAL & SURGICAL HISTORY:  Afib    COPD (chronic obstructive pulmonary disease)    CHF (congestive heart failure)    Dementia      ====================ASSESSMENT ==============  5/26 Txfered to [facility] with concern of pericardial effusion, hypotension and NIC.  Atrial Fibrillation  Acute Kidney Injury  Anemia  Thrombocytopenia   Severe pulmonary hypertension     Plan:  ====================== NEUROLOGY=====================  A&Ox3, nonfocal   - Continue to monitor neuro status per protocol      ==================== RESPIRATORY======================  Acute Hypercapnic Respiratory Failure req Intubation   - On full vent support, requiring close monitoring of respiratory rate, breathing pattern, pulse oximetry monitoring, and intermittent blood gas analysis. On minimal vent settings, adjust prn based on ABG's.   - daily sbt trials once RV unloaded     Severe pulmonary HTN, likely group 3 from COPD   - monitor PA pressures and SvO2 as below     Mechanical Ventilation:  Mode: AC/ CMV (Assist Control/ Continuous Mandatory Ventilation)  RR (machine): 12  TV (machine): 350  FiO2: 30  PEEP: 5  ITime: 1  MAP: 10  PIP: 27      ====================CARDIOVASCULAR==================  HFpEF with severe TR and severely decr RVSF likley 2/2 pulm HTN   - S/P RHC with [place] emmanuel catheter placed on 5/27.   - Continue to monitor ScvO2, lact, PA pressures  - now s/p mass fluid and blood product resuscitation, started on Bumex gtt @2mg/hr     Hypotension, hemorrhagic, hypovolemic in setting of active bleeding, resolved   - s/p multiple products of blood transfused 2/2 RFA bleed   - On IV Levophed     Chronic Afib  - rate controlled, continue to monitor   -Continue Lipitor    atorvastatin 40 milliGRAM(s) Oral at bedtime  norepinephrine Infusion 0.05 MICROgram(s)/kG/Min (10.5 mL/Hr) IV Continuous <Continuous>    ===================HEMATOLOGIC/ONC ===================  Hemorrhagic Shock, resolved   - RFA s/p removal of Beto with large hematoma and active bleeding s/p supratherapeutic ptt   - direct pressure applied and manual pressure of hematoma. FemStop applied w/ no relief of hematoma accumulation   - MTP called s/p 11U PRBC, 8U plasma, 2 plts. Protamine x2 + 2.5L IVF resuscitated     Anemia  - H/H: 12/37%  - Monitor H/H     VTE ppx   - SCDs   - Monitor PLTs      heparin   Injectable 5000 Unit(s) SubCutaneous every 8 hours    ===================== RENAL =========================  Non-oliguric NIC with creatinine peak of 4.49 currently 3.29. Baseline 0.97 in 02/2021   - Cr worsened > 3 s/p hemorrhagic shock   - Goal net negative fluid balance. Replete lytes PRN. Keep K> 4 and Mg >2.   - Bumex gtt @2mg/hr   - avoid nephrotoxic agents  - Nephro following. No urgent needs for HD at this time   -Continue monitoring urine output    buMETAnide Infusion 2 mG/Hr (10 mL/Hr) IV Continuous <Continuous>    ==================== GASTROINTESTINAL===================  Diet: NPO w/ TFS- Nepro with Carb steady goal 40cc/hr.   - BM x2 since admission on 5/26, continue Protonix for stress ulcer prophylaxis.     lactulose Syrup 20 Gram(s) Oral two times a day  pantoprazole  Injectable 40 milliGRAM(s) IV Push daily  sodium chloride 0.9%. 1000 milliLiter(s) (10 mL/Hr) IV Continuous <Continuous>    =======================    ENDOCRINE  =====================  Bgl controlled  - monitor glucose for need to initiate sliding scale.     Hx of HLD  - C/w Lipitor 40mg QHS    ========================INFECTIOUS DISEASE================  PNA  - Combicath 5/26 w/ moderate GN diplococci and rare GPC in pairs and chains   - Pt hypothermic and no leukocytosis   - BCX x2 5/26: NGTD, UCx 5/26: no growth. S/P MRSA swab 5/27: neg.   - Would potentially consider switch to 3rd gen cephalosporin (Rocephin) in setting of GN diplococci (Neisseria)      Patient requires continuous monitoring with bedside rhythm monitoring, pulse ox monitoring, and intermittent blood gas analysis. Care plan discussed with ICU care team. Patient remained critical and at risk for life threatening decompensation.  Patient seen, examined and plan discussed with CCU team during rounds.     I have personally provided 35 minutes of critical care time excluding time spent on separate procedures.    By signing my name below, I, Mirtha Brody, attest that this documentation has been prepared under the direction and in the presence of Nicole Seo NP  Electronically signed: Olivia Elam, 06-02-21 @ 20:11    I, Nicole Seo NP, personally performed the services described in this documentation. all medical record entries made by the scribe were at my direction and in my presence. I have reviewed the chart and agree that the record reflects my personal performance and is accurate and complete  Electronically signed: Nicole Seo NP       SANJIV ROBERTS  MRN-[id number]  Patient is a 75y old  Female who presents with a chief complaint of Transfer from [facility] with pericardial effusion, NIC and hypotension (02 Jun 2021 18:09)    HPI:  75y.o female with PMHx of CHFpEF, Severe TR with Right CHF and severe pHTN, COPD on home 02, HTN, HLD, Chronic Afib (on Eliquis), OA, CKD3, DM, right eye cataract who was brought in by EMS to [facility] on 5/23/21 for AMS. pt A&O x1, per daughter patient with generalized weakness and lethargy since since 5/22. Pt had recent in Jan 2021 with CP and NIC. Per chart on arrival to ED pt was hypotensive, obtunded. Chest CT with large pericardial effusion (increased since Jan 2021)- per thoracic surgery consult no indication for pericardial window, pHTN, cirrhotic liver with small ascites, ECHO EF 55% dilated RV with decreased function, severe TN, pHTN, NIC (per renal consult no acute indication for HD), venous duplex NG for DVTs, Head CT revealed chronic involutional and white matter changes, no acute intracranial process, NIC(SCr peaked at 4.6) with  hyperkalemia (5.8) requiring lokelma and insulin.  Of note daughter states over past month patient more and more confused ? dementia and pt is legally blind. Daughter states pt HAS NOT RECEIVED COVID VACCINE as she has been sick these past few months.     5/26 Patient transferred to [facility] with concern of pericardial effusion, hypotension and NIC. On arrival patient SBP 80's, A&O to first name but very lethargic.  (26 May 2021 10:16)      Hospital Course:  5/26 Patient transferred to [facility] with concern of pericardial effusion, hypotension and NIC.   5/26 intubated for hypercarbic respiratory distressed. Transferred from CTU to CICU as no need for pericardiocentesis. Briefly on pressors prior to intubation and since weaned off. RIJ cordis placed- unable to float swan- coiling in RV. RV pressures 50s /10s. Start sildenafil 20 tid. start bumex gtt   5/27 S/P RHC with [place] emmanuel catheter placed.  5/28 MTP called for R Femoral A bleed from previous A line site s/p 11U PRBC, 8 plasma, 2 plts.   5/29 started IV Levophed, sildenafil dc'ed  6/02 Dc'ed Cefepime    24 HOUR EVENTS:  - off sedation, mental status remained the same  - vEEG ongoing     REVIEW OF SYSTEMS:   Constitutional: + fevers, or chills  Eyes/ENT: No visual changes  Respiratory: No cough, wheezing, hemoptysis  Cardiovascular: No chest pain, no palpitations  Gastrointestinal: No abdominal pain.   Genitourinary: No dysuria  Neurological: No numbness, no weakness  Skin: No itching, rashes    ICU Vital Signs Last 24 Hrs  T(C): 37.5 (02 Jun 2021 19:00), Max: 37.5 (02 Jun 2021 19:00)  T(F): 99.5 (02 Jun 2021 19:00), Max: 99.5 (02 Jun 2021 19:00)  HR: 94 (02 Jun 2021 20:00) (86 - 98)  ABP: 116/52 (02 Jun 2021 20:00) (88/44 - 126/64)  ABP(mean): 72 (02 Jun 2021 20:00) (56 - 88)  RR: 16 (02 Jun 2021 20:00) (12 - 28)  SpO2: 100% (02 Jun 2021 20:00) (99% - 100%)    Mode: AC/ CMV (Assist Control/ Continuous Mandatory Ventilation), RR (machine): 12, TV (machine): 350, FiO2: 30, PEEP: 5, ITime: 1  CVP(mm Hg): 11 (06-02-21 @ 20:00) (7 - 17)  I&O's Summary    01 Jun 2021 07:01  -  02 Jun 2021 07:00  --------------------------------------------------------  IN: 2239.4 mL / OUT: 2770 mL / NET: -530.6 mL    02 Jun 2021 07:01  -  02 Jun 2021 20:11  --------------------------------------------------------  IN: 1424.6 mL / OUT: 1520 mL / NET: -95.4 mL    PHYSICAL EXAM:   General: No acute distress  Eyes: EOMI, PERRLA, conjunctiva and sclera clear  Chest/Lung: CTAB, no wheezes, rales, or rhonchi  Heart: Regular rate, regular rhythm. Normal S1/S2. No murmurs, rubs, or gallops.  Abdomen: Soft, nontender, nondistended. Normal bowel sounds.  Extremites: 2+ peripheral pulses B/L. No clubbing, cyanosis, or edema.  Neurology: A&O x3, no focal deficits  Skin: No rashes or lesions  ============================I/O===========================   I&O's Detail    01 Jun 2021 07:01  -  02 Jun 2021 07:00  --------------------------------------------------------  IN:    Bumetanide: 240 mL    Enteral Tube Flush: 180 mL    Free Water: 500 mL    IV PiggyBack: 350 mL    Nepro with Carb Steady: 920 mL    Norepinephrine: 21 mL    Norepinephrine: 28.4 mL  Total IN: 2239.4 mL    OUT:    Indwelling Catheter - Urethral (mL): 2770 mL  Total OUT: 2770 mL    Total NET: -530.6 mL      02 Jun 2021 07:01  -  02 Jun 2021 20:11  --------------------------------------------------------  IN:    Bumetanide: 130 mL    Free Water: 800 mL    Nepro with Carb Steady: 440 mL    Norepinephrine: 54.6 mL  Total IN: 1424.6 mL    OUT:    Indwelling Catheter - Urethral (mL): 1520 mL  Total OUT: 1520 mL    Total NET: -95.4 mL    ============================ LABS =========================                        11.1   9.44  )-----------( 89       ( 02 Jun 2021 00:55 )             35.7     06-02    150<H>  |  102  |  76<H>  ----------------------------<  107<H>  3.8   |  36<H>  |  2.94<H>    Ca    9.5      02 Jun 2021 10:18  Phos  2.0     06-02  Mg     2.2     06-02    TPro  6.7  /  Alb  2.9<L>  /  TBili  2.5<H>  /  DBili  x   /  AST  41<H>  /  ALT  24  /  AlkPhos  224<H>  06-02    LIVER FUNCTIONS - ( 02 Jun 2021 10:18 )  Alb: 2.9 g/dL / Pro: 6.7 g/dL / ALK PHOS: 224 U/L / ALT: 24 U/L / AST: 41 U/L / GGT: x           PTT - ( 01 Jun 2021 03:50 )  PTT:35.1 sec  ABG - ( 02 Jun 2021 15:52 )  pH, Arterial: 7.45  pH, Blood: x     /  pCO2: 60    /  pO2: 122   / HCO3: 41    / Base Excess: 14.3  /  SaO2: 99        ======================Micro/Rad/Cardio=================  Telemetry: Reviewed   EKG: Reviewed  CXR: Reviewed  Culture: Reviewed   Echo: Reviewed  Cath: Reviewed  ======================================================  PAST MEDICAL & SURGICAL HISTORY:  Afib  COPD (chronic obstructive pulmonary disease)  CHF (congestive heart failure)  Dementia      ====================ASSESSMENT ==============  5/26 Txfered to [facility] with concern of pericardial effusion, hypotension and NIC.  Atrial Fibrillation  Acute Kidney Injury  Anemia  Thrombocytopenia   Severe pulmonary hypertension     Plan:  ====================== NEUROLOGY=====================  A&Ox3, nonfocal   - Continue to monitor neuro status per protocol  - not on sedation   - vEEG ongoing   ==================== RESPIRATORY======================  Acute Hypercapnic Respiratory Failure req Intubation   - On full vent support, requiring close monitoring of respiratory rate, breathing pattern, pulse oximetry monitoring, and intermittent blood gas analysis. On minimal vent settings, adjust prn based on ABG's.   - daily sbt trials once RV unloaded     Severe pulmonary HTN, likely group 3 from COPD   - monitor PA pressures and SvO2 as below     Mechanical Ventilation:  Mode: AC/ CMV (Assist Control/ Continuous Mandatory Ventilation)  RR (machine): 12  TV (machine): 350  FiO2: 30  PEEP: 5  ITime: 1  MAP: 10  PIP: 27  ====================CARDIOVASCULAR==================  HFpEF with severe TR and severely decr RVSF likley 2/2 pulm HTN   - S/P RHC with [place] emmanuel catheter placed on 5/27.   - Continue to monitor ScvO2, lact, PA pressures  - now s/p mass fluid and blood product resuscitation, started on Bumex gtt @2mg/hr     Hypotension, hemorrhagic, hypovolemic in setting of active bleeding, resolved   - s/p multiple products of blood transfused 2/2 RFA bleed   - On IV Levophed     Chronic Afib  - rate controlled, continue to monitor   -Continue Lipitor    ===================HEMATOLOGIC/ONC ===================  Hemorrhagic Shock, resolved   - RFA s/p removal of Beto with large hematoma and active bleeding s/p supratherapeutic ptt   - direct pressure applied and manual pressure of hematoma. FemStop applied w/ no relief of hematoma accumulation   - MTP called s/p 11U PRBC, 8U plasma, 2 plts. Protamine x2 + 2.5L IVF resuscitated   - right groin stable with positive pulses    Anemia  - H/H: 12/37%  - Monitor H/H     VTE ppx   - SCDs   - Monitor PLTs    ===================== RENAL =========================  Non-oliguric NIC with creatinine peak of 4.49 currently 3.29. Baseline 0.97 in 02/2021   - Cr worsened > 3 s/p hemorrhagic shock   - Goal net negative fluid balance. Replete lytes PRN. Keep K> 4 and Mg >2.   - Bumex gtt @2mg/hr   - avoid nephrotoxic agents  - Nephro following. No urgent needs for HD at this time   -Continue monitoring urine output    ==================== GASTROINTESTINAL===================  Diet: NPO w/ TFS- Nepro with Carb steady goal 40cc/hr.   - BM x2 since admission on 5/26, continue Protonix for stress ulcer prophylaxis.     =======================    ENDOCRINE  =====================  Bgl controlled  - monitor glucose for need to initiate sliding scale.     Hx of HLD  - C/w Lipitor 40mg QHS  ========================INFECTIOUS DISEASE================  PNA  - Combicath 5/26 w/ moderate GN diplococci and rare GPC in pairs and chains   - Pt hypothermic and no leukocytosis   - BCX x2 5/26: NGTD, UCx 5/26: no growth. S/P MRSA swab 5/27: neg.   - Would potentially consider switch to 3rd gen cephalosporin (Rocephin) in setting of GN diplococci (Neisseria)      Patient requires continuous monitoring with bedside rhythm monitoring, pulse ox monitoring, and intermittent blood gas analysis. Care plan discussed with ICU care team. Patient remained critical and at risk for life threatening decompensation.  Patient seen, examined and plan discussed with CCU team during rounds.     I have personally provided 35 minutes of critical care time excluding time spent on separate procedures.    By signing my name below, I, Mirtha Brody, attest that this documentation has been prepared under the direction and in the presence of Nicole Seo NP  Electronically signed: Olivia Elam, 06-02-21 @ 20:11    I, Nicole Seo NP, personally performed the services described in this documentation. all medical record entries made by the scribe were at my direction and in my presence. I have reviewed the chart and agree that the record reflects my personal performance and is accurate and complete  Electronically signed: Nicole Seo NP

## 2021-06-02 NOTE — PROGRESS NOTE ADULT - ATTENDING COMMENTS
Patient is seen and examined with fellow, NP and the CCU house-staff. I agree with the history, physical and the assessment and plan.    pulmonary HTN of unknown etiology with RV failure and possible cardiac cirrhosis p/w AMS - s/p intubation  off of sedation ? 72 hrs  ongoing EEG  MRI brain pending  will c/w CPAP trial for exercise  further pulmonary/HF work up is pending her neuro status  on pressor support to maintain MAP > 65   3.5 free water deficit - will increase her free water flushes  will keep net goal even fluid status Patient is seen and examined with fellow, NP and the CCU house-staff. I agree with the history, physical and the assessment and plan.    pulmonary HTN of unknown etiology with RV failure and possible cardiac cirrhosis p/w AMS - s/p intubation  off of sedation ? 72 hrs  ongoing EEG  MRI brain pending  will c/w CPAP trial for exercise  further pulmonary/HF work up is pending her neuro status  on pressor support to maintain MAP > 65 mmHg  3.5 free water deficit - will increase her free water flushes  will keep net goal even fluid status

## 2021-06-02 NOTE — PROGRESS NOTE ADULT - SUBJECTIVE AND OBJECTIVE BOX
CHIEF COMPLAINT:    Interval Events:    REVIEW OF SYSTEMS:  Constitutional: [ ] negative [ ] fevers [ ] chills [ ] weight loss [ ] weight gain  HEENT: [ ] negative [ ] dry eyes [ ] eye irritation [ ] postnasal drip [ ] nasal congestion  CV: [ ] negative  [ ] chest pain [ ] orthopnea [ ] palpitations [ ] murmur  Resp: [ ] negative [ ] cough [ ] shortness of breath [ ] dyspnea [ ] wheezing [ ] sputum [ ] hemoptysis  GI: [ ] negative [ ] nausea [ ] vomiting [ ] diarrhea [ ] constipation [ ] abd pain [ ] dysphagia   : [ ] negative [ ] dysuria [ ] nocturia [ ] hematuria [ ] increased urinary frequency  Musculoskeletal: [ ] negative [ ] back pain [ ] myalgias [ ] arthralgias [ ] fracture  Skin: [ ] negative [ ] rash [ ] itch  Neurological: [ ] negative [ ] headache [ ] dizziness [ ] syncope [ ] weakness [ ] numbness  Psychiatric: [ ] negative [ ] anxiety [ ] depression  Endocrine: [ ] negative [ ] diabetes [ ] thyroid problem  Hematologic/Lymphatic: [ ] negative [ ] anemia [ ] bleeding problem  Allergic/Immunologic: [ ] negative [ ] itchy eyes [ ] nasal discharge [ ] hives [ ] angioedema  [ ] All other systems negative  [ ] Unable to assess ROS because ________    OBJECTIVE:  ICU Vital Signs Last 24 Hrs  T(C): 37.2 (02 Jun 2021 04:00), Max: 37.3 (01 Jun 2021 12:00)  T(F): 99 (02 Jun 2021 04:00), Max: 99.1 (01 Jun 2021 12:00)  HR: 88 (02 Jun 2021 06:45) (84 - 98)  BP: --  BP(mean): --  ABP: 102/48 (02 Jun 2021 06:45) (80/40 - 144/66)  ABP(mean): 64 (02 Jun 2021 06:45) (54 - 88)  RR: 13 (02 Jun 2021 06:45) (11 - 28)  SpO2: 100% (02 Jun 2021 06:45) (99% - 100%)    Mode: AC/ CMV (Assist Control/ Continuous Mandatory Ventilation), RR (machine): 12, TV (machine): 350, FiO2: 30, PEEP: 5, ITime: 1, MAP: 11, PIP: 31    05-31 @ 07:01  - 06-01 @ 07:00  --------------------------------------------------------  IN: 1650 mL / OUT: 3065 mL / NET: -1415 mL    06-01 @ 07:01  - 06-02 @ 06:57  --------------------------------------------------------  IN: 2239.4 mL / OUT: 2770 mL / NET: -530.6 mL      CAPILLARY BLOOD GLUCOSE          PHYSICAL EXAM:  General:   HEENT:   Lymph Nodes:  Neck:   Respiratory:   Cardiovascular:   Abdomen:   Extremities:   Skin:   Neurological:  Psychiatry:    LINES:    HOSPITAL MEDICATIONS:  Standing Meds:  atorvastatin 40 milliGRAM(s) Oral at bedtime  buMETAnide Infusion 2 mG/Hr IV Continuous <Continuous>  chlorhexidine 0.12% Liquid 15 milliLiter(s) Oral Mucosa every 12 hours  chlorhexidine 2% Cloths 1 Application(s) Topical <User Schedule>  heparin   Injectable 5000 Unit(s) SubCutaneous every 8 hours  norepinephrine Infusion 0.05 MICROgram(s)/kG/Min IV Continuous <Continuous>  pantoprazole  Injectable 40 milliGRAM(s) IV Push daily  sodium chloride 0.9%. 1000 milliLiter(s) IV Continuous <Continuous>      PRN Meds:      LABS:                        11.1   9.44  )-----------( 89       ( 02 Jun 2021 00:55 )             35.7     Hgb Trend: 11.1<--, 11.3<--, 11.6<--, 11.8<--, 12.3<--  06-02    150<H>  |  102  |  75<H>  ----------------------------<  108<H>  3.6   |  34<H>  |  3.02<H>    Ca    9.1      02 Jun 2021 00:55  Phos  2.0     06-02  Mg     2.2     06-02    TPro  6.4  /  Alb  2.7<L>  /  TBili  2.4<H>  /  DBili  x   /  AST  40  /  ALT  22  /  AlkPhos  215<H>  06-02    Creatinine Trend: 3.02<--, 2.95<--, 3.00<--, 2.99<--, 2.99<--, 2.85<--  PTT - ( 01 Jun 2021 03:50 )  PTT:35.1 sec    Arterial Blood Gas:  06-02 @ 05:49  7.48/54/96/40/98/14.4  ABG lactate: --  Arterial Blood Gas:  06-01 @ 15:52  7.46/58/97/40/98/14.1  ABG lactate: --  Arterial Blood Gas:  06-01 @ 13:07  7.38/73/99/42/98/13.6  ABG lactate: --  Arterial Blood Gas:  06-01 @ 09:10  7.39/68/95/40/98/12.5  ABG lactate: --  Arterial Blood Gas:  06-01 @ 03:48  7.43/62/78/40/96/13.7  ABG lactate: --    Venous Blood Gas:  06-02 @ 00:50  7.46/56/40/39/77  VBG Lactate: 0.7  Venous Blood Gas:  06-01 @ 13:07  7.35/78/48/42/81  VBG Lactate: 0.6      MICROBIOLOGY:     RADIOLOGY:  [ ] Reviewed and interpreted by me    EKG:   CHIEF COMPLAINT: AMS    Interval Events: pt's CVPs occassionally in the 8s-9s, had a lot of diarrhea after lactulose, was started on vEEG. Pt off sedation, grabbing Et Tube and will open and close eyes on commands. Moving fingers to command.     OBJECTIVE:  ICU Vital Signs Last 24 Hrs  T(C): 37.2 (02 Jun 2021 04:00), Max: 37.3 (01 Jun 2021 12:00)  T(F): 99 (02 Jun 2021 04:00), Max: 99.1 (01 Jun 2021 12:00)  HR: 88 (02 Jun 2021 06:45) (84 - 98)  BP: --  BP(mean): --  ABP: 102/48 (02 Jun 2021 06:45) (80/40 - 144/66)  ABP(mean): 64 (02 Jun 2021 06:45) (54 - 88)  RR: 13 (02 Jun 2021 06:45) (11 - 28)  SpO2: 100% (02 Jun 2021 06:45) (99% - 100%)    Mode: AC/ CMV (Assist Control/ Continuous Mandatory Ventilation), RR (machine): 12, TV (machine): 350, FiO2: 30, PEEP: 5, ITime: 1, MAP: 11, PIP: 31    05-31 @ 07:01 - 06-01 @ 07:00  --------------------------------------------------------  IN: 1650 mL / OUT: 3065 mL / NET: -1415 mL    06-01 @ 07:01  -  06-02 @ 06:57  --------------------------------------------------------  IN: 2239.4 mL / OUT: 2770 mL / NET: -530.6 mL      CAPILLARY BLOOD GLUCOSE    PHYSICAL EXAM:  GENERAL: intubated  HEAD Atraumatic, Normocephalic  NECK: No JVD; FROM  EYES: PERRL, EOMs intact b/l w/out deficits; normal conjunctiva  CHEST/LUNG: CTAB no wheezes/rhonchi/rales  HEART: RRR no murmur/gallops/rubs  ABDOMEN: +BS, soft, NT, ND  EXTREMITIES: No LE edema, +2 radial pulses b/l, +2 DP/PT pulses b/l  NERVOUS SYSTEM:  intubated  SKIN:  No new rashes; R groin with open wound from previous lines; no erythema or drainage;    LINES:    HOSPITAL MEDICATIONS:  Standing Meds:  atorvastatin 40 milliGRAM(s) Oral at bedtime  buMETAnide Infusion 2 mG/Hr IV Continuous <Continuous>  chlorhexidine 0.12% Liquid 15 milliLiter(s) Oral Mucosa every 12 hours  chlorhexidine 2% Cloths 1 Application(s) Topical <User Schedule>  heparin   Injectable 5000 Unit(s) SubCutaneous every 8 hours  norepinephrine Infusion 0.05 MICROgram(s)/kG/Min IV Continuous <Continuous>  pantoprazole  Injectable 40 milliGRAM(s) IV Push daily  sodium chloride 0.9%. 1000 milliLiter(s) IV Continuous <Continuous>      PRN Meds:      LABS:                        11.1   9.44  )-----------( 89       ( 02 Jun 2021 00:55 )             35.7     Hgb Trend: 11.1<--, 11.3<--, 11.6<--, 11.8<--, 12.3<--  06-02    150<H>  |  102  |  75<H>  ----------------------------<  108<H>  3.6   |  34<H>  |  3.02<H>    Ca    9.1      02 Jun 2021 00:55  Phos  2.0     06-02  Mg     2.2     06-02    TPro  6.4  /  Alb  2.7<L>  /  TBili  2.4<H>  /  DBili  x   /  AST  40  /  ALT  22  /  AlkPhos  215<H>  06-02    Creatinine Trend: 3.02<--, 2.95<--, 3.00<--, 2.99<--, 2.99<--, 2.85<--  PTT - ( 01 Jun 2021 03:50 )  PTT:35.1 sec    Arterial Blood Gas:  06-02 @ 05:49  7.48/54/96/40/98/14.4  ABG lactate: --  Arterial Blood Gas:  06-01 @ 15:52  7.46/58/97/40/98/14.1  ABG lactate: --  Arterial Blood Gas:  06-01 @ 13:07  7.38/73/99/42/98/13.6  ABG lactate: --  Arterial Blood Gas:  06-01 @ 09:10  7.39/68/95/40/98/12.5  ABG lactate: --  Arterial Blood Gas:  06-01 @ 03:48  7.43/62/78/40/96/13.7  ABG lactate: --    Venous Blood Gas:  06-02 @ 00:50  7.46/56/40/39/77  VBG Lactate: 0.7  Venous Blood Gas:  06-01 @ 13:07  7.35/78/48/42/81  VBG Lactate: 0.6      MICROBIOLOGY:     RADIOLOGY:  [ ] Reviewed and interpreted by me    EKG:

## 2021-06-02 NOTE — PROGRESS NOTE ADULT - ASSESSMENT
75F with a pmhx of CHFpEF, Severe TR with Right CHF and severe pHTN, COPD on home 02, HTN, HLD, Chronic Afib (on Eliquis), OA, CKD3, DM, right eye cataract who was brought in by EMS to [facility] on 5/23/21 for AMS, found to have a large pericardial effusion w/o tamponade physiology, without drainable window, intubated (5/26) for acute hypercarbic respiratory failure and worsening AMS, transferred to CICU for hypotension and NIC on CKD in undifferentiated shock, course c/b expanding R thigh hematoma, s/p MTP, with ratio predom targeted towards FFP, s/p protamine for hep reversal.    Neuro: AMS metabolic encephalopathy given CT showing cirrhosis  -per family, baseline pt performs ADLs independently, sometimes forgetful however functional  -pt off sedation now for > 72 hours, following simple commands which is an improvement, could be from lactulose?  -CTH 5/31 negative for acute pathology  -c/w lactulose  -neuro recs appreciated; ongoing vEEG, will likely need MR brain afterwards    Resp: intubated for hypercarbic respiratory failure, severe pulm HTN  -severe pulmonary hypertension with RV dysfunction, high pulmonary artery pressures, unclear etiology  -Pt tolerates CPAP 10/5, c/w exercising patient  -Currently on minimal vent settings (AC, 350, 12, 5, 30%), f/u ABGs  -f/u pulm recs for pulm HTN (Dr. Patel)  -may need to start sildenafil     CV:  (5/26): TTE: EF 55%, RVH with severe RVSD, severe pulm HTN, large pericardial effusion 3.4cm @ largest diameter, adjacent to RA  ventricular systolic function.  -intermittently on levo, currently on 0.02  -monitor CVPs goal 12-13  -c/w atorvastatin 40mg qhs  -Hold sildenefil as pt is still on Levophed and awaiting pulm recs     GI:  -Diet: Continue Nepro at 40 and will add prosource  -Ammonia level 41  -Stress ulcer ppx: continue PPI  -lactulose today, now with rectal tube  -(5/26): CT a/p noncon: mild contoured liver as seen in cirrhosis    Heme:  -Thrombocytopenia  -(5/28): Acute drop in H/H 10->8,  with expanding thigh hematoma 35->24 -> s/p MTP 11 pRBCs , 8 U FFP, 2 U PLTS and protamine x 2 due to supratherapeutic pTT > 200 --> 28  -Hemoglobin has remained stable now .   -[ ] (5/28): VA duplex arterial and venous RLE: no pseudo  - 6/2 DVT studies negative  -DVT ppx: scd, will start heparin SQ  -f/u b/l DVT studies    Renal/  - Continue to monitor I/Os, BUN/Creatinine, and urine output.  -hypernatremia: free water deficit 2.5L, c/w free water 400 cc q6h   -CVP 12-13 continue bumex gtt.   - Replete lytes PRN. Keep K> 4 and Mg >2  - keep net even    Endo:  - Monitor blood sugar 100-130    ID:  - no active issues  CODE STATUS:    Full code

## 2021-06-03 NOTE — PROGRESS NOTE ADULT - ASSESSMENT
75F PMH CHFpEF, Severe TR with Right CHF and severe pHTN, COPD on home 02, HTN, HLD, Chronic Afib (on Eliquis), OA, CKD3, DM, right eye cataract who was transferred to [facility] with large pericardial effusion without tamponade in the context of right heart cath identified group 2 pulmonary hypertension    # Pulmonary Hypertension  - Group 2, Mild --> however RHC not performed in euvolemic state.   - Discussed case with Dr. Ptael. Likely group 2 etiology. Continue with diuretics. After patient is volume optimized, recommend repeating RHC to re-assess pHTN burden  - KHARI positive. Unclear significance. DsDNA, histone, RF, scleroderema negative.     Will follow up when patient is extubated and closer to euvolemic window        Triston Armenta MD  PGY-6  Pulmonary and Critical Care Fellow  Pager: [phone]

## 2021-06-03 NOTE — PROGRESS NOTE ADULT - ATTENDING COMMENTS
75 F with HFpEF, severe pHTN, COPD on Home o2, afib on a/c, CKD here with acute hypercapnic respiratory failure likely due to decompensated heart failure, acute on chronic cor pulmonale, also with hemorraghic shock (resolving). Still intubated.    Tolerating PS trials, pulling 200-300 cc but may be due to poor mental status.  Cxr shows opacified L base, ?effusion vs atelectasis; given she is not awake enough to extubate, will monitor for now to see if this is preventing her ability to wean from vent    Not following commands. Likely acute metabolic encephalopathy with uremia likely contributing.  f/u MRI.    ADHF/pulm htn: will need repeat RHC.  Approaching euvolemia.  Diuresis per CICU.  KHARI positive but DSDNA was negative, unclear significance.  Will follow up after repeat RHC.

## 2021-06-03 NOTE — PROGRESS NOTE ADULT - ATTENDING COMMENTS
Pericardial effusion and pulmonary hypertension.  Hemodynamic instability.  Respiratory failure requiring ventilatory support.  Encephalopathy of unclear etiology.

## 2021-06-03 NOTE — PROGRESS NOTE ADULT - SUBJECTIVE AND OBJECTIVE BOX
CHIEF COMPLAINT:    Interval Events:    REVIEW OF SYSTEMS:  CONSTITUTIONAL: No weakness, fevers or chills  EYES/ENT: No visual changes;  No vertigo or throat pain   NECK: No pain or stiffness  RESPIRATORY: No cough, wheezing, hemoptysis; No shortness of breath  CARDIOVASCULAR: No chest pain or palpitations  GASTROINTESTINAL: No abdominal or epigastric pain. No nausea, vomiting, or hematemesis; No diarrhea or constipation. No melena or hematochezia.  GENITOURINARY: No dysuria, frequency or hematuria  NEUROLOGICAL: No numbness or weakness  SKIN: No itching, burning, rashes, or lesions   All other review of systems is negative unless indicated above.    OBJECTIVE:  ICU Vital Signs Last 24 Hrs  T(C): 37.7 (03 Jun 2021 10:00), Max: 37.7 (03 Jun 2021 00:00)  T(F): 99.9 (03 Jun 2021 10:00), Max: 99.9 (03 Jun 2021 00:00)  HR: 92 (03 Jun 2021 12:00) (88 - 102)  BP: --  BP(mean): --  ABP: 98/40 (03 Jun 2021 12:00) (88/38 - 128/50)  ABP(mean): 54 (03 Jun 2021 12:00) (52 - 84)  RR: 19 (03 Jun 2021 12:00) (11 - 31)  SpO2: 100% (03 Jun 2021 12:00) (84% - 100%)    Mode: CPAP with PS, FiO2: 30, PEEP: 5, PS: 15, MAP: 10, PIP: 21    06-02 @ 07:01 - 06-03 @ 07:00  --------------------------------------------------------  IN: 4148.7 mL / OUT: 3530 mL / NET: 618.7 mL    06-03 @ 07:01  -  06-03 @ 13:26  --------------------------------------------------------  IN: 660 mL / OUT: 575 mL / NET: 85 mL      CAPILLARY BLOOD GLUCOSE          PHYSICAL EXAM:  General: WN/WD NAD  Neurology: A&Ox3, nonfocal, MULLINS x 4  Eyes: PERRLA/ EOMI, Gross vision intact  ENT/Neck: Neck supple, trachea midline, No JVD, Gross hearing intact  Respiratory: CTA B/L, No wheezing, rales, rhonchi  CV: RRR, +S1/S2, -S3/S4, no murmurs, rubs or gallops  Abdominal: Soft, NT, ND +BS, No HSM  MSK: 5/5 strength UE/LE bilaterally  Extremities: No edema, 2+ peripheral pulses  Skin: No Rashes, Hematoma, Ecchymosis  Incisions:   Tubes:    HOSPITAL MEDICATIONS:  MEDICATIONS  (STANDING):  acetaZOLAMIDE Injectable 500 milliGRAM(s) IV Push once  atorvastatin 40 milliGRAM(s) Oral at bedtime  buMETAnide Infusion 2 mG/Hr (10 mL/Hr) IV Continuous <Continuous>  chlorhexidine 0.12% Liquid 15 milliLiter(s) Oral Mucosa every 12 hours  chlorhexidine 2% Cloths 1 Application(s) Topical <User Schedule>  heparin   Injectable 5000 Unit(s) SubCutaneous every 8 hours  pantoprazole  Injectable 40 milliGRAM(s) IV Push daily  sodium chloride 0.9%. 1000 milliLiter(s) (10 mL/Hr) IV Continuous <Continuous>    MEDICATIONS  (PRN):      LABS:                        11.6   10.08 )-----------( 117      ( 03 Jun 2021 01:53 )             37.1     Hgb Trend: 11.6<--, 11.1<--, 11.3<--, 11.6<--, 11.8<--  06-03    147<H>  |  101  |  76<H>  ----------------------------<  115<H>  3.9   |  31  |  2.86<H>    Ca    9.5      03 Jun 2021 01:53  Phos  3.3     06-03  Mg     2.2     06-03    TPro  6.7  /  Alb  2.7<L>  /  TBili  2.9<H>  /  DBili  x   /  AST  53<H>  /  ALT  28  /  AlkPhos  240<H>  06-03    Creatinine Trend: 2.86<--, 2.95<--, 2.94<--, 3.02<--, 2.95<--, 3.00<--      Arterial Blood Gas:  06-03 @ 05:02  7.46/52/138/36/99/11.2  ABG lactate: --  Arterial Blood Gas:  06-03 @ 01:53  7.44/58/221/39/100/13.0  ABG lactate: --  Arterial Blood Gas:  06-02 @ 15:52  7.45/60/122/41/99/14.3  ABG lactate: --  Arterial Blood Gas:  06-02 @ 05:49  7.48/54/96/40/98/14.4  ABG lactate: --  Arterial Blood Gas:  06-01 @ 15:52  7.46/58/97/40/98/14.1  ABG lactate: --    Venous Blood Gas:  06-02 @ 00:50  7.46/56/40/39/77  VBG Lactate: 0.7      MICROBIOLOGY:       RADIOLOGY:  [ ] Reviewed by me    PULMONARY FUNCTION TESTS:    EKG: CHIEF COMPLAINT:    Interval Events: No progress with mental status. Patient with PST on 15/5 taking  at RR of 25. On POCUS, RVSP still very elevated. No change since last exam. Patient still on bumex gtt.     REVIEW OF SYSTEMS:  [x] Unable to obtain 2/2 mental status and being intubated.     OBJECTIVE:  ICU Vital Signs Last 24 Hrs  T(C): 37.7 (03 Jun 2021 10:00), Max: 37.7 (03 Jun 2021 00:00)  T(F): 99.9 (03 Jun 2021 10:00), Max: 99.9 (03 Jun 2021 00:00)  HR: 92 (03 Jun 2021 12:00) (88 - 102)  BP: --  BP(mean): --  ABP: 98/40 (03 Jun 2021 12:00) (88/38 - 128/50)  ABP(mean): 54 (03 Jun 2021 12:00) (52 - 84)  RR: 19 (03 Jun 2021 12:00) (11 - 31)  SpO2: 100% (03 Jun 2021 12:00) (84% - 100%)    Mode: CPAP with PS, FiO2: 30, PEEP: 5, PS: 15, MAP: 10, PIP: 21    06-02 @ 07:01  -  06-03 @ 07:00  --------------------------------------------------------  IN: 4148.7 mL / OUT: 3530 mL / NET: 618.7 mL    06-03 @ 07:01  -  06-03 @ 13:26  --------------------------------------------------------  IN: 660 mL / OUT: 575 mL / NET: 85 mL      CAPILLARY BLOOD GLUCOSE          PHYSICAL EXAM:  General: NAD  Neurology: Non lateralizing findings  Eyes: PERRLA  ENT/Neck: Neck supple, trachea midline, +JVD  Respiratory: anterior lung fields clear. No wheezing  CV: RRR, +S1/S2, -S3/S4, no murmurs, rubs or gallops  Abdominal: Soft, NT, ND +BS, No HSM  MSK: Unable to assess  Extremities: No edema, 2+ peripheral pulses  Skin: No Rashes, Hematoma, Ecchymosis      HOSPITAL MEDICATIONS:  MEDICATIONS  (STANDING):  acetaZOLAMIDE Injectable 500 milliGRAM(s) IV Push once  atorvastatin 40 milliGRAM(s) Oral at bedtime  buMETAnide Infusion 2 mG/Hr (10 mL/Hr) IV Continuous <Continuous>  chlorhexidine 0.12% Liquid 15 milliLiter(s) Oral Mucosa every 12 hours  chlorhexidine 2% Cloths 1 Application(s) Topical <User Schedule>  heparin   Injectable 5000 Unit(s) SubCutaneous every 8 hours  pantoprazole  Injectable 40 milliGRAM(s) IV Push daily  sodium chloride 0.9%. 1000 milliLiter(s) (10 mL/Hr) IV Continuous <Continuous>    MEDICATIONS  (PRN):      LABS:                        11.6   10.08 )-----------( 117      ( 03 Jun 2021 01:53 )             37.1     Hgb Trend: 11.6<--, 11.1<--, 11.3<--, 11.6<--, 11.8<--  06-03    147<H>  |  101  |  76<H>  ----------------------------<  115<H>  3.9   |  31  |  2.86<H>    Ca    9.5      03 Jun 2021 01:53  Phos  3.3     06-03  Mg     2.2     06-03    TPro  6.7  /  Alb  2.7<L>  /  TBili  2.9<H>  /  DBili  x   /  AST  53<H>  /  ALT  28  /  AlkPhos  240<H>  06-03    Creatinine Trend: 2.86<--, 2.95<--, 2.94<--, 3.02<--, 2.95<--, 3.00<--      Arterial Blood Gas:  06-03 @ 05:02  7.46/52/138/36/99/11.2  ABG lactate: --  Arterial Blood Gas:  06-03 @ 01:53  7.44/58/221/39/100/13.0  ABG lactate: --  Arterial Blood Gas:  06-02 @ 15:52  7.45/60/122/41/99/14.3  ABG lactate: --  Arterial Blood Gas:  06-02 @ 05:49  7.48/54/96/40/98/14.4  ABG lactate: --  Arterial Blood Gas:  06-01 @ 15:52  7.46/58/97/40/98/14.1  ABG lactate: --    Venous Blood Gas:  06-02 @ 00:50  7.46/56/40/39/77  VBG Lactate: 0.7

## 2021-06-03 NOTE — PROGRESS NOTE ADULT - SUBJECTIVE AND OBJECTIVE BOX
CHIEF COMPLAINT:    Interval Events:    REVIEW OF SYSTEMS:  Constitutional: [ ] negative [ ] fevers [ ] chills [ ] weight loss [ ] weight gain  HEENT: [ ] negative [ ] dry eyes [ ] eye irritation [ ] postnasal drip [ ] nasal congestion  CV: [ ] negative  [ ] chest pain [ ] orthopnea [ ] palpitations [ ] murmur  Resp: [ ] negative [ ] cough [ ] shortness of breath [ ] dyspnea [ ] wheezing [ ] sputum [ ] hemoptysis  GI: [ ] negative [ ] nausea [ ] vomiting [ ] diarrhea [ ] constipation [ ] abd pain [ ] dysphagia   : [ ] negative [ ] dysuria [ ] nocturia [ ] hematuria [ ] increased urinary frequency  Musculoskeletal: [ ] negative [ ] back pain [ ] myalgias [ ] arthralgias [ ] fracture  Skin: [ ] negative [ ] rash [ ] itch  Neurological: [ ] negative [ ] headache [ ] dizziness [ ] syncope [ ] weakness [ ] numbness  Psychiatric: [ ] negative [ ] anxiety [ ] depression  Endocrine: [ ] negative [ ] diabetes [ ] thyroid problem  Hematologic/Lymphatic: [ ] negative [ ] anemia [ ] bleeding problem  Allergic/Immunologic: [ ] negative [ ] itchy eyes [ ] nasal discharge [ ] hives [ ] angioedema  [ ] All other systems negative  [ ] Unable to assess ROS because ________    OBJECTIVE:  ICU Vital Signs Last 24 Hrs  T(C): 37.7 (03 Jun 2021 04:00), Max: 37.7 (03 Jun 2021 00:00)  T(F): 99.9 (03 Jun 2021 04:00), Max: 99.9 (03 Jun 2021 00:00)  HR: 94 (03 Jun 2021 06:52) (86 - 102)  BP: --  BP(mean): --  ABP: 94/42 (03 Jun 2021 06:00) (88/38 - 128/50)  ABP(mean): 56 (03 Jun 2021 06:00) (52 - 84)  RR: 14 (03 Jun 2021 06:00) (11 - 31)  SpO2: 100% (03 Jun 2021 06:52) (84% - 100%)    Mode: CPAP with PS, FiO2: 30, PEEP: 5, PS: 15, MAP: 9, PIP: 20    06-01 @ 07:01  -  06-02 @ 07:00  --------------------------------------------------------  IN: 2239.4 mL / OUT: 2770 mL / NET: -530.6 mL    06-02 @ 07:01 - 06-03 @ 06:57  --------------------------------------------------------  IN: 4098.7 mL / OUT: 3455 mL / NET: 643.7 mL      CAPILLARY BLOOD GLUCOSE          PHYSICAL EXAM:  General:   HEENT:   Lymph Nodes:  Neck:   Respiratory:   Cardiovascular:   Abdomen:   Extremities:   Skin:   Neurological:  Psychiatry:    LINES:    HOSPITAL MEDICATIONS:  Standing Meds:  atorvastatin 40 milliGRAM(s) Oral at bedtime  buMETAnide Infusion 2 mG/Hr IV Continuous <Continuous>  chlorhexidine 0.12% Liquid 15 milliLiter(s) Oral Mucosa every 12 hours  chlorhexidine 2% Cloths 1 Application(s) Topical <User Schedule>  heparin   Injectable 5000 Unit(s) SubCutaneous every 8 hours  lactulose Syrup 20 Gram(s) Oral two times a day  pantoprazole  Injectable 40 milliGRAM(s) IV Push daily  sodium chloride 0.9%. 1000 milliLiter(s) IV Continuous <Continuous>      PRN Meds:      LABS:                        11.6   10.08 )-----------( 117      ( 03 Jun 2021 01:53 )             37.1     Hgb Trend: 11.6<--, 11.1<--, 11.3<--, 11.6<--, 11.8<--  06-03    147<H>  |  101  |  76<H>  ----------------------------<  115<H>  3.9   |  31  |  2.86<H>    Ca    9.5      03 Jun 2021 01:53  Phos  3.3     06-03  Mg     2.2     06-03    TPro  6.7  /  Alb  2.7<L>  /  TBili  2.9<H>  /  DBili  x   /  AST  53<H>  /  ALT  28  /  AlkPhos  240<H>  06-03    Creatinine Trend: 2.86<--, 2.95<--, 2.94<--, 3.02<--, 2.95<--, 3.00<--      Arterial Blood Gas:  06-03 @ 05:02  7.46/52/138/36/99/11.2  ABG lactate: --  Arterial Blood Gas:  06-03 @ 01:53  7.44/58/221/39/100/13.0  ABG lactate: --  Arterial Blood Gas:  06-02 @ 15:52  7.45/60/122/41/99/14.3  ABG lactate: --  Arterial Blood Gas:  06-02 @ 05:49  7.48/54/96/40/98/14.4  ABG lactate: --  Arterial Blood Gas:  06-01 @ 15:52  7.46/58/97/40/98/14.1  ABG lactate: --  Arterial Blood Gas:  06-01 @ 13:07  7.38/73/99/42/98/13.6  ABG lactate: --  Arterial Blood Gas:  06-01 @ 09:10  7.39/68/95/40/98/12.5  ABG lactate: --    Venous Blood Gas:  06-02 @ 00:50  7.46/56/40/39/77  VBG Lactate: 0.7  Venous Blood Gas:  06-01 @ 13:07  7.35/78/48/42/81  VBG Lactate: 0.6      MICROBIOLOGY:     RADIOLOGY:  [ ] Reviewed and interpreted by me    EKG:   CHIEF COMPLAINT: AMS    Interval Events: off levo since 2am. started on CPAP 15/5 exercising this AM. d/c'd lactulose. no change in neurologic status    OBJECTIVE:  ICU Vital Signs Last 24 Hrs  T(C): 37.7 (03 Jun 2021 04:00), Max: 37.7 (03 Jun 2021 00:00)  T(F): 99.9 (03 Jun 2021 04:00), Max: 99.9 (03 Jun 2021 00:00)  HR: 94 (03 Jun 2021 06:52) (86 - 102)  BP: --  BP(mean): --  ABP: 94/42 (03 Jun 2021 06:00) (88/38 - 128/50)  ABP(mean): 56 (03 Jun 2021 06:00) (52 - 84)  RR: 14 (03 Jun 2021 06:00) (11 - 31)  SpO2: 100% (03 Jun 2021 06:52) (84% - 100%)    Mode: CPAP with PS, FiO2: 30, PEEP: 5, PS: 15, MAP: 9, PIP: 20    06-01 @ 07:01  -  06-02 @ 07:00  --------------------------------------------------------  IN: 2239.4 mL / OUT: 2770 mL / NET: -530.6 mL    06-02 @ 07:01 - 06-03 @ 06:57  --------------------------------------------------------  IN: 4098.7 mL / OUT: 3455 mL / NET: 643.7 mL    PHYSICAL EXAM:  GENERAL: intubated  HEAD Atraumatic, Normocephalic  NECK: No JVD; FROM  EYES: PERRL, EOMs intact b/l w/out deficits; normal conjunctiva  CHEST/LUNG: CTAB no wheezes/rhonchi/rales  HEART: RRR no murmur/gallops/rubs  ABDOMEN: +BS, soft, NT, ND  EXTREMITIES: No LE edema, +2 radial pulses b/l, +2 DP/PT pulses b/l  NERVOUS SYSTEM:  intubated, opens eyes to voice, withdraws to pain  SKIN:  No new rashes; R groin no hematoma, bleeding    HOSPITAL MEDICATIONS:  Standing Meds:  atorvastatin 40 milliGRAM(s) Oral at bedtime  buMETAnide Infusion 2 mG/Hr IV Continuous <Continuous>  chlorhexidine 0.12% Liquid 15 milliLiter(s) Oral Mucosa every 12 hours  chlorhexidine 2% Cloths 1 Application(s) Topical <User Schedule>  heparin   Injectable 5000 Unit(s) SubCutaneous every 8 hours  lactulose Syrup 20 Gram(s) Oral two times a day  pantoprazole  Injectable 40 milliGRAM(s) IV Push daily  sodium chloride 0.9%. 1000 milliLiter(s) IV Continuous <Continuous>      PRN Meds:      LABS:                        11.6   10.08 )-----------( 117      ( 03 Jun 2021 01:53 )             37.1     Hgb Trend: 11.6<--, 11.1<--, 11.3<--, 11.6<--, 11.8<--  06-03    147<H>  |  101  |  76<H>  ----------------------------<  115<H>  3.9   |  31  |  2.86<H>    Ca    9.5      03 Jun 2021 01:53  Phos  3.3     06-03  Mg     2.2     06-03    TPro  6.7  /  Alb  2.7<L>  /  TBili  2.9<H>  /  DBili  x   /  AST  53<H>  /  ALT  28  /  AlkPhos  240<H>  06-03    Creatinine Trend: 2.86<--, 2.95<--, 2.94<--, 3.02<--, 2.95<--, 3.00<--      Arterial Blood Gas:  06-03 @ 05:02  7.46/52/138/36/99/11.2  ABG lactate: --  Arterial Blood Gas:  06-03 @ 01:53  7.44/58/221/39/100/13.0  ABG lactate: --  Arterial Blood Gas:  06-02 @ 15:52  7.45/60/122/41/99/14.3  ABG lactate: --  Arterial Blood Gas:  06-02 @ 05:49  7.48/54/96/40/98/14.4  ABG lactate: --  Arterial Blood Gas:  06-01 @ 15:52  7.46/58/97/40/98/14.1  ABG lactate: --  Arterial Blood Gas:  06-01 @ 13:07  7.38/73/99/42/98/13.6  ABG lactate: --  Arterial Blood Gas:  06-01 @ 09:10  7.39/68/95/40/98/12.5  ABG lactate: --    Venous Blood Gas:  06-02 @ 00:50  7.46/56/40/39/77  VBG Lactate: 0.7  Venous Blood Gas:  06-01 @ 13:07  7.35/78/48/42/81  VBG Lactate: 0.6      MICROBIOLOGY:     RADIOLOGY:  [ ] Reviewed and interpreted by me    EKG:

## 2021-06-03 NOTE — PROGRESS NOTE ADULT - ASSESSMENT
75F with a pmhx of CHFpEF, Severe TR with Right CHF and severe pHTN, COPD on home 02, HTN, HLD, Chronic Afib (on Eliquis), OA, CKD3, DM, right eye cataract who was brought in by EMS to [facility] on 5/23/21 for AMS, found to have a large pericardial effusion w/o tamponade physiology, without drainable window, intubated (5/26) for acute hypercarbic respiratory failure and worsening AMS, transferred to CICU for hypotension and NIC on CKD in undifferentiated shock, course c/b expanding R thigh hematoma, s/p MTP, with ratio predom targeted towards FFP, s/p protamine for hep reversal.    Neuro: AMS metabolic encephalopathy given CT showing cirrhosis  -per family, baseline pt performs ADLs independently, sometimes forgetful however functional  -pt off sedation now for > 72 hours, withdraws to pain, opens eyes to voice  -CTH 5/31 negative for acute pathology  -s/p lactulose x2 days w/o improvement of mental status  -vEEG 6/1-2: no epileptic wave forms, moderate generalized slowing  -for MR brain today    Resp: intubated for hypercarbic respiratory failure, severe pulm HTN  -severe pulmonary hypertension with RV dysfunction, high pulmonary artery pressures, unclear etiology  -Pt tolerates CPAP 10/5 or 15/5, c/w exercising patient during day, back on vent at night  -Currently on minimal vent settings (AC, 350, 12, 5, 30%), f/u ABGs  -f/u pulm recs for pulm HTN (Dr. Patel)  -may need to start sildenafil once completely and stably off pressors    CV:  (5/26): TTE: EF 55%, RVH with severe RVSD, severe pulm HTN, large pericardial effusion 3.4cm @ largest diameter, adjacent to RA  ventricular systolic function.  -intermittently on levo, currently off  -monitor CVPs goal 12-13  -c/w atorvastatin 40mg qhs  -Hold sildenefil until stably off pressors d and awaiting pulm recs     GI:  -Diet: Continue Nepro at 40 and will add prosource  -Ammonia level 41 on 6/1  -Stress ulcer ppx: continue PPI  -s/p lactulose with diarrhea  -(5/26): CT a/p noncon: mild contoured liver as seen in cirrhosis    Heme:  -Thrombocytopenia  -(5/28): Acute drop in H/H 10->8,  with expanding thigh hematoma 35->24 -> s/p MTP 11 pRBCs , 8 U FFP, 2 U PLTS and protamine x 2 due to supratherapeutic pTT > 200 --> 28  -Hemoglobin has remained stable now .   -[ ] (5/28): VA duplex arterial and venous RLE: no pseudo  - 6/2 DVT studies negative  -DVT ppx: scd, will start heparin SQ  -check coags today, if normal, weekly    Renal/  metabolic alkalosis with respiratory compensation  - Continue to monitor I/Os, BUN/Creatinine, and urine output with goal net even  -hypernatremia improving: free water deficit 2.5L, c/w free water 300 cc q6h   -CVP 12-13 continue bumex gtt @ 2  - Replete lytes PRN. Keep K> 4 and Mg >2  -start diamox    Endo:  - Monitor blood sugar 100-130    ID:  - no active issues  CODE STATUS:    Full code

## 2021-06-03 NOTE — EEG REPORT - NS EEG TEXT BOX
[facility]  Comprehensive Epilepsy Center  Report of Continuous Video EEG    [facility]: [address], [place] [address], Phone [phone]  [facility]: [address] [place], [place] [address], [place] [address], Phone [phone]  [place] Office: [address], [address], [place] [address] Phone [phone]    [facility]: [address] [place] [address], Phone [phone]  [place] Office: [address] [place] [address], Phone [phone]    Patient Name: Clara Mckeon    Age: 75 year, : 1945  Patient ID: -, MRN #: [id number], Ojeda: [facility]      Study Time/Date: 08:00 AM on 6/3/2021  	  End Time/Date: 4:00 PM on 6/3/2021          			   Duration: 8H    Study Information:    EEG Recording Technique:  The patient underwent continuous Video-EEG monitoring, using Telemetry System hardware on the XLTek Digital System. EEG and video data were stored on a computer hard drive with important events saved in digital archive files. The material was reviewed by a physician (electroencephalographer / epileptologist) on a daily basis. Brandon and seizure detection algorithms were utilized and reviewed. An EEG Technician attended to the patient, and was available throughout daytime work hours.  The epilepsy center neurologist was available in person or on call 24-hours per day.    EEG Placement and Labeling of Electrodes:  The EEG was performed utilizing 20 channel referential EEG connections (coronal over temporal over parasagittal montage) using all standard 10-20 electrode placements, with additional electrodes placed in the inferior temporal region using the modified 10-10 montage electrode placements for elective admissions, or if deemed necessary. Recording was at a sampling rate of 256 samples per second per channel. Time synchronized digital video recording was done simultaneously with EEG recording. A low light infrared camera was used for low light recording.     History:   VEEG performed at bedside   COR: Awake, doesn’t follow commands. unable to answer questions due to intubation  NO PHOTIC/HV PROTOCOL PERFORMED DUE TO ICU  NOTCH FILTER NEEDED DUE TO 60 HZ ARTIFACT    76 y/o Female  HO: Afib, CHF, COPD, Dementia, DM, HTN, HLD, OA, CKD3, R eye cataract      Pertinent Medication  No Data.    Interpretation:    Daily EEG Visual Analysis  Findings: The background was continuous, spontaneously variable and reactive.   No posterior dominant rhythm seen.  Background predominantly consisted of theta, delta and faster activities.    Focal Slowing:   None were present.    Sleep Background:  Drowsiness and stage II sleep transients were not recorded.    Other Non-Epileptiform Findings:  None were present.    Interictal Epileptiform Activity:   None were present.    Events:  Clinical events: None recorded.  Seizures: None recorded.    Activation Procedures:   Hyperventilation was not performed.    Photic stimulation was not performed.     Artifacts:  Intermittent myogenic and movement artifacts were noted.    EEG Summary / Classification:  Abnormal EEG   - Moderate generalized slowing.    EEG Impression / Clinical Correlate:  Abnormal EEG study.  Moderate nonspecific diffuse or multifocal cerebral dysfunction.   No epileptiform pattern or seizure seen.    Dorian De Jesus MD  Attending Physician, [facility]

## 2021-06-03 NOTE — PROGRESS NOTE ADULT - SUBJECTIVE AND OBJECTIVE BOX
[facility] NEPHROLOGY SERVICES, [facility]  NEPHROLOGY AND HYPERTENSION  [address]  [address]  [place] [address]  [phone]    MD LANG PHAN, MD JANAE WILKINS, MD KARMEN MONTES, MD JAIDA DUNCAN, MD CORI STAPLETON MD          Patient events noted  no distress  intubated   eyes open not following commands    MEDICATIONS  (STANDING):  atorvastatin 40 milliGRAM(s) Oral at bedtime  buMETAnide Infusion 2 mG/Hr (10 mL/Hr) IV Continuous <Continuous>  chlorhexidine 0.12% Liquid 15 milliLiter(s) Oral Mucosa every 12 hours  chlorhexidine 2% Cloths 1 Application(s) Topical <User Schedule>  heparin   Injectable 5000 Unit(s) SubCutaneous every 8 hours  pantoprazole  Injectable 40 milliGRAM(s) IV Push daily  sodium chloride 0.9%. 1000 milliLiter(s) (10 mL/Hr) IV Continuous <Continuous>    MEDICATIONS  (PRN):      06-02-21 @ 07:01  -  06-03-21 @ 07:00  --------------------------------------------------------  IN: 4148.7 mL / OUT: 3530 mL / NET: 618.7 mL    06-03-21 @ 07:01  -  06-03-21 @ 21:45  --------------------------------------------------------  IN: 1500 mL / OUT: 1805 mL / NET: -305 mL      PHYSICAL EXAM:      T(C): 37.9 (06-03-21 @ 19:00), Max: 37.9 (06-03-21 @ 19:00)  HR: 94 (06-03-21 @ 21:20) (88 - 102)  BP: --  RR: 16 (06-03-21 @ 21:00) (12 - 31)  SpO2: 100% (06-03-21 @ 21:20) (84% - 100%)  Wt(kg): --  Lungs clear  Heart S1S2  Abd soft NT ND  Extremities:   1-2 edema                                    11.6   10.08 )-----------( 117      ( 03 Jun 2021 01:53 )             37.1     06-03    147<H>  |  101  |  76<H>  ----------------------------<  115<H>  3.9   |  31  |  2.86<H>    Ca    9.5      03 Jun 2021 01:53  Phos  3.3     06-03  Mg     2.2     06-03    TPro  6.7  /  Alb  2.7<L>  /  TBili  2.9<H>  /  DBili  x   /  AST  53<H>  /  ALT  28  /  AlkPhos  240<H>  06-03    ABG - ( 03 Jun 2021 05:02 )  pH, Arterial: 7.46  pH, Blood: x     /  pCO2: 52    /  pO2: 138   / HCO3: 36    / Base Excess: 11.2  /  SaO2: 99                LIVER FUNCTIONS - ( 03 Jun 2021 17:54 )  Alb: x     / Pro: x     / ALK PHOS: x     / ALT: x     / AST: x     / GGT: 224 U/L       Creatinine Trend: 2.86<--, 2.95<--, 2.94<--, 3.02<--, 2.95<--, 3.00<--  Mode: CPAP with PS  FiO2: 30  PEEP: 5  PS: 15  MAP: 10  PIP: 21      Assessment    COPD, severe PHTN, CO2 retention, CM, peric effusion, R heart failure, resp failure, intubated  Hemodynamic NIC (Cr 0.97 - 2/3/21)  Good UO w/diuresis on Bumex Drip  Cr continues to improve from peak of 4.87 - 3/26/21 now 2.86    Plan  Diuresis as per critical care recommendations  Avoid nephrotoxins  Avoid hypotension  F/u CBC, BMP, UO    Amrik Nobles MD

## 2021-06-03 NOTE — EEG REPORT - NS EEG TEXT BOX
[facility]  Comprehensive Epilepsy Center  Report of Continuous Video EEG    [facility]: [address], [place] [address], Phone [phone]  [facility]: [address] [place], [place] [address], [place] [address], Phone [phone]  [place] Office: [address], [address], [place] [address] Phone [phone]    [facility]: [address] [place] [address], Phone [phone]  [place] Office: [address] [place] [address], Phone [phone]    Patient Name: Clara Mckeon    Age: 75 year, : 1945  Patient ID: -, MRN #: [id number], Ojeda: [facility] 10 [facility] 10      Study Time/Date: 08:00 AM on 2021  	  End Time/Date: 08:00 AM on 6/3/2021          			   Duration: 24H    Study Information:    EEG Recording Technique:  The patient underwent continuous Video-EEG monitoring, using Telemetry System hardware on the XLTek Digital System. EEG and video data were stored on a computer hard drive with important events saved in digital archive files. The material was reviewed by a physician (electroencephalographer / epileptologist) on a daily basis. Brandon and seizure detection algorithms were utilized and reviewed. An EEG Technician attended to the patient, and was available throughout daytime work hours.  The epilepsy center neurologist was available in person or on call 24-hours per day.    EEG Placement and Labeling of Electrodes:  The EEG was performed utilizing 20 channel referential EEG connections (coronal over temporal over parasagittal montage) using all standard 10-20 electrode placements, with additional electrodes placed in the inferior temporal region using the modified 10-10 montage electrode placements for elective admissions, or if deemed necessary. Recording was at a sampling rate of 256 samples per second per channel. Time synchronized digital video recording was done simultaneously with EEG recording. A low light infrared camera was used for low light recording.     History:   VEEG performed at bedside   COR: Awake, doesn’t follow commands. unable to answer questions due to intubation  NO PHOTIC/HV PROTOCOL PERFORMED DUE TO ICU  NOTCH FILTER NEEDED DUE TO 60 HZ ARTIFACT    74 y/o Female  HO: Afib, CHF, COPD, Dementia, DM, HTN, HLD, OA, CKD3, R eye cataract      Pertinent Medication  No Data.    Interpretation:    Daily EEG Visual Analysis  Findings: The background was continuous, spontaneously variable and reactive.   No posterior dominant rhythm seen.  Background predominantly consisted of theta, delta and faster activities.    Focal Slowing:   None were present.    Sleep Background:  Drowsiness and stage II sleep transients were not recorded.    Other Non-Epileptiform Findings:  None were present.    Interictal Epileptiform Activity:   None were present.    Events:  Clinical events: None recorded.  Seizures: None recorded.    Activation Procedures:   Hyperventilation was not performed.    Photic stimulation was not performed.     Artifacts:  Intermittent myogenic and movement artifacts were noted.    EEG Summary / Classification:  Abnormal EEG   - Moderate generalized slowing.    EEG Impression / Clinical Correlate:  Abnormal EEG study.  Moderate nonspecific diffuse or multifocal cerebral dysfunction.   No epileptiform pattern or seizure seen.    Dorian De Jesus MD  Attending Physician, [facility]

## 2021-06-03 NOTE — PROGRESS NOTE ADULT - SUBJECTIVE AND OBJECTIVE BOX
SANJIV ROBERTS  MRN-[id number]  Patient is a 75y old  Female who presents with a chief complaint of Transfer from [facility] with pericardial effusion, NIC and hypotension (03 Jun 2021 21:45)    HPI:  75y.o female with PMHx of CHFpEF, Severe TR with Right CHF and severe pHTN, COPD on home 02, HTN, HLD, Chronic Afib (on Eliquis), OA, CKD3, DM, right eye cataract who was brought in by EMS to [facility] on 5/23/21 for AMS. pt A&O x1, per daughter patient with generalized weakness and lethargy since since 5/22. Pt had recent in Jan 2021 with CP and NIC. Per chart on arrival to ED pt was hypotensive, obtunded. Chest CT with large pericardial effusion (increased since Jan 2021)- per thoracic surgery consult no indication for pericardial window, pHTN, cirrhotic liver with small ascites, ECHO EF 55% dilated RV with decreased function, severe TN, pHTN, NIC (per renal consult no acute indication for HD), venous duplex NG for DVTs, Head CT revealed chronic involutional and white matter changes, no acute intracranial process, NIC(SCr peaked at 4.6) with  hyperkalemia (5.8) requiring lokelma and insulin.  Of note daughter states over past month patient more and more confused ? dementia and pt is legally blind. Daughter states pt HAS NOT RECEIVED COVID VACCINE as she has been sick these past few months.     5/26 Patient transferred to [facility] with concern of pericardial effusion, hypotension and NIC. On arrival patient SBP 80's, A&O to first name but very lethargic.  (26 May 2021 10:16)      Hospital Course:  5/26 Patient transferred to [facility] with concern of pericardial effusion, hypotension and NIC.   5/26 intubated for hypercarbic respiratory distressed. Transferred from CTU to CICU as no need for pericardiocentesis. Briefly on pressors prior to intubation and since weaned off. RIJ cordis placed- unable to float swan- coiling in RV. RV pressures 50s /10s. Start sildenafil 20 tid. start bumex gtt   5/27 S/P RHC with [place] emmanuel catheter placed.  5/28 MTP called for R Femoral A bleed from previous A line site s/p 11U PRBC, 8 plasma, 2 plts.   5/29 started IV Levophed, sildenafil dc'ed  6/02 Dc'ed Cefepime    24 HOUR EVENTS:    REVIEW OF SYSTEMS:   Constitutional: No fevers, or chills  Eyes/ENT: No visual changes  Respiratory: No cough, wheezing, hemoptysis  Cardiovascular: No chest pain, no palpitations  Gastrointestinal: No abdominal pain.  Genitourinary: No dysuria  Neurological: No numbness, no weakness  Skin: No itching, rashes    ICU Vital Signs Last 24 Hrs  T(C): 37.9 (03 Jun 2021 19:00), Max: 37.9 (03 Jun 2021 19:00)  T(F): 100.2 (03 Jun 2021 19:00), Max: 100.2 (03 Jun 2021 19:00)  HR: 110 (03 Jun 2021 22:00) (88 - 110)  BP: --  BP(mean): --  ABP: 140/66 (03 Jun 2021 22:00) (90/40 - 140/66)  ABP(mean): 92 (03 Jun 2021 22:00) (52 - 92)  RR: 19 (03 Jun 2021 22:00) (12 - 31)  SpO2: 96% (03 Jun 2021 22:00) (84% - 100%)    Mode: CPAP with PS, FiO2: 30, PEEP: 5  CVP(mm Hg): 10 (06-03-21 @ 21:00) (7 - 21)  CO: --  CI: --  PA: --  PA(mean): --  PA(direct): --  PCWP: --  LA: --  RA: --  SVR: --  SVRI: --  PVR: --  PVRI: --  I&O's Summary    02 Jun 2021 07:01  -  03 Jun 2021 07:00  --------------------------------------------------------  IN: 4148.7 mL / OUT: 3530 mL / NET: 618.7 mL    03 Jun 2021 07:01  -  03 Jun 2021 22:11  --------------------------------------------------------  IN: 1500 mL / OUT: 1865 mL / NET: -365 mL        CAPILLARY BLOOD GLUCOSE    CAPILLARY BLOOD GLUCOSE          PHYSICAL EXAM:   General: No acute distress  Eyes: EOMI, PERRLA, conjunctiva and sclera clear  Chest/Lung: CTAB, no wheezes, rales, or rhonchi  Heart: Regular rate, regular rhythm. Normal S1/S2. No murmurs, rubs, or gallops.  Abdomen: Soft, nontender, nondistended. Normal bowel sounds.  Extremites: 2+ peripheral pulses B/L. No clubbing, cyanosis, or edema.  Neurology: A&O x3, no focal deficits  Skin: No rashes or lesions  ============================I/O===========================   I&O's Detail    02 Jun 2021 07:01  -  03 Jun 2021 07:00  --------------------------------------------------------  IN:    Bumetanide: 240 mL    Enteral Tube Flush: 270 mL    Free Water: 2000 mL    IV PiggyBack: 100 mL    IV PiggyBack: 583.1 mL    Nepro with Carb Steady: 880 mL    Norepinephrine: 75.6 mL  Total IN: 4148.7 mL    OUT:    Indwelling Catheter - Urethral (mL): 2830 mL    Rectal Tube (mL): 700 mL  Total OUT: 3530 mL    Total NET: 618.7 mL      03 Jun 2021 07:01  -  03 Jun 2021 22:11  --------------------------------------------------------  IN:    Bumetanide: 150 mL    Free Water: 600 mL    IV PiggyBack: 150 mL    Nepro with Carb Steady: 600 mL  Total IN: 1500 mL    OUT:    Indwelling Catheter - Urethral (mL): 1265 mL    Rectal Tube (mL): 600 mL  Total OUT: 1865 mL    Total NET: -365 mL        ============================ LABS =========================                        11.6   10.08 )-----------( 117      ( 03 Jun 2021 01:53 )             37.1     06-03    147<H>  |  101  |  76<H>  ----------------------------<  115<H>  3.9   |  31  |  2.86<H>    Ca    9.5      03 Jun 2021 01:53  Phos  3.3     06-03  Mg     2.2     06-03    TPro  6.7  /  Alb  2.7<L>  /  TBili  2.9<H>  /  DBili  x   /  AST  53<H>  /  ALT  28  /  AlkPhos  240<H>  06-03    LIVER FUNCTIONS - ( 03 Jun 2021 17:54 )  Alb: x     / Pro: x     / ALK PHOS: x     / ALT: x     / AST: x     / GGT: 224 U/L       ABG - ( 03 Jun 2021 05:02 )  pH, Arterial: 7.46  pH, Blood: x     /  pCO2: 52    /  pO2: 138   / HCO3: 36    / Base Excess: 11.2  /  SaO2: 99                  ======================Micro/Rad/Cardio=================  Telemetry: Reviewed   EKG: Reviewed  CXR: Reviewed  Culture: Reviewed   Echo: Reviewed  Cath: Reviewed  ======================================================  PAST MEDICAL & SURGICAL HISTORY:  Afib    COPD (chronic obstructive pulmonary disease)    CHF (congestive heart failure)    Dementia      ====================ASSESSMENT ==============  5/26 Txfered to [facility] with concern of pericardial effusion, hypotension and NIC.  Atrial Fibrillation  Acute Kidney Injury  Anemia  Thrombocytopenia   Severe pulmonary hypertension     Plan:  ====================== NEUROLOGY=====================  A&Ox3, nonfocal   - Continue to monitor neuro status per protocol  - not on sedation   - vEEG ongoing     ==================== RESPIRATORY======================  Acute Hypercapnic Respiratory Failure req Intubation   - On full vent support, requiring close monitoring of respiratory rate, breathing pattern, pulse oximetry monitoring, and intermittent blood gas analysis. On minimal vent settings, adjust prn based on ABG's.   - daily sbt trials once RV unloaded     Severe pulmonary HTN, likely group 3 from COPD   - monitor PA pressures and SvO2 as below     Mechanical Ventilation:  Mode: CPAP with PS  FiO2: 30  PEEP: 5  PS: 15  MAP: 10  PIP: 21      ====================CARDIOVASCULAR==================  HFpEF with severe TR and severely decr RVSF len[place] 2/2 pulm HTN   - S/P RHC with [place] emmanuel catheter placed on 5/27.   - Continue to monitor ScvO2, lact, PA pressures  - C/w Bumex gtt @2mg/hr s/p mass fluid and blood product resuscitation,    Chronic Afib  - rate controlled, continue to monitor   -Continue Lipitor    ===================HEMATOLOGIC/ONC ===================  Hemorrhagic Shock, resolved   - RFA s/p removal of Beto with large hematoma and active bleeding s/p supratherapeutic ptt   - direct pressure applied and manual pressure of hematoma. FemStop applied w/ no relief of hematoma accumulation   - MTP called s/p 11U PRBC, 8U plasma, 2 plts. Protamine x2 + 2.5L IVF resuscitated   - right groin stable with positive pulses  - Monitor H/H     VTE ppx   - SCDs   - Monitor PLTs  - C/w Heparin     ===================== RENAL =========================  Non-oliguric NIC with creatinine peak of 4.49 currently 2.86Baseline 0.97 in 02/2021   - Cr worsened > 3 s/p hemorrhagic shock   - Goal net negative fluid balance. Replete lytes PRN. Keep K> 4 and Mg >2.   - Bumex gtt @2mg/hr   - avoid nephrotoxic agents  - Nephro following. No urgent needs for HD at this time   -Continue monitoring urine output    ==================== GASTROINTESTINAL===================  Diet: NPO w/ TFS- Nepro with Carb steady goal 40cc/hr.   - BM x2 since admission on 5/26, continue Protonix for stress ulcer prophylaxis.     =======================    ENDOCRINE  =====================  Bgl controlled  - monitor glucose for need to initiate sliding scale.     Hx of HLD  - C/w Lipitor 40mg QHS    ========================INFECTIOUS DISEASE================  PNA  - Combicath 5/26 w/ moderate GN diplococci and rare GPC in pairs and chains   - Pt hypothermic and no leukocytosis   - BCX x2 5/26: NGTD, UCx 5/26: no growth. S/P MRSA swab 5/27: neg.         Patient requires continuous monitoring with bedside rhythm monitoring, pulse ox monitoring, and intermittent blood gas analysis. Care plan discussed with ICU care team. Patient remained critical and at risk for life threatening decompensation.  Patient seen, examined and plan discussed with CCU team during rounds.     I have personally provided 35 minutes of critical care time excluding time spent on separate procedures.    By signing my name below, I, James Serrato, attest that this documentation has been prepared under the direction and in the presence of ALLISON Herrera  Electronically signed: Olivia Giron, 06-03-21 @ 22:11    I, ALLISON Herrera, personally performed the services described in this documentation. all medical record entries made by the gayleibe were at my direction and in my presence. I have reviewed the chart and agree that the record reflects my personal performance and is accurate and complete  Electronically signed: ALLISON Herrera       SANJIV ROBERTS  MRN-[id number]  Patient is a 75y old  Female who presents with a chief complaint of Transfer from [facility] with pericardial effusion, NIC and hypotension (03 Jun 2021 21:45)    HPI:  75y.o female with PMHx of CHFpEF, Severe TR with Right CHF and severe pHTN, COPD on home 02, HTN, HLD, Chronic Afib (on Eliquis), OA, CKD3, DM, right eye cataract who was brought in by EMS to [facility] on 5/23/21 for AMS. pt A&O x1, per daughter patient with generalized weakness and lethargy since since 5/22. Pt had recent in Jan 2021 with CP and NIC. Per chart on arrival to ED pt was hypotensive, obtunded. Chest CT with large pericardial effusion (increased since Jan 2021)- per thoracic surgery consult no indication for pericardial window, pHTN, cirrhotic liver with small ascites, ECHO EF 55% dilated RV with decreased function, severe TN, pHTN, NIC (per renal consult no acute indication for HD), venous duplex NG for DVTs, Head CT revealed chronic involutional and white matter changes, no acute intracranial process, NIC(SCr peaked at 4.6) with  hyperkalemia (5.8) requiring lokelma and insulin.  Of note daughter states over past month patient more and more confused ? dementia and pt is legally blind. Daughter states pt HAS NOT RECEIVED COVID VACCINE as she has been sick these past few months.     5/26 Patient transferred to [facility] with concern of pericardial effusion, hypotension and NIC. On arrival patient SBP 80's, A&O to first name but very lethargic.  (26 May 2021 10:16)      Hospital Course:  5/26 Patient transferred to [facility] with concern of pericardial effusion, hypotension and NIC.   5/26 intubated for hypercarbic respiratory distressed. Transferred from CTU to CICU as no need for pericardiocentesis. Briefly on pressors prior to intubation and since weaned off. RIJ cordis placed- unable to float swan- coiling in RV. RV pressures 50s /10s. Start sildenafil 20 tid. start bumex gtt   5/27 S/P RHC with [place] emmanuel catheter placed.  5/28 MTP called for R Femoral A bleed from previous A line site s/p 11U PRBC, 8 plasma, 2 plts.   5/29 started IV Levophed, sildenafil dc'ed  6/02 Dc'ed Cefepime    24 HOUR EVENTS:  - MRI brain completed overnight.  - remains off pressors    REVIEW OF SYSTEMS:   unable to obtain 2/2 mental status/patient is intubated    ICU Vital Signs Last 24 Hrs  T(C): 37.9 (03 Jun 2021 19:00), Max: 37.9 (03 Jun 2021 19:00)  T(F): 100.2 (03 Jun 2021 19:00), Max: 100.2 (03 Jun 2021 19:00)  HR: 110 (03 Jun 2021 22:00) (88 - 110)  ABP: 140/66 (03 Jun 2021 22:00) (90/40 - 140/66)  ABP(mean): 92 (03 Jun 2021 22:00) (52 - 92)  RR: 19 (03 Jun 2021 22:00) (12 - 31)  SpO2: 96% (03 Jun 2021 22:00) (84% - 100%)    Mode: CPAP with PS, FiO2: 30, PEEP: 5  CVP(mm Hg): 10 (06-03-21 @ 21:00) (7 - 21)    I&O's Summary    02 Jun 2021 07:01  -  03 Jun 2021 07:00  --------------------------------------------------------  IN: 4148.7 mL / OUT: 3530 mL / NET: 618.7 mL    03 Jun 2021 07:01  -  03 Jun 2021 22:11  --------------------------------------------------------  IN: 1500 mL / OUT: 1865 mL / NET: -365 mL      PHYSICAL EXAM:   General: No acute distress  Eyes: EOMI, PERRLA, conjunctiva and sclera clear  Chest/Lung: CTAB, no wheezes, rales, or rhonchi  Heart: Regular rate, regular rhythm. Normal S1/S2. No murmurs, rubs, or gallops.  Abdomen: Soft, nontender, nondistended. Normal bowel sounds.  Extremites: 2+ peripheral pulses B/L. No clubbing, cyanosis, or edema.  Neurology: A&O x3, no focal deficits  Skin: No rashes or lesions    ============================I/O===========================   I&O's Detail    02 Jun 2021 07:01  -  03 Jun 2021 07:00  --------------------------------------------------------  IN:    Bumetanide: 240 mL    Enteral Tube Flush: 270 mL    Free Water: 2000 mL    IV PiggyBack: 100 mL    IV PiggyBack: 583.1 mL    Nepro with Carb Steady: 880 mL    Norepinephrine: 75.6 mL  Total IN: 4148.7 mL    OUT:    Indwelling Catheter - Urethral (mL): 2830 mL    Rectal Tube (mL): 700 mL  Total OUT: 3530 mL    Total NET: 618.7 mL      03 Jun 2021 07:01  -  03 Jun 2021 22:11  --------------------------------------------------------  IN:    Bumetanide: 150 mL    Free Water: 600 mL    IV PiggyBack: 150 mL    Nepro with Carb Steady: 600 mL  Total IN: 1500 mL    OUT:    Indwelling Catheter - Urethral (mL): 1265 mL    Rectal Tube (mL): 600 mL  Total OUT: 1865 mL    Total NET: -365 mL      ============================ LABS =========================             11.6   10.08 )-----------( 117      ( 03 Jun 2021 01:53 )             37.1     06-03    147<H>  |  101  |  76<H>  ----------------------------<  115<H>  3.9   |  31  |  2.86<H>    Ca    9.5      03 Jun 2021 01:53  Phos  3.3     06-03  Mg     2.2     06-03    TPro  6.7  /  Alb  2.7<L>  /  TBili  2.9<H>  /  DBili  x   /  AST  53<H>  /  ALT  28  /  AlkPhos  240<H>  06-03    LIVER FUNCTIONS - ( 03 Jun 2021 17:54 )  Alb: x     / Pro: x     / ALK PHOS: x     / ALT: x     / AST: x     / GGT: 224 U/L       ABG - ( 03 Jun 2021 05:02 )  pH, Arterial: 7.46  pH, Blood: x     /  pCO2: 52    /  pO2: 138   / HCO3: 36    / Base Excess: 11.2  /  SaO2: 99            ======================Micro/Rad/Cardio=================  Telemetry: Reviewed   EKG: Reviewed  CXR: Reviewed  Culture: Reviewed   Echo: Reviewed  Cath: Reviewed  ======================================================  PAST MEDICAL & SURGICAL HISTORY:  Afib    COPD (chronic obstructive pulmonary disease)    CHF (congestive heart failure)    Dementia      ====================ASSESSMENT ==============  5/26 Txfered to [facility] with concern of pericardial effusion, hypotension and NIC.  Atrial Fibrillation  Acute Kidney Injury  Anemia  Thrombocytopenia   Severe pulmonary hypertension     Plan:  ====================== NEUROLOGY=====================  AMS  - off sedation since 5/30, has been without purposeful movements. On exam tonight opens eyes to voice, appears to be tracking with eyes, moves b/l UE spontaneously, withdraws to pain  - vEEG; generalized non-specific slowing, no epileptiform activity  - Continue to monitor neuro status per protocol    ==================== RESPIRATORY======================  Acute Hypercapnic Respiratory Failure req Intubation   - On full vent support, requiring close monitoring of respiratory rate, breathing pattern, pulse oximetry monitoring, and intermittent blood gas analysis. On minimal vent settings, adjust prn based on ABG's.   - daily sbt trials once RV unloaded     Severe pulmonary HTN, likely group 3 from COPD   - monitor PA pressures and SvO2 as below     Mechanical Ventilation:  Mode: CPAP with PS  FiO2: 30  PEEP: 5  PS: 15  MAP: 10  PIP: 21      ====================CARDIOVASCULAR==================  HFpEF with severe TR and severely decr RVSF radha 2/2 pulm HTN   - S/P RHC with [place] emmanuel catheter placed on 5/27.   - Continue to monitor ScvO2, lact, PA pressures  - C/w Bumex gtt @2mg/hr s/p mass fluid and blood product resuscitation,    Chronic Afib  - rate controlled, continue to monitor   -Continue Lipitor    ===================HEMATOLOGIC/ONC ===================  Hemorrhagic Shock, resolved   - RFA s/p removal of Beto with large hematoma and active bleeding s/p supratherapeutic ptt   - direct pressure applied and manual pressure of hematoma. FemStop applied w/ no relief of hematoma accumulation   - MTP called s/p 11U PRBC, 8U plasma, 2 plts. Protamine x2 + 2.5L IVF resuscitated   - right groin stable with positive pulses  - Monitor H/H     VTE ppx   - SCDs   - Monitor PLTs  - C/w Heparin     ===================== RENAL =========================  Non-oliguric NIC with creatinine peak of 4.49 currently 2.86Baseline 0.97 in 02/2021   - Cr worsened > 3 s/p hemorrhagic shock   - Goal net negative fluid balance. Replete lytes PRN. Keep K> 4 and Mg >2.   - Bumex gtt @2mg/hr   - avoid nephrotoxic agents  - Nephro following. No urgent needs for HD at this time   -Continue monitoring urine output    ==================== GASTROINTESTINAL===================  Diet: NPO w/ TFS- Nepro with Carb steady goal 40cc/hr.   - BM x2 since admission on 5/26, continue Protonix for stress ulcer prophylaxis.     =======================    ENDOCRINE  =====================  Bgl controlled  - monitor glucose for need to initiate sliding scale.     Hx of HLD  - C/w Lipitor 40mg QHS    ========================INFECTIOUS DISEASE================  PNA  - Combicath 5/26 w/ moderate GN diplococci and rare GPC in pairs and chains   - Pt hypothermic and no leukocytosis   - BCX x2 5/26: NGTD, UCx 5/26: no growth. S/P MRSA swab 5/27: neg.         Patient requires continuous monitoring with bedside rhythm monitoring, pulse ox monitoring, and intermittent blood gas analysis. Care plan discussed with ICU care team. Patient remained critical and at risk for life threatening decompensation.  Patient seen, examined and plan discussed with CCU team during rounds.     I have personally provided 35 minutes of critical care time excluding time spent on separate procedures.    By signing my name below, I, James Serrato, attest that this documentation has been prepared under the direction and in the presence of ALLISON Herrera  Electronically signed: Cj Giron, 06-03-21 @ 22:11    I, ALLISON Herrera, personally performed the services described in this documentation. all medical record entries made by the cj were at my direction and in my presence. I have reviewed the chart and agree that the record reflects my personal performance and is accurate and complete  Electronically signed: ALLISON Herrera       SANJIV ROBERTS  MRN-[id number]  Patient is a 75y old  Female who presents with a chief complaint of Transfer from [facility] with pericardial effusion, NIC and hypotension (03 Jun 2021 21:45)    HPI:  75y.o female with PMHx of CHFpEF, Severe TR with Right CHF and severe pHTN, COPD on home 02, HTN, HLD, Chronic Afib (on Eliquis), OA, CKD3, DM, right eye cataract who was brought in by EMS to [facility] on 5/23/21 for AMS. pt A&O x1, per daughter patient with generalized weakness and lethargy since since 5/22. Pt had recent in Jan 2021 with CP and NIC. Per chart on arrival to ED pt was hypotensive, obtunded. Chest CT with large pericardial effusion (increased since Jan 2021)- per thoracic surgery consult no indication for pericardial window, pHTN, cirrhotic liver with small ascites, ECHO EF 55% dilated RV with decreased function, severe TN, pHTN, NIC (per renal consult no acute indication for HD), venous duplex NG for DVTs, Head CT revealed chronic involutional and white matter changes, no acute intracranial process, NIC(SCr peaked at 4.6) with  hyperkalemia (5.8) requiring lokelma and insulin.  Of note daughter states over past month patient more and more confused ? dementia and pt is legally blind. Daughter states pt HAS NOT RECEIVED COVID VACCINE as she has been sick these past few months.     5/26 Patient transferred to [facility] with concern of pericardial effusion, hypotension and NIC. On arrival patient SBP 80's, A&O to first name but very lethargic.  (26 May 2021 10:16)      Hospital Course:  5/26 Patient transferred to [facility] with concern of pericardial effusion, hypotension and NIC.   5/26 intubated for hypercarbic respiratory distressed. Transferred from CTU to CICU as no need for pericardiocentesis. Briefly on pressors prior to intubation and since weaned off. RIJ cordis placed- unable to float swan- coiling in RV. RV pressures 50s /10s. Start sildenafil 20 tid. start bumex gtt   5/27 S/P RHC with [place] emmanuel catheter placed.  5/28 MTP called for R Femoral A bleed from previous A line site s/p 11U PRBC, 8 plasma, 2 plts.   5/29 started IV Levophed, sildenafil dc'ed  6/02 Dc'ed Cefepime    24 HOUR EVENTS:  - MRI brain completed overnight.  - remains off pressors.    REVIEW OF SYSTEMS:   unable to obtain 2/2 mental status/patient is intubated    ICU Vital Signs Last 24 Hrs  T(C): 37.9 (03 Jun 2021 19:00), Max: 37.9 (03 Jun 2021 19:00)  T(F): 100.2 (03 Jun 2021 19:00), Max: 100.2 (03 Jun 2021 19:00)  HR: 110 (03 Jun 2021 22:00) (88 - 110)  ABP: 140/66 (03 Jun 2021 22:00) (90/40 - 140/66)  ABP(mean): 92 (03 Jun 2021 22:00) (52 - 92)  RR: 19 (03 Jun 2021 22:00) (12 - 31)  SpO2: 96% (03 Jun 2021 22:00) (84% - 100%)    Mode: CPAP with PS, FiO2: 30, PEEP: 5  CVP(mm Hg): 10 (06-03-21 @ 21:00) (7 - 21)    I&O's Summary    02 Jun 2021 07:01  -  03 Jun 2021 07:00  --------------------------------------------------------  IN: 4148.7 mL / OUT: 3530 mL / NET: 618.7 mL    03 Jun 2021 07:01  -  03 Jun 2021 22:11  --------------------------------------------------------  IN: 1500 mL / OUT: 1865 mL / NET: -365 mL      PHYSICAL EXAM:   General: No acute distress  Eyes: EOMI, PERRLA, conjunctiva and sclera clear  Chest/Lung: CTAB, no wheezes, rales, or rhonchi  Heart: Regular rate, regular rhythm. Normal S1/S2. No murmurs, rubs, or gallops.  Abdomen: Soft, nontender, nondistended. Normal bowel sounds.  Extremites: 2+ peripheral pulses B/L. No clubbing, cyanosis, or edema.  Neurology: A&O x3, no focal deficits  Skin: No rashes or lesions    ============================I/O===========================   I&O's Detail    02 Jun 2021 07:01  -  03 Jun 2021 07:00  --------------------------------------------------------  IN:    Bumetanide: 240 mL    Enteral Tube Flush: 270 mL    Free Water: 2000 mL    IV PiggyBack: 100 mL    IV PiggyBack: 583.1 mL    Nepro with Carb Steady: 880 mL    Norepinephrine: 75.6 mL  Total IN: 4148.7 mL    OUT:    Indwelling Catheter - Urethral (mL): 2830 mL    Rectal Tube (mL): 700 mL  Total OUT: 3530 mL    Total NET: 618.7 mL      03 Jun 2021 07:01  -  03 Jun 2021 22:11  --------------------------------------------------------  IN:    Bumetanide: 150 mL    Free Water: 600 mL    IV PiggyBack: 150 mL    Nepro with Carb Steady: 600 mL  Total IN: 1500 mL    OUT:    Indwelling Catheter - Urethral (mL): 1265 mL    Rectal Tube (mL): 600 mL  Total OUT: 1865 mL    Total NET: -365 mL      ============================ LABS =========================             11.6   10.08 )-----------( 117      ( 03 Jun 2021 01:53 )             37.1     06-03    147<H>  |  101  |  76<H>  ----------------------------<  115<H>  3.9   |  31  |  2.86<H>    Ca    9.5      03 Jun 2021 01:53  Phos  3.3     06-03  Mg     2.2     06-03    TPro  6.7  /  Alb  2.7<L>  /  TBili  2.9<H>  /  DBili  x   /  AST  53<H>  /  ALT  28  /  AlkPhos  240<H>  06-03    LIVER FUNCTIONS - ( 03 Jun 2021 17:54 )  Alb: x     / Pro: x     / ALK PHOS: x     / ALT: x     / AST: x     / GGT: 224 U/L       ABG - ( 03 Jun 2021 05:02 )  pH, Arterial: 7.46  pH, Blood: x     /  pCO2: 52    /  pO2: 138   / HCO3: 36    / Base Excess: 11.2  /  SaO2: 99            ======================Micro/Rad/Cardio=================  Telemetry: Reviewed   EKG: Reviewed  CXR: Reviewed  Culture: Reviewed   Echo: Reviewed  Cath: Reviewed  ======================================================  PAST MEDICAL & SURGICAL HISTORY:  Afib    COPD (chronic obstructive pulmonary disease)    CHF (congestive heart failure)    Dementia      ====================ASSESSMENT ==============  5/26 Txfered to [facility] with concern of pericardial effusion, hypotension and NIC.  Atrial Fibrillation  Acute Kidney Injury  Anemia  Thrombocytopenia   Severe pulmonary hypertension     Plan:  ====================== NEUROLOGY=====================  AMS  - off sedation since 5/30, has been without purposeful movements. On exam tonight opens eyes to voice, appears to be tracking with eyes, moves b/l UE spontaneously, withdraws to pain  - vEEG; generalized non-specific slowing, no epileptiform activity.  - MRI brain performed tonight f/up official read  - correction of metabolic derangements; s/p lactulose w/ no improvement, ammonia level wnl. BUN 70s renal function improving, trend.  - Continue to monitor neuro status per protocol    ==================== RESPIRATORY======================  Acute Hypercapnic Respiratory Failure req Intubation   - On full vent support, minimal settings 350/12/5/30%  - daily sbt trials  - trend ABGs    pulmonary HTN, likely group 2  - continue diuresis bumex gtt @ 2  - will need repeat RHC when euvolemic   - pulmonology following appreciate recs    ====================CARDIOVASCULAR==================  HFpEF with severe TR and severely decr RVSF [place] 2/2 pulm HTN   - C/w Bumex gtt @2mg/hr. Goal CVP 10-12  - stable off levo since 2AM 6/3. Sildenafil has been on hold while requiring pressors, consider restarting in AM if HD stable as will be off pressors for > 24hrs.    Chronic Afib  - rate controlled, continue to monitor  - off hep gtt i/s/o recent bleed & hemorrhagic shock, cont sq heparin    ===================HEMATOLOGIC/ONC ===================  Hemorrhagic Shock, resolved   - RFA s/p removal of Beto with large hematoma and active bleeding i/s/o supratherapeutic ptt   - MTP s/p 11U PRBC, 8U plasma, 2 plts. Protamine x2 + 2.5L IVF resuscitated   - right groin stable with positive pulses  - Monitor H/H    VTE ppx   - sq heparin    ===================== RENAL =========================  Non-oliguric NIC with creatinine peak of 4.49 currently 2.86 Baseline 0.97 in 02/2021   - adequate uop on bumex gtt. SCr downtrending.  - Replete lytes PRN. Keep K> 4 and Mg >2.   - avoid nephrotoxic agents  - Nephro following. No urgent needs for HD at this time   - Strict I/Os    metabolic alkalosis   - s/p diamox x1 today. Trend ABG    hypernatremia  - continue FW and decrease as appropriate. Na 148 > 147 today.    ==================== GASTROINTESTINAL===================  No acitve issues  - Diet: NPO w/ tube feed - Nepro with Carb steady goal 40cc/hr.   - continue Protonix for stress ulcer prophylaxis.     =======================    ENDOCRINE  =====================  Bgl controlled  - monitor glucose for need to initiate sliding scale.     Hx of HLD  - C/w Lipitor 40mg QHS    ========================INFECTIOUS DISEASE================  No active issues  - Is s/p course of broad spectrum abx i/s/o hypothermia, all cultures ngtd, remains normothermic   - afebrile, no leukocytosis, trend and monitor off abx  - BCX x2 5/26: NGTD, UCx 5/26: no growth. S/P MRSA swab 5/27: neg.       Patient requires continuous monitoring with bedside rhythm monitoring, pulse ox monitoring, and intermittent blood gas analysis. Care plan discussed with ICU care team. Patient remained critical and at risk for life threatening decompensation.  Patient seen, examined and plan discussed with CCU team during rounds.     I have personally provided 35 minutes of critical care time excluding time spent on separate procedures.    By signing my name below, I, James Serrato, attest that this documentation has been prepared under the direction and in the presence of ALLISON Herrera  Electronically signed: Olivia Giron, 06-03-21 @ 22:11    I, ALLISON Herrera, personally performed the services described in this documentation. all medical record entries made by the scribe were at my direction and in my presence. I have reviewed the chart and agree that the record reflects my personal performance and is accurate and complete  Electronically signed: ALLISON Herrera       SANJIV ROBERTS  MRN-[id number]  Patient is a 75y old  Female who presents with a chief complaint of Transfer from [facility] with pericardial effusion, NIC and hypotension (03 Jun 2021 21:45)    HPI:  75y.o female with PMHx of CHFpEF, Severe TR with Right CHF and severe pHTN, COPD on home 02, HTN, HLD, Chronic Afib (on Eliquis), OA, CKD3, DM, right eye cataract who was brought in by EMS to [facility] on 5/23/21 for AMS. pt A&O x1, per daughter patient with generalized weakness and lethargy since since 5/22. Pt had recent in Jan 2021 with CP and NIC. Per chart on arrival to ED pt was hypotensive, obtunded. Chest CT with large pericardial effusion (increased since Jan 2021)- per thoracic surgery consult no indication for pericardial window, pHTN, cirrhotic liver with small ascites, ECHO EF 55% dilated RV with decreased function, severe TN, pHTN, NIC (per renal consult no acute indication for HD), venous duplex NG for DVTs, Head CT revealed chronic involutional and white matter changes, no acute intracranial process, NIC(SCr peaked at 4.6) with  hyperkalemia (5.8) requiring lokelma and insulin.  Of note daughter states over past month patient more and more confused ? dementia and pt is legally blind. Daughter states pt HAS NOT RECEIVED COVID VACCINE as she has been sick these past few months.     5/26 Patient transferred to [facility] with concern of pericardial effusion, hypotension and NIC. On arrival patient SBP 80's, A&O to first name but very lethargic.  (26 May 2021 10:16)      Hospital Course:  5/26 Patient transferred to [facility] with concern of pericardial effusion, hypotension and NIC.   5/26 intubated for hypercarbic respiratory distressed. Transferred from CTU to CICU as no need for pericardiocentesis. Briefly on pressors prior to intubation and since weaned off. RIJ cordis placed- unable to float swan- coiling in RV. RV pressures 50s /10s. Start sildenafil 20 tid. start bumex gtt   5/27 S/P RHC with [place] emmanuel catheter placed.  5/28 MTP called for R Femoral A bleed from previous A line site s/p 11U PRBC, 8 plasma, 2 plts.   5/29 started IV Levophed, sildenafil dc'ed  6/02 Dc'ed Cefepime    24 HOUR EVENTS:  - MRI brain completed overnight.  - remains off pressors.    REVIEW OF SYSTEMS:   unable to obtain 2/2 mental status/patient is intubated    ICU Vital Signs Last 24 Hrs  T(C): 37.9 (03 Jun 2021 19:00), Max: 37.9 (03 Jun 2021 19:00)  T(F): 100.2 (03 Jun 2021 19:00), Max: 100.2 (03 Jun 2021 19:00)  HR: 110 (03 Jun 2021 22:00) (88 - 110)  ABP: 140/66 (03 Jun 2021 22:00) (90/40 - 140/66)  ABP(mean): 92 (03 Jun 2021 22:00) (52 - 92)  RR: 19 (03 Jun 2021 22:00) (12 - 31)  SpO2: 96% (03 Jun 2021 22:00) (84% - 100%)    Mode: CPAP with PS, FiO2: 30, PEEP: 5  CVP(mm Hg): 10 (06-03-21 @ 21:00) (7 - 21)    I&O's Summary    02 Jun 2021 07:01  -  03 Jun 2021 07:00  --------------------------------------------------------  IN: 4148.7 mL / OUT: 3530 mL / NET: 618.7 mL    03 Jun 2021 07:01  -  03 Jun 2021 22:11  --------------------------------------------------------  IN: 1500 mL / OUT: 1865 mL / NET: -365 mL      PHYSICAL EXAM:   General: No acute distress  Eyes: EOMI, PERRLA, conjunctiva and sclera clear  Chest/Lung: coarse throughout. ETT midline  Heart: Regular rate, regular rhythm. Normal S1/S2. No murmurs, rubs, or gallops.  Abdomen: Obese abdomen, soft. Normoactive bowel sounds.  Extremites: 2+ peripheral pulses B/L. b/l LE edema. R femoral beto site w/o bleeding or hematoma.   Neurology: off sedation, opens eyes to voice, appears to be tracking with eyes, grimaces and withdraws to pain, spontaneously moves b/l UE, does not move b/l LE.      ============================I/O===========================   I&O's Detail    02 Jun 2021 07:01  -  03 Jun 2021 07:00  --------------------------------------------------------  IN:    Bumetanide: 240 mL    Enteral Tube Flush: 270 mL    Free Water: 2000 mL    IV PiggyBack: 100 mL    IV PiggyBack: 583.1 mL    Nepro with Carb Steady: 880 mL    Norepinephrine: 75.6 mL  Total IN: 4148.7 mL    OUT:    Indwelling Catheter - Urethral (mL): 2830 mL    Rectal Tube (mL): 700 mL  Total OUT: 3530 mL    Total NET: 618.7 mL      03 Jun 2021 07:01  -  03 Jun 2021 22:11  --------------------------------------------------------  IN:    Bumetanide: 150 mL    Free Water: 600 mL    IV PiggyBack: 150 mL    Nepro with Carb Steady: 600 mL  Total IN: 1500 mL    OUT:    Indwelling Catheter - Urethral (mL): 1265 mL    Rectal Tube (mL): 600 mL  Total OUT: 1865 mL    Total NET: -365 mL      ============================ LABS =========================             11.6   10.08 )-----------( 117      ( 03 Jun 2021 01:53 )             37.1     06-03    147<H>  |  101  |  76<H>  ----------------------------<  115<H>  3.9   |  31  |  2.86<H>    Ca    9.5      03 Jun 2021 01:53  Phos  3.3     06-03  Mg     2.2     06-03    TPro  6.7  /  Alb  2.7<L>  /  TBili  2.9<H>  /  DBili  x   /  AST  53<H>  /  ALT  28  /  AlkPhos  240<H>  06-03    LIVER FUNCTIONS - ( 03 Jun 2021 17:54 )  Alb: x     / Pro: x     / ALK PHOS: x     / ALT: x     / AST: x     / GGT: 224 U/L       ABG - ( 03 Jun 2021 05:02 )  pH, Arterial: 7.46  pH, Blood: x     /  pCO2: 52    /  pO2: 138   / HCO3: 36    / Base Excess: 11.2  /  SaO2: 99            ======================Micro/Rad/Cardio=================  Telemetry: Reviewed   EKG: Reviewed  CXR: Reviewed  Culture: Reviewed   Echo: Reviewed  Cath: Reviewed  ======================================================  PAST MEDICAL & SURGICAL HISTORY:  Afib    COPD (chronic obstructive pulmonary disease)    CHF (congestive heart failure)    Dementia      ====================ASSESSMENT ==============  5/26 Txfered to [facility] with concern of pericardial effusion, hypotension and NIC.  Atrial Fibrillation  Acute Kidney Injury  Anemia  Thrombocytopenia   Severe pulmonary hypertension     Plan:  ====================== NEUROLOGY=====================  AMS  - off sedation since 5/30, has been without purposeful movements. On exam tonight opens eyes to voice, appears to be tracking with eyes, moves b/l UE spontaneously, withdraws to pain  - vEEG; generalized non-specific slowing, no epileptiform activity.  - MRI brain performed tonight f/up official read  - correction of metabolic derangements; s/p lactulose w/ no improvement, ammonia level wnl. BUN 70s renal function improving, trend.  - Continue to monitor neuro status per protocol    ==================== RESPIRATORY======================  Acute Hypercapnic Respiratory Failure req Intubation   - On full vent support, minimal settings 350/12/5/30%  - daily sbt trials  - trend ABGs    pulmonary HTN, likely group 2  - continue diuresis bumex gtt @ 2  - will need repeat RHC when euvolemic   - pulmonology following appreciate recs    ====================CARDIOVASCULAR==================  HFpEF with severe TR and severely decr RVSF [facility] 2/2 pulm HTN   - C/w Bumex gtt @2mg/hr. Goal CVP 10-12  - stable off levo since 2AM 6/3. Sildenafil has been on hold while requiring pressors, consider restarting in AM if HD stable as will be off pressors for > 24hrs.    Chronic Afib  - rate controlled, continue to monitor  - off hep gtt i/s/o recent bleed & hemorrhagic shock, cont sq heparin    ===================HEMATOLOGIC/ONC ===================  Hemorrhagic Shock, resolved   - RFA s/p removal of Beto with large hematoma and active bleeding i/s/o supratherapeutic ptt   - MTP s/p 11U PRBC, 8U plasma, 2 plts. Protamine x2 + 2.5L IVF resuscitated   - right groin stable with positive pulses  - Monitor H/H    VTE ppx   - sq heparin    ===================== RENAL =========================  Non-oliguric NIC with creatinine peak of 4.49 currently 2.86 Baseline 0.97 in 02/2021   - adequate uop on bumex gtt. SCr downtrending.  - Replete lytes PRN. Keep K> 4 and Mg >2.   - avoid nephrotoxic agents  - Nephro following. No urgent needs for HD at this time   - Strict I/Os    metabolic alkalosis   - s/p diamox x1 today. Trend ABG    hypernatremia  - continue FW and decrease as appropriate. Na 148 > 147 today.    ==================== GASTROINTESTINAL===================  No acitve issues  - Diet: NPO w/ tube feed - Nepro with Carb steady goal 40cc/hr.   - continue Protonix for stress ulcer prophylaxis.     =======================    ENDOCRINE  =====================  Bgl controlled  - monitor glucose for need to initiate sliding scale.     Hx of HLD  - C/w Lipitor 40mg QHS    ========================INFECTIOUS DISEASE================  No active issues  - Is s/p course of broad spectrum abx i/s/o hypothermia, all cultures ngtd, remains normothermic   - afebrile, no leukocytosis, trend and monitor off abx  - BCX x2 5/26: NGTD, UCx 5/26: no growth. S/P MRSA swab 5/27: neg.       Patient requires continuous monitoring with bedside rhythm monitoring, pulse ox monitoring, and intermittent blood gas analysis. Care plan discussed with ICU care team. Patient remained critical and at risk for life threatening decompensation.  Patient seen, examined and plan discussed with CCU team during rounds.     I have personally provided 35 minutes of critical care time excluding time spent on separate procedures.    By signing my name below, I, James Serrato, attest that this documentation has been prepared under the direction and in the presence of ALLISON Herrera  Electronically signed: Olivia Giron, 06-03-21 @ 22:11    I, ALLISON Herrera, personally performed the services described in this documentation. all medical record entries made by the scribe were at my direction and in my presence. I have reviewed the chart and agree that the record reflects my personal performance and is accurate and complete  Electronically signed: ALLISON Herrera

## 2021-06-04 NOTE — PROGRESS NOTE ADULT - ASSESSMENT
Pt is a 75 year old woman with a PMHx of HFpEF, severe pHTN, severe TR with right-sided HF, COPD on 3L home O2, HTN, HLD, chronic afib (on Eliquis), OA, CKD3, DM, right eye cataract who was brought in by EMS to [facility] on 5/23/21 for AMS transferred here for hypotension w/o concern for pericardial tamponade. On arrival here she was hypotensive requiring jorge a/levo/vaso. She was intubated for hypercarbic respiratory failure, with subsequent improvement in BP and weaned off vasopressors and inotropes. She was diuresing on a Bumex gtt and started on sildenafil on 5/27, but on 5/28 she was found to have profound bleeding from femoral a-line site in the setting of supratherapeutic aPTT for which a  MTP was initiated x2 and is s/p 11U PRBC, 8 plasma, 2 plts.     She remains intubated on minimal vent settings. Though weaned off sedation, since 5/30 remains encephalopathic with workup unrevealing (CT head, EEG and brain MRI). Once neuro issues have resolved, would pursue RHC with leave in PAC to guide initiation/titration of pulmonary vasodilators. TTE was also notable for thickened pericardium which can be evaluated with cMRI when able.     She has had intermittent pressor requirements, though currently off and with contraction alkalosis/hypernatremia on labs for which we will reduce diuretics and allow for gentle net positive balance. Would also recheck ScV02 to ensure adequate cardiac output as RAP has been rising. Infectious workup given fever this AM.     Bedside hemodynamics:  5/29: CVP 14, PA 58/15/31, PAsat 82%, AOsat 97%, CO/CI (F) 11.6/5.1, MAP 79, SVR 1020

## 2021-06-04 NOTE — PROGRESS NOTE ADULT - ATTENDING COMMENTS
Patient is seen and examined with fellow, NP and the CCU house-staff. I agree with the history, physical and the assessment and plan.    RV failure in setting of pulmonary HTN of unknown etiology - intubated for hypercabneic respiratory failure  off of sedation for > 5 days with minimal neuro improvement - f/u with MRI and with neuro team  maybe be metabolic cause of her neuro status - metabolic encephalopathy - ?uremia - will d/w nephrology need for renal replacement  off of pressors for > 24 hrs  CVP ~14-16 - will c/w diuresis  hypernatremia improving  on CPAP trial for exercise  central venous sat of 75 with normal lactate level Patient is seen and examined with fellow, NP and the CCU house-staff. I agree with the history, physical and the assessment and plan.    RV failure in setting of pulmonary HTN of unknown etiology - intubated for hypercapnic respiratory failure  off of sedation for > 5 days with minimal neuro improvement - f/u with MRI and with neuro team  maybe be metabolic cause of her neuro status - metabolic encephalopathy - ?uremia - will d/w nephrology need for renal replacement  off of pressors for > 24 hrs  CVP ~14-16 - will c/w diuresis  hypernatremia improving  on CPAP trial for exercise  central venous sat of 75 with normal lactate level

## 2021-06-04 NOTE — PROGRESS NOTE ADULT - SUBJECTIVE AND OBJECTIVE BOX
SANJIV ROBERTS  MRN-[id number]  Patient is a 75y old  Female who presents with a chief complaint of Transfer from [facility] with pericardial effusion, NIC and hypotension (04 Jun 2021 11:40)    HPI:  75y.o female with PMHx of CHFpEF, Severe TR with Right CHF and severe pHTN, COPD on home 02, HTN, HLD, Chronic Afib (on Eliquis), OA, CKD3, DM, right eye cataract who was brought in by EMS to [facility] on 5/23/21 for AMS. pt A&O x1, per daughter patient with generalized weakness and lethargy since since 5/22. Pt had recent in Jan 2021 with CP and NIC. Per chart on arrival to ED pt was hypotensive, obtunded. Chest CT with large pericardial effusion (increased since Jan 2021)- per thoracic surgery consult no indication for pericardial window, pHTN, cirrhotic liver with small ascites, ECHO EF 55% dilated RV with decreased function, severe TN, pHTN, NIC (per renal consult no acute indication for HD), venous duplex NG for DVTs, Head CT revealed chronic involutional and white matter changes, no acute intracranial process, NIC(SCr peaked at 4.6) with  hyperkalemia (5.8) requiring lokelma and insulin.  Of note daughter states over past month patient more and more confused ? dementia and pt is legally blind. Daughter states pt HAS NOT RECEIVED COVID VACCINE as she has been sick these past few months.     5/26 Patient transferred to [facility] with concern of pericardial effusion, hypotension and NIC. On arrival patient SBP 80's, A&O to first name but very lethargic.  (26 May 2021 10:16)      Hospital Course:  5/26 Patient transferred to [facility] with concern of pericardial effusion, hypotension and NIC.   5/26 intubated for hypercarbic respiratory distressed. Transferred from CTU to CICU as no need for pericardiocentesis. Briefly on pressors prior to intubation and since weaned off. RIJ cordis placed- unable to float swan- coiling in RV. RV pressures 50s /10s. Start sildenafil 20 tid. start bumex gtt   5/27 S/P RHC with [place] emmanuel catheter placed.  5/28 MTP called for R Femoral A bleed from previous A line site s/p 11U PRBC, 8 plasma, 2 plts.   5/29 started IV Levophed, sildenafil dc'ed  6/02 Dc'ed Cefepime  6/04 fever of 100.6F, restarted Cefepime     24 HOUR EVENTS:    REVIEW OF SYSTEMS:   Constitutional: + fevers, or chills  Eyes/ENT: No visual changes  Respiratory: No cough, wheezing, hemoptysis  Cardiovascular: No chest pain, no palpitations  Gastrointestinal: No abdominal pain.   Genitourinary: No dysuria  Neurological: No numbness, no weakness  Skin: No itching, rashes    ICU Vital Signs Last 24 Hrs  T(C): 37.9 (04 Jun 2021 19:00), Max: 38.1 (04 Jun 2021 12:00)  T(F): 100.2 (04 Jun 2021 19:00), Max: 100.6 (04 Jun 2021 12:00)  HR: 94 (04 Jun 2021 19:45) (88 - 110)  BP: --  BP(mean): --  ABP: 112/52 (04 Jun 2021 19:45) (82/40 - 140/66)  ABP(mean): 72 (04 Jun 2021 19:45) (54 - 92)  RR: 12 (04 Jun 2021 19:45) (7 - 26)  SpO2: 100% (04 Jun 2021 19:45) (95% - 100%)    Mode: CPAP with PS, FiO2: 30, PEEP: 5  CVP(mm Hg): 18 (06-04-21 @ 19:45) (7 - 22)  CO: --  CI: --  PA: --  PA(mean): --  PA(direct): --  PCWP: --  LA: --  RA: --  SVR: --  SVRI: --  PVR: --  PVRI: --  I&O's Summary    03 Jun 2021 07:01  -  04 Jun 2021 07:00  --------------------------------------------------------  IN: 2260 mL / OUT: 2840 mL / NET: -580 mL    04 Jun 2021 07:01  -  04 Jun 2021 20:08  --------------------------------------------------------  IN: 1719.7 mL / OUT: 1147 mL / NET: 572.7 mL        CAPILLARY BLOOD GLUCOSE    CAPILLARY BLOOD GLUCOSE          PHYSICAL EXAM:   General: No acute distress  Eyes: EOMI, PERRLA, conjunctiva and sclera clear  Chest/Lung: CTAB, no wheezes, rales, or rhonchi  Heart: Regular rate, regular rhythm. Normal S1/S2. No murmurs, rubs, or gallops.  Abdomen: Soft, nontender, nondistended. Normal bowel sounds.  Extremites: 2+ peripheral pulses B/L. No clubbing, cyanosis, or edema.  Neurology: A&O x3, no focal deficits  Skin: No rashes or lesions  ============================I/O===========================   I&O's Detail    03 Jun 2021 07:01 - 04 Jun 2021 07:00  --------------------------------------------------------  IN:    Bumetanide: 240 mL    Enteral Tube Flush: 300 mL    Free Water: 600 mL    IV PiggyBack: 320 mL    Nepro with Carb Steady: 800 mL  Total IN: 2260 mL    OUT:    Indwelling Catheter - Urethral (mL): 2140 mL    Rectal Tube (mL): 600 mL    Stool (mL): 100 mL  Total OUT: 2840 mL    Total NET: -580 mL      04 Jun 2021 07:01  -  04 Jun 2021 20:08  --------------------------------------------------------  IN:    Bumetanide: 50 mL    Bumetanide: 30 mL    Enteral Tube Flush: 300 mL    Free Water: 300 mL    IV PiggyBack: 350 mL    IV PiggyBack: 200 mL    Nepro with Carb Steady: 440 mL    Norepinephrine: 49.7 mL  Total IN: 1719.7 mL    OUT:    Indwelling Catheter - Urethral (mL): 1097 mL    Stool (mL): 50 mL  Total OUT: 1147 mL    Total NET: 572.7 mL        ============================ LABS =========================                        10.4   13.09 )-----------( 135      ( 04 Jun 2021 16:17 )             33.6     06-04    146<H>  |  100  |  82<H>  ----------------------------<  158<H>  3.7   |  34<H>  |  3.02<H>    Ca    9.2      04 Jun 2021 16:17  Phos  2.6     06-04  Mg     2.1     06-04    TPro  6.8  /  Alb  2.7<L>  /  TBili  3.7<H>  /  DBili  x   /  AST  46<H>  /  ALT  23  /  AlkPhos  221<H>  06-04    LIVER FUNCTIONS - ( 04 Jun 2021 16:17 )  Alb: 2.7 g/dL / Pro: 6.8 g/dL / ALK PHOS: 221 U/L / ALT: 23 U/L / AST: 46 U/L / GGT: x           PT/INR - ( 03 Jun 2021 14:06 )   PT: 14.0 sec;   INR: 1.18 ratio         PTT - ( 03 Jun 2021 14:06 )  PTT:67.5 sec  ABG - ( 04 Jun 2021 03:36 )  pH, Arterial: 7.45  pH, Blood: x     /  pCO2: 56    /  pO2: 80    / HCO3: 38    / Base Excess: 12.6  /  SaO2: 97                ======================Micro/Rad/Cardio=================  Telemetry: Reviewed   EKG: Reviewed  CXR: Reviewed  Culture: Reviewed   Echo: Reviewed  Cath: Reviewed  ======================================================  PAST MEDICAL & SURGICAL HISTORY:  Afib    COPD (chronic obstructive pulmonary disease)    CHF (congestive heart failure)    Dementia      ====================ASSESSMENT ==============  5/26 Txfered to [facility] with concern of pericardial effusion, hypotension and NIC.  Atrial Fibrillation  Acute Kidney Injury  Anemia  Thrombocytopenia   Severe pulmonary hypertension     Plan:  ====================== NEUROLOGY=====================  AMS  - off sedation since 5/30, has been without purposeful movements. On exam 6/03, pt opened his eyes to voice, appeared to be tracking with eyes, moved  b/l UE spontaneously, withdraws to pain  - vEEG; generalized non-specific slowing, no epileptiform activity.  - MRI brain performed tonight f/up official read  - correction of metabolic derangements; s/p lactulose w/ no improvement, ammonia level wnl. BUN 70s now in 80s renal function  - Continue to monitor neuro status per protocol      ==================== RESPIRATORY======================  Acute Hypercapnic Respiratory Failure req Intubation   - On full vent support, minimal settings 350/12/5/30%   - daily sbt trials  - trend ABGs    pulmonary HTN, likely group 2  - continue diuresis bumex gtt @ 2  - will need repeat RHC when euvolemic   - pulmonology following appreciate recs    Mechanical Ventilation:  Mode: CPAP with PS  FiO2: 30  PEEP: 5  PS: 15  MAP: 10  PIP: 22      ====================CARDIOVASCULAR==================  HFpEF with severe TR and severely decr RVSF radha 2/2 pulm HTN   - C/w Bumex gtt @2mg/hr. Goal CVP 10-12  - stable off levo since 2AM 6/3. Sildenafil has been on hold while requiring pressors, consider restarting in AM if HD stable as will be off pressors for > 24hrs.    Chronic Afib  - rate controlled, continue to monitor  - off hep gtt i/s/o recent bleed & hemorrhagic shock, cont sq heparin      norepinephrine Infusion 0.05 MICROgram(s)/kG/Min (10.5 mL/Hr) IV Continuous <Continuous>    ===================HEMATOLOGIC/ONC ===================  Hemorrhagic Shock, resolved   - RFA s/p removal of Beto with large hematoma and active bleeding i/s/o supratherapeutic ptt   - MTP s/p 11U PRBC, 8U plasma, 2 plts. Protamine x2 + 2.5L IVF resuscitated   - right groin stable with positive pulses  - Monitor H/H    VTE ppx   - sq heparin    heparin   Injectable 5000 Unit(s) SubCutaneous every 8 hours    ===================== RENAL =========================  Non-oliguric NIC with creatinine peak of 4.49 currently 3.02 Baseline 0.97 in 02/2021   - adequate uop on bumex gtt. SCr downtrending.  - Replete lytes PRN. Keep K> 4 and Mg >2.   - avoid nephrotoxic agents  - Nephro following. No urgent needs for HD at this time   - Strict I/Os  - Continue Bumex     metabolic alkalosis   - s/p diamox x1 6/03. Trend ABG    hypernatremia  - continue FW and decrease as appropriate. Na 148 > 147-> 146 today    buMETAnide Infusion 1 mG/Hr (5 mL/Hr) IV Continuous <Continuous>    ==================== GASTROINTESTINAL===================  No acitve issues  - Diet: NPO w/ tube feed - Nepro with Carb steady goal 40cc/hr.   - continue Protonix for stress ulcer prophylaxis.     pantoprazole  Injectable 40 milliGRAM(s) IV Push daily  potassium chloride  10 mEq/50 mL IVPB 10 milliEquivalent(s) IV Intermittent every 1 hour  sodium chloride 0.9%. 1000 milliLiter(s) (10 mL/Hr) IV Continuous <Continuous>    =======================    ENDOCRINE  =====================  Bgl controlled  - monitor glucose for need to initiate sliding scale.     Hx of HLD  - C/w Lipitor 40mg QHS    atorvastatin 40 milliGRAM(s) Oral at bedtime    ========================INFECTIOUS DISEASE================  No active issues  - Is s/p course of broad spectrum abx i/s/o hypothermia, all cultures ngtd, remains normothermic   - Febrile, 100.6F, no leukocytosis, trend and monitor off abx  - BCX x2 5/26: NGTD, UCx 5/26: no growth. S/P MRSA swab 5/27: neg.   - restarted IV Cefepime     cefepime   IVPB          Patient requires continuous monitoring with bedside rhythm monitoring, pulse ox monitoring, and intermittent blood gas analysis. Care plan discussed with ICU care team. Patient remained critical and at risk for life threatening decompensation.  Patient seen, examined and plan discussed with CCU team during rounds.     I have personally provided 35 minutes of critical care time excluding time spent on separate procedures.    By signing my name below, I, Mirtha Brody, attest that this documentation has been prepared under the direction and in the presence of ALLISON Herrera  Electronically signed: Olivia Elam, 06-04-21 @ 20:08    IRadha PA, personally performed the services described in this documentation. all medical record entries made by the scribe were at my direction and in my presence. I have reviewed the chart and agree that the record reflects my personal performance and is accurate and complete  Electronically signed: ALLISON Herrera       SANJIV ROBERTS  MRN-[id number]  Patient is a 75y old  Female who presents with a chief complaint of Transfer from [facility] with pericardial effusion, NIC and hypotension (04 Jun 2021 11:40)    HPI:  75y.o female with PMHx of CHFpEF, Severe TR with Right CHF and severe pHTN, COPD on home 02, HTN, HLD, Chronic Afib (on Eliquis), OA, CKD3, DM, right eye cataract who was brought in by EMS to [facility] on 5/23/21 for AMS. pt A&O x1, per daughter patient with generalized weakness and lethargy since since 5/22. Pt had recent in Jan 2021 with CP and NIC. Per chart on arrival to ED pt was hypotensive, obtunded. Chest CT with large pericardial effusion (increased since Jan 2021)- per thoracic surgery consult no indication for pericardial window, pHTN, cirrhotic liver with small ascites, ECHO EF 55% dilated RV with decreased function, severe TN, pHTN, NIC (per renal consult no acute indication for HD), venous duplex NG for DVTs, Head CT revealed chronic involutional and white matter changes, no acute intracranial process, NIC(SCr peaked at 4.6) with  hyperkalemia (5.8) requiring lokelma and insulin.  Of note daughter states over past month patient more and more confused ? dementia and pt is legally blind. Daughter states pt HAS NOT RECEIVED COVID VACCINE as she has been sick these past few months.     5/26 Patient transferred to [facility] with concern of pericardial effusion, hypotension and NIC. On arrival patient SBP 80's, A&O to first name but very lethargic.  (26 May 2021 10:16)      Hospital Course:  5/26 Patient transferred to [facility] with concern of pericardial effusion, hypotension and NIC.   5/26 intubated for hypercarbic respiratory distressed. Transferred from CTU to CICU as no need for pericardiocentesis. Briefly on pressors prior to intubation and since weaned off. RIJ cordis placed- unable to float swan- coiling in RV. RV pressures 50s /10s. Start sildenafil 20 tid. start bumex gtt   5/27 S/P RHC with [place] emmanuel catheter placed.  5/28 MTP called for R Femoral A bleed from previous A line site s/p 11U PRBC, 8 plasma, 2 plts.   5/29 started IV Levophed, sildenafil dc'ed  6/02 Dc'ed Cefepime  6/04 fever of 100.6F, restarted Cefepime     24 HOUR EVENTS:  - MRI brain negative for acute pathology  - Bumex gtt decrease from 2 > 1 mg/hr  - febrile, started on empiric cefepime/vanc by level    REVIEW OF SYSTEMS:   unable to obtain patient intubated w/ AMS    ICU Vital Signs Last 24 Hrs  T(C): 37.9 (04 Jun 2021 19:00), Max: 38.1 (04 Jun 2021 12:00)  T(F): 100.2 (04 Jun 2021 19:00), Max: 100.6 (04 Jun 2021 12:00)  HR: 94 (04 Jun 2021 19:45) (88 - 110)  ABP: 112/52 (04 Jun 2021 19:45) (82/40 - 140/66)  ABP(mean): 72 (04 Jun 2021 19:45) (54 - 92)  RR: 12 (04 Jun 2021 19:45) (7 - 26)  SpO2: 100% (04 Jun 2021 19:45) (95% - 100%)    Mode: CPAP with PS, FiO2: 30, PEEP: 5  CVP(mm Hg): 18 (06-04-21 @ 19:45) (7 - 22)    I&O's Summary    03 Jun 2021 07:01  -  04 Jun 2021 07:00  --------------------------------------------------------  IN: 2260 mL / OUT: 2840 mL / NET: -580 mL    04 Jun 2021 07:01  -  04 Jun 2021 20:08  --------------------------------------------------------  IN: 1719.7 mL / OUT: 1147 mL / NET: 572.7 mL      PHYSICAL EXAM:   General: No acute distress  Eyes: EOMI, PERRLA, conjunctiva and sclera clear  Chest/Lung: CTAB, no wheezes, rales, or rhonchi  Heart: Regular rate, regular rhythm. Normal S1/S2. No murmurs, rubs, or gallops.  Abdomen: Soft, nontender, nondistended. Normal bowel sounds.  Extremites: 2+ peripheral pulses B/L. No clubbing, cyanosis, or edema.  Neurology: A&O x3, no focal deficits    ============================I/O===========================   I&O's Detail    03 Jun 2021 07:01  -  04 Jun 2021 07:00  --------------------------------------------------------  IN:    Bumetanide: 240 mL    Enteral Tube Flush: 300 mL    Free Water: 600 mL    IV PiggyBack: 320 mL    Nepro with Carb Steady: 800 mL  Total IN: 2260 mL    OUT:    Indwelling Catheter - Urethral (mL): 2140 mL    Rectal Tube (mL): 600 mL    Stool (mL): 100 mL  Total OUT: 2840 mL    Total NET: -580 mL      04 Jun 2021 07:01  -  04 Jun 2021 20:08  --------------------------------------------------------  IN:    Bumetanide: 50 mL    Bumetanide: 30 mL    Enteral Tube Flush: 300 mL    Free Water: 300 mL    IV PiggyBack: 350 mL    IV PiggyBack: 200 mL    Nepro with Carb Steady: 440 mL    Norepinephrine: 49.7 mL  Total IN: 1719.7 mL    OUT:    Indwelling Catheter - Urethral (mL): 1097 mL    Stool (mL): 50 mL  Total OUT: 1147 mL    Total NET: 572.7 mL      ============================ LABS =========================             10.4   13.09 )-----------( 135      ( 04 Jun 2021 16:17 )             33.6     06-04    146<H>  |  100  |  82<H>  ----------------------------<  158<H>  3.7   |  34<H>  |  3.02<H>    Ca    9.2      04 Jun 2021 16:17  Phos  2.6     06-04  Mg     2.1     06-04    TPro  6.8  /  Alb  2.7<L>  /  TBili  3.7<H>  /  DBili  x   /  AST  46<H>  /  ALT  23  /  AlkPhos  221<H>  06-04    LIVER FUNCTIONS - ( 04 Jun 2021 16:17 )  Alb: 2.7 g/dL / Pro: 6.8 g/dL / ALK PHOS: 221 U/L / ALT: 23 U/L / AST: 46 U/L / GGT: x           PT/INR - ( 03 Jun 2021 14:06 )   PT: 14.0 sec;   INR: 1.18 ratio    PTT - ( 03 Jun 2021 14:06 )  PTT:67.5 sec    ABG - ( 04 Jun 2021 03:36 )  pH, Arterial: 7.45  pH, Blood: x     /  pCO2: 56    /  pO2: 80    / HCO3: 38    / Base Excess: 12.6  /  SaO2: 97          ======================Micro/Rad/Cardio=================  Telemetry: Reviewed   EKG: Reviewed  CXR: Reviewed  Culture: Reviewed   Echo: Reviewed  Cath: Reviewed  ======================================================  PAST MEDICAL & SURGICAL HISTORY:  Afib    COPD (chronic obstructive pulmonary disease)    CHF (congestive heart failure)    Dementia      ====================ASSESSMENT ==============  5/26 Txfered to [facility] with concern of pericardial effusion, hypotension and NIC.  Atrial Fibrillation  Acute Kidney Injury  Anemia  Thrombocytopenia   Severe pulmonary hypertension     Plan:  ====================== NEUROLOGY=====================  AMS  - off sedation since 5/30, has been without purposeful movements. On exam 6/03, pt opened his eyes to voice, appeared to be tracking with eyes, moved  b/l UE spontaneously, withdraws to pain  - vEEG; generalized non-specific slowing, no epileptiform activity.  - MRI brain performed tonight f/up official read  - correction of metabolic derangements; s/p lactulose w/ no improvement, ammonia level wnl. BUN 70s now in 80s renal function  - Continue to monitor neuro status per protocol    ==================== RESPIRATORY======================  Acute Hypercapnic Respiratory Failure req Intubation   - On full vent support, minimal settings 350/12/5/30%   - daily sbt trials  - trend ABGs    pulmonary HTN, likely group 2  - continue diuresis bumex gtt @ 2  - will need repeat RHC when euvolemic   - pulmonology following appreciate recs    Mechanical Ventilation:  Mode: CPAP with PS  FiO2: 30  PEEP: 5  PS: 15  MAP: 10  PIP: 22    ====================CARDIOVASCULAR==================  HFpEF with severe TR and severely decr RVSF likley 2/2 pulm HTN   - C/w Bumex gtt @2mg/hr. Goal CVP 10-12  - stable off levo since 2AM 6/3. Sildenafil has been on hold while requiring pressors, consider restarting in AM if HD stable as will be off pressors for > 24hrs.    Chronic Afib  - rate controlled, continue to monitor  - off hep gtt i/s/o recent bleed & hemorrhagic shock, cont sq heparin    ===================HEMATOLOGIC/ONC ===================  Hemorrhagic Shock, resolved   - RFA s/p removal of Beto with large hematoma and active bleeding i/s/o supratherapeutic ptt   - MTP s/p 11U PRBC, 8U plasma, 2 plts. Protamine x2 + 2.5L IVF resuscitated   - right groin stable with positive pulses  - Monitor H/H    VTE ppx   - sq heparin    heparin   Injectable 5000 Unit(s) SubCutaneous every 8 hours    ===================== RENAL =========================  Non-oliguric NIC with creatinine peak of 4.49 currently 3.02 Baseline 0.97 in 02/2021   - adequate uop on bumex gtt. SCr downtrending.  - Replete lytes PRN. Keep K> 4 and Mg >2.   - avoid nephrotoxic agents  - Nephro following. No urgent needs for HD at this time   - Strict I/Os  - Continue Bumex     metabolic alkalosis   - s/p diamox x1 6/03. Trend ABG    hypernatremia  - continue FW and decrease as appropriate. Na 148 > 147-> 146 today    buMETAnide Infusion 1 mG/Hr (5 mL/Hr) IV Continuous <Continuous>    ==================== GASTROINTESTINAL===================  No acitve issues  - Diet: NPO w/ tube feed - Nepro with Carb steady goal 40cc/hr.   - continue Protonix for stress ulcer prophylaxis.     pantoprazole  Injectable 40 milliGRAM(s) IV Push daily  potassium chloride  10 mEq/50 mL IVPB 10 milliEquivalent(s) IV Intermittent every 1 hour  sodium chloride 0.9%. 1000 milliLiter(s) (10 mL/Hr) IV Continuous <Continuous>    =======================    ENDOCRINE  =====================  Bgl controlled  - monitor glucose for need to initiate sliding scale.     Hx of HLD  - C/w Lipitor 40mg QHS    atorvastatin 40 milliGRAM(s) Oral at bedtime    ========================INFECTIOUS DISEASE================  No active issues  - Is s/p course of broad spectrum abx i/s/o hypothermia, all cultures ngtd, remains normothermic   - Febrile, 100.6F, no leukocytosis, trend and monitor off abx  - BCX x2 5/26: NGTD, UCx 5/26: no growth. S/P MRSA swab 5/27: neg.   - restarted IV Cefepime     cefepime   IVPB          Patient requires continuous monitoring with bedside rhythm monitoring, pulse ox monitoring, and intermittent blood gas analysis. Care plan discussed with ICU care team. Patient remained critical and at risk for life threatening decompensation.  Patient seen, examined and plan discussed with CCU team during rounds.     I have personally provided 35 minutes of critical care time excluding time spent on separate procedures.    By signing my name below, I, Mirtha Brody, attest that this documentation has been prepared under the direction and in the presence of ALLISON Herrera  Electronically signed: Olivia Elam, 06-04-21 @ 20:08    I, ALLISON Herrera, personally performed the services described in this documentation. all medical record entries made by the gayleibe were at my direction and in my presence. I have reviewed the chart and agree that the record reflects my personal performance and is accurate and complete  Electronically signed: ALLISON Herrera       SANJIV ROBERTS  MRN-[id number]  Patient is a 75y old  Female who presents with a chief complaint of Transfer from [facility] with pericardial effusion, NIC and hypotension (04 Jun 2021 11:40)    HPI:  75y.o female with PMHx of CHFpEF, Severe TR with Right CHF and severe pHTN, COPD on home 02, HTN, HLD, Chronic Afib (on Eliquis), OA, CKD3, DM, right eye cataract who was brought in by EMS to [facility] on 5/23/21 for AMS. pt A&O x1, per daughter patient with generalized weakness and lethargy since since 5/22. Pt had recent in Jan 2021 with CP and NIC. Per chart on arrival to ED pt was hypotensive, obtunded. Chest CT with large pericardial effusion (increased since Jan 2021)- per thoracic surgery consult no indication for pericardial window, pHTN, cirrhotic liver with small ascites, ECHO EF 55% dilated RV with decreased function, severe TN, pHTN, NIC (per renal consult no acute indication for HD), venous duplex NG for DVTs, Head CT revealed chronic involutional and white matter changes, no acute intracranial process, NIC(SCr peaked at 4.6) with  hyperkalemia (5.8) requiring lokelma and insulin.  Of note daughter states over past month patient more and more confused ? dementia and pt is legally blind. Daughter states pt HAS NOT RECEIVED COVID VACCINE as she has been sick these past few months.     5/26 Patient transferred to [facility] with concern of pericardial effusion, hypotension and NIC. On arrival patient SBP 80's, A&O to first name but very lethargic.  (26 May 2021 10:16)      Hospital Course:  5/26 Patient transferred to [facility] with concern of pericardial effusion, hypotension and NIC.   5/26 intubated for hypercarbic respiratory distressed. Transferred from CTU to CICU as no need for pericardiocentesis. Briefly on pressors prior to intubation and since weaned off. RIJ cordis placed- unable to float swan- coiling in RV. RV pressures 50s /10s. Start sildenafil 20 tid. start bumex gtt   5/27 S/P RHC with [place] emmanuel catheter placed.  5/28 MTP called for R Femoral A bleed from previous A line site s/p 11U PRBC, 8 plasma, 2 plts.   5/29 started IV Levophed, sildenafil dc'ed  6/02 Dc'ed Cefepime  6/04 fever of 100.6F, restarted Cefepime     24 HOUR EVENTS:  - MRI brain negative for acute pathology  - Bumex gtt decrease from 2 > 1 mg/hr  - febrile, started on empiric cefepime/vanc by level    REVIEW OF SYSTEMS:   unable to obtain patient intubated w/ AMS    ICU Vital Signs Last 24 Hrs  T(C): 37.9 (04 Jun 2021 19:00), Max: 38.1 (04 Jun 2021 12:00)  T(F): 100.2 (04 Jun 2021 19:00), Max: 100.6 (04 Jun 2021 12:00)  HR: 94 (04 Jun 2021 19:45) (88 - 110)  ABP: 112/52 (04 Jun 2021 19:45) (82/40 - 140/66)  ABP(mean): 72 (04 Jun 2021 19:45) (54 - 92)  RR: 12 (04 Jun 2021 19:45) (7 - 26)  SpO2: 100% (04 Jun 2021 19:45) (95% - 100%)    Mode: CPAP with PS, FiO2: 30, PEEP: 5  CVP(mm Hg): 18 (06-04-21 @ 19:45) (7 - 22)    I&O's Summary    03 Jun 2021 07:01  -  04 Jun 2021 07:00  --------------------------------------------------------  IN: 2260 mL / OUT: 2840 mL / NET: -580 mL    04 Jun 2021 07:01  -  04 Jun 2021 20:08  --------------------------------------------------------  IN: 1719.7 mL / OUT: 1147 mL / NET: 572.7 mL      PHYSICAL EXAM:   General: No acute distress  Eyes: EOMI, PERRLA, conjunctiva and sclera clear  Chest/Lung: CTAB, no wheezes, rales, or rhonchi  Heart: Regular rate, regular rhythm. Normal S1/S2. No murmurs, rubs, or gallops.  Abdomen: Soft, nontender, nondistended. Normal bowel sounds.  Extremites: 2+ peripheral pulses B/L. No clubbing, cyanosis, or edema.  Neurology: A&O x3, no focal deficits    ============================I/O===========================   I&O's Detail    03 Jun 2021 07:01  -  04 Jun 2021 07:00  --------------------------------------------------------  IN:    Bumetanide: 240 mL    Enteral Tube Flush: 300 mL    Free Water: 600 mL    IV PiggyBack: 320 mL    Nepro with Carb Steady: 800 mL  Total IN: 2260 mL    OUT:    Indwelling Catheter - Urethral (mL): 2140 mL    Rectal Tube (mL): 600 mL    Stool (mL): 100 mL  Total OUT: 2840 mL    Total NET: -580 mL      04 Jun 2021 07:01  -  04 Jun 2021 20:08  --------------------------------------------------------  IN:    Bumetanide: 50 mL    Bumetanide: 30 mL    Enteral Tube Flush: 300 mL    Free Water: 300 mL    IV PiggyBack: 350 mL    IV PiggyBack: 200 mL    Nepro with Carb Steady: 440 mL    Norepinephrine: 49.7 mL  Total IN: 1719.7 mL    OUT:    Indwelling Catheter - Urethral (mL): 1097 mL    Stool (mL): 50 mL  Total OUT: 1147 mL    Total NET: 572.7 mL      ============================ LABS =========================             10.4   13.09 )-----------( 135      ( 04 Jun 2021 16:17 )             33.6     06-04    146<H>  |  100  |  82<H>  ----------------------------<  158<H>  3.7   |  34<H>  |  3.02<H>    Ca    9.2      04 Jun 2021 16:17  Phos  2.6     06-04  Mg     2.1     06-04    TPro  6.8  /  Alb  2.7<L>  /  TBili  3.7<H>  /  DBili  x   /  AST  46<H>  /  ALT  23  /  AlkPhos  221<H>  06-04    LIVER FUNCTIONS - ( 04 Jun 2021 16:17 )  Alb: 2.7 g/dL / Pro: 6.8 g/dL / ALK PHOS: 221 U/L / ALT: 23 U/L / AST: 46 U/L / GGT: x           PT/INR - ( 03 Jun 2021 14:06 )   PT: 14.0 sec;   INR: 1.18 ratio    PTT - ( 03 Jun 2021 14:06 )  PTT:67.5 sec    ABG - ( 04 Jun 2021 03:36 )  pH, Arterial: 7.45  pH, Blood: x     /  pCO2: 56    /  pO2: 80    / HCO3: 38    / Base Excess: 12.6  /  SaO2: 97          ======================Micro/Rad/Cardio=================  Telemetry: Reviewed   EKG: Reviewed  CXR: Reviewed  Culture: Reviewed   Echo: Reviewed  Cath: Reviewed  ======================================================  PAST MEDICAL & SURGICAL HISTORY:  Afib    COPD (chronic obstructive pulmonary disease)    CHF (congestive heart failure)    Dementia      ====================ASSESSMENT ==============  5/26 Txfered to [facility] with concern of pericardial effusion, hypotension and NIC.  Atrial Fibrillation  Acute Kidney Injury  Anemia  Thrombocytopenia   Severe pulmonary hypertension     Plan:  ====================== NEUROLOGY=====================  AMS, likely metabolic encephalopathy  - off sedation since 5/30, has been without purposeful movements. Neuro exam unchanged from yesterday, opening eyes to voice, occasionally tracking with eyes, spontaneous non purposeful movements b/l UE, withdraws b/l UE and grimaces to pain. Does not follow commands  - vEEG; generalized non-specific slowing, no epileptiform activity.  - MRI brain negative for acute pathology  - correction of metabolic derangements; continues to be uremic w/ BUN now in the 80s, f/up renal for consideration of HD to clear uremia  - Continue to monitor neuro status per protocol  - appreciate neuro recs    ==================== RESPIRATORY======================  Acute Hypercapnic Respiratory Failure req Intubation   - On full vent support, minimal settings 350/12/5/30%, unable to extubate 2/2 mental status  - daily sbt trials as tolerated, full vent overnight  - trend ABGs    pulmonary HTN, likely group 2  - continue diuresis bumex gtt @ 1 mg/hr  - will need repeat RHC when euvolemic   - pulmonology following appreciate recs    ====================CARDIOVASCULAR==================  HFpEF with severe TR and severely decr RVSF likley 2/2 pulm HTN   - C/w Bumex gtt @ 1mg/hr. Target net even and CVP 10-12  - trend perfusion labs; lactate, scvo2, lfts  - ScVO2 remains in the 70s; 78%, lactate 0.9.    Chronic Afib  - rate controlled, continue to monitor  - off hep gtt i/s/o recent bleed & hemorrhagic shock, cont sq heparin    ===================HEMATOLOGIC/ONC ===================  Hemorrhagic Shock, resolved   - RFA s/p removal of Beto with large hematoma and active bleeding i/s/o supratherapeutic ptt   - MTP s/p 11U PRBC, 8U plasma, 2 plts. Protamine x2 + 2.5L IVF resuscitated   - right groin stable with positive pulses  - Monitor H/H    VTE ppx   - sq heparin    ===================== RENAL =========================  Non-oliguric NIC with creatinine peak of 4.49 currently 3.02 Baseline 0.97 in 02/2021   - trend SCr, BMP q12  - adequate uop on bumex gtt. Strict I/Os  - Nephro following. No urgent needs for HD at this time, f/up recs regarding HD to clear uremia in setting of encephalopathy  - Replete lytes PRN. Keep K> 4 and Mg >2.   - avoid nephrotoxic agents    hypernatremia  - continue FW and decrease volume as appropriate. Na 148 > 146 today    ==================== GASTROINTESTINAL===================  No active issues  - Diet: NPO w/ tube feed - Nepro with Carb steady goal 40cc/hr.   - continue Protonix for stress ulcer prophylaxis.     =======================    ENDOCRINE  =====================  Bgl controlled  - monitor glucose for need to initiate sliding scale.     Hx of HLD  - C/w Lipitor 40mg QHS    ========================INFECTIOUS DISEASE================  Febrile today to 100.6  - persistently febrile today, cultures sent and started on empiric cefepime and vanc by level  - vanc level due at 2pm 6/5  - WBC 11>13, trend.  - BCX x2 5/26: NGTD, UCx 5/26: no growth. S/P MRSA swab 5/27: neg.       Patient requires continuous monitoring with bedside rhythm monitoring, pulse ox monitoring, and intermittent blood gas analysis. Care plan discussed with ICU care team. Patient remained critical and at risk for life threatening decompensation.  Patient seen, examined and plan discussed with CCU team during rounds.     I have personally provided 35 minutes of critical care time excluding time spent on separate procedures.    By signing my name below, I, Mirtha Brody, attest that this documentation has been prepared under the direction and in the presence of ALLISON Herrera  Electronically signed: Olivia Elam, 06-04-21 @ 20:08    I, ALLISON Herrera, personally performed the services described in this documentation. all medical record entries made by the gayleibe were at my direction and in my presence. I have reviewed the chart and agree that the record reflects my personal performance and is accurate and complete  Electronically signed: ALLISON Herrera

## 2021-06-04 NOTE — PROGRESS NOTE ADULT - SUBJECTIVE AND OBJECTIVE BOX
CHIEF COMPLAINT:    Interval Events:    REVIEW OF SYSTEMS:  Constitutional: [ ] negative [ ] fevers [ ] chills [ ] weight loss [ ] weight gain  HEENT: [ ] negative [ ] dry eyes [ ] eye irritation [ ] postnasal drip [ ] nasal congestion  CV: [ ] negative  [ ] chest pain [ ] orthopnea [ ] palpitations [ ] murmur  Resp: [ ] negative [ ] cough [ ] shortness of breath [ ] dyspnea [ ] wheezing [ ] sputum [ ] hemoptysis  GI: [ ] negative [ ] nausea [ ] vomiting [ ] diarrhea [ ] constipation [ ] abd pain [ ] dysphagia   : [ ] negative [ ] dysuria [ ] nocturia [ ] hematuria [ ] increased urinary frequency  Musculoskeletal: [ ] negative [ ] back pain [ ] myalgias [ ] arthralgias [ ] fracture  Skin: [ ] negative [ ] rash [ ] itch  Neurological: [ ] negative [ ] headache [ ] dizziness [ ] syncope [ ] weakness [ ] numbness  Psychiatric: [ ] negative [ ] anxiety [ ] depression  Endocrine: [ ] negative [ ] diabetes [ ] thyroid problem  Hematologic/Lymphatic: [ ] negative [ ] anemia [ ] bleeding problem  Allergic/Immunologic: [ ] negative [ ] itchy eyes [ ] nasal discharge [ ] hives [ ] angioedema  [ ] All other systems negative  [ ] Unable to assess ROS because ________    OBJECTIVE:  ICU Vital Signs Last 24 Hrs  T(C): 37.6 (04 Jun 2021 05:00), Max: 37.9 (03 Jun 2021 19:00)  T(F): 99.7 (04 Jun 2021 05:00), Max: 100.2 (03 Jun 2021 19:00)  HR: 98 (04 Jun 2021 06:00) (88 - 110)  BP: --  BP(mean): --  ABP: 90/46 (04 Jun 2021 06:00) (90/46 - 140/66)  ABP(mean): 62 (04 Jun 2021 06:00) (52 - 92)  RR: 24 (04 Jun 2021 06:00) (15 - 24)  SpO2: 99% (04 Jun 2021 06:00) (96% - 100%)    Mode: AC/ CMV (Assist Control/ Continuous Mandatory Ventilation), RR (machine): 12, TV (machine): 350, FiO2: 30, PEEP: 5, ITime: 0.76, MAP: 13, PIP: 28    06-02 @ 07:01 - 06-03 @ 07:00  --------------------------------------------------------  IN: 4148.7 mL / OUT: 3530 mL / NET: 618.7 mL    06-03 @ 07:01  - 06-04 @ 06:56  --------------------------------------------------------  IN: 2110 mL / OUT: 2840 mL / NET: -730 mL      CAPILLARY BLOOD GLUCOSE          PHYSICAL EXAM:  General:   HEENT:   Lymph Nodes:  Neck:   Respiratory:   Cardiovascular:   Abdomen:   Extremities:   Skin:   Neurological:  Psychiatry:    LINES:    HOSPITAL MEDICATIONS:  Standing Meds:  atorvastatin 40 milliGRAM(s) Oral at bedtime  buMETAnide Infusion 2 mG/Hr IV Continuous <Continuous>  chlorhexidine 0.12% Liquid 15 milliLiter(s) Oral Mucosa every 12 hours  chlorhexidine 2% Cloths 1 Application(s) Topical <User Schedule>  heparin   Injectable 5000 Unit(s) SubCutaneous every 8 hours  pantoprazole  Injectable 40 milliGRAM(s) IV Push daily  potassium chloride  20 mEq/100 mL IVPB 20 milliEquivalent(s) IV Intermittent every 2 hours  sodium chloride 0.9%. 1000 milliLiter(s) IV Continuous <Continuous>      PRN Meds:      LABS:                        11.0   11.14 )-----------( 125      ( 04 Jun 2021 03:54 )             35.5     Hgb Trend: 11.0<--, 11.6<--, 11.1<--, 11.3<--, 11.6<--  06-04    148<H>  |  100  |  81<H>  ----------------------------<  94  3.0<L>   |  32<H>  |  3.00<H>    Ca    9.3      04 Jun 2021 03:54  Phos  3.2     06-04  Mg     2.1     06-04    TPro  6.7  /  Alb  2.7<L>  /  TBili  3.4<H>  /  DBili  x   /  AST  39  /  ALT  25  /  AlkPhos  220<H>  06-04    Creatinine Trend: 3.00<--, 2.86<--, 2.95<--, 2.94<--, 3.02<--, 2.95<--  PT/INR - ( 03 Jun 2021 14:06 )   PT: 14.0 sec;   INR: 1.18 ratio         PTT - ( 03 Jun 2021 14:06 )  PTT:67.5 sec    Arterial Blood Gas:  06-04 @ 03:36  7.45/56/80/38/97/12.6  ABG lactate: --  Arterial Blood Gas:  06-03 @ 05:02  7.46/52/138/36/99/11.2  ABG lactate: --  Arterial Blood Gas:  06-03 @ 01:53  7.44/58/221/39/100/13.0  ABG lactate: --  Arterial Blood Gas:  06-02 @ 15:52  7.45/60/122/41/99/14.3  ABG lactate: --        MICROBIOLOGY:     RADIOLOGY:  [ ] Reviewed and interpreted by me    EKG:

## 2021-06-04 NOTE — PROGRESS NOTE ADULT - ASSESSMENT
75F with a pmhx of CHFpEF, Severe TR with Right CHF and severe pHTN, COPD on home 02, HTN, HLD, Chronic Afib (on Eliquis), OA, CKD3, DM, right eye cataract who was brought in by EMS to [facility] on 5/23/21 for AMS, found to have a large pericardial effusion w/o tamponade physiology, without drainable window, intubated (5/26) for acute hypercarbic respiratory failure and worsening AMS, transferred to CICU for hypotension and NIC on CKD in undifferentiated shock, course c/b expanding R thigh hematoma, s/p MTP, with ratio predom targeted towards FFP, s/p protamine for hep reversal.    Neuro: AMS metabolic/uremic encephalopathy given CT showing cirrhosis  -per family, baseline pt performs ADLs independently, sometimes forgetful however functional  -pt off sedation now for > 72 hours, withdraws to pain, opens eyes to voice  -CTH 5/31 negative for acute pathology  -s/p lactulose x2 days w/o improvement of mental status  -vEEG 6/1-2: no epileptic wave forms, moderate generalized slowing  -MR Brain w/o contrast 6/4: no acute pathology, global cerebral atrophy    Resp: intubated for hypercarbic respiratory failure, severe pulm HTN  -severe pulmonary hypertension with RV dysfunction, high pulmonary artery pressures, unclear etiology  -Pt tolerates CPAP 10/5 or 15/5, c/w exercising patient during day, back on vent at night  -Currently on minimal vent settings (AC, 350, 12, 5, 30%), f/u ABGs  -f/u pulm recs for pulm HTN (Dr. Patel)  -may need to start sildenafil once completely and stably off pressors    CV:  (5/26): TTE: EF 55%, RVH with severe RVSD, severe pulm HTN, large pericardial effusion 3.4cm @ largest diameter, adjacent to RA  ventricular systolic function.  -has been off levo > 24 hours  -monitor CVPs goal 12-13  -c/w atorvastatin 40mg qhs  -Hold sildenefil until stably off pressors d and awaiting pulm recs     GI:  -Diet: Continue Nepro at 40 and will add prosource  -Ammonia level 41 on 6/1  -Stress ulcer ppx: continue PPI  -s/p lactulose with diarrhea  - RUQ US ordered for T bili 3.4 (slowly rising), elevated Alk phos, and GGT 220s  -(5/26): CT a/p noncon: mild contoured liver as seen in cirrhosis    Heme:  -Thrombocytopenia  -(5/28): Acute drop in H/H 10->8,  with expanding thigh hematoma 35->24 -> s/p MTP 11 pRBCs , 8 U FFP, 2 U PLTS and protamine x 2 due to supratherapeutic pTT > 200 --> 28  -Hemoglobin has remained stable now .   -[ ] (5/28): VA duplex arterial and venous RLE: no pseudo  - 6/2 DVT studies negative  -DVT ppx: scd, will start heparin SQ  -check coags weekly unless signs of bleeding    Renal/  - metabolic alkalosis with respiratory compensation s/p diamox x1  - Continue to monitor I/Os, BUN/Creatinine, and urine output with goal net even  -hypernatremia improving: free water deficit 2.5L, c/w free water 300 cc q6h   -CVP 12-13 continue bumex gtt @ 1  - Replete lytes PRN. Keep K> 4 and Mg >2    Endo:  - Monitor blood sugar 100-130    ID:  - Tmax 100.5 on 6/4  - sent for BCx, UA, UCx, Combicath sputum  - slowly uptrending WBC to 11 from 9  - monitor off abx for now, low threshold to broadly cover (VAP?) if decompensates  - watery diarrhea s/p lactulose, possible send off C. diff 6/5    CODE STATUS:    Full code

## 2021-06-04 NOTE — PROGRESS NOTE ADULT - SUBJECTIVE AND OBJECTIVE BOX
RICKY ROBERTSIE  75y  Female    Patient is a 75y old  Female who presents with a chief complaint of Transfer from [facility] with pericardial effusion, NIC and hypotension (04 Jun 2021 08:41)    vented and sedated on bumex drip with good urine output.    PAST MEDICAL & SURGICAL HISTORY:  Afib    COPD (chronic obstructive pulmonary disease)    CHF (congestive heart failure)    Dementia            PHYSICAL EXAM:    T(C): 38 (06-04-21 @ 08:00), Max: 38 (06-04-21 @ 08:00)  HR: 100 (06-04-21 @ 11:00) (88 - 110)  BP: --  RR: 19 (06-04-21 @ 11:00) (15 - 26)  SpO2: 100% (06-04-21 @ 11:00) (95% - 100%)  Wt(kg): --    I&O's Detail    03 Jun 2021 07:01  -  04 Jun 2021 07:00  --------------------------------------------------------  IN:    Bumetanide: 240 mL    Enteral Tube Flush: 300 mL    Free Water: 600 mL    IV PiggyBack: 320 mL    Nepro with Carb Steady: 800 mL  Total IN: 2260 mL    OUT:    Indwelling Catheter - Urethral (mL): 2140 mL    Rectal Tube (mL): 600 mL    Stool (mL): 100 mL  Total OUT: 2840 mL    Total NET: -580 mL      04 Jun 2021 07:01  -  04 Jun 2021 11:40  --------------------------------------------------------  IN:    Bumetanide: 40 mL    IV PiggyBack: 200 mL    Nepro with Carb Steady: 160 mL  Total IN: 400 mL    OUT:    Indwelling Catheter - Urethral (mL): 630 mL  Total OUT: 630 mL    Total NET: -230 mL          Respiratory: clear anteriorly, decreased BS at bases  Cardiovascular: S1 S2  Gastrointestinal: soft NT ND +BS  Extremities: edema   Neuro: sedated    MEDICATIONS  (STANDING):  atorvastatin 40 milliGRAM(s) Oral at bedtime  buMETAnide Infusion 1 mG/Hr (5 mL/Hr) IV Continuous <Continuous>  chlorhexidine 0.12% Liquid 15 milliLiter(s) Oral Mucosa every 12 hours  chlorhexidine 2% Cloths 1 Application(s) Topical <User Schedule>  heparin   Injectable 5000 Unit(s) SubCutaneous every 8 hours  pantoprazole  Injectable 40 milliGRAM(s) IV Push daily  sodium chloride 0.9%. 1000 milliLiter(s) (10 mL/Hr) IV Continuous <Continuous>    MEDICATIONS  (PRN):                            11.0   11.14 )-----------( 125      ( 04 Jun 2021 03:54 )             35.5       06-04    148<H>  |  100  |  81<H>  ----------------------------<  94  3.0<L>   |  32<H>  |  3.00<H>    Ca    9.3      04 Jun 2021 03:54  Phos  3.2     06-04  Mg     2.1     06-04    TPro  6.7  /  Alb  2.7<L>  /  TBili  3.4<H>  /  DBili  x   /  AST  39  /  ALT  25  /  AlkPhos  220<H>  06-04      Creatinine Trend: Creatinine Trend: 3.00<--, 2.86<--, 2.95<--, 2.94<--, 3.02<--, 2.95<--

## 2021-06-04 NOTE — CHART NOTE - NSCHARTNOTEFT_GEN_A_CORE
Patient was seen at bedside this AM. Patient was noted to be more awake today.   Patient had Brain MRI w/o contrast noted to show:  "No diffusion abnormality to suggest acute infarct. No acute intracranial hemorrhage, mass effect, or midline shift. Disproportionate prominence of the lateral and third ventricles without crowding of sulci or transependymal flow to suggest hydrocephalus consistent with probable central atrophy."  EEG: Moderate nonspecific diffuse or multifocal cerebral dysfunction. No epileptiform pattern or seizure seen.  Neuro exam:   MS: Awake to verbal stimulation. Does not follow commands.   CN: EOMI. PERRL. Pupils symmetric b/l. BTT noted b/l.   Cough/Gag/Corneals intact.   Spontaneous movement noted it the UE b/l.   W/d to pain the UE b/l. Minimal movement noted when noxious stimuli applied to LE b/l. However, patient grimaces to pain.   Coordination/gait deferred     Impression/Plan:  AMS likely due to metabolic/uremic encephalopathy   Recommendations:  MRI brain was negative for any acute abnormalities   EEG was negative for any epileptiform discharges   Defer rest of care to primary team     There is no further neurological workup needed as this is likely metabolic etiology. Spoke with primary team as well at bedside.     Please do not hesitate to contact us for any further questions or concerns. Can call [employer] [phone].     Case to be discussed with attending.    Arnol Rosado MD PGY 2   Neurology

## 2021-06-04 NOTE — PROGRESS NOTE ADULT - PROBLEM SELECTOR PLAN 1
- Reduce Bumex infusion to allow net fluid balance to be positive 500-1L; target CVP 10-12; CVP this AM 12  - Trend hemodynamics and perfusion labs (LFTs, lactate, ScV02); please send ScV02 from R IJ today  - extubation once neuro issues resolved  - Would not drain pericardial effusion as it is likely compensatory in setting of RV failure  - eventual Cardiac MRI to further investigate report of thickened pericardium on TTE

## 2021-06-04 NOTE — PROGRESS NOTE ADULT - ASSESSMENT
75y.o female with PMHx of CHFpEF, Severe TR with Right CHF and severe pHTN, COPD, HTN, HLD, Chronic Afib (on Eliquis), OA, CKD3, DM, now with VDRF, CHF exacerbation and NIC oN CKD.  Medications reviewed. Renal indices are stable   continue the bumex drip for obligatory diuresis and supplement potassium as per the CCU team.

## 2021-06-04 NOTE — PROGRESS NOTE ADULT - SUBJECTIVE AND OBJECTIVE BOX
Subjective:    Medications:  atorvastatin 40 milliGRAM(s) Oral at bedtime  buMETAnide Infusion 2 mG/Hr IV Continuous <Continuous>  chlorhexidine 0.12% Liquid 15 milliLiter(s) Oral Mucosa every 12 hours  chlorhexidine 2% Cloths 1 Application(s) Topical <User Schedule>  heparin   Injectable 5000 Unit(s) SubCutaneous every 8 hours  pantoprazole  Injectable 40 milliGRAM(s) IV Push daily  potassium chloride  20 mEq/100 mL IVPB 20 milliEquivalent(s) IV Intermittent every 2 hours  sodium chloride 0.9%. 1000 milliLiter(s) IV Continuous <Continuous>      Physical Exam:    Vitals:  Vital Signs Last 24 Hours  T(C): 38 (21 @ 08:00), Max: 38 (21 @ 08:00)  HR: 108 (21 @ 08:00) (88 - 110)  BP: --  RR: 26 (21 @ 08:00) (15 - 26)  SpO2: 95% (21 @ 08:00) (95% - 100%)    Weight in k.9 (:00)    I&O's Summary    2021 07: 07:00  --------------------------------------------------------  IN: 2260 mL / OUT: 2840 mL / NET: -580 mL    2021 07: 08:42  --------------------------------------------------------  IN: 150 mL / OUT: 260 mL / NET: -110 mL        Tele:    General: No distress. Comfortable.  HEENT: EOM intact.  Neck: Neck supple. JVP not elevated. No masses  Chest: Clear to auscultation bilaterally  CV: Normal S1 and S2. No murmurs, rub, or gallops. Radial pulses normal.  Abdomen: Soft, non-distended, non-tender  Skin: No rashes or skin breakdown  Neurology: Alert and oriented times three. Sensation intact  Psych: Affect normal    Labs:                        11.0   11.14 )-----------( 125      ( 2021 03:54 )             35.5     06-04    148<H>  |  100  |  81<H>  ----------------------------<  94  3.0<L>   |  32<H>  |  3.00<H>    Ca    9.3      2021 03:54  Phos  3.2     06-04  Mg     2.1     06-04    TPro  6.7  /  Alb  2.7<L>  /  TBili  3.4<H>  /  DBili  x   /  AST  39  /  ALT  25  /  AlkPhos  220<H>  06-04    PT/INR - ( 2021 14:06 )   PT: 14.0 sec;   INR: 1.18 ratio         PTT - ( 2021 14:06 )  PTT:67.5 sec               Subjective:  - ongoing encephalopathy s/p EEg revealing diffuse cerebral dysfunction and brain MRI with central atrophy  - remains intubated min vent settings <-> CPAP  - febrile to 100.4F this AM    Medications:  atorvastatin 40 milliGRAM(s) Oral at bedtime  buMETAnide Infusion 2 mG/Hr IV Continuous <Continuous>  chlorhexidine 0.12% Liquid 15 milliLiter(s) Oral Mucosa every 12 hours  chlorhexidine 2% Cloths 1 Application(s) Topical <User Schedule>  heparin   Injectable 5000 Unit(s) SubCutaneous every 8 hours  pantoprazole  Injectable 40 milliGRAM(s) IV Push daily  potassium chloride  20 mEq/100 mL IVPB 20 milliEquivalent(s) IV Intermittent every 2 hours  sodium chloride 0.9%. 1000 milliLiter(s) IV Continuous <Continuous>      Physical Exam:    ICU Vital Signs Last 24 Hrs  T(C): 38 (2021 08:00), Max: 38 (2021 08:00)  T(F): 100.4 (2021 08:00), Max: 100.4 (2021 08:00)  HR: 100 (2021 11:00) (88 - 110)  BP: --  BP(mean): --  ABP: 90/46 (2021 11:00) (84/44 - 140/66)  ABP(mean): 62 (2021 11:00) (54 - 92)  RR: 19 (2021 11:00) (15 - 26)  SpO2: 100% (2021 11:00) (95% - 100%)      Weight in k.9 ( @ 04:00)    I&O's Summary    2021 07:01  -  2021 07:00  --------------------------------------------------------  IN: 2260 mL / OUT: 2840 mL / NET: -580 mL    2021 07:01  -  2021 08:42  --------------------------------------------------------  IN: 150 mL / OUT: 260 mL / NET: -110 mL    Tele: SR 90-100s, frequent PVCs    General: Intubated  HEENT: PERRL  Neck: Neck supple. JVD 10-12 cm H20, large V waves  Chest: breath sounds heard bilaterally   CV: Normal S1 and S2. + RV heave. II/VI SM. Radial pulses normal. +1 BLE edema  Abdomen: Soft, non-distended, non-tender  Skin: No rashes or skin breakdown noted. Warm peripherally.  Neurology: tracks with eyes. Does not follow commands  Psych: JESSICA    Labs:                        11.0   11.14 )-----------( 125      ( 2021 03:54 )             35.5     06-04    148<H>  |  100  |  81<H>  ----------------------------<  94  3.0<L>   |  32<H>  |  3.00<H>    Ca    9.3      2021 03:54  Phos  3.2     06-04  Mg     2.1     06-04    TPro  6.7  /  Alb  2.7<L>  /  TBili  3.4<H>  /  DBili  x   /  AST  39  /  ALT  25  /  AlkPhos  220<H>  06-04    PT/INR - ( 2021 14:06 )   PT: 14.0 sec;   INR: 1.18 ratio         PTT - ( 2021 14:06 )  PTT:67.5 sec

## 2021-06-04 NOTE — PROGRESS NOTE ADULT - ATTENDING COMMENTS
no improvement in mental status. remains intubated. not following commands.   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 MRI: No evidence for stroke or bleed.  meds: bumex 2/hr, statin, PPI, s/q heparin. diamox. intermittent pressors   CVP 15-17, CVP sat 70s 2 days ago. HR SR , febrile 100.4, 90/-116/,   CXR: no major change. some PVC.   I/o: -700, +600, -500, -1.4,   06-04  148<H>  |  100  |  81<H>  ----------------------------<  94  3.0<L>   |  32<H>  |  3.00<H>  Ca    9.3      04 Jun 2021 03:54  Phos  3.2     06-04  Mg     2.1     06-04  TPro  6.7  /  Alb  2.7<L>  /  TBili  3.4<H>  /  DBili  x   /  AST  39  /  ALT  25  /  AlkPhos  220<H>  06-04                        11.0   11.14 )-----------( 125      ( 04 Jun 2021 03:54 )             35.5 no improvement in mental status. remains intubated. not following commands.   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 MRI: No evidence for stroke or bleed.  meds: bumex 2/hr, statin, PPI, s/q heparin. diamox. intermittent pressors   CVP 15-17, CVP sat 70s 2 days ago. HR SR , febrile 100.4, 90/-116/,   CXR: no major change. some PVC.   I/o: -700, +600, -500, -1.4,   06-04  148<H>  |  100  |  81<H>  ----------------------------<  94  3.0<L>   |  32<H>  |  3.00<H>  Ca    9.3      04 Jun 2021 03:54  Phos  3.2     06-04  Mg     2.1     06-04  TPro  6.7  /  Alb  2.7<L>  /  TBili  3.4<H>  /  DBili  x   /  AST  39  /  ALT  25  /  AlkPhos  220<H>  06-04                        11.0   11.14 )-----------( 125      ( 04 Jun 2021 03:54 )             35.5  Patient remains with altered MS.   Metabolic profile consistent with contraction alkalosis. CVP at bed side 12. Hypernatremia may be contributing to her MS.   Suggest:   Decrease diuretics to maintain gentle positive balance today (MAX 1 L).  Please check CVP sat: ? does patient have normal cardiac ouput.   Work up for infection.   K supplements.   Jered Farooq

## 2021-06-05 NOTE — PROGRESS NOTE ADULT - SUBJECTIVE AND OBJECTIVE BOX
NEPHROLOGY PROGRESS NOTE    CHIEF COMPLAINT:  NIC    HPI:  Peak Cr 4.9 now 3.0 mg/dL with urea in 80's.  Remains poorly responsive and team concerned re: uremia?  Urine output brisk.  No hyperkalemia, hyperphosphatemia or acidosis noted.   Has been intubated about 2 weeks, discussion about trach has commenced.      EXAM:  T(F): 99.3 (06-05-21 @ 14:00)  HR: 90 (06-05-21 @ 14:30)  BP: --  RR: 18 (06-05-21 @ 14:30)  SpO2: 100% (06-05-21 @ 14:30)    Opens eyes spontaneously, not following commands  Normal respiratory effort, lungs clear bilaterally  Heart RRR with no murmur, no peripheral edema         LABS                             10.5   14.48 )-----------( 142      ( 05 Jun 2021 02:17 )             33.4          06-05    146<H>  |  100  |  83<H>  ----------------------------<  136<H>  3.8   |  32<H>  |  2.98<H>    Ca    9.3      05 Jun 2021 14:10  Phos  3.1     06-05  Mg     2.0     06-05    TPro  6.7  /  Alb  2.6<L>  /  TBili  3.8<H>  /  DBili  x   /  AST  36  /  ALT  22  /  AlkPhos  230<H>  06-05      RADIOLOGY  Chest X-ray from 6/4 personally reviewed and shows cardiomegaly and mild PVC    CARDIOLOGY  2D ECHO from 5/25 shows large pericardial effusion without tamponade, severe RV dysfunction      ASSESSMENT:  1.  NIC at least 10 days old with small, partial recovery, remains non oliguric on bumex drip;  no metabolic abnormalities of advanced renal failure noted, eGFR at least 14 mL/min, essentially no suspicion that this degree of renal impairment could result in such profound encephalopathy    PLAN:  I do not feel a dialytic 'trial' is appropriate or necessary  Discussed with patient's daughter and CCU resident

## 2021-06-05 NOTE — PROGRESS NOTE ADULT - SUBJECTIVE AND OBJECTIVE BOX
SANJIV ROBERTS  MRN-[id number]  Patient is a 75y old  Female who presents with a chief complaint of Transfer from [facility] with pericardial effusion, NIC and hypotension (2021 15:17)    HPI:  75y.o female with PMHx of CHFpEF, Severe TR with Right CHF and severe pHTN, COPD on home 02, HTN, HLD, Chronic Afib (on Eliquis), OA, CKD3, DM, right eye cataract who was brought in by EMS to [facility] on 21 for AMS. pt A&O x1, per daughter patient with generalized weakness and lethargy since since . Pt had recent in 2021 with CP and NIC. Per chart on arrival to ED pt was hypotensive, obtunded. Chest CT with large pericardial effusion (increased since 2021)- per thoracic surgery consult no indication for pericardial window, pHTN, cirrhotic liver with small ascites, ECHO EF 55% dilated RV with decreased function, severe TN, pHTN, NIC (per renal consult no acute indication for HD), venous duplex NG for DVTs, Head CT revealed chronic involutional and white matter changes, no acute intracranial process, NIC(SCr peaked at 4.6) with  hyperkalemia (5.8) requiring lokelma and insulin.  Of note daughter states over past month patient more and more confused ? dementia and pt is legally blind. Daughter states pt HAS NOT RECEIVED COVID VACCINE as she has been sick these past few months.      Patient transferred to [facility] with concern of pericardial effusion, hypotension and NIC. On arrival patient SBP 80's, A&O to first name but very lethargic.  (26 May 2021 10:16)      Hospital Course:   Patient transferred to [facility] with concern of pericardial effusion, hypotension and NIC.    intubated for hypercarbic respiratory distressed. Transferred from CTU to CICU as no need for pericardiocentesis. Briefly on pressors prior to intubation and since weaned off. RIJ cordis placed- unable to float swan- coiling in RV. RV pressures 50s /10s. Start sildenafil 20 tid. start bumex gtt    S/P RHC with [place] emmanuel catheter placed.   MTP called for R Femoral A bleed from previous A line site s/p 11U PRBC, 8 plasma, 2 plts.    started IV Levophed, sildenafil dc'ed   Dc'ed Cefepime   fever of 100.6F, restarted Cefepime     24 HOUR EVENTS:  Spoke to daughter Mayank over the phone, who indicated that the family will not want to proceed with Trach or PEG.      REVIEW OF SYSTEMS:  Intubated and AMS      ICU Vital Signs Last 24 Hrs  T(C): 38.4 (2021 19:00), Max: 38.4 (2021 19:00)  T(F): 101.1 (2021 19:00), Max: 101.1 (2021 19:00)  HR: 106 (:) (88 - 114)  BP: --  BP(mean): --  ABP: 112/58 (2021 21:00) (82/44 - 142/84)  ABP(mean): 78 (:00) (56 - 104)  RR: 25 (2021 21:00) (12 - 34)  SpO2: 100% (:) (93% - 100%)    Mode: CPAP with PS, FiO2: 30, PEEP: 5  CVP(mm Hg): 16 (- @ 21:00) (8 - 22)        CAPILLARY BLOOD GLUCOSE          PHYSICAL EXAM:  GENERAL: No acute distress, well-developed  HEAD:  Atraumatic, Normocephalic  EYES: EOMI, PERRLA, conjunctiva and sclera clear  NECK: Supple, no lymphadenopathy, no JVD  CHEST/LUNG: CTAB; No wheezes, rales, or rhonchi  HEART: Regular rate and rhythm. Normal S1/S2. No murmurs, rubs, or gallops  ABDOMEN: Soft, non-tender, non-distended; normal bowel sounds, no organomegaly  EXTREMITIES:  2+ peripheral pulses b/l, No clubbing, cyanosis, or edema  NEUROLOGY: A&O x 3, no focal deficits  SKIN: No rashes or lesions    ============================I/O===========================   I&O's Detail    2021 07:01  -  2021 07:00  --------------------------------------------------------  IN:    Bumetanide: 50 mL    Bumetanide: 95 mL    Enteral Tube Flush: 360 mL    Free Water: 600 mL    IV PiggyBack: 300 mL    IV PiggyBack: 250 mL    IV PiggyBack: 200 mL    Nepro with Carb Steady: 960 mL    Norepinephrine: 208.2 mL    sodium chloride 0.9%: 130 mL  Total IN: 3153.2 mL    OUT:    Indwelling Catheter - Urethral (mL): 2697 mL    Stool (mL): 150 mL  Total OUT: 2847 mL    Total NET: 306.2 mL      2021 07:01  -  2021 21:50  --------------------------------------------------------  IN:    Bumetanide: 60 mL    Enteral Tube Flush: 50 mL    IV PiggyBack: 300 mL    IV PiggyBack: 187.2 mL    Nepro with Carb Steady: 480 mL    Norepinephrine: 151.8 mL    sodium chloride 0.9%: 120 mL  Total IN: 1349 mL    OUT:    Indwelling Catheter - Urethral (mL): 1320 mL  Total OUT: 1320 mL    Total NET: 29 mL        ============================ LABS =========================                        10.5   14.48 )-----------( 142      ( 2021 02:17 )             33.4     06-05    146<H>  |  100  |  83<H>  ----------------------------<  136<H>  3.8   |  32<H>  |  2.98<H>    Ca    9.3      2021 14:10  Phos  3.1     06-  Mg     2.0     -    TPro  6.7  /  Alb  2.6<L>  /  TBili  3.8<H>  /  DBili  x   /  AST  36  /  ALT  22  /  AlkPhos  230<H>  06-                LIVER FUNCTIONS - ( 2021 14:10 )  Alb: 2.6 g/dL / Pro: 6.7 g/dL / ALK PHOS: 230 U/L / ALT: 22 U/L / AST: 36 U/L / GGT: x           PT/INR - ( 2021 02:17 )   PT: 14.3 sec;   INR: 1.20 ratio         PTT - ( 2021 02:17 )  PTT:48.3 sec  ABG - ( 2021 02:13 )  pH, Arterial: 7.46  pH, Blood: x     /  pCO2: 53    /  pO2: 122   / HCO3: 37    / Base Excess: 11.5  /  SaO2: 99                Blood Gas Arterial, Lactate: 0.9 mmol/L (21 @ 02:13)  Blood Gas Venous - Lactate: 0.9 mmoL/L (21 @ 02:13)    Urinalysis Basic - ( 2021 12:34 )    Color: Yellow / Appearance: Clear / S.012 / pH: x  Gluc: x / Ketone: Negative  / Bili: Negative / Urobili: 3 mg/dL   Blood: x / Protein: 30 mg/dL / Nitrite: Negative   Leuk Esterase: Negative / RBC: 2 /hpf / WBC 7 /HPF   Sq Epi: x / Non Sq Epi: 1 /hpf / Bacteria: Negative      ======================Micro/Rad/Cardio=================  Telemtry: Reviewed   EKG: Reviewed  CXR: Reviewed  Culture: Reviewed   Echo: Reviewed   Cath: Reviewed  ======================================================  PAST MEDICAL & SURGICAL HISTORY:  Afib    COPD (chronic obstructive pulmonary disease)    CHF (congestive heart failure)    Dementia      ====================ASSESSMENT ==============            Plan:  ====================CARDIOVASCULAR==================  HFpEF with severe TR and severely decr RVSF likley 2/2 pulm HTN   - C/w Bumex gtt @ 1mg/hr. Target net even and CVP 10-12  - trend perfusion labs; lactate, scvo2, lfts  - ScVO2 remains in the 70s; 78%, lactate 0.9.  -c/w levophed gtt, wean as tolerated to keep MAP>60    Chronic Afib  - rate controlled, continue to monitor  -No BB due to hypotension   - off hep gtt i/s/o recent bleed & hemorrhagic shock, cont sq heparin      buMETAnide Infusion 1 mG/Hr (5 mL/Hr) IV Continuous <Continuous>  norepinephrine Infusion 0.05 MICROgram(s)/kG/Min (10.5 mL/Hr) IV Continuous <Continuous>      ====================== NEUROLOGY=====================  AMS, likely metabolic encephalopathy  - off sedation since , has been without purposeful movements. Neuro exam unchanged from yesterday, opening eyes to voice, occasionally tracking with eyes, spontaneous non purposeful movements b/l UE, withdraws b/l UE and grimaces to pain. Does not follow commands  - vEEG; generalized non-specific slowing, no epileptiform activity.  - MRI brain negative for acute pathology  - correction of metabolic derangements; continues to be uremic w/ BUN now in the 80s, f/up renal for consideration of HD to clear uremia  - Continue to monitor neuro status per protocol  - appreciate neuro recs    acetaminophen    Suspension .. 650 milliGRAM(s) Oral every 6 hours PRN Temp greater or equal to 38C (100.4F)    ==================== RESPIRATORY======================  Acute Hypercapnic Respiratory Failure req Intubation   - On full vent support, minimal settings 350/12/5/30%, unable to extubate 2/2 mental status  - daily sbt trials as tolerated, full vent overnight  - trend ABGs    pulmonary HTN, likely group 2  - continue diuresis bumex gtt @ 1 mg/hr  - will need repeat RHC when euvolemic   - pulmonology following appreciate recs    Mechanical Ventilation:  Mode: CPAP with PS  FiO2: 30  PEEP: 5  PS: 12  MAP: 10  PIP: 18    ABG - ( 2021 02:13 )  pH, Arterial: 7.46  pH, Blood: x     /  pCO2: 53    /  pO2: 122   / HCO3: 37    / Base Excess: 11.5  /  SaO2: 99          ===================== RENAL =========================  Non-oliguric NIC with creatinine peak of 4.49 currently 2.99 Baseline 0.97 in 2021   - trend SCr, BMP q12  - adequate uop on bumex gtt. Strict I/Os  - Nephro following. No urgent needs for HD at this time, f/up recs regarding HD to clear uremia in setting of encephalopathy  - Replete lytes PRN. Keep K> 4 and Mg >2.   - avoid nephrotoxic agents    hypernatremia  - continue FW and decrease volume as appropriate. Na 148 > 146 today      21 @ 07:  -  21 @ 07:00  --------------------------------------------------------  IN: 3153.2 mL / OUT: 2847 mL / NET: 306.2 mL    21 @ 07:01  -  21 @ 21:50  --------------------------------------------------------  IN: 1349 mL / OUT: 1320 mL / NET: 29 mL        ==================== GASTROINTESTINAL===================  No active issues  - Diet: NPO w/ tube feed - Nepro with Carb steady goal 40cc/hr.   - continue Protonix for stress ulcer prophylaxis.     Last BM:   Indication for Stress Ulcer Prophylaxis, [ ] Yes    [ ] No   If Yes, Medication:     pantoprazole  Injectable 40 milliGRAM(s) IV Push daily  sodium chloride 0.9%. 1000 milliLiter(s) (10 mL/Hr) IV Continuous <Continuous>    ========================INFECTIOUS DISEASE================  Febrile today to 101.1  - persistently febrile today, cultures sent and started on empiric cefepime and vanc by level  - vanc trough 12.5 today and dosed with vanco 1g, will continue vanco by level   - WBC 11>13, trend.  - BCX x2 : NGTD, UCx : no growth. S/P MRSA swab : neg. new blood cultures sent today due to fever     T(C): 38.4 (21 @ 19:00), Max: 38.4 (21 @ 19:00)  WBC Count: 14.48 K/uL (21 @ 02:17)  WBC Count: 13.09 K/uL (21 @ 16:17)  WBC Count: 11.14 K/uL (21 @ 03:54)  WBC Count: 10.08 K/uL (21 @ 01:53)      Culture - Blood (collected 21 @ 17:14)  Source: .Blood Blood-Peripheral  Preliminary Report (21 @ 18:00):    No growth to date.    Culture - Blood (collected 21 @ 17:14)  Source: .Blood Blood-Peripheral  Preliminary Report (21 @ 18:00):    No growth to date.    Culture - Bronchial (collected 21 @ 17:11)  Source: Bronch Wash Combicath  Gram Stain (21 @ 19:32):    Few polymorphonuclear leukocytes per low power field    No Squamous epithelial cells per low power field    No organisms seen per oil power field  Preliminary Report (21 @ 18:21):    No growth to date.    Culture - Sputum (collected 21 @ 17:11)  Source: .Sputum Sputum  Gram Stain (21 @ 19:32):    Few polymorphonuclear leukocytes per low power field    Rare Squamous epithelial cells per low power field    No organisms seen per oil power field  Preliminary Report (21 @ 18:22):    Normal Respiratory Karen present        Current Antibiotics with start date:     cefepime   IVPB 2000 milliGRAM(s) IV Intermittent every 24 hours  cefepime   IVPB        ===================HEMATOLOGIC/ONC ===================  Hemorrhagic Shock, resolved   - RFA s/p removal of Beto with large hematoma and active bleeding i/s/o supratherapeutic ptt   - MTP s/p 11U PRBC, 8U plasma, 2 plts. Protamine x2 + 2.5L IVF resuscitated   - right groin stable with positive pulses  - Monitor H/H    VTE ppx   - sq heparin    Hemoglobin: 10.5 g/dL (21 @ 02:17)  Hemoglobin: 10.4 g/dL (21 @ 16:17)  Hemoglobin: 11.0 g/dL (21 @ 03:54)  Hemoglobin: 11.6 g/dL (21 @ 01:53)    Platelet Count - Automated: 142 K/uL (21 @ 02:17)  Platelet Count - Automated: 135 K/uL (21 @ 16:17)  Platelet Count - Automated: 125 K/uL (21 @ 03:54)  Platelet Count - Automated: 117 K/uL (21 @ 01:53)    Chemical VTE Prophylaxis:  [ ] Lovenox    [ ] SQH   [ ]NA  Systemic Anticogaulation:  [ ] Yes    [ ] No,  If Yes, Medication:     heparin   Injectable 5000 Unit(s) SubCutaneous every 8 hours    =======================    ENDOCRINE  =====================  Bgl controlled  - monitor glucose for need to initiate sliding scale.     Hx of HLD  - C/w Lipitor 40mg QHS      atorvastatin 40 milliGRAM(s) Oral at bedtime      Patient requires continuous monitoring with bedside rhythm monitoring, pulse ox monitoring, and intermittent blood gas analysis. Care plan discussed with ICU care team. Patient remained critical and at risk for life threatening decompensation.  Patient seen, examined and plan discussed with CCU team during rounds.     I have personally provided ____ minutes of critical care time excluding time spent on separate procedures.      I, ____ , personally performed the services described in this documentation. all medical record entries made by the scribe were at my direction and in my presence. I have reviewed the chart and agree that the record reflects my personal performance and is accurate and complete  Electronically signed: ______

## 2021-06-05 NOTE — PROGRESS NOTE ADULT - ASSESSMENT
`75F with a pmhx of CHFpEF, Severe TR with Right CHF and severe pHTN, COPD on home 02, HTN, HLD, Chronic Afib (on Eliquis), OA, CKD3, DM, right eye cataract who was brought in by EMS to [facility] on 5/23/21 for AMS, found to have a large pericardial effusion w/o tamponade physiology, without drainable window, intubated (5/26) for acute hypercarbic respiratory failure and worsening AMS, transferred to CICU for hypotension and NIC on CKD in undifferentiated shock, course c/b expanding R thigh hematoma, s/p MTP, with ratio predom targeted towards FFP, s/p protamine for hep reversal.    Neuro:  -Mental status: CTH obtained and MR of the brain negative . Pt is not following commands.  Neuro following and likely toxic/metobolic in origin.     Resp:  -(5/26): Intubated , RR (machine): 12, TV (machine): 350, FiO2: 30,  PEEP:5 . ABG 7.45/50/94/34. Chest XR showing pulmonary congestion now being diuresed. Daily spontaneous breathing trails. She is tolerating PS trails 8/5/30%.  Will continue these settings for now. Chest XR still showing pulm congestion with high filling pressures based on her [place] numbers.   - Dr. Patel consulted for pulm HTN.       CV:  (5/26): TTE: EF 55%, RVH with severe RVSD, severe pulm HTN, large pericardial effusion 3.4cm @ largest diameter, adjacent to RA  ventricular systolic function.  - Pt was on Pressors: levophed, vaso. Now on minimal Levophed at 0.09.  After aggressve fluid resuscitatton now with hemodynamics as follows: CVP 16, PAP 60/15, Janes CO 10, CI 4.5 . No lactate. Central sat 82.  -(5/27): PAC placement in cath lab, trend central sats. Based on Chest XR will pull Pulm cathter back by 2 cm  -c/w atorvastatin 40mg qhs  -Hold sildenefil as pt is still on Levophed. Currently on Levo 0.25  -Systolic goal: >90, MAP >65  Telemetry    GI:  -Diet: Continue Nepro at 40 and will add prosource  -Stress ulcer ppx:continue PPI  -Bowel regimen: none  -(5/26): CT a/p noncon: mild contoured liver. Anasarca      - Hernadez catheter in place for critical care monitoring    Heme:  -Thrombocytopenia  -(5/28): Acute drop in H/H 10->8,  with expanding thigh hematoma 35->24 -> s/p MTP 11 pRBCs , 8 U FFP, 2 U PLTS and protamine x 2 due to supratherapeutic pTT > 200 --> 28  -Hemoglobin has remained stable now .   -[ ] (5/28): VA duplex arterial and venous RLE: no pseudo  -DVT ppx: scd, will start heparin SQ    Renal:  - Continue to monitor I/Os, BUN/Creatinine, and urine output. Cr bmped to 3.6 now 3.16. Nephro following and diuresing well.   - CVP 17 continue bumex gtt.   - Replete lytes PRN. Keep K> 4 and Mg >2    Endo:  - Monitor blood sugar 100-130    ID:  - Cefepime discontinued as cultures have remained negative    CODE STATUS:  Full code    TUBES/LINES/DRAINS:  -ETT  -OGT  -Hernadez  -PIV `75F with a pmhx of CHFpEF, Severe TR with Right CHF and severe pHTN, COPD on home 02, HTN, HLD, Chronic Afib (on Eliquis), OA, CKD3, DM, right eye cataract who was brought in by EMS to [facility] on 5/23/21 for AMS, found to have a large pericardial effusion w/o tamponade physiology, without drainable window, intubated (5/26) for acute hypercarbic respiratory failure and worsening AMS, transferred to CICU for hypotension and NIC on CKD in undifferentiated shock, course c/b expanding R thigh hematoma, s/p MTP, with ratio predom targeted towards FFP, s/p protamine for hep reversal.    Neuro:  -Mental status: CTH obtained and MR of the brain negative . Pt is not following commands.  Neuro following and likely toxic/metobolic in origin. Nephrology paged to discuss whether pt would benefit from dialysis due to BUN 80s and possible uremic encephalopathy however she is making UO. Another concern are her low grade fevers causing encephalopathy for which she is in Antibiotics.     Resp:  -(5/26): Intubated , RR (machine): 12, TV (machine): 350, FiO2: 30,  PEEP:5 . ABG 7.45/50/94/34. Chest XR showing pulmonary congestion now being diuresed. Daily spontaneous breathing trails. She is tolerating PS trails 8/5/30%.  Will continue these settings for now. Chest XR still showing pulm congestion with high filling pressures based on her [place] numbers.         CV:  (5/26): TTE: EF 55%, RVH with severe RVSD, severe pulm HTN, large pericardial effusion 3.4cm @ largest diameter, adjacent to RA  ventricular systolic function.  - Pt was on Pressors: levophed, vaso. Now on minimal Levophed at 0.09.  After aggressve fluid resuscitatton now with hemodynamics as follows: CVP 16, PAP 60/15, Janes CO 10, CI 4.5 . No lactate. Central sat 82.  -(5/27): PAC placement in cath lab, trend central sats. Based on Chest XR will pull Pulm cathter back by 2 cm  -c/w atorvastatin 40mg qhs  -Hold sildenefil as pt is still on Levophed. Currently on Levo 0.25  -Systolic goal: >90, MAP >65  Telemetry    GI:  -Diet: Continue Nepro at 40 and will add prosource  -Stress ulcer ppx:continue PPI  -Bowel regimen: none  -(5/26): CT a/p noncon: mild contoured liver. Anasarca      - Hernadez catheter in place for critical care monitoring    Heme:  -Thrombocytopenia  -(5/28): Acute drop in H/H 10->8,  with expanding thigh hematoma 35->24 -> s/p MTP 11 pRBCs , 8 U FFP, 2 U PLTS and protamine x 2 due to supratherapeutic pTT > 200 --> 28  -Hemoglobin has remained stable now .   -[ ] (5/28): VA duplex arterial and venous RLE: no pseudo  -DVT ppx: scd, will start heparin SQ    Renal:  - Continue to monitor I/Os, BUN/Creatinine, and urine output. Cr bmped to 3.6 now 3.16. Nephro following and diuresing well.   - CVP 17 continue bumex gtt.   - Replete lytes PRN. Keep K> 4 and Mg >2    Endo:  - Monitor blood sugar 100-130    ID:  - Cefepime discontinued as cultures have remained negative    CODE STATUS:  Full code    TUBES/LINES/DRAINS:  -ETT  -OGT  -Hernadez  -PIV `75F with a pmhx of CHFpEF, Severe TR with Right CHF and severe pHTN, COPD on home 02, HTN, HLD, Chronic Afib (on Eliquis), OA, CKD3, DM, right eye cataract who was brought in by EMS to [facility] on 5/23/21 for AMS, found to have a large pericardial effusion w/o tamponade physiology, without drainable window, intubated (5/26) for acute hypercarbic respiratory failure and worsening AMS, transferred to CICU for hypotension and NIC on CKD in undifferentiated shock, course c/b expanding R thigh hematoma, s/p MTP, with ratio predom targeted towards FFP, s/p protamine for hep reversal.    Neuro:  -Mental status: CTH obtained and MR of the brain negative . Pt is not following commands.  Neuro following and likely toxic/metobolic in origin. Nephrology paged to discuss whether pt would benefit from dialysis due to BUN 80s and possible uremic encephalopathy however she is making UO. Another concern are her low grade fevers causing encephalopathy for which she is in Antibiotics.     Resp:  -(5/26): Intubated , RR (machine): 12, TV (machine): 350, FiO2: 30,  PEEP:5 . ABG 7.46/56/94/34. Chest XR showing pulmonary congestion , CVP 13 on Bumex 1 mg. She desaturated when laying flat.  Daily spontaneous breathing trails. She is tolerating PS trails 12/5/30%.  Increase RR to 14 once off spontaneous breathing trails.   - - Group 2, Mild --> however RHC not performed in euvolemic state.   - Discussed case with Dr. Patel. Likely group 2 etiology. Continue with diuretics. After patient is volume optimized, recommend repeating RHC to re-assess pHTN burden    CV:  (5/26): TTE: EF 55%, RVH with severe RVSD, severe pulm HTN, large pericardial effusion 3.4cm @ largest diameter, adjacent to RA  ventricular systolic function.  - Pt Now on minimal Levophed at 0.05.  CVP 13 , Central sat 77. Continue to diurese with Bumex 1 mg. Overall positive 300. she is overall net neg 8 L since admission. Will POCUS to assess whether she would benefit with more aggressive diuresis.   -c/w atorvastatin 40mg qhs  -Hold sildenefil as pt is still on Levophed  -Systolic goal: >90, MAP >65  Telemetry    GI:  -Diet: Continue Nepro at 40   -Stress ulcer ppx:continue PPI  -Bowel regimen: none, had diarrhea now resolving  -(5/26): CT a/p noncon: mild contoured liver. Anasarca. TBili elevated at 4.       - Hernadez catheter in place for critical care monitoring    Heme:  -Thrombocytopenia  -Hemoglobin has remained stable now . on Heparin SQ for DVT px. Hold systemic full AC as bleeding risk is high.     Renal:  - Continue to monitor I/Os, BUN/Creatinine, and urine output. Cr has leveled at 3 and BUN 80. Nephrology paged to discuss any benefit of dialysis to lower BUN.   - CVP 13 continue bumex gtt.   - Replete lytes PRN. Keep K> 4 and Mg >2    Endo:  - Monitor blood sugar 100-130    ID:  -  Low grade fevers with WBC 14.     CODE STATUS:  Full code    TUBES/LINES/DRAINS:  -ETT  -OGT  -Hernadez  -PIV `75F with a pmhx of CHFpEF, Severe TR with Right CHF and severe pHTN, COPD on home 02, HTN, HLD, Chronic Afib (on Eliquis), OA, CKD3, DM, right eye cataract who was brought in by EMS to [facility] on 5/23/21 for AMS, found to have a large pericardial effusion w/o tamponade physiology, without drainable window, intubated (5/26) for acute hypercarbic respiratory failure and worsening AMS, transferred to CICU for hypotension and NIC on CKD in undifferentiated shock, course c/b expanding R thigh hematoma, s/p MTP, with ratio predom targeted towards FFP, s/p protamine for hep reversal.    Neuro:  -Mental status: CTH obtained and MR of the brain negative . Pt is not following commands.  Neuro following and likely toxic/metobolic in origin. Nephrology paged to discuss whether pt would benefit from dialysis due to BUN 80s and possible uremic encephalopathy however she is making UO. Another concern are her low grade fevers causing encephalopathy for which she is in Antibiotics.     Resp:  -(5/26): Intubated , RR (machine): 12, TV (machine): 350, FiO2: 30,  PEEP:5 . ABG 7.46/56/94/34. Chest XR showing pulmonary congestion , CVP 13 on Bumex 1 mg. She desaturated when laying flat.  Daily spontaneous breathing trails. She is tolerating PS trails 12/5/30%.  Increase RR to 14 once off spontaneous breathing trails.   - - Group 2, Mild --> however RHC not performed in euvolemic state.   - Discussed case with Dr. Patel. Likely group 2 etiology. Continue with diuretics. After patient is volume optimized, recommend repeating RHC to re-assess pHTN burden    CV:  (5/26): TTE: EF 55%, RVH with severe RVSD, severe pulm HTN, large pericardial effusion 3.4cm @ largest diameter, adjacent to RA  ventricular systolic function.  - Pt Now on minimal Levophed at 0.05.  CVP 13 , Central sat 77. Continue to diurese with Bumex 1 mg. Overall positive 300. she is overall net neg 8 L since admission. Will POCUS to assess whether she would benefit with more aggressive diuresis.   -c/w atorvastatin 40mg qhs  -Hold sildenefil as pt is still on Levophed  -Systolic goal: >90, MAP >65  Telemetry    GI:  -Diet: Continue Nepro at 40   -Stress ulcer ppx:continue PPI  -Bowel regimen: none, had diarrhea now resolving  -(5/26): CT a/p noncon: mild contoured liver. Anasarca. TBili elevated at 4.       - Hernadez catheter in place for critical care monitoring    Heme:  -Thrombocytopenia  -Hemoglobin has remained stable now . on Heparin SQ for DVT px. Hold systemic full AC as bleeding risk is high.     Renal:  - Continue to monitor I/Os, BUN/Creatinine, and urine output. Cr has leveled at 3 and BUN 80. Nephrology paged to discuss any benefit of dialysis to lower BUN.   - CVP 13 continue bumex gtt.   - Replete lytes PRN. Keep K> 4 and Mg >2    Endo:  - Monitor blood sugar 100-130    ID:  -  Low grade fevers with WBC 14. Continue Cefepime and check Vanco by level. She was given one dose yesterday. Blood cultures are in the lab. If fevers persist will change central line from the RIJ ( Day 9) tomorrow.     CODE STATUS:  Full code    TUBES/LINES/DRAINS:  -ETT  -OGT  -Hernadez  -PIV    ADDENDUM- [facility] discussion    I spoke with the HCP   `75F with a pmhx of CHFpEF, Severe TR with Right CHF and severe pHTN, COPD on home 02, HTN, HLD, Chronic Afib (on Eliquis), OA, CKD3, DM, right eye cataract who was brought in by EMS to [facility] on 5/23/21 for AMS, found to have a large pericardial effusion w/o tamponade physiology, without drainable window, intubated (5/26) for acute hypercarbic respiratory failure and worsening AMS, transferred to CICU for hypotension and NIC on CKD in undifferentiated shock, course c/b expanding R thigh hematoma, s/p MTP, with ratio predom targeted towards FFP, s/p protamine for hep reversal.    Neuro:  -Mental status: CTH obtained and MR of the brain negative . Pt is not following commands.  Neuro following and likely toxic/metobolic in origin. Nephrology paged to discuss whether pt would benefit from dialysis due to BUN 80s and possible uremic encephalopathy however she is making UO. Another concern are her low grade fevers causing encephalopathy for which she is in Antibiotics.     Resp:  -(5/26): Intubated , RR (machine): 12, TV (machine): 350, FiO2: 30,  PEEP:5 . ABG 7.46/56/94/34. Chest XR showing pulmonary congestion , CVP 13 on Bumex 1 mg. She desaturated when laying flat.  Daily spontaneous breathing trails. She is tolerating PS trails 12/5/30%.  Increase RR to 14 once off spontaneous breathing trails.   - - Group 2, Mild --> however RHC not performed in euvolemic state.   - Discussed case with Dr. Patel. Likely group 2 etiology. Continue with diuretics. After patient is volume optimized, recommend repeating RHC to re-assess pHTN burden    CV:  (5/26): TTE: EF 55%, RVH with severe RVSD, severe pulm HTN, large pericardial effusion 3.4cm @ largest diameter, adjacent to RA  ventricular systolic function.  - Pt Now on minimal Levophed at 0.05.  CVP 13 , Central sat 77. Continue to diurese with Bumex 1 mg. Overall positive 300. she is overall net neg 8 L since admission. Will POCUS to assess whether she would benefit with more aggressive diuresis.   -c/w atorvastatin 40mg qhs  -Hold sildenefil as pt is still on Levophed  -Systolic goal: >90, MAP >65  Telemetry    GI:  -Diet: Continue Nepro at 40   -Stress ulcer ppx:continue PPI  -Bowel regimen: none, had diarrhea now resolving  -(5/26): CT a/p noncon: mild contoured liver. Anasarca. TBili elevated at 4.       - Hernadez catheter in place for critical care monitoring    Heme:  -Thrombocytopenia  -Hemoglobin has remained stable now . on Heparin SQ for DVT px. Hold systemic full AC as bleeding risk is high.     Renal:  - Continue to monitor I/Os, BUN/Creatinine, and urine output. Cr has leveled at 3 and BUN 80. Nephrology paged to discuss any benefit of dialysis to lower BUN.   - CVP 13 continue bumex gtt.   - Replete lytes PRN. Keep K> 4 and Mg >2    Endo:  - Monitor blood sugar 100-130    ID:  -  Low grade fevers with WBC 14. Continue Cefepime and check Vanco by level. She was given one dose yesterday. Blood cultures are in the lab. If fevers persist will change central line from the RIJ ( Day 9) tomorrow.     CODE STATUS:  Full code    TUBES/LINES/DRAINS:  -ETT  -OGT  -Hernadez  -PIV    ADDENDUM- [facility] discussion    I spoke with the daughter whom she lives with Mayank. I explained to her that her mom has multiple irreversible comorbidites including dementia and R sided HF. She has yet to return to her mental status despite being off sedation for > 1 week and she is 9 days post intubation. I discussed that it is time to start discussing with the family whether they would like to pursue trach/peg vs palliative extubation as pt is not tolerating a pressure support < 8. Daughter says she will start discussing with her family members but her mother has expressed that she would not like to be intubated for a prolonged period of time and would not want trach or artifical nutrition if there was low liklihood of improvement. Daughter has requested a family meeting for when the time comes to discuss trach to see what the options are.     For now we have agreed that we will continue to treat her with aggressive measures. We will obtain a palliative consultation to discuss options. If the time comes to discuss trach she is leaning towards a palliative extubation or trail of extubation however she would like to discuss this with her family as well as in a meeting with the doctors. Palliative consult placed. She expressed gratitude for the care provided.

## 2021-06-05 NOTE — PROGRESS NOTE ADULT - SUBJECTIVE AND OBJECTIVE BOX
Events:    Review Of Systems:  Constitutional: denies fever, chills, Fatigue   HEENT: denies Blurred vision, Eye Pain, Headache   Respiratory: denies Cough, Wheezing , Shortness of breath  Cardiovascular: denies Chest Pain, Palpitations,  BERMEO   Gastrointestinal: denies Abdominal Pain, Diarrhea, Constipation   Genitourinary: denies Nocturia, Dysuria, Incontinence  Extremities: denies Swelling, Joint Pain  Neurologic: denies Focal deficit, Paresthesias, Syncope  Lymphatic: denies Swelling, Lymphadenopathy   Skin: denies Rash, Ecchymoses, Wounds   Psychiatry: denies Depression, Suicidal/Homicidal Ideation, anxiety  [X ] 10 point review of systems is otherwise negative except as mentioned above         Medications:  atorvastatin 40 milliGRAM(s) Oral at bedtime  buMETAnide Infusion 1 mG/Hr IV Continuous <Continuous>  cefepime   IVPB 2000 milliGRAM(s) IV Intermittent every 24 hours  cefepime   IVPB      chlorhexidine 0.12% Liquid 15 milliLiter(s) Oral Mucosa every 12 hours  chlorhexidine 2% Cloths 1 Application(s) Topical <User Schedule>  heparin   Injectable 5000 Unit(s) SubCutaneous every 8 hours  norepinephrine Infusion 0.05 MICROgram(s)/kG/Min IV Continuous <Continuous>  pantoprazole  Injectable 40 milliGRAM(s) IV Push daily  sodium chloride 0.9%. 1000 milliLiter(s) IV Continuous <Continuous>    PMH/PSH/FH/SH: [ ] Unchanged  Vitals:  T(C): 37.8 (21 @ 08:00), Max: 38.1 (21 @ 12:00)  HR: 92 (21 @ 08:30) (88 - 114)  BP: --  BP(mean): --  RR: 21 (21 @ 08:30) (7 - 31)  SpO2: 100% (21 @ 08:30) (91% - 100%)  Wt(kg): --  Daily     Daily Weight in k.2 (2021 06:00)  I&O's Summary    2021 07:01  -  2021 07:00  --------------------------------------------------------  IN: 3153.2 mL / OUT: 2847 mL / NET: 306.2 mL    2021 07:01  -  2021 08:38  --------------------------------------------------------  IN: 131.8 mL / OUT: 190 mL / NET: -58.2 mL        Physical Exam:  Appearance: [ ] Normal [ ] NAD  Eyes: [ ] PERRL [ ] EOMI  HENT: [ ] Normal oral muscosa [ ]NC/AT  Cardiovascular: [ ] S1 [ ] S2 [ ] RRR [ ] No m/r/g [ ]No edema [ ] JVP  Procedural Access Site: [ ] No hematoma [ ] Non-tender to palpation [ ] 2+ pulse [ ] No bruit [ ] No Ecchymosis  Respiratory: [ ] Clear to auscultation bilaterally  Gastrointestinal: [ ] Soft [ ] Non-tender [ ] Non-distended [ ] BS+  Musculoskeletal: [ ] No clubbing [ ] No joint deformity   Neurologic: [ ] Non-focal  Lymphatic: [ ] No lymphadenopathy  Psychiatry: [ ] AAOx3 [ ] Mood & affect appropriate  Skin: [ ] No rashes [ ] No ecchymoses [ ] No cyanosis        143  |  98  |  83<H>  ----------------------------<  140<H>  3.7   |  32<H>  |  2.99<H>    Ca    9.3      2021 02:17  Phos  2.2     06-05  Mg     2.0     06-05    TPro  6.9  /  Alb  2.6<L>  /  TBili  4.2<H>  /  DBili  x   /  AST  42<H>  /  ALT  25  /  AlkPhos  234<H>  06-05    PT/INR - ( 2021 02:17 )   PT: 14.3 sec;   INR: 1.20 ratio         PTT - ( 2021 02:17 )  PTT:48.3 sec              ECG:    Echo:    Stress Testing:     Cath:    Imaging:    Interpretation of Telemetry:   HPI: 75y.o female with PMHx of CHFpEF, Severe TR with Right CHF and severe pHTN, COPD on home , HTN, HLD, Chronic Afib (on Eliquis), OA, CKD3, DM, right eye cataract who was brought in by EMS to [facility] on 21 for AMS. pt A&O x1, per daughter patient with generalized weakness and lethargy since since . Pt had recent in 2021 with CP and NIC. Per chart on arrival to ED pt was hypotensive, obtunded. Chest CT with large pericardial effusion (increased since 2021)- per thoracic surgery consult no indication for pericardial window, pHTN, cirrhotic liver with small ascites, ECHO EF 55% dilated RV with decreased function, severe TN, pHTN, NIC (per renal consult no acute indication for HD), venous duplex NG for DVTs, Head CT revealed chronic involutional and white matter changes, no acute intracranial process, NIC(SCr peaked at 4.6) with  hyperkalemia (5.8) requiring lokelma and insulin.Of note daughter states over past month patient more and more confused ? dementia and pt is legally blind. Daughter states pt HAS NOT RECEIVED COVID VACCINE as she has been sick these past few months.  Patient transferred to [facility] with concern of pericardial effusion, hypotension and NIC. On arrival patient SBP 80's, A&O to first name but very lethargic.  (26 May 2021 10:16). Echo revealed a pericardial effusion with no tamponade physiology. She was intubated for hypercarbic respiratory failure, with subsequent improvement in BP, now off vasopressors. She is currently volume overloaded in the setting of RV failure and pulmonary hypertension. She was started on IV bumex and sildenafil.     ON  Levophed requirements continued to rise from 0.9 to .3. R groin was noted to be swollen and concerning for a hematoma from groin lines that were removed the day prior . Of note her pTT was noted to be > 200 at 4 AM. Heparin gtt was stopped and manual pressure applied for 1 hour. 2 L of LR given and 1 U of PRBCS. At this time pt continued ot become unstable with blood pressure dropping into systolics of 50s and HR up to 160s and CVP reading 0. Everytime manual pressure was stopped hematoma would form again. Vascular surgery called and MTP protocol was applied. Decision was made to reverse coagulopathy first given that both groin lines were small catheter frenches. In total the patient received 11 U of PRBCs, 8 FFP, 2 U PLTS, 3 Calciums and protamine x 2. After coagulopathy was reversed pt stabilized. Lactate peaked at 5. Pt now being diuresed on a Bumex gtt.     Events: Pt mental status continues to be poor. She opens her eyes but does not follow commands. Pt has been having low grade fevers for 24 hours.     Review Of Systems:  Unable to attain pt does not appear to be in pain    Medications:  atorvastatin 40 milliGRAM(s) Oral at bedtime  buMETAnide Infusion 1 mG/Hr IV Continuous <Continuous>  cefepime   IVPB 2000 milliGRAM(s) IV Intermittent every 24 hours  cefepime   IVPB      chlorhexidine 0.12% Liquid 15 milliLiter(s) Oral Mucosa every 12 hours  chlorhexidine 2% Cloths 1 Application(s) Topical <User Schedule>  heparin   Injectable 5000 Unit(s) SubCutaneous every 8 hours  norepinephrine Infusion 0.05 MICROgram(s)/kG/Min IV Continuous <Continuous>  pantoprazole  Injectable 40 milliGRAM(s) IV Push daily  sodium chloride 0.9%. 1000 milliLiter(s) IV Continuous <Continuous>    Vitals:  T(C): 37.8 (21 @ 08:00), Max: 38.1 (21 @ 12:00)  HR: 92 (21 @ 08:30) (88 - 114)  RR: 21 (21 @ 08:30) (7 - 31)  SpO2: 100% (21 @ 08:30) (91% - 100%)    Daily     Daily Weight in k.2 (2021 06:00)  I&O's Summary    2021 07:01  -  2021 07:00  --------------------------------------------------------  IN: 3153.2 mL / OUT: 2847 mL / NET: 306.2 mL    2021 07:01  -  2021 08:38  --------------------------------------------------------  IN: 131.8 mL / OUT: 190 mL / NET: -58.2 mL    Physical Exam:  Appearance: [X ] Normal [ X] NAD  Eyes: [X ] PERRL [X ] EOMI  HENT: [ X] Normal oral muscosa [ X]NC/AT  Cardiovascular: [X ] S1 [X ] S2 [X ] RRR No edema. No JVD  Procedural Access Site: RIJ Central line C/D/I without bleeding, induration, or hematoma.   Respiratory: [ X] Clear to auscultation bilaterally  Gastrointestinal: [X ] Soft [X ] Non-tender [X ] Non-distended  Musculoskeletal: [X ] No clubbing [X ] No joint deformity   Neurologic: [ X] Non-focal  Lymphatic: [ X] No lymphadenopathy  Psychiatry: [ X] AAOx3 [ X] Mood & affect appropriate  Skin:[X ] No rashes [X ] No ecchymoses [X No cyanosis    -    143  |  98  |  83<H>  ----------------------------<  140<H>  3.7   |  32<H>  |  2.99<H>    Ca    9.3      2021 02:17  Phos  2.2     06-05  Mg     2.0     06-05    TPro  6.9  /  Alb  2.6<L>  /  TBili  4.2<H>  /  DBili  x   /  AST  42<H>  /  ALT  25  /  AlkPhos  234<H>  06-05    PT/INR - ( 2021 02:17 )   PT: 14.3 sec;   INR: 1.20 ratio      PTT - ( 2021 02:17 )  PTT:48.3 sec    ECG: < from: 12 Lead ECG (21 @ 08:25) >  Diagnosis Line ATRIAL FIBRILLATION  RIGHT AXIS DEVIATION  PULMONARY DISEASE PATTERN  INCOMPLETE RIGHT BUNDLE BRANCH BLOCK  POSSIBLE RIGHT VENTRICULAR HYPERTROPHY  SEPTAL INFARCT (CITED ON OR BEFORE 28-MAY-2021)  ABNORMAL ECG    Echo: < from: Transthoracic Echocardiogram (21 @ 09:44) >  1. Endocardium not well visualized; overall preserved left  ventricular systolic function. Septal flattening with  paradoxical septal motion consistent with right ventricular  overload.  2. Severe right ventricular enlargement with severely  decreased right ventricular systolic function.  3.Estimated right ventricular systolic pressure equals 67  mm Hg, assuming right atrial pressure equals 15 mm Hg,  consistent with severe pulmonary hypertension.  4. Large pericardial effusion seen adjacent to the right  atrium. The effusion measures up to approximately 3.4 cm  adjacent to the RA in its largest dimension. Thickened  pericardium with small-moderate pericardial effusion seen  posterior and lateral to the LV. The effusion measures up  to approximately 1.5 cm posterior to the LV. No  echocardiographic evidence of pericardial tamponade.  Results discussed with physician assistant Brayan from  CTICU.    Cath: < from: Cardiac Cath Lab - Adult (21 @ 14:49) >  INTERVENTIONAL RECOMMENDATIONS: [facility] [place] Dm placement via RIJ (catheter was  placed in the ICU prior to the procedure).  CI 1.82 by Janes.  Management as per primary team and ICU.  Prepared and signed by  Darrian Chong D.O.  Signed 2021 16:39:13  HEMODYNAMIC TABLES  Pressures:  Baseline  Pressures:  - HR: 57  Pressures:  - Rhythm:  Pressures:  -- Pulmonary Artery (S/D/M): 65/15/30  Pressures:  -- Pulmonary Capillary Wedge: --/28/20  Pressures:  -- Right Atrium (a/v/M): --/15/14  O2 Sats:  Baseline  O2 Sats:  - HR: 57  O2 Sats:  - Rhythm:  O2 Sats:  -- AO: 12.2/100/16.59  O2 Sats:  -- PA: 12.2/55.4/9.19  Outputs:  Baseline  Outputs:  -- CALCULATIONS[age]  Outputs:  -- CALCULATIONS: Body Surface Area: 2.17  Outputs:  -- CALCULATIONS: Height in cm: 165.00  Outputs:  -- CALCULATIONS: Sex: Female  Outputs:  -- CALCULATIONS: Weight in k.40  Outputs:  -- OUTPUTS: CO by Janes: 3.93  Outputs:  -- OUTPUTS: Janes cardiac index: 1.82  Outputs:  -- OUTPUTS: O2 consumption: 291.00  Outputs:  -- OUTPUTS: Vo2 Indexed: 134.36  Outputs:  -- RESISTANCES: Pulmonary vascular index (dsc): 440.49  Outputs:  -- RESISTANCES: Pulmonary vascular index (Wood Units): 5.51  Outputs:  -- RESISTANCES: Pulmonary vascular resistance (dsc): 203.39  Outputs:  -- RESISTANCES: Pulmonary vascular resistance (Wood Units): 2.54  Outputs:  -- RESISTANCES: Right ventricular stroke work: 16.15  Outputs:  -- RESISTANCES: Right ventricular stroke work index: 7.45  Outputs:  -- RESISTANCES: Total pulmonary index (dsc): 1321.48  Outputs:  -- RESISTANCES: Total pulmonary index (Wood Units): 16.52  Outputs:  -- RESISTANCES: Total pulmonary resistance (dsc): 610.16  Outputs:  -- RESISTANCES: Total pulmonary resistance (Wood Units): 7.63  Outputs:  -- SHUNTS: Pulmonary flow: 3.93  Outputs:  -- SHUNTS: Qp Indexed: 1.82  Outputs:  -- SHUNTS: Qs Indexed: 1.82  Outputs:  -- SHUNTS: Systemic flow: 3.93    Imaging: < from: Xray Chest 1 View- PORTABLE-Routine (Xray Chest 1 View- PORTABLE-Routine in AM.) (21 @ 04:49) >    Endotracheal tube tip above the terry. Tip of the right IJ [place]-Dm catheter within right lower lobe pulmonary artery. Enteric tube within the stomach.    Pulmonary edema.    Interpretation of Telemetry: NSR 90 w/ PVCs

## 2021-06-05 NOTE — PROGRESS NOTE ADULT - ATTENDING COMMENTS
Patient is seen and examined with fellow, NP and the CCU house-staff. I agree with the history, physical and the assessment and plan.    poor neuro response - no purposeful response - will repeat a head CT  CPAP trial for exercise  right sided heart failure in setting of pulmonary HTN of unknown etiology  f/u with nephrology in setting of possible renal replacement to see if uremia is the source of her poor neuro response  f/u with vanco troph  c/w abx  will speak with family in regards to goals of care Patient is seen and examined with fellow, NP and the CCU house-staff. I agree with the history, physical and the assessment and plan.    poor neuro response - no purposeful response - will repeat a head CT  CPAP trial for exercise  right sided heart failure in setting of pulmonary HTN of unknown etiology  f/u with nephrology in setting of possible renal replacement to see if uremia is the source of her poor neuro response  f/u with vancomycin troph  c/w abx  will speak with family in regards to goals of care

## 2021-06-06 NOTE — PROGRESS NOTE ADULT - SUBJECTIVE AND OBJECTIVE BOX
Events:    Review Of Systems:  Constitutional: denies fever, chills, Fatigue   HEENT: denies Blurred vision, Eye Pain, Headache   Respiratory: denies Cough, Wheezing , Shortness of breath  Cardiovascular: denies Chest Pain, Palpitations,  BERMEO   Gastrointestinal: denies Abdominal Pain, Diarrhea, Constipation   Genitourinary: denies Nocturia, Dysuria, Incontinence  Extremities: denies Swelling, Joint Pain  Neurologic: denies Focal deficit, Paresthesias, Syncope  Lymphatic: denies Swelling, Lymphadenopathy   Skin: denies Rash, Ecchymoses, Wounds   Psychiatry: denies Depression, Suicidal/Homicidal Ideation, anxiety  [X ] 10 point review of systems is otherwise negative except as mentioned above         Medications:  acetaminophen    Suspension .. 650 milliGRAM(s) Oral every 6 hours PRN  atorvastatin 40 milliGRAM(s) Oral at bedtime  buMETAnide Infusion 1 mG/Hr IV Continuous <Continuous>  cefepime   IVPB      cefepime   IVPB 2000 milliGRAM(s) IV Intermittent every 24 hours  chlorhexidine 0.12% Liquid 15 milliLiter(s) Oral Mucosa every 12 hours  chlorhexidine 2% Cloths 1 Application(s) Topical <User Schedule>  heparin   Injectable 5000 Unit(s) SubCutaneous every 8 hours  norepinephrine Infusion 0.05 MICROgram(s)/kG/Min IV Continuous <Continuous>  pantoprazole  Injectable 40 milliGRAM(s) IV Push daily  sodium chloride 0.9%. 1000 milliLiter(s) IV Continuous <Continuous>    PMH/PSH/FH/SH: [ ] Unchanged  Vitals:  T(C): 37.4 (21 @ 06:15), Max: 38.6 (21 @ 22:00)  HR: 100 (21 @ 06:45) (88 - 108)  BP: --  BP(mean): --  RR: 22 (21 @ 06:45) (15 - 34)  SpO2: 100% (21 @ 06:45) (94% - 100%)  Wt(kg): --  Daily     Daily Weight in k.3 (2021 05:00)  I&O's Summary    2021 07:01  -  2021 07:00  --------------------------------------------------------  IN: 3153.2 mL / OUT: 2847 mL / NET: 306.2 mL    2021 07:01  -  2021 06:48  --------------------------------------------------------  IN: 2479.4 mL / OUT: 3145 mL / NET: -665.6 mL        Physical Exam:  Appearance: [ ] Normal [ ] NAD  Eyes: [ ] PERRL [ ] EOMI  HENT: [ ] Normal oral muscosa [ ]NC/AT  Cardiovascular: [ ] S1 [ ] S2 [ ] RRR [ ] No m/r/g [ ]No edema [ ] JVP  Procedural Access Site: [ ] No hematoma [ ] Non-tender to palpation [ ] 2+ pulse [ ] No bruit [ ] No Ecchymosis  Respiratory: [ ] Clear to auscultation bilaterally  Gastrointestinal: [ ] Soft [ ] Non-tender [ ] Non-distended [ ] BS+  Musculoskeletal: [ ] No clubbing [ ] No joint deformity   Neurologic: [ ] Non-focal  Lymphatic: [ ] No lymphadenopathy  Psychiatry: [ ] AAOx3 [ ] Mood & affect appropriate  Skin: [ ] No rashes [ ] No ecchymoses [ ] No cyanosis        146<H>  |  99  |  86<H>  ----------------------------<  153<H>  3.6   |  31  |  2.99<H>    Ca    9.6      2021 04:22  Phos  2.6     06-  Mg     2.1     06-06    TPro  7.3  /  Alb  2.6<L>  /  TBili  4.0<H>  /  DBili  x   /  AST  38  /  ALT  23  /  AlkPhos  242<H>  06-06    PT/INR - ( 2021 02:17 )   PT: 14.3 sec;   INR: 1.20 ratio         PTT - ( 2021 02:17 )  PTT:48.3 sec              ECG:    Echo:    Stress Testing:     Cath:    Imaging:    Interpretation of Telemetry:   HPI: 75y.o female with PMHx of CHFpEF, Severe TR with Right CHF and severe pHTN, COPD on home , HTN, HLD, Chronic Afib (on Eliquis), OA, CKD3, DM, right eye cataract who was brought in by EMS to [facility] on 21 for AMS. pt A&O x1, per daughter patient with generalized weakness and lethargy since since . Pt had recent in 2021 with CP and NIC. Per chart on arrival to ED pt was hypotensive, obtunded. Chest CT with large pericardial effusion (increased since 2021)- per thoracic surgery consult no indication for pericardial window, pHTN, cirrhotic liver with small ascites, ECHO EF 55% dilated RV with decreased function, severe TN, pHTN, NIC (per renal consult no acute indication for HD), venous duplex NG for DVTs, Head CT revealed chronic involutional and white matter changes, no acute intracranial process, NIC(SCr peaked at 4.6) with  hyperkalemia (5.8) requiring lokelma and insulin.Of note daughter states over past month patient more and more confused ? dementia and pt is legally blind. Daughter states pt HAS NOT RECEIVED COVID VACCINE as she has been sick these past few months.  Patient transferred to [facility] with concern of pericardial effusion, hypotension and NIC. On arrival patient SBP 80's, A&O to first name but very lethargic.  (26 May 2021 10:16). Echo revealed a pericardial effusion with no tamponade physiology. She was intubated for hypercarbic respiratory failure, with subsequent improvement in BP, now off vasopressors. She is currently volume overloaded in the setting of RV failure and pulmonary hypertension. She was started on IV bumex and sildenafil. ON  Levophed requirements continued to rise from 0.9 to .3. R groin was noted to be swollen and concerning for a hematoma from groin lines that were removed the day prior . Of note her pTT was noted to be > 200 at 4 AM. Heparin gtt was stopped and manual pressure applied for 1 hour. 2 L of LR given and 1 U of PRBCS. At this time pt continued ot become unstable with blood pressure dropping into systolics of 50s and HR up to 160s and CVP reading 0. Everytime manual pressure was stopped hematoma would form again. Vascular surgery called and MTP protocol was applied. Decision was made to reverse coagulopathy first given that both groin lines were small catheter frenches. In total the patient received 11 U of PRBCs, 8 FFP, 2 U PLTS, 3 Calciums and protamine x 2. After coagulopathy was reversed pt stabilized. Lactate peaked at 5. Pt now being diuresed on a Bumex gtt.   Spoke with the daughter whom she lives with Mayank. I explained to her that her mom has multiple irreversible comorbidites including dementia and R sided HF. She has yet to return to her mental status despite being off sedation for > 1 week and she is 9 days post intubation. I discussed that it is time to start discussing with the family whether they would like to pursue trach/peg vs palliative extubation as pt is not tolerating a pressure support < 8. Daughter says she will start discussing with her family members but her mother has expressed that she would not like to be intubated for a prolonged period of time and would not want trach or artifical nutrition if there was low liklihood of improvement. Daughter has requested a family meeting for when the time comes to discuss trach to see what the options are.     For now we have agreed that we will continue to treat her with aggressive measures. We will obtain a palliative consultation to discuss options. If the time comes to discuss trach she is leaning towards a palliative extubation or trail of extubation however she would like to discuss this with her family as well as in a meeting with the doctors. Palliative consult placed. She expressed gratitude for the care provided.     Events:    Review Of Systems:  Constitutional: denies fever, chills, Fatigue   HEENT: denies Blurred vision, Eye Pain, Headache   Respiratory: denies Cough, Wheezing , Shortness of breath  Cardiovascular: denies Chest Pain, Palpitations,  BERMEO   Gastrointestinal: denies Abdominal Pain, Diarrhea, Constipation   Genitourinary: denies Nocturia, Dysuria, Incontinence  Extremities: denies Swelling, Joint Pain  Neurologic: denies Focal deficit, Paresthesias, Syncope  Lymphatic: denies Swelling, Lymphadenopathy   Skin: denies Rash, Ecchymoses, Wounds   Psychiatry: denies Depression, Suicidal/Homicidal Ideation, anxiety  [X ] 10 point review of systems is otherwise negative except as mentioned above         Medications:  acetaminophen    Suspension .. 650 milliGRAM(s) Oral every 6 hours PRN  atorvastatin 40 milliGRAM(s) Oral at bedtime  buMETAnide Infusion 1 mG/Hr IV Continuous <Continuous>  cefepime   IVPB      cefepime   IVPB 2000 milliGRAM(s) IV Intermittent every 24 hours  chlorhexidine 0.12% Liquid 15 milliLiter(s) Oral Mucosa every 12 hours  chlorhexidine 2% Cloths 1 Application(s) Topical <User Schedule>  heparin   Injectable 5000 Unit(s) SubCutaneous every 8 hours  norepinephrine Infusion 0.05 MICROgram(s)/kG/Min IV Continuous <Continuous>  pantoprazole  Injectable 40 milliGRAM(s) IV Push daily  sodium chloride 0.9%. 1000 milliLiter(s) IV Continuous <Continuous>    PMH/PSH/FH/SH: [ ] Unchanged  Vitals:  T(C): 37.4 (21 @ 06:15), Max: 38.6 (21 @ 22:00)  HR: 100 (21 @ 06:45) (88 - 108)  BP: --  BP(mean): --  RR: 22 (21 @ 06:45) (15 - 34)  SpO2: 100% (21 @ 06:45) (94% - 100%)  Wt(kg): --  Daily     Daily Weight in k.3 (2021 05:00)  I&O's Summary    2021 07:01  -  2021 07:00  --------------------------------------------------------  IN: 3153.2 mL / OUT: 2847 mL / NET: 306.2 mL    2021 07:01  -  2021 06:48  --------------------------------------------------------  IN: 2479.4 mL / OUT: 3145 mL / NET: -665.6 mL        Physical Exam:  Appearance: [ ] Normal [ ] NAD  Eyes: [ ] PERRL [ ] EOMI  HENT: [ ] Normal oral muscosa [ ]NC/AT  Cardiovascular: [ ] S1 [ ] S2 [ ] RRR [ ] No m/r/g [ ]No edema [ ] JVP  Procedural Access Site: [ ] No hematoma [ ] Non-tender to palpation [ ] 2+ pulse [ ] No bruit [ ] No Ecchymosis  Respiratory: [ ] Clear to auscultation bilaterally  Gastrointestinal: [ ] Soft [ ] Non-tender [ ] Non-distended [ ] BS+  Musculoskeletal: [ ] No clubbing [ ] No joint deformity   Neurologic: [ ] Non-focal  Lymphatic: [ ] No lymphadenopathy  Psychiatry: [ ] AAOx3 [ ] Mood & affect appropriate  Skin: [ ] No rashes [ ] No ecchymoses [ ] No cyanosis        146<H>  |  99  |  86<H>  ----------------------------<  153<H>  3.6   |  31  |  2.99<H>    Ca    9.6      2021 04:22  Phos  2.6     06-  Mg     2.1     06-06    TPro  7.3  /  Alb  2.6<L>  /  TBili  4.0<H>  /  DBili  x   /  AST  38  /  ALT  23  /  AlkPhos  242<H>  -06    PT/INR - ( 2021 02:17 )   PT: 14.3 sec;   INR: 1.20 ratio         PTT - ( 2021 02:17 )  PTT:48.3 sec              ECG:    Echo:    Stress Testing:     Cath:    Imaging:    Interpretation of Telemetry:   HPI: 75y.o female with PMHx of CHFpEF, Severe TR with Right CHF and severe pHTN, COPD on home , HTN, HLD, Chronic Afib (on Eliquis), OA, CKD3, DM, right eye cataract who was brought in by EMS to [facility] on 21 for AMS. pt A&O x1, per daughter patient with generalized weakness and lethargy since since . Pt had recent in 2021 with CP and NIC. Per chart on arrival to ED pt was hypotensive, obtunded. Chest CT with large pericardial effusion (increased since 2021)- per thoracic surgery consult no indication for pericardial window, pHTN, cirrhotic liver with small ascites, ECHO EF 55% dilated RV with decreased function, severe TN, pHTN, NIC (per renal consult no acute indication for HD), venous duplex NG for DVTs, Head CT revealed chronic involutional and white matter changes, no acute intracranial process, NIC(SCr peaked at 4.6) with  hyperkalemia (5.8) requiring lokelma and insulin.Of note daughter states over past month patient more and more confused ? dementia and pt is legally blind. Daughter states pt HAS NOT RECEIVED COVID VACCINE as she has been sick these past few months.  Patient transferred to [facility] with concern of pericardial effusion, hypotension and NIC. On arrival patient SBP 80's, A&O to first name but very lethargic.  (26 May 2021 10:16). Echo revealed a pericardial effusion with no tamponade physiology. She was intubated for hypercarbic respiratory failure, with subsequent improvement in BP, now off vasopressors. She is currently volume overloaded in the setting of RV failure and pulmonary hypertension. She was started on IV bumex and sildenafil. ON  Levophed requirements continued to rise from 0.9 to .3. R groin was noted to be swollen and concerning for a hematoma from groin lines that were removed the day prior . Of note her pTT was noted to be > 200 at 4 AM. Heparin gtt was stopped and manual pressure applied for 1 hour. 2 L of LR given and 1 U of PRBCS. At this time pt continued ot become unstable with blood pressure dropping into systolics of 50s and HR up to 160s and CVP reading 0. Everytime manual pressure was stopped hematoma would form again. Vascular surgery called and MTP protocol was applied. Decision was made to reverse coagulopathy first given that both groin lines were small catheter frenches. In total the patient received 11 U of PRBCs, 8 FFP, 2 U PLTS, 3 Calciums and protamine x 2. After coagulopathy was reversed pt stabilized. Lactate peaked at 5. Pt now being diuresed on a Bumex gtt.   Spoke with the daughter whom she lives with Mayank. I explained to her that her mom has multiple irreversible comorbidites including dementia and R sided HF. She has yet to return to her mental status despite being off sedation for > 1 week and she is 9 days post intubation. I discussed that it is time to start discussing with the family whether they would like to pursue trach/peg vs palliative extubation as pt is not tolerating a pressure support < 8. Daughter says she will start discussing with her family members but her mother has expressed that she would not like to be intubated for a prolonged period of time and would not want trach or artifical nutrition if there was low liklihood of improvement. Daughter has requested a family meeting for when the time comes to discuss trach to see what the options are.     For now we have agreed that we will continue to treat her with aggressive measures. We will obtain a palliative consultation to discuss options. If the time comes to discuss trach she is leaning towards a palliative extubation or trail of extubation however she would like to discuss this with her family as well as in a meeting with the doctors. Palliative consult placed. She expressed gratitude for the care provided.     Events: Overnight daughter Mayank spoke to family members and they do not want any invasive measures. This Am pt nodded her head yes and moved her arms in response to my commands. She has been febrile for 24 hours. I will discuss with the family whether they would want me to change out central line as pt continues to be on Levo.     Review Of Systems:  Not answering me if she is in pain but appears comfortable.     Medications:  acetaminophen    Suspension .. 650 milliGRAM(s) Oral every 6 hours PRN  atorvastatin 40 milliGRAM(s) Oral at bedtime  buMETAnide Infusion 1 mG/Hr IV Continuous <Continuous>  cefepime   IVPB      cefepime   IVPB 2000 milliGRAM(s) IV Intermittent every 24 hours  chlorhexidine 0.12% Liquid 15 milliLiter(s) Oral Mucosa every 12 hours  chlorhexidine 2% Cloths 1 Application(s) Topical <User Schedule>  heparin   Injectable 5000 Unit(s) SubCutaneous every 8 hours  norepinephrine Infusion 0.05 MICROgram(s)/kG/Min IV Continuous <Continuous>  pantoprazole  Injectable 40 milliGRAM(s) IV Push daily  sodium chloride 0.9%. 1000 milliLiter(s) IV Continuous <Continuous>    Vitals:  T(C): 37.4 (21 @ 06:15), Max: 38.6 (21 @ 22:00)  HR: 100 (21 @ 06:45) (88 - 108)  RR: 22 (21 @ 06:45) (15 - 34)  SpO2: 100% (21 @ 06:45) (94% - 100%)    Daily     Daily Weight in k.3 (2021 05:00)  I&O's Summary    2021 07:01  -  2021 07:00  --------------------------------------------------------  IN: 3153.2 mL / OUT: 2847 mL / NET: 306.2 mL    2021 07:01  -  2021 06:48  --------------------------------------------------------  IN: 2479.4 mL / OUT: 3145 mL / NET: -665.6 mL    Physical Exam:  Appearance: [X ] Normal [ X] NAD  Eyes: [X ] PERRL [X ] EOMI  HENT: [ X] Normal oral muscosa [ X]NC/AT  Cardiovascular: [X ] S1 [X ] S2 [X ] RRR No edema. No JVD  Procedural Access Site: RIJ Central line C/D/I without bleeding, induration, or hematoma.   Respiratory: [ X] Clear to auscultation bilaterally  Gastrointestinal: [X ] Soft [X ] Non-tender [X ] Non-distended  Musculoskeletal: [X ] No clubbing [X ] No joint deformity   Neurologic: [ X] Non-focal  Lymphatic: [ X] No lymphadenopathy  Psychiatry: [ X] AAOx3 [ X] Mood & affect appropriate  Skin:[X ] No rashes [X ] No ecchymoses [X No cyanosis    06    146<H>  |  99  |  86<H>  ----------------------------<  153<H>  3.6   |  31  |  2.99<H>    Ca    9.6      2021 04:22  Phos  2.6     -  Mg     2.1     -06    TPro  7.3  /  Alb  2.6<L>  /  TBili  4.0<H>  /  DBili  x   /  AST  38  /  ALT  23  /  AlkPhos  242<H>  06-06    PT/INR - ( 2021 02:17 )   PT: 14.3 sec;   INR: 1.20 ratio      PTT - ( 2021 02:17 )  PTT:48.3 sec    ECG: < from: 12 Lead ECG (21 @ 08:25) >  Diagnosis Line ATRIAL FIBRILLATION  RIGHT AXIS DEVIATION  PULMONARY DISEASE PATTERN  INCOMPLETE RIGHT BUNDLE BRANCH BLOCK  POSSIBLE RIGHT VENTRICULAR HYPERTROPHY  SEPTAL INFARCT (CITED ON OR BEFORE 28-MAY-2021)  ABNORMAL ECG    Echo: < from: Transthoracic Echocardiogram (21 @ 09:44) >  1. Endocardium not well visualized; overall preserved left  ventricular systolic function. Septal flattening with  paradoxical septal motion consistent with right ventricular  overload.  2. Severe right ventricular enlargement with severely  decreased right ventricular systolic function.  3.Estimated right ventricular systolic pressure equals 67  mm Hg, assuming right atrial pressure equals 15 mm Hg,  consistent with severe pulmonary hypertension.  4. Large pericardial effusion seen adjacent to the right  atrium. The effusion measures up to approximately 3.4 cm  adjacent to the RA in its largest dimension. Thickened  pericardium with small-moderate pericardial effusion seen  posterior and lateral to the LV. The effusion measures up  to approximately 1.5 cm posterior to the LV. No  echocardiographic evidence of pericardial tamponade.  Results discussed with physician assistant Brayan from  CTU.    Cath: < from: Cardiac Cath Lab - Adult (21 @ 14:49) >  INTERVENTIONAL RECOMMENDATIONS: Fleming Dm placement via RIJ (catheter was  placed in the ICU prior to the procedure).  CI 1.82 by Janes.  Management as per primary team and ICU.  Prepared and signed by  Darrian Chong D.O.  Signed 2021 16:39:13  HEMODYNAMIC TABLES  Pressures:  Baseline  Pressures:  - HR: 57  Pressures:  - Rhythm:  Pressures:  -- Pulmonary Artery (S/D/M): 65/15/30  Pressures:  -- Pulmonary Capillary Wedge: --/28/20  Pressures:  -- Right Atrium (a/v/M): --/15/14  O2 Sats:  Baseline  O2 Sats:  - HR: 57  O2 Sats:  - Rhythm:  O2 Sats:  -- AO: 12.2/100/16.59  O2 Sats:  -- PA: 12.2/55.4/9.19  Outputs:  Baseline  Outputs:  -- CALCULATIONS[age]  Outputs:  -- CALCULATIONS: Body Surface Area: 2.17  Outputs:  -- CALCULATIONS: Height in cm: 165.00  Outputs:  -- CALCULATIONS: Sex: Female  Outputs:  -- CALCULATIONS: Weight in k.40  Outputs:  -- OUTPUTS: CO by Janes: 3.93  Outputs:  -- OUTPUTS: Janes cardiac index: 1.82  Outputs:  -- OUTPUTS: O2 consumption: 291.00  Outputs:  -- OUTPUTS: Vo2 Indexed: 134.36  Outputs:  -- RESISTANCES: Pulmonary vascular index (dsc): 440.49  Outputs:  -- RESISTANCES: Pulmonary vascular index (Wood Units): 5.51  Outputs:  -- RESISTANCES: Pulmonary vascular resistance (dsc): 203.39  Outputs:  -- RESISTANCES: Pulmonary vascular resistance (Wood Units): 2.54  Outputs:  -- RESISTANCES: Right ventricular stroke work: 16.15  Outputs:  -- RESISTANCES: Right ventricular stroke work index: 7.45  Outputs:  -- RESISTANCES: Total pulmonary index (dsc): 1321.48  Outputs:  -- RESISTANCES: Total pulmonary index (Wood Units): 16.52  Outputs:  -- RESISTANCES: Total pulmonary resistance (dsc): 610.16  Outputs:  -- RESISTANCES: Total pulmonary resistance (Wood Units): 7.63  Outputs:  -- SHUNTS: Pulmonary flow: 3.93  Outputs:  -- SHUNTS: Qp Indexed: 1.82  Outputs:  -- SHUNTS: Qs Indexed: 1.82  Outputs:  -- SHUNTS: Systemic flow: 3.93    Imaging: < from: Xray Chest 1 View- PORTABLE-Routine (Xray Chest 1 View- PORTABLE-Routine in AM.) (21 @ 04:49) >    Endotracheal tube tip above the terry. Tip of the right IJ Fleming-Dm catheter within right lower lobe pulmonary artery. Enteric tube within the stomach.    Pulmonary edema.    Interpretation of Telemetry: NSR 90 w/ babar

## 2021-06-06 NOTE — PROGRESS NOTE ADULT - ASSESSMENT
`75F with a pmhx of CHFpEF, Severe TR with Right CHF and severe pHTN, COPD on home 02, HTN, HLD, Chronic Afib (on Eliquis), OA, CKD3, DM, right eye cataract who was brought in by EMS to [facility] on 5/23/21 for AMS, found to have a large pericardial effusion w/o tamponade physiology, without drainable window, intubated (5/26) for acute hypercarbic respiratory failure and worsening AMS, transferred to CICU for hypotension and NIC on CKD in undifferentiated shock, course c/b expanding R thigh hematoma, s/p MTP, with ratio predom targeted towards FFP, s/p protamine for hep reversal.    Neuro:  -Mental status: CTH obtained and MR of the brain negative . Neuro following and likely toxic/metobolic in origin. I suspect that her MS is caused by septic shock. This AM she nodded her head appropriately and moved her arms to command.     Resp:  -(5/26): Intubated , RR (machine): 12, TV (machine): 350, FiO2: 30,  PEEP:5 . ABG 7.46/56/94/34. Chest XR showing pulmonary congestion , CVP 11 on Bumex 1 mg. She desaturated when laying flat.  Daily spontaneous breathing trails. She is tolerating PS trails 12/5/30%.  Increase RR to 14 once off spontaneous breathing trails.   - - Group 2, Mild --> however RHC not performed in euvolemic state.   - Discussed case with Dr. Patel. Likely group 2 etiology. Continue with diuretics. After patient is volume optimized, recommend repeating RHC to re-assess pHTN burden    CV:  (5/26): TTE: EF 55%, RVH with severe RVSD, severe pulm HTN, large pericardial effusion 3.4cm @ largest diameter, adjacent to RA  ventricular systolic function.  - Pt Now on minimal Levophed at 0.05.  CVP 13 , Central sat 77. Continue to diurese with Bumex 1 mg. Overall positive 300. she is overall net neg 8 L since admission. Will POCUS to assess whether she would benefit with more aggressive diuresis.   -c/w atorvastatin 40mg qhs  -Hold sildenefil as pt is still on Levophed  -Systolic goal: >90, MAP >65  Telemetry    GI:  -Diet: Continue Nepro at 40   -Stress ulcer ppx:continue PPI  -Bowel regimen: none, had diarrhea now resolving  -(5/26): CT a/p noncon: mild contoured liver. Anasarca. TBili elevated at 4.       - Hernadez catheter in place for critical care monitoring    Heme:  -Thrombocytopenia  -Hemoglobin has remained stable now . on Heparin SQ for DVT px. Hold systemic full AC as bleeding risk is high.     Renal:  - Continue to monitor I/Os, BUN/Creatinine, and urine output. Cr has leveled at 3 and BUN 80. Nephrology paged to discuss any benefit of dialysis to lower BUN.   - CVP 13 continue bumex gtt.   - Replete lytes PRN. Keep K> 4 and Mg >2    Endo:  - Monitor blood sugar 100-130    ID:  -  Low grade fevers with WBC 14. Continue Cefepime and check Vanco by level. She was given one dose yesterday. Blood cultures are in the lab. If fevers persist will change central line from the RIJ ( Day 9) tomorrow.     CODE STATUS:  Full code    TUBES/LINES/DRAINS:  -ETT  -OGT  -Hernadez  -PIV    GOC discussion    I spoke with the daughter whom she lives with Mayank. I explained to her that her mom has multiple irreversible comorbidites including dementia and R sided HF. She has yet to return to her mental status despite being off sedation for > 1 week and she is 9 days post intubation. I discussed that it is time to start discussing with the family whether they would like to pursue trach/peg vs palliative extubation as pt is not tolerating a pressure support < 8. Daughter says she will start discussing with her family members but her mother has expressed that she would not like to be intubated for a prolonged period of time and would not want trach or artifical nutrition if there was low liklihood of improvement. Daughter has requested a family meeting for when the time comes to discuss trach to see what the options are.     For now we have agreed that we will continue to treat her with aggressive measures. We will obtain a palliative consultation to discuss options. If the time comes to discuss trach she is leaning towards a palliative extubation or trail of extubation however she would like to discuss this with her family as well as in a meeting with the doctors. Palliative consult placed. She expressed gratitude for the care provided.  `75F with a pmhx of CHFpEF, Severe TR with Right CHF and severe pHTN, COPD on home 02, HTN, HLD, Chronic Afib (on Eliquis), OA, CKD3, DM, right eye cataract who was brought in by EMS to [facility] on 5/23/21 for AMS, found to have a large pericardial effusion w/o tamponade physiology, without drainable window, intubated (5/26) for acute hypercarbic respiratory failure and worsening AMS, transferred to CICU for hypotension and NIC on CKD in undifferentiated shock, course c/b expanding R thigh hematoma, s/p MTP, with ratio predom targeted towards FFP, s/p protamine for hep reversal.    Neuro:  -Mental status: CTH obtained and MR of the brain negative . Neuro following and likely toxic/metobolic in origin. I suspect that her MS is caused by septic shock. This AM she nodded her head appropriately and moved her arms to command.     Resp:  -(5/26): Intubated , RR (machine): 12, TV (machine): 350, FiO2: 30,  PEEP:5 . ABG 7.46/56/94/34. Chest XR showing pulmonary congestion however it is improved from XR from 6/4 , CVP 11 on Bumex 1 mg. She desaturated when laying flat.  Daily spontaneous breathing trails. She is tolerating PS trails 8/5/30%.  Will check ABG for readiness to extubation however will not extubate until GOC are fully establish as pt has a high chance of decompensation given comorbities.   - - Group 2, Mild --> however RHC not performed in euvolemic state.   . Continue with diuretics.     CV:  (5/26): TTE: EF 55%, RVH with severe RVSD, severe pulm HTN, large pericardial effusion 3.4cm @ largest diameter, adjacent to RA  ventricular systolic function.  - Pt Levo requirements have gona up from 0.05 to .13 . I suspect it is septic shock not cardiogenic. Central sat is 77  -c/w atorvastatin 40mg qhs  -Hold sildenefil as pt is still on Levophed  -Systolic goal: >90, MAP >65  Telemetry    GI:  -Diet: Continue Nepro at 40   -Stress ulcer ppx:continue PPI  -Bowel regimen: none, had diarrhea now resolving  -(5/26): CT a/p noncon: mild contoured liver. Anasarca. TBili elevated at 4.       - Hernadez catheter in place for critical care monitoring    Heme:  -Thrombocytopenia  -Hemoglobin has remained stable now . on Heparin SQ for DVT px. Hold systemic full AC as bleeding risk is high.     Renal:  - Continue to monitor I/Os, BUN/Creatinine, and urine output. Cr has leveled at 3 and BUN 80. Nephrology paged to discuss any benefit of dialysis to lower BUN.   - CVP 13 continue bumex gtt.   - Replete lytes PRN. Keep K> 4 and Mg >2    Endo:  - Monitor blood sugar 100-130    ID:  -  Low grade fevers with WBC 14. Continue Cefepime and check Vanco by level. She was given one dose yesterday. Blood cultures are in the lab. If fevers persist will change central line from the RIJ ( Day 9) tomorrow.     CODE STATUS:  Full code    TUBES/LINES/DRAINS:  -ETT  -OGT  -Hernadez  -PIV    GOC discussion    I spoke with the daughter whom she lives with Mayank. I explained to her that her mom has multiple irreversible comorbidites including dementia and R sided HF. She has yet to return to her mental status despite being off sedation for > 1 week and she is 9 days post intubation. I discussed that it is time to start discussing with the family whether they would like to pursue trach/peg vs palliative extubation as pt is not tolerating a pressure support < 8. Daughter says she will start discussing with her family members but her mother has expressed that she would not like to be intubated for a prolonged period of time and would not want trach or artifical nutrition if there was low liklihood of improvement. Daughter has requested a family meeting for when the time comes to discuss trach to see what the options are.     For now we have agreed that we will continue to treat her with aggressive measures. We will obtain a palliative consultation to discuss options. If the time comes to discuss trach she is leaning towards a palliative extubation or trail of extubation however she would like to discuss this with her family as well as in a meeting with the doctors. Palliative consult placed. She expressed gratitude for the care provided.  `75F with a pmhx of CHFpEF, Severe TR with Right CHF and severe pHTN, COPD on home 02, HTN, HLD, Chronic Afib (on Eliquis), OA, CKD3, DM, right eye cataract who was brought in by EMS to [facility] on 5/23/21 for AMS, found to have a large pericardial effusion w/o tamponade physiology, without drainable window, intubated (5/26) for acute hypercarbic respiratory failure and worsening AMS, transferred to CICU for hypotension and NIC on CKD in undifferentiated shock, course c/b expanding R thigh hematoma, s/p MTP, with ratio predom targeted towards FFP, s/p protamine for hep reversal.    Neuro:  -Mental status: CTH obtained and MR of the brain negative . Neuro following and likely toxic/metobolic in origin. I suspect that her MS is caused by septic shock. This AM she nodded her head appropriately and moved her arms to command.     Resp:  -(5/26): Intubated , RR (machine): 12, TV (machine): 350, FiO2: 30,  PEEP:5 . ABG 7.46/56/94/34. Chest XR showing pulmonary congestion however it is improved from XR from 6/4 , CVP 11 on Bumex 1 mg. She desaturated when laying flat.  Daily spontaneous breathing trails. She is tolerating PS trails 8/5/30%.  Will check ABG for readiness to extubation however will not extubate until GOC are fully establish as pt has a high chance of decompensation given comorbities.   - - Group 2, Mild --> however RHC not performed in euvolemic state.   . Continue with diuretics.     CV:  (5/26): TTE: EF 55%, RVH with severe RVSD, severe pulm HTN, large pericardial effusion 3.4cm @ largest diameter, adjacent to RA  ventricular systolic function.  - Pt Levo requirements have gona up from 0.05 to .13 . I suspect it is septic shock not cardiogenic. Central sat is 77. CVP 11 Continue with diuresis with Bumex at 1 . She is overall net neg 667  -c/w atorvastatin 40mg qhs  -Hold sildenefil as pt is still on Levophed  -Systolic goal: >90, MAP >65  Telemetry    GI:  -Diet: Continue Nepro at 40   -Stress ulcer ppx:continue PPI  -Bowel regimen: none, had diarrhea now resolving  -(5/26): CT a/p noncon: mild contoured liver. Anasarca. TBili elevated at 4.       - Hernadez catheter in place for critical care monitoring    Heme:  -Thrombocytopenia  -Hemoglobin has remained stable now . on Heparin SQ for DVT px. Hold systemic full AC as bleeding risk is high.     Renal:  - Continue to monitor I/Os, BUN/Creatinine, and urine output. Cr has leveled at 3 and BUN 80. Nephrology paged to discuss any benefit of dialysis to lower BUN.   - CVP 13 continue bumex gtt.   - Replete lytes PRN. Keep K> 4 and Mg >2    Endo:  - Monitor blood sugar 100-130    ID:  -  Low grade fevers with WBC 14. Continue Cefepime and check Vanco by level. She was given one dose yesterday. Blood cultures are in the lab. If fevers persist will change central line from the RIJ ( Day 9) tomorrow.     CODE STATUS:  Full code    TUBES/LINES/DRAINS:  -ETT  -OGT  -Hernadez  -PIV    GOC discussion    I spoke with the daughter whom she lives with Mayank. I explained to her that her mom has multiple irreversible comorbidites including dementia and R sided HF. She has yet to return to her mental status despite being off sedation for > 1 week and she is 9 days post intubation. I discussed that it is time to start discussing with the family whether they would like to pursue trach/peg vs palliative extubation as pt is not tolerating a pressure support < 8. Daughter says she will start discussing with her family members but her mother has expressed that she would not like to be intubated for a prolonged period of time and would not want trach or artifical nutrition if there was low liklihood of improvement. Daughter has requested a family meeting for when the time comes to discuss trach to see what the options are.     For now we have agreed that we will continue to treat her with aggressive measures. We will obtain a palliative consultation to discuss options. If the time comes to discuss trach she is leaning towards a palliative extubation or trail of extubation however she would like to discuss this with her family as well as in a meeting with the doctors. Palliative consult placed. She expressed gratitude for the care provided.  `75F with a pmhx of CHFpEF, Severe TR with Right CHF and severe pHTN, COPD on home 02, HTN, HLD, Chronic Afib (on Eliquis), OA, CKD3, DM, right eye cataract who was brought in by EMS to [facility] on 5/23/21 for AMS, found to have a large pericardial effusion w/o tamponade physiology, without drainable window, intubated (5/26) for acute hypercarbic respiratory failure and worsening AMS, transferred to CICU for hypotension and NIC on CKD in undifferentiated shock, course c/b expanding R thigh hematoma, s/p MTP, with ratio predom targeted towards FFP, s/p protamine for hep reversal.    Neuro:  -Mental status: CTH obtained and MR of the brain negative . Neuro following and likely toxic/metobolic in origin. I suspect that her MS is caused by septic shock. This AM she nodded her head appropriately and moved her arms to command.     Resp:  -(5/26): Intubated , RR (machine): 12, TV (machine): 350, FiO2: 30,  PEEP:5 . ABG 7.46/56/94/34. Chest XR showing pulmonary congestion however it is improved from XR from 6/4 , CVP 11 on Bumex 1 mg. She desaturated when laying flat.  Daily spontaneous breathing trails. She is tolerating PS trails 8/5/30%.  Will check ABG for readiness to extubation however will not extubate until GOC are fully establish as pt has a high chance of decompensation given comorbities.   - - Group 2, Mild --> however RHC not performed in euvolemic state.   . Continue with diuretics.     CV:  (5/26): TTE: EF 55%, RVH with severe RVSD, severe pulm HTN, large pericardial effusion 3.4cm @ largest diameter, adjacent to RA  ventricular systolic function.  - Pt Levo requirements have gona up from 0.05 to .13 . I suspect it is septic shock not cardiogenic. Central sat is 77. CVP 11 Continue with diuresis with Bumex at 1 . She is overall net neg 667  -c/w atorvastatin 40mg qhs  -Hold sildenefil as pt is still on Levophed  -Systolic goal: >90, MAP >65  Telemetry    GI:  -Diet: Continue Nepro at 40   -Stress ulcer ppx:continue PPI  -Bowel regimen: none, had diarrhea now resolving  -(5/26): CT a/p noncon: mild contoured liver. Anasarca. TBili elevated at 4.       - Hernadez catheter in place for critical care monitoring    Heme:  -Thrombocytopenia  -Hemoglobin has remained stable now . on Heparin SQ for DVT px. Hold systemic full AC as bleeding risk is high.     Renal:  - Continue to monitor I/Os, BUN/Creatinine, and urine output. Cr has leveled at 3 and BUN 80. Nephrology following.   - CVP 11 continue bumex gtt.   - Replete lytes PRN. Keep K> 4 and Mg >2. Pt has frequent bigeminy when K is low.     Endo:  - Monitor blood sugar 100-130    ID:  -  Low grade fevers with WBC rising now 15. Continue Cefepime and check Vanco by level. Obtain blood cultures next time the patient has a fever. I will remove the central line today ( Day 10) . She is on Levo 0.06 I will discuss with family whether they would like a central line as it is considered an invasive procedure and they have expressed that they would not want it. May do a midline ultrasound guided.     CODE STATUS:  Full code with palliative meeting pending    TUBES/LINES/DRAINS:  -ETT  -OGT  -Hernadez  -PIV    GOC discussion    I spoke with the daughter whom she lives with Mayank. I explained to her that her mom has multiple irreversible comorbidites including dementia and R sided HF. She has yet to return to her mental status despite being off sedation for > 1 week and she is 9 days post intubation. I discussed that it is time to start discussing with the family whether they would like to pursue trach/peg vs palliative extubation as pt is not tolerating a pressure support < 8. Daughter says she will start discussing with her family members but her mother has expressed that she would not like to be intubated for a prolonged period of time and would not want trach or artifical nutrition if there was low liklihood of improvement. Daughter has requested a family meeting for when the time comes to discuss trach to see what the options are.     For now we have agreed that we will continue to treat her with aggressive measures. We will obtain a palliative consultation to discuss options. If the time comes to discuss trach she is leaning towards a palliative extubation or trail of extubation however she would like to discuss this with her family as well as in a meeting with the doctors. Palliative consult placed. She expressed gratitude for the care provided.  `75F with a pmhx of CHFpEF, Severe TR with Right CHF and severe pHTN, COPD on home 02, HTN, HLD, Chronic Afib (on Eliquis), OA, CKD3, DM, right eye cataract who was brought in by EMS to [facility] on 5/23/21 for AMS, found to have a large pericardial effusion w/o tamponade physiology, without drainable window, intubated (5/26) for acute hypercarbic respiratory failure and worsening AMS, transferred to CICU for hypotension and NIC on CKD in undifferentiated shock, course c/b expanding R thigh hematoma, s/p MTP, with ratio predom targeted towards FFP, s/p protamine for hep reversal.    Neuro:  -Mental status: CTH obtained and MR of the brain negative . Neuro following and likely toxic/metobolic in origin. I suspect that her MS is caused by septic shock. This AM she nodded her head appropriately and moved her arms to command.     Resp:  -(5/26): Intubated , RR (machine): 12, TV (machine): 350, FiO2: 30,  PEEP:5 . ABG 7.46/56/94/34. Chest XR showing pulmonary congestion however it is improved from XR from 6/4 , CVP 11 on Bumex 1 mg. She desaturated when laying flat.  Daily spontaneous breathing trails. She is tolerating PS trails 8/5/30%.  Will check ABG for readiness to extubation however will not extubate until GOC are fully establish as pt has a high chance of decompensation given comorbities.   - - Group 2, Mild --> however RHC not performed in euvolemic state.   . Continue with diuretics.     CV:  (5/26): TTE: EF 55%, RVH with severe RVSD, severe pulm HTN, large pericardial effusion 3.4cm @ largest diameter, adjacent to RA  ventricular systolic function.  - Pt Levo requirements have gona up from 0.05 to .13 . I suspect it is septic shock not cardiogenic. Central sat is 77. CVP 11 Continue with diuresis with Bumex at 1 . She is overall net neg 667  -c/w atorvastatin 40mg qhs  -Hold sildenefil as pt is still on Levophed  -Systolic goal: >90, MAP >65  Telemetry    GI:  -Diet: Continue Nepro at 40   -Stress ulcer ppx:continue PPI  -Bowel regimen: none, had diarrhea now resolving  -(5/26): CT a/p noncon: mild contoured liver. Anasarca. TBili elevated at 4.       - Hernadez catheter in place for critical care monitoring    Heme:  -Thrombocytopenia  -Hemoglobin has remained stable now . on Heparin SQ for DVT px. Hold systemic full AC as bleeding risk is high.     Renal:  - Continue to monitor I/Os, BUN/Creatinine, and urine output. Cr has leveled at 3 and BUN 80. Nephrology following.   - CVP 11 continue bumex gtt.   - Replete lytes PRN. Keep K> 4 and Mg >2. Pt has frequent bigeminy when K is low.     Endo:  - Monitor blood sugar 100-130    ID:  -  Low grade fevers with WBC rising now 15. Continue Cefepime and check Vanco by level. Obtain blood cultures next time the patient has a fever. I will remove the central line today ( Day 10) . She is on Levo 0.06 I will discuss with family whether they would like a central line as it is considered an invasive procedure and they have expressed that they would not want it. May do a midline ultrasound guided.     CODE STATUS:  Full code with palliative meeting pending    TUBES/LINES/DRAINS:  -ETT  -OGT  -Hernadez  -PIV    GOC discussion    I spoke with the daughter whom she lives with Mayank. I explained to her that her mom has multiple irreversible comorbidites including dementia and R sided HF. She has yet to return to her mental status despite being off sedation for > 1 week and she is 9 days post intubation. I discussed that it is time to start discussing with the family whether they would like to pursue trach/peg vs palliative extubation as pt is not tolerating a pressure support < 8. Daughter says she will start discussing with her family members but her mother has expressed that she would not like to be intubated for a prolonged period of time and would not want trach or artifical nutrition if there was low liklihood of improvement. Daughter has requested a family meeting for when the time comes to discuss trach to see what the options are.     For now we have agreed that we will continue to treat her with aggressive measures. We will obtain a palliative consultation to discuss options. If the time comes to discuss trach she is leaning towards a palliative extubation or trail of extubation however she would like to discuss this with her family as well as in a meeting with the doctors. Palliative consult placed. She expressed gratitude for the care provided.         ****Fellow Note****    Plan  -on CPAP o/n, switching to VC   -c/w diuresis  -pal care consult    MELLO Liang MD  Cardiology Fellow  Text or Call: [phone]  For all New Consults and Questions:  [email or web address]   Login: [email or web address] [email or web address]

## 2021-06-06 NOTE — PROGRESS NOTE ADULT - ATTENDING COMMENTS
Patient is seen and examined with fellow, NP and the CCU house-staff. I agree with the history, physical and the assessment and plan.    poor neuro response - no purposeful response - will repeat a head CT  CPAP trial for exercise  right sided heart failure in setting of pulmonary HTN of unknown etiology  f/u with vanco vanessa  c/w abx  will speak with family in regards to goals of care - will call palliative care team for goals of care discussion and family meeting - as per the daughter last night, the family do not want PEG/TRACH Patient is seen and examined with fellow, NP and the CCU house-staff. I agree with the history, physical and the assessment and plan.    poor neuro response - no purposeful response - will repeat a head CT  CPAP trial for exercise  right sided heart failure in setting of pulmonary HTN of unknown etiology  f/u with vancomycin troph  c/w abx  will speak with family in regards to goals of care - will call palliative care team for goals of care discussion and family meeting - as per the daughter last night, the family do not want PEG/TRACH

## 2021-06-06 NOTE — PROGRESS NOTE ADULT - SUBJECTIVE AND OBJECTIVE BOX
NEPHROLOGY PROGRESS NOTE    CHIEF COMPLAINT:  NIC/CKD    HPI:  Slightly more responsive today.  Small improvement in Cr.     EXAM:  T(F): 99.7 (06-06-21 @ 14:00)  HR: 88 (06-06-21 @ 14:00)  BP: 136/73 (06-06-21 @ 08:09)  RR: 20 (06-06-21 @ 14:00)  SpO2: 100% (06-06-21 @ 14:00)    Intubated  Normal respiratory effort, lungs clear bilaterally  Heart RRR with no murmur, no peripheral edema         LABS                             10.3   15.57 )-----------( 181      ( 06 Jun 2021 04:22 )             32.6          06-06    146<H>  |  100  |  86<H>  ----------------------------<  137<H>  3.7   |  32<H>  |  2.84<H>    Ca    9.5      06 Jun 2021 09:35  Phos  2.5     06-06  Mg     2.1     06-06    TPro  6.9  /  Alb  2.4<L>  /  TBili  3.8<H>  /  DBili  x   /  AST  38  /  ALT  21  /  AlkPhos  235<H>  06-06           RADIOLOGY  Chest X-ray personally reviewed and shows cardiomegaly, globular heart, mild PVC      ASSESSMENT:  1.  NIC at least 10 days old with small, partial recovery, remains non oliguric on bumex drip;  no metabolic abnormalities of advanced renal failure noted, eGFR at least 14 mL/min, essentially no suspicion that this degree of renal impairment could result in such profound encephalopathy    PLAN:  No indications for a dialytic trial

## 2021-06-06 NOTE — PROGRESS NOTE ADULT - SUBJECTIVE AND OBJECTIVE BOX
SANJIV ROBERTS  MRN-[id number]  Patient is a 75y old  Female who presents with a chief complaint of Transfer from [facility] with pericardial effusion, NIC and hypotension (06 Jun 2021 14:13)    HPI:  75y.o female with PMHx of CHFpEF, Severe TR with Right CHF and severe pHTN, COPD on home 02, HTN, HLD, Chronic Afib (on Eliquis), OA, CKD3, DM, right eye cataract who was brought in by EMS to [facility] on 5/23/21 for AMS. pt A&O x1, per daughter patient with generalized weakness and lethargy since since 5/22. Pt had recent in Jan 2021 with CP and NIC. Per chart on arrival to ED pt was hypotensive, obtunded. Chest CT with large pericardial effusion (increased since Jan 2021)- per thoracic surgery consult no indication for pericardial window, pHTN, cirrhotic liver with small ascites, ECHO EF 55% dilated RV with decreased function, severe TN, pHTN, NIC (per renal consult no acute indication for HD), venous duplex NG for DVTs, Head CT revealed chronic involutional and white matter changes, no acute intracranial process, NIC(SCr peaked at 4.6) with  hyperkalemia (5.8) requiring lokelma and insulin.  Of note daughter states over past month patient more and more confused ? dementia and pt is legally blind. Daughter states pt HAS NOT RECEIVED COVID VACCINE as she has been sick these past few months.     5/26 Patient transferred to [facility] with concern of pericardial effusion, hypotension and NIC. On arrival patient SBP 80's, A&O to first name but very lethargic.  (26 May 2021 10:16)      Hospital Course:  5/26 Patient transferred to [facility] with concern of pericardial effusion, hypotension and NIC.   5/26 intubated for hypercarbic respiratory distressed. Transferred from CTU to CICU as no need for pericardiocentesis. Briefly on pressors prior to intubation and since weaned off. RIJ cordis placed- unable to float swan- coiling in RV. RV pressures 50s /10s. Start sildenafil 20 tid. start bumex gtt   5/27 S/P RHC with [place] emmanuel catheter placed.  5/28 MTP called for R Femoral A bleed from previous A line site s/p 11U PRBC, 8 plasma, 2 plts.   5/29 started IV Levophed, sildenafil dc'ed  6/02 Dc'ed Cefepime  6/04 fever of 100.6F, restarted Cefepime     24 HOUR EVENTS:    REVIEW OF SYSTEMS:    CONSTITUTIONAL: No weakness, fevers or chills  EYES/ENT: No visual changes;  No vertigo or throat pain   NECK: No pain or stiffness  RESPIRATORY: No cough, wheezing, hemoptysis; No shortness of breath  CARDIOVASCULAR: No chest pain or palpitations  GASTROINTESTINAL: No abdominal or epigastric pain. No nausea, vomiting, or hematemesis; No diarrhea or constipation. No melena or hematochezia.  GENITOURINARY: No dysuria, frequency or hematuria  NEUROLOGICAL: No numbness or weakness  SKIN: No itching, rashes      ICU Vital Signs Last 24 Hrs  T(C): 37.6 (06 Jun 2021 18:15), Max: 38.6 (05 Jun 2021 22:00)  T(F): 99.7 (06 Jun 2021 18:15), Max: 101.5 (05 Jun 2021 22:00)  HR: 88 (06 Jun 2021 20:30) (82 - 114)  BP: 136/73 (06 Jun 2021 08:09) (136/73 - 136/73)  BP(mean): 99 (06 Jun 2021 08:09) (99 - 99)  ABP: 108/48 (06 Jun 2021 20:30) (92/44 - 136/80)  ABP(mean): 70 (06 Jun 2021 20:30) (60 - 102)  RR: 18 (06 Jun 2021 20:30) (14 - 31)  SpO2: 100% (06 Jun 2021 20:30) (94% - 100%)    Mode: AC/ CMV (Assist Control/ Continuous Mandatory Ventilation), RR (machine): 14, TV (machine): 350, FiO2: 30, PEEP: 5, ITime: 1  CVP(mm Hg): 10 (06-06-21 @ 16:15) (6 - 19)        CAPILLARY BLOOD GLUCOSE          PHYSICAL EXAM:  GENERAL: No acute distress, well-developed  HEAD:  Atraumatic, Normocephalic  EYES: EOMI, PERRLA, conjunctiva and sclera clear  NECK: Supple, no lymphadenopathy, no JVD  CHEST/LUNG: CTAB; No wheezes, rales, or rhonchi  HEART: Regular rate and rhythm. Normal S1/S2. No murmurs, rubs, or gallops  ABDOMEN: Soft, non-tender, non-distended; normal bowel sounds, no organomegaly  EXTREMITIES:  2+ peripheral pulses b/l, No clubbing, cyanosis, or edema  NEUROLOGY: A&O x 3, no focal deficits  SKIN: No rashes or lesions    ============================I/O===========================   I&O's Detail    05 Jun 2021 07:01  -  06 Jun 2021 07:00  --------------------------------------------------------  IN:    Bumetanide: 120 mL    Enteral Tube Flush: 170 mL    IV PiggyBack: 100 mL    IV PiggyBack: 187.2 mL    IV PiggyBack: 300 mL    Nepro with Carb Steady: 960 mL    Norepinephrine: 484.1 mL    sodium chloride 0.9%: 240 mL  Total IN: 2561.3 mL    OUT:    Indwelling Catheter - Urethral (mL): 3145 mL  Total OUT: 3145 mL    Total NET: -583.7 mL      06 Jun 2021 07:01  -  06 Jun 2021 20:56  --------------------------------------------------------  IN:    Bumetanide: 55 mL    Enteral Tube Flush: 180 mL    IV PiggyBack: 300 mL    Nepro with Carb Steady: 440 mL    Norepinephrine: 131.8 mL    sodium chloride 0.9%: 90 mL    Vasopressin: 9.6 mL  Total IN: 1206.4 mL    OUT:    Indwelling Catheter - Urethral (mL): 1850 mL  Total OUT: 1850 mL    Total NET: -643.6 mL        ============================ LABS =========================                        10.3   15.57 )-----------( 181      ( 06 Jun 2021 04:22 )             32.6     06-06    142  |  99  |  94<H>  ----------------------------<  157<H>  3.1<L>   |  31  |  2.91<H>    Ca    9.7      06 Jun 2021 17:09  Phos  2.1     06-06  Mg     2.1     06-06    TPro  6.6  /  Alb  2.2<L>  /  TBili  3.2<H>  /  DBili  x   /  AST  36  /  ALT  19  /  AlkPhos  225<H>  06-06                LIVER FUNCTIONS - ( 06 Jun 2021 17:09 )  Alb: 2.2 g/dL / Pro: 6.6 g/dL / ALK PHOS: 225 U/L / ALT: 19 U/L / AST: 36 U/L / GGT: x           PT/INR - ( 05 Jun 2021 02:17 )   PT: 14.3 sec;   INR: 1.20 ratio         PTT - ( 05 Jun 2021 02:17 )  PTT:48.3 sec  ABG - ( 06 Jun 2021 09:25 )  pH, Arterial: 7.46  pH, Blood: x     /  pCO2: 54    /  pO2: 148   / HCO3: 38    / Base Excess: 12.4  /  SaO2: 100           Blood Gas Arterial, Lactate: 0.8 mmol/L (06-06-21 @ 09:25)  Blood Gas Venous - Lactate: 0.9 mmoL/L (06-06-21 @ 04:16)  Blood Gas Arterial, Lactate: 1.0 mmol/L (06-06-21 @ 04:16)      ======================Micro/Rad/Cardio=================  Telemtry: Reviewed   EKG: Reviewed  CXR: Reviewed  Culture: Reviewed   Echo: Reviewed   Cath: Reivewed    ======================================================  PAST MEDICAL & SURGICAL HISTORY:  Afib    COPD (chronic obstructive pulmonary disease)    CHF (congestive heart failure)    Dementia      ====================ASSESSMENT ==============            Plan:  ====================CARDIOVASCULAR==================  HFpEF with severe TR and severely decr RVSF likley 2/2 pulm HTN   - C/w Bumex gtt @ 1mg/hr. Target net negative  - trend perfusion labs; lactate, scvo2, lfts  - ScVO2 remains in the 70s; 78%, lactate 0.9.  -c/w levophed gtt, wean as tolerated to keep MAP>60 and transition to vasopressin       Chronic Afib  - rate controlled, continue to monitor  -No BB due to hypotension   - off hep gtt i/s/o recent bleed & hemorrhagic shock, cont sq heparin      buMETAnide Infusion 1 mG/Hr (5 mL/Hr) IV Continuous <Continuous>  norepinephrine Infusion 0.05 MICROgram(s)/kG/Min (10.5 mL/Hr) IV Continuous <Continuous>      ====================== NEUROLOGY=====================  AMS, likely metabolic encephalopathy  - off sedation since 5/30, has been without purposeful movements. Neuro exam unchanged from yesterday, opening eyes to voice, occasionally tracking with eyes, spontaneous non purposeful movements b/l UE, withdraws b/l UE and grimaces to pain. Does not follow commands  - vEEG; generalized non-specific slowing, no epileptiform activity.  - MRI brain negative for acute pathology  - correction of metabolic derangements; continues to be uremic w/ BUN now in the 80s, f/up renal for consideration of HD to clear uremia  - Continue to monitor neuro status per protocol  - appreciate neuro recs    acetaminophen    Suspension .. 650 milliGRAM(s) Oral every 6 hours PRN Temp greater or equal to 38C (100.4F)    ==================== RESPIRATORY======================  Acute Hypercapnic Respiratory Failure req Intubation   - On full vent support, minimal settings 350/12/5/30%, unable to extubate 2/2 mental status  - daily sbt trials as tolerated, full vent overnight  - trend ABGs    pulmonary HTN, likely group 2  - continue diuresis bumex gtt @ 1 mg/hr  - will need repeat RHC when euvolemic   - pulmonology following appreciate recs    Mechanical Ventilation:  Mode: AC/ CMV (Assist Control/ Continuous Mandatory Ventilation)  RR (machine): 14  TV (machine): 350  FiO2: 30  PEEP: 5  ITime: 1  MAP: 10  PIP: 31    ABG - ( 06 Jun 2021 09:25 )  pH, Arterial: 7.46  pH, Blood: x     /  pCO2: 54    /  pO2: 148   / HCO3: 38    / Base Excess: 12.4  /  SaO2: 100           ===================== RENAL =========================  Non-oliguric NIC with creatinine peak of 4.49 currently 2.99 Baseline 0.97 in 02/2021   - trend SCr, BMP q12  - adequate uop on bumex gtt. Strict I/Os  - Nephro following. No urgent needs for HD at this time, f/up recs regarding HD to clear uremia in setting of encephalopathy  - Replete lytes PRN. Keep K> 4 and Mg >2.   - avoid nephrotoxic agents    hypernatremia  - continue FW and decrease volume as appropriate. Na 148 > 142 today      06-05-21 @ 07:01  -  06-06-21 @ 07:00  --------------------------------------------------------  IN: 2561.3 mL / OUT: 3145 mL / NET: -583.7 mL    06-06-21 @ 07:01  -  06-06-21 @ 20:56  --------------------------------------------------------  IN: 1206.4 mL / OUT: 1850 mL / NET: -643.6 mL      Renal Replacement Therapy:  [ ] CRRT      [ ] IHD, Last Session:    Fluid removal:     [ ] Diuretic therapy, Regimen:       ==================== GASTROINTESTINAL===================  No active issues  - Diet: NPO w/ tube feed - Nepro with Carb steady goal 40cc/hr.   - continue Protonix for stress ulcer prophylaxis.     Last BM:   Indication for Stress Ulcer Prophylaxis, [ ] Yes    [ ] No   If Yes, Medication:     pantoprazole  Injectable 40 milliGRAM(s) IV Push daily  potassium chloride   Solution 40 milliEquivalent(s) Enteral Tube every 4 hours    ========================INFECTIOUS DISEASE================  Febrile today 100.6  - persistently febrile today, cultures sent and started on empiric cefepime and vanc by level  - vanc trough 11.7  today and dosed with vanco 1.25g, will continue vanco by level   - WBC 11>13, trend.  - BCX x2 5/26: NGTD, UCx 5/26: no growth. S/P MRSA swab 5/27: neg. new blood cultures sent today due to fever   -Central line d/c today for fever    T(C): 37.6 (06-06-21 @ 18:15), Max: 38.6 (06-05-21 @ 22:00)  WBC Count: 15.57 K/uL (06-06-21 @ 04:22)  WBC Count: 14.48 K/uL (06-05-21 @ 02:17)  WBC Count: 13.09 K/uL (06-04-21 @ 16:17)  WBC Count: 11.14 K/uL (06-04-21 @ 03:54)      Culture - Blood (collected 06-04-21 @ 17:14)  Source: .Blood Blood-Peripheral  Preliminary Report (06-05-21 @ 18:00):    No growth to date.    Culture - Blood (collected 06-04-21 @ 17:14)  Source: .Blood Blood-Peripheral  Preliminary Report (06-05-21 @ 18:00):    No growth to date.    Culture - Bronchial (collected 06-04-21 @ 17:11)  Source: Bronch Wash Combicath  Gram Stain (06-04-21 @ 19:32):    Few polymorphonuclear leukocytes per low power field    No Squamous epithelial cells per low power field    No organisms seen per oil power field  Final Report (06-06-21 @ 16:50):    No growth at 48 hours    Culture - Sputum (collected 06-04-21 @ 17:11)  Source: .Sputum Sputum  Gram Stain (06-04-21 @ 19:32):    Few polymorphonuclear leukocytes per low power field    Rare Squamous epithelial cells per low power field    No organisms seen per oil power field  Final Report (06-06-21 @ 16:46):    Normal Respiratory Karen present        Current Antibiotics with start date:     cefepime   IVPB 2000 milliGRAM(s) IV Intermittent every 24 hours  cefepime   IVPB        ===================HEMATOLOGIC/ONC ===================  Hemorrhagic Shock, resolved   - RFA s/p removal of Beto with large hematoma and active bleeding i/s/o supratherapeutic ptt   - MTP s/p 11U PRBC, 8U plasma, 2 plts. Protamine x2 + 2.5L IVF resuscitated   - right groin stable with positive pulses  - Monitor H/H    VTE ppx   - sq heparin      Hemoglobin: 10.3 g/dL (06-06-21 @ 04:22)  Hemoglobin: 10.5 g/dL (06-05-21 @ 02:17)  Hemoglobin: 10.4 g/dL (06-04-21 @ 16:17)  Hemoglobin: 11.0 g/dL (06-04-21 @ 03:54)    Platelet Count - Automated: 181 K/uL (06-06-21 @ 04:22)  Platelet Count - Automated: 142 K/uL (06-05-21 @ 02:17)  Platelet Count - Automated: 135 K/uL (06-04-21 @ 16:17)  Platelet Count - Automated: 125 K/uL (06-04-21 @ 03:54)    Chemical VTE Prophylaxis:  [ ] Lovenox    [ ] SQH   [ ]NA  Systemic Anticogaulation:  [ ] Yes    [ ] No,  If Yes, Medication:     heparin   Injectable 5000 Unit(s) SubCutaneous every 8 hours    =======================    ENDOCRINE  =====================  Bgl controlled  - monitor glucose for need to initiate sliding scale.     Hx of HLD  - C/w Lipitor 40mg QHS      atorvastatin 40 milliGRAM(s) Oral at bedtime  vasopressin Infusion 0.04 Unit(s)/Min (2.4 mL/Hr) IV Continuous <Continuous>      Patient requires continuous monitoring with bedside rhythm monitoring, pulse ox monitoring, and intermittent blood gas analysis. Care plan discussed with ICU care team. Patient remained critical and at risk for life threatening decompensation.  Patient seen, examined and plan discussed with CCU team during rounds.     I have personally provided ____ minutes of critical care time excluding time spent on separate procedures.      I, ____ , personally performed the services described in this documentation. all medical record entries made by the scribe were at my direction and in my presence. I have reviewed the chart and agree that the record reflects my personal performance and is accurate and complete  Electronically signed: ______       SANJIV ROBERTS  MRN-[id number]  Patient is a 75y old  Female who presents with a chief complaint of Transfer from [facility] with pericardial effusion, NIC and hypotension (06 Jun 2021 14:13)    HPI:  75y.o female with PMHx of CHFpEF, Severe TR with Right CHF and severe pHTN, COPD on home 02, HTN, HLD, Chronic Afib (on Eliquis), OA, CKD3, DM, right eye cataract who was brought in by EMS to [facility] on 5/23/21 for AMS. pt A&O x1, per daughter patient with generalized weakness and lethargy since since 5/22. Pt had recent in Jan 2021 with CP and NIC. Per chart on arrival to ED pt was hypotensive, obtunded. Chest CT with large pericardial effusion (increased since Jan 2021)- per thoracic surgery consult no indication for pericardial window, pHTN, cirrhotic liver with small ascites, ECHO EF 55% dilated RV with decreased function, severe TN, pHTN, NIC (per renal consult no acute indication for HD), venous duplex NG for DVTs, Head CT revealed chronic involutional and white matter changes, no acute intracranial process, NIC(SCr peaked at 4.6) with  hyperkalemia (5.8) requiring lokelma and insulin.  Of note daughter states over past month patient more and more confused ? dementia and pt is legally blind. Daughter states pt HAS NOT RECEIVED COVID VACCINE as she has been sick these past few months.     5/26 Patient transferred to [facility] with concern of pericardial effusion, hypotension and NIC. On arrival patient SBP 80's, A&O to first name but very lethargic.  (26 May 2021 10:16)      Hospital Course:  5/26 Patient transferred to [facility] with concern of pericardial effusion, hypotension and NIC.   5/26 intubated for hypercarbic respiratory distressed. Transferred from CTU to CICU as no need for pericardiocentesis. Briefly on pressors prior to intubation and since weaned off. RIJ cordis placed- unable to float swan- coiling in RV. RV pressures 50s /10s. Start sildenafil 20 tid. start bumex gtt   5/27 S/P RHC with [place] emmanuel catheter placed.  5/28 MTP called for R Femoral A bleed from previous A line site s/p 11U PRBC, 8 plasma, 2 plts.   5/29 started IV Levophed, sildenafil dc'ed  6/02 Dc'ed Cefepime  6/04 fever of 100.6F, restarted Cefepime     24 HOUR EVENTS:    REVIEW OF SYSTEMS:    CONSTITUTIONAL: No weakness, fevers or chills  EYES/ENT: No visual changes;  No vertigo or throat pain   NECK: No pain or stiffness  RESPIRATORY: No cough, wheezing, hemoptysis; No shortness of breath  CARDIOVASCULAR: No chest pain or palpitations  GASTROINTESTINAL: No abdominal or epigastric pain. No nausea, vomiting, or hematemesis; No diarrhea or constipation. No melena or hematochezia.  GENITOURINARY: No dysuria, frequency or hematuria  NEUROLOGICAL: No numbness or weakness  SKIN: No itching, rashes      ICU Vital Signs Last 24 Hrs  T(C): 37.6 (06 Jun 2021 18:15), Max: 38.6 (05 Jun 2021 22:00)  T(F): 99.7 (06 Jun 2021 18:15), Max: 101.5 (05 Jun 2021 22:00)  HR: 88 (06 Jun 2021 20:30) (82 - 114)  BP: 136/73 (06 Jun 2021 08:09) (136/73 - 136/73)  BP(mean): 99 (06 Jun 2021 08:09) (99 - 99)  ABP: 108/48 (06 Jun 2021 20:30) (92/44 - 136/80)  ABP(mean): 70 (06 Jun 2021 20:30) (60 - 102)  RR: 18 (06 Jun 2021 20:30) (14 - 31)  SpO2: 100% (06 Jun 2021 20:30) (94% - 100%)    Mode: AC/ CMV (Assist Control/ Continuous Mandatory Ventilation), RR (machine): 14, TV (machine): 350, FiO2: 30, PEEP: 5, ITime: 1  CVP(mm Hg): 10 (06-06-21 @ 16:15) (6 - 19)        CAPILLARY BLOOD GLUCOSE          PHYSICAL EXAM:  GENERAL: No acute distress, well-developed  HEAD:  Atraumatic, Normocephalic  EYES: EOMI, PERRLA, conjunctiva and sclera clear  NECK: Supple, no lymphadenopathy, no JVD  CHEST/LUNG: CTAB; No wheezes, rales, or rhonchi  HEART: Regular rate and rhythm. Normal S1/S2. No murmurs, rubs, or gallops  ABDOMEN: Soft, non-tender, non-distended; normal bowel sounds, no organomegaly  EXTREMITIES:  2+ peripheral pulses b/l, No clubbing, cyanosis, or edema  NEUROLOGY: A&O x 3, no focal deficits  SKIN: No rashes or lesions    ============================I/O===========================   I&O's Detail    05 Jun 2021 07:01  -  06 Jun 2021 07:00  --------------------------------------------------------  IN:    Bumetanide: 120 mL    Enteral Tube Flush: 170 mL    IV PiggyBack: 100 mL    IV PiggyBack: 187.2 mL    IV PiggyBack: 300 mL    Nepro with Carb Steady: 960 mL    Norepinephrine: 484.1 mL    sodium chloride 0.9%: 240 mL  Total IN: 2561.3 mL    OUT:    Indwelling Catheter - Urethral (mL): 3145 mL  Total OUT: 3145 mL    Total NET: -583.7 mL      06 Jun 2021 07:01  -  06 Jun 2021 20:56  --------------------------------------------------------  IN:    Bumetanide: 55 mL    Enteral Tube Flush: 180 mL    IV PiggyBack: 300 mL    Nepro with Carb Steady: 440 mL    Norepinephrine: 131.8 mL    sodium chloride 0.9%: 90 mL    Vasopressin: 9.6 mL  Total IN: 1206.4 mL    OUT:    Indwelling Catheter - Urethral (mL): 1850 mL  Total OUT: 1850 mL    Total NET: -643.6 mL        ============================ LABS =========================                        10.3   15.57 )-----------( 181      ( 06 Jun 2021 04:22 )             32.6     06-06    142  |  99  |  94<H>  ----------------------------<  157<H>  3.1<L>   |  31  |  2.91<H>    Ca    9.7      06 Jun 2021 17:09  Phos  2.1     06-06  Mg     2.1     06-06    TPro  6.6  /  Alb  2.2<L>  /  TBili  3.2<H>  /  DBili  x   /  AST  36  /  ALT  19  /  AlkPhos  225<H>  06-06                LIVER FUNCTIONS - ( 06 Jun 2021 17:09 )  Alb: 2.2 g/dL / Pro: 6.6 g/dL / ALK PHOS: 225 U/L / ALT: 19 U/L / AST: 36 U/L / GGT: x           PT/INR - ( 05 Jun 2021 02:17 )   PT: 14.3 sec;   INR: 1.20 ratio         PTT - ( 05 Jun 2021 02:17 )  PTT:48.3 sec  ABG - ( 06 Jun 2021 09:25 )  pH, Arterial: 7.46  pH, Blood: x     /  pCO2: 54    /  pO2: 148   / HCO3: 38    / Base Excess: 12.4  /  SaO2: 100           Blood Gas Arterial, Lactate: 0.8 mmol/L (06-06-21 @ 09:25)  Blood Gas Venous - Lactate: 0.9 mmoL/L (06-06-21 @ 04:16)  Blood Gas Arterial, Lactate: 1.0 mmol/L (06-06-21 @ 04:16)      ======================Micro/Rad/Cardio=================  Telemtry: Reviewed   EKG: Reviewed  CXR: Reviewed  Culture: Reviewed   Echo: Reviewed   Cath: Reivewed    ======================================================  PAST MEDICAL & SURGICAL HISTORY:  Afib    COPD (chronic obstructive pulmonary disease)    CHF (congestive heart failure)    Dementia      ====================ASSESSMENT ==============            Plan:  ====================CARDIOVASCULAR==================  HFpEF with severe TR and severely decr RVSF likley 2/2 pulm HTN   - C/w Bumex gtt @ 1mg/hr. Target net negative  - trend perfusion labs; lactate, scvo2, lfts  - ScVO2 remains in the 70s; 78%, lactate 0.9.  -c/w levophed gtt, wean as tolerated to keep MAP>60 and transition to vasopressin       Chronic Afib  - rate controlled, continue to monitor  -No BB due to hypotension   - off hep gtt i/s/o recent bleed & hemorrhagic shock, cont sq heparin      buMETAnide Infusion 1 mG/Hr (5 mL/Hr) IV Continuous <Continuous>  norepinephrine Infusion 0.05 MICROgram(s)/kG/Min (10.5 mL/Hr) IV Continuous <Continuous>      ====================== NEUROLOGY=====================  AMS, likely metabolic encephalopathy  - off sedation since 5/30, has been without purposeful movements. Neuro exam unchanged from yesterday, opening eyes to voice, occasionally tracking with eyes, spontaneous non purposeful movements b/l UE, withdraws b/l UE and grimaces to pain. Does not follow commands  - vEEG; generalized non-specific slowing, no epileptiform activity.  - MRI brain negative for acute pathology  - correction of metabolic derangements; continues to be uremic w/ BUN now in the 80s, f/up renal for consideration of HD to clear uremia  - Continue to monitor neuro status per protocol  - appreciate neuro recs    acetaminophen    Suspension .. 650 milliGRAM(s) Oral every 6 hours PRN Temp greater or equal to 38C (100.4F)    ==================== RESPIRATORY======================  Acute Hypercapnic Respiratory Failure req Intubation   - On full vent support, minimal settings 350/12/5/30%, unable to extubate 2/2 mental status  - daily sbt trials as tolerated, full vent overnight  - trend ABGs    pulmonary HTN, likely group 2  - continue diuresis bumex gtt @ 1 mg/hr  - will need repeat RHC when euvolemic   - pulmonology following appreciate recs    Mechanical Ventilation:  Mode: AC/ CMV (Assist Control/ Continuous Mandatory Ventilation)  RR (machine): 14  TV (machine): 350  FiO2: 30  PEEP: 5  ITime: 1  MAP: 10  PIP: 31    ABG - ( 06 Jun 2021 09:25 )  pH, Arterial: 7.46  pH, Blood: x     /  pCO2: 54    /  pO2: 148   / HCO3: 38    / Base Excess: 12.4  /  SaO2: 100           ===================== RENAL =========================  Non-oliguric NIC with creatinine peak of 4.49 currently 2.99 Baseline 0.97 in 02/2021   - trend SCr, BMP q12  - adequate uop on bumex gtt. Strict I/Os  - Nephro following. No urgent needs for HD at this time, f/up recs regarding HD to clear uremia in setting of encephalopathy  - Replete lytes PRN. Keep K> 4 and Mg >2.   - avoid nephrotoxic agents    hypernatremia  - continue FW and decrease volume as appropriate. Na 148 > 142 today      06-05-21 @ 07:01  -  06-06-21 @ 07:00  --------------------------------------------------------  IN: 2561.3 mL / OUT: 3145 mL / NET: -583.7 mL    06-06-21 @ 07:01  -  06-06-21 @ 20:56  --------------------------------------------------------  IN: 1206.4 mL / OUT: 1850 mL / NET: -643.6 mL      Renal Replacement Therapy:  [ ] CRRT      [ ] IHD, Last Session:    Fluid removal:     [ ] Diuretic therapy, Regimen:       ==================== GASTROINTESTINAL===================  No active issues  - Diet: NPO w/ tube feed - Nepro with Carb steady goal 40cc/hr.   - continue Protonix for stress ulcer prophylaxis.     Last BM:   Indication for Stress Ulcer Prophylaxis, [ ] Yes    [ ] No   If Yes, Medication:     pantoprazole  Injectable 40 milliGRAM(s) IV Push daily  potassium chloride   Solution 40 milliEquivalent(s) Enteral Tube every 4 hours    ========================INFECTIOUS DISEASE================  Febrile today 100.6  - persistently febrile today, cultures sent and started on empiric cefepime and vanc by level  - vanc trough 11.7  today and dosed with vanco 1.25g, will continue vanco by level   - WBC 11>13, trend.  - BCX x2 5/26: NGTD, UCx 5/26: no growth. S/P MRSA swab 5/27: neg. new blood cultures sent today due to fever   -Central line d/c today for fever    T(C): 37.6 (06-06-21 @ 18:15), Max: 38.6 (06-05-21 @ 22:00)  WBC Count: 15.57 K/uL (06-06-21 @ 04:22)  WBC Count: 14.48 K/uL (06-05-21 @ 02:17)  WBC Count: 13.09 K/uL (06-04-21 @ 16:17)  WBC Count: 11.14 K/uL (06-04-21 @ 03:54)      Culture - Blood (collected 06-04-21 @ 17:14)  Source: .Blood Blood-Peripheral  Preliminary Report (06-05-21 @ 18:00):    No growth to date.    Culture - Blood (collected 06-04-21 @ 17:14)  Source: .Blood Blood-Peripheral  Preliminary Report (06-05-21 @ 18:00):    No growth to date.    Culture - Bronchial (collected 06-04-21 @ 17:11)  Source: Bronch Wash Combicath  Gram Stain (06-04-21 @ 19:32):    Few polymorphonuclear leukocytes per low power field    No Squamous epithelial cells per low power field    No organisms seen per oil power field  Final Report (06-06-21 @ 16:50):    No growth at 48 hours    Culture - Sputum (collected 06-04-21 @ 17:11)  Source: .Sputum Sputum  Gram Stain (06-04-21 @ 19:32):    Few polymorphonuclear leukocytes per low power field    Rare Squamous epithelial cells per low power field    No organisms seen per oil power field  Final Report (06-06-21 @ 16:46):    Normal Respiratory Karen present        Current Antibiotics with start date:     cefepime   IVPB 2000 milliGRAM(s) IV Intermittent every 24 hours  cefepime   IVPB        ===================HEMATOLOGIC/ONC ===================  Hemorrhagic Shock, resolved   - RFA s/p removal of Beto with large hematoma and active bleeding i/s/o supratherapeutic ptt   - MTP s/p 11U PRBC, 8U plasma, 2 plts. Protamine x2 + 2.5L IVF resuscitated   - right groin stable with positive pulses  - Monitor H/H    VTE ppx   - sq heparin      Hemoglobin: 10.3 g/dL (06-06-21 @ 04:22)  Hemoglobin: 10.5 g/dL (06-05-21 @ 02:17)  Hemoglobin: 10.4 g/dL (06-04-21 @ 16:17)  Hemoglobin: 11.0 g/dL (06-04-21 @ 03:54)    Platelet Count - Automated: 181 K/uL (06-06-21 @ 04:22)  Platelet Count - Automated: 142 K/uL (06-05-21 @ 02:17)  Platelet Count - Automated: 135 K/uL (06-04-21 @ 16:17)  Platelet Count - Automated: 125 K/uL (06-04-21 @ 03:54)    Chemical VTE Prophylaxis:  [ ] Lovenox    [ ] SQH   [ ]NA  Systemic Anticogaulation:  [ ] Yes    [ ] No,  If Yes, Medication:     heparin   Injectable 5000 Unit(s) SubCutaneous every 8 hours    =======================    ENDOCRINE  =====================  Bgl controlled  - monitor glucose for need to initiate sliding scale.     Hx of HLD  - C/w Lipitor 40mg QHS      atorvastatin 40 milliGRAM(s) Oral at bedtime  vasopressin Infusion 0.04 Unit(s)/Min (2.4 mL/Hr) IV Continuous <Continuous>      Patient requires continuous monitoring with bedside rhythm monitoring, pulse ox monitoring, and intermittent blood gas analysis. Care plan discussed with ICU care team. Patient remained critical and at risk for life threatening decompensation.  Patient seen, examined and plan discussed with CCU team during rounds.     I have personally provided ____ minutes of critical care time excluding time spent on separate procedures.      I, ____ , personally performed the services described in this documentation. all medical record entries made by the scribe were at my direction and in my presence. I have reviewed the chart and agree that the record reflects my personal performance and is accurate and complete  Electronically signed: ______            ****Fellow Note****    Plan  -pal care consulted, will clarify JAROCHO Liang MD  Cardiology Fellow  Text or Call: [phone]  For all New Consults and Questions:  [email or web address]   Login: [email or web address]

## 2021-06-07 NOTE — ADVANCED PRACTICE NURSE CONSULT - ASSESSMENT
The pt was encountered in the CICU- she is on a [email or web address] P 500 support surface and is being turned and positioned. staff have applied Complete Cair boots for off-loading the heels.  Mrs Mckeon receives enteral feeds and is being followed by nutrition.  It was difficult to assess the pts skin completely as when the head of the bed is lowered, the pts oxygen saturation decreases. the pts head must be greater than 30 degrees for both nutrition and oxygenation.  Upon assessment, the pt was incontinent of pasty brown stool and pericare was provided. in the gluteal cleft were 3 small linear breaks in the skin  by intact skin. the wounds were moist with red tissue- this is most likely MASD which can occur due to the moisture that can accumulable in the gluteal cleft. Advanced Cavilon was applied to lay down a protective coating on the skin.

## 2021-06-07 NOTE — CONSULT NOTE ADULT - PROBLEM SELECTOR RECOMMENDATION 4
Will need to get collateral info from family in order to better understand severity of Dementia prior to this admission.

## 2021-06-07 NOTE — CHART NOTE - NSCHARTNOTEFT_GEN_A_CORE
Spoke w daughter James Navarrete at bedside. With worsening BUN/Cr and no plans for invasive measures (PEG/trach/HD) I expressed the concern of her mother having any recovery. Daughter reports patient and her had spoken before about goals of care and would not want to be remained connected to a ventilator. Ms. Navarrete exhibited understanding of the situation. She is inquiring about compassionate extubation. Since Palliative Care is assisting in her case I recommended we speak with them before proceeding. She is in agreement. All questions answered. Palliative care contacted. Spoke w daughter James Navarrete at bedside. Patient off sedation for 1 week at this time without any purposeful Neurologic function. With worsening BUN/Cr and no plans for invasive measures (PEG/trach/HD) I expressed the concern of her mother having any recovery. Daughter reports patient and her had spoken before about goals of care and would not want to be remained connected to a ventilator without possibility of recovery. Ms. Navarrete exhibited understanding of the situation. She is inquiring about compassionate extubation. Since Palliative Care is assisting in her case I recommended we speak with them before proceeding. She is in agreement. All questions answered. Palliative care contacted.

## 2021-06-07 NOTE — CONSULT NOTE ADULT - REASON FOR ADMISSION
Transfer from [facility] with pericardial effusion, NIC and hypotension
Transfer from [facility] with pericardial effusion, NIC and hypotension
Transfer from [facility] with pericardial effusion, NIC and hypotension
Transfer from [facility] with pericardial effusion, NIC and hypotension
Transfer from [facility] with pericardial effusion, NIC and hypotension
Transfer from [facility] with pericardial effusion, NIC and hypotension
Transfer from [facility] with pericardial effusion, NIC and hypotension

## 2021-06-07 NOTE — CONSULT NOTE ADULT - SUBJECTIVE AND OBJECTIVE BOX
HPI:  75y.o female with PMHx of CHFpEF, Severe TR with Right CHF and severe pHTN, COPD on home 02, HTN, HLD, Chronic Afib (on Eliquis), OA, CKD3, DM, right eye cataract who was brought in by EMS to [facility] on 5/23/21 for AMS. pt A&O x1, per daughter patient with generalized weakness and lethargy since since 5/22. Pt had recent in Jan 2021 with CP and NIC. Per chart on arrival to ED pt was hypotensive, obtunded. Chest CT with large pericardial effusion (increased since Jan 2021)- per thoracic surgery consult no indication for pericardial window, pHTN, cirrhotic liver with small ascites, ECHO EF 55% dilated RV with decreased function, severe TN, pHTN, NIC (per renal consult no acute indication for HD), venous duplex NG for DVTs, Head CT revealed chronic involutional and white matter changes, no acute intracranial process, NIC(SCr peaked at 4.6) with  hyperkalemia (5.8) requiring lokelma and insulin.  Of note daughter states over past month patient more and more confused ? dementia and pt is legally blind. Daughter states pt HAS NOT RECEIVED COVID VACCINE as she has been sick these past few months.     5/26 Patient transferred to [facility] with concern of pericardial effusion, hypotension and NIC. On arrival patient SBP 80's, A&O to first name but very lethargic.  (26 May 2021 10:16)    PERTINENT PM/SXH:   Afib    COPD (chronic obstructive pulmonary disease)    CHF (congestive heart failure)    Dementia        FAMILY HISTORY:    ITEMS NOT CHECKED ARE NOT PRESENT    SOCIAL HISTORY:   Significant other/partner:  [ ]  Children:  [ ]  Presybeterian/Spirituality:  Substance hx:  [ ]   Tobacco hx:  [ ]   Alcohol hx: [ ]   Home Opioid hx:  [ ] I-Stop Reference No:  Living Situation: [ ]Home  [ ]Long term care  [ ]Rehab [ ]Other    ADVANCE DIRECTIVES:    DNR  MOLST  [ ]  Living Will  [ ]   DECISION MAKER(s):  [ ] Health Care Proxy(s)  [ ] Surrogate(s)  [ ] Guardian           Name(s): Phone Number(s):    BASELINE (I)ADL(s) (prior to admission):  [place]: [ ]Total  [ ] Moderate [ ]Dependent    Allergies    No Known Allergies    Intolerances    MEDICATIONS  (STANDING):  atorvastatin 40 milliGRAM(s) Oral at bedtime  buMETAnide Infusion 1 mG/Hr (5 mL/Hr) IV Continuous <Continuous>  cefepime   IVPB 2000 milliGRAM(s) IV Intermittent every 24 hours  cefepime   IVPB      chlorhexidine 0.12% Liquid 15 milliLiter(s) Oral Mucosa every 12 hours  chlorhexidine 2% Cloths 1 Application(s) Topical <User Schedule>  heparin   Injectable 5000 Unit(s) SubCutaneous every 8 hours  norepinephrine Infusion 0.05 MICROgram(s)/kG/Min (10.5 mL/Hr) IV Continuous <Continuous>  pantoprazole  Injectable 40 milliGRAM(s) IV Push daily  vasopressin Infusion 0.08 Unit(s)/Min (4.8 mL/Hr) IV Continuous <Continuous>    MEDICATIONS  (PRN):  acetaminophen    Suspension .. 650 milliGRAM(s) Oral every 6 hours PRN Temp greater or equal to 38C (100.4F)    PRESENT SYMPTOMS: [ ]Unable to obtain due to poor mentation   Source if other than patient:  [ ]Family   [ ]Team     Pain: [ ] yes [ ] no  QOL impact -   Location -                    Aggravating factors -  Quality -  Radiation -  Timing-  Severity (0-10 scale):  Minimal acceptable level (0-10 scale):     PAIN AD Score:     http://geriatrictoolkit.[email or web address]/painad.pdf (press ctrl +  left click to view)    Dyspnea:                           [ ]Mild [ ]Moderate [ ]Severe  Anxiety:                             [ ]Mild [ ]Moderate [ ]Severe  Fatigue:                             [ ]Mild [ ]Moderate [ ]Severe  Nausea:                             [ ]Mild [ ]Moderate [ ]Severe  Loss of appetite:              [ ]Mild [ ]Moderate [ ]Severe  Constipation:                    [ ]Mild [ ]Moderate [ ]Severe    Other Symptoms:  [ ]All other review of systems negative     Karnofsky Performance Score/Palliative Performance Status Version 2:         %    http://npcrc.org/files/news/palliative_performance_scale_ppsv2.pdf  PHYSICAL EXAM:  Vital Signs Last 24 Hrs  T(C): 37.4 (07 Jun 2021 07:00), Max: 38 (07 Jun 2021 00:00)  T(F): 99.3 (07 Jun 2021 07:00), Max: 100.4 (07 Jun 2021 00:00)  HR: 96 (07 Jun 2021 09:39) (78 - 124)  BP: --  BP(mean): --  RR: 21 (07 Jun 2021 08:00) (11 - 34)  SpO2: 100% (07 Jun 2021 09:39) (91% - 100%) I&O's Summary    06 Jun 2021 07:01  -  07 Jun 2021 07:00  --------------------------------------------------------  IN: 1883.7 mL / OUT: 3175 mL / NET: -1291.3 mL    07 Jun 2021 07:01  -  07 Jun 2021 10:32  --------------------------------------------------------  IN: 234.2 mL / OUT: 125 mL / NET: 109.2 mL    GENERAL:  [ ]Alert  [ ]Oriented x   [ ]Lethargic  [ ]Cachexia  [ ]Unarousable  [ ]Verbal  [ ]Non-Verbal  Behavioral:   [ ] Anxiety  [ ] Delirium [ ] Agitation [ ] Other  HEENT:  [ ]Normal   [ ]Dry mouth   [ ]ET Tube/Trach  [ ]Oral lesions  PULMONARY:   [ ]Clear [ ]Tachypnea  [ ]Audible excessive secretions   [ ]Rhonchi        [ ]Right [ ]Left [ ]Bilateral  [ ]Crackles        [ ]Right [ ]Left [ ]Bilateral  [ ]Wheezing     [ ]Right [ ]Left [ ]Bilateral  CARDIOVASCULAR:    [ ]Regular [ ]Irregular [ ]Tachy  [ ]Aman [ ]Murmur [ ]Other  GASTROINTESTINAL:  [ ]Soft  [ ]Distended   [ ]+BS  [ ]Non tender [ ]Tender  [ ]PEG [ ]OGT/ NGT  Last BM:   06-02-21 @ 07:01  -  06-03-21 @ 07:00  --------------------------------------------------------  OUT: 700 mL    06-03-21 @ 07:01  -  06-04-21 @ 07:00  --------------------------------------------------------  OUT: 700 mL    06-04-21 @ 07:01  -  06-05-21 @ 07:00  --------------------------------------------------------  OUT: 150 mL    GENITOURINARY:  [ ]Normal [ ] Incontinent   [ ]Oliguria/Anuria   [ ]Hernadez  MUSCULOSKELETAL:   [ ]Normal   [ ]Weakness  [ ]Bed/Wheelchair bound [ ]Edema  NEUROLOGIC:   [ ]No focal deficits  [ ] Cognitive impairment  [ ] Dysphagia [ ]Dysarthria [ ] Paresis [ ]Other   SKIN:   [ ]Normal   [ ]Pressure ulcer(s)  [ ]Rash    CRITICAL CARE:  [ ] Shock Present  [ ]Septic [ ]Cardiogenic [ ]Neurologic [ ]Hypovolemic  [ ]  Vasopressors [ ]  Inotropes   [ ] Respiratory failure present [ ] mechanical ventilation [ ] non-invasive ventilatory support [ ] High flow  [ ] Acute  [ ] Chronic [ ] Hypoxic  [ ] Hypercarbic [ ] Other  [ ] Other organ failure     LABS:                        9.4    12.85 )-----------( 172      ( 07 Jun 2021 01:25 )             29.5   06-07    147<H>  |  103  |  96<H>  ----------------------------<  174<H>  3.9   |  33<H>  |  3.01<H>    Ca    9.9      07 Jun 2021 01:26  Phos  2.0     06-07  Mg     2.2     06-07    TPro  6.9  /  Alb  2.4<L>  /  TBili  3.3<H>  /  DBili  x   /  AST  42<H>  /  ALT  21  /  AlkPhos  231<H>  06-07  PT/INR - ( 07 Jun 2021 01:26 )   PT: 14.3 sec;   INR: 1.20 ratio         PTT - ( 07 Jun 2021 01:26 )  PTT:63.1 sec      RADIOLOGY & ADDITIONAL STUDIES:    PROTEIN CALORIE MALNUTRITION PRESENT: [ ] Yes [ ] No  [ ] PPSV2 < or = to 30% [ ] significant weight loss  [ ] poor nutritional intake [ ] catabolic state [ ] anasarca     Albumin, Serum: 2.4 g/dL (06-07-21 @ 01:26)  Artificial Nutrition [ ]     REFERRALS:   [ ]Chaplaincy  [ ] Hospice  [ ]Child Life  [ ]Social Work  [ ]Case management [ ]Holistic Therapy     Goals of Care Document:   Care Coordination Assessment 201 [C. Provider] (05-27-21 @ 12:11)   Progress Notes - Care Coordination [C. Provider] (06-04-21 @ 12:14)   HPI:  75y.o female with PMHx of CHFpEF, Severe TR with Right CHF and severe pHTN, COPD on home 02, HTN, HLD, Chronic Afib (on Eliquis), OA, CKD3, DM, right eye cataract who was brought in by EMS to [facility] on 5/23/21 for AMS. pt A&O x1, per daughter patient with generalized weakness and lethargy since since 5/22. Pt had recent in Jan 2021 with CP and NIC. Per chart on arrival to ED pt was hypotensive, obtunded. Chest CT with large pericardial effusion (increased since Jan 2021)- per thoracic surgery consult no indication for pericardial window, pHTN, cirrhotic liver with small ascites, ECHO EF 55% dilated RV with decreased function, severe TN, pHTN, NIC (per renal consult no acute indication for HD), venous duplex NG for DVTs, Head CT revealed chronic involutional and white matter changes, no acute intracranial process, NIC(SCr peaked at 4.6) with  hyperkalemia (5.8) requiring lokelma and insulin.  Of note daughter states over past month patient more and more confused ? dementia and pt is legally blind. Daughter states pt HAS NOT RECEIVED COVID VACCINE as she has been sick these past few months.     5/26 Patient transferred to [facility] with concern of pericardial effusion, hypotension and NIC. On arrival patient SBP 80's, A&O to first name but very lethargic.  (26 May 2021 10:16)      6/7/2021 : Patient seen in CCU discussed with Medical resident and bed side Nursing : Patient currently unable to participate in any HPI or ROS due to intubation and know advanced history of Dementia. As per CCU team she has been intubated since 5/26/2021 and off sedation for 2&1/2 days. With minimal turning on CPAP setting patient does desaturate into the 70's as per Nursing and noted to have a lot of secretions.     PERTINENT PM/SXH:   Afib    COPD (chronic obstructive pulmonary disease)    CHF (congestive heart failure)    Dementia        FAMILY HISTORY:    ITEMS NOT CHECKED ARE NOT PRESENT    SOCIAL HISTORY:   Significant other/partner:  [ ]  Children:  [ ]  Denominational/Spirituality:  Substance hx:  [ ]   Tobacco hx:  [ ]   Alcohol hx: [ ]   Home Opioid hx:  [ ] I-Stop Reference No:  Living Situation: [ ]Home  [ ]Long term care  [ ]Rehab [ ]Other    ADVANCE DIRECTIVES:    DNR  MOLST  [ ]  Living Will  [ ]   DECISION MAKER(s):  [ ] Health Care Proxy(s)  [ ] Surrogate(s)  [ ] Guardian           Name(s): Phone Number(s):    BASELINE (I)ADL(s) (prior to admission):  [place]: [ ]Total  [ ] Moderate [ ]Dependent    Allergies    No Known Allergies    Intolerances    MEDICATIONS  (STANDING):  atorvastatin 40 milliGRAM(s) Oral at bedtime  buMETAnide Infusion 1 mG/Hr (5 mL/Hr) IV Continuous <Continuous>  cefepime   IVPB 2000 milliGRAM(s) IV Intermittent every 24 hours  cefepime   IVPB      chlorhexidine 0.12% Liquid 15 milliLiter(s) Oral Mucosa every 12 hours  chlorhexidine 2% Cloths 1 Application(s) Topical <User Schedule>  heparin   Injectable 5000 Unit(s) SubCutaneous every 8 hours  norepinephrine Infusion 0.05 MICROgram(s)/kG/Min (10.5 mL/Hr) IV Continuous <Continuous>  pantoprazole  Injectable 40 milliGRAM(s) IV Push daily  vasopressin Infusion 0.08 Unit(s)/Min (4.8 mL/Hr) IV Continuous <Continuous>    MEDICATIONS  (PRN):  acetaminophen    Suspension .. 650 milliGRAM(s) Oral every 6 hours PRN Temp greater or equal to 38C (100.4F)    PRESENT SYMPTOMS: [x ]Unable to obtain due to poor mentation   Source if other than patient:  [ ]Family   [ ]Team     Pain: [ ] yes [ ] no  QOL impact -   Location -                    Aggravating factors -  Quality -  Radiation -  Timing-  Severity (0-10 scale):  Minimal acceptable level (0-10 scale):     PAIN AD Score:     http://geriatrictoolkit.[email or web address].[email or web address]/painad.pdf (press ctrl +  left click to view)    Dyspnea:                           [ ]Mild [ ]Moderate [ ]Severe  Anxiety:                             [ ]Mild [ ]Moderate [ ]Severe  Fatigue:                             [ ]Mild [ ]Moderate [ ]Severe  Nausea:                             [ ]Mild [ ]Moderate [ ]Severe  Loss of appetite:              [ ]Mild [ ]Moderate [ ]Severe  Constipation:                    [ ]Mild [ ]Moderate [ ]Severe    Other Symptoms:  [ ]All other review of systems negative     Karnofsky Performance Score/Palliative Performance Status Version 2:    20     %    http://npcrc.org/files/news/palliative_performance_scale_ppsv2.pdf  PHYSICAL EXAM:  Vital Signs Last 24 Hrs  T(C): 37.4 (07 Jun 2021 07:00), Max: 38 (07 Jun 2021 00:00)  T(F): 99.3 (07 Jun 2021 07:00), Max: 100.4 (07 Jun 2021 00:00)  HR: 96 (07 Jun 2021 09:39) (78 - 124)  BP: --  BP(mean): --  RR: 21 (07 Jun 2021 08:00) (11 - 34)  SpO2: 100% (07 Jun 2021 09:39) (91% - 100%) I&O's Summary    06 Jun 2021 07:01  -  07 Jun 2021 07:00  --------------------------------------------------------  IN: 1883.7 mL / OUT: 3175 mL / NET: -1291.3 mL    07 Jun 2021 07:01  -  07 Jun 2021 10:32  --------------------------------------------------------  IN: 234.2 mL / OUT: 125 mL / NET: 109.2 mL    GENERAL:  [x ]Alert  [ ]Oriented x   [ ]Lethargic  [ ]Cachexia  [ ]Unarousable  [ ]Verbal  [ x]Non-Verbal  Behavioral:   [ ] Anxiety  [ ] Delirium [ ] Agitation [x ] Other Confused appearing   HEENT:  [ ]Normal   [ ]Dry mouth   [x ]ET Tube/Trach  [ ]Oral lesions  PULMONARY:   [ ]Clear [ ]Tachypnea  [x ]Audible excessive secretions   [x ]Rhonchi        [ ]Right [ ]Left [ x]Bilateral  [ ]Crackles        [ ]Right [ ]Left [ ]Bilateral  [ ]Wheezing     [ ]Right [ ]Left [ ]Bilateral  CARDIOVASCULAR:    [x ]Regular [ ]Irregular [ ]Tachy  [ ]Aman [ ]Murmur [ ]Other  GASTROINTESTINAL:  [ x]Soft  [ ]Distended   [ ]+BS  [ ]Non tender [ ]Tender  [ ]PEG [x ]OGT/ NGT  Last BM:   06-02-21 @ 07:01  -  06-03-21 @ 07:00  --------------------------------------------------------  OUT: 700 mL    06-03-21 @ 07:01  -  06-04-21 @ 07:00  --------------------------------------------------------  OUT: 700 mL    06-04-21 @ 07:01  -  06-05-21 @ 07:00  --------------------------------------------------------  OUT: 150 mL    GENITOURINARY:  [ ]Normal [ ] Incontinent   [ ]Oliguria/Anuria   [ x]Hernadze  MUSCULOSKELETAL:   [ ]Normal   [ ]Weakness  [ x]Bed/Wheelchair bound [ ]Edema  NEUROLOGIC:   [ ]No focal deficits  [ x] Cognitive impairment  [ ] Dysphagia [ ]Dysarthria [ ] Paresis [ ]Other   SKIN:   [ ]Normal   [ x]Pressure ulcer(s) Stage 2 to Sacrum   [ ]Rash    CRITICAL CARE:  [x ] Shock Present  [x ]Septic [ ]Cardiogenic [ ]Neurologic [ ]Hypovolemic  [ ]  Vasopressors [ ]  Inotropes   [ x] Respiratory failure present [ ] mechanical ventilation [ ] non-invasive ventilatory support [ ] High flow  [ ] Acute  [ ] Chronic [ ] Hypoxic  [ ] Hypercarbic [ ] Other  [x ] Other organ failure     LABS:                        9.4    12.85 )-----------( 172      ( 07 Jun 2021 01:25 )             29.5   06-07    147<H>  |  103  |  96<H>  ----------------------------<  174<H>  3.9   |  33<H>  |  3.01<H>    Ca    9.9      07 Jun 2021 01:26  Phos  2.0     06-07  Mg     2.2     06-07    TPro  6.9  /  Alb  2.4<L>  /  TBili  3.3<H>  /  DBili  x   /  AST  42<H>  /  ALT  21  /  AlkPhos  231<H>  06-07  PT/INR - ( 07 Jun 2021 01:26 )   PT: 14.3 sec;   INR: 1.20 ratio         PTT - ( 07 Jun 2021 01:26 )  PTT:63.1 sec      RADIOLOGY & ADDITIONAL STUDIES:    PROTEIN CALORIE MALNUTRITION PRESENT: [ ] Yes [ ] No  [ ] PPSV2 < or = to 30% [ ] significant weight loss  [ ] poor nutritional intake [ ] catabolic state [ ] anasarca     Albumin, Serum: 2.4 g/dL (06-07-21 @ 01:26)  Artificial Nutrition [ ]     REFERRALS:   [ ]Chaplaincy  [ ] Hospice  [ ]Child Life  [ ]Social Work  [ ]Case management [ ]Holistic Therapy     Goals of Care Document:   Care Coordination Assessment 201 [C. Provider] (05-27-21 @ 12:11)   Progress Notes - Care Coordination [C. Provider] (06-04-21 @ 12:14)   HPI:  75F with CHFpEF, Severe TR with Right CHF and severe pHTN, COPD on home 02, HTN, HLD, Chronic Afib (on Eliquis), OA, CKD3, DM, right eye cataract who was brought in by EMS to [facility] on 5/23/21 for AMS, found to have a large pericardial effusion w/o tamponade physiology and without drainable window. She was intubated (5/26) for acute hypercarbic respiratory failure and worsening AMS. Transferred to CICU for shock and NIC on CKD. Course c/b expanding R thigh hematoma s/p protamine for hep reversal. Palliative care was consulted for GOC and ACP.       6/7/2021 : Patient seen in CCU discussed with Medical resident and bed side Nursing : Patient currently unable to participate in any HPI or ROS due to intubation and know advanced history of Dementia. As per CCU team she has been intubated since 5/26/2021 and off sedation for 2&1/2 days. With minimal turning on CPAP setting patient does desaturate into the 70's as per Nursing and noted to have a lot of secretions.     PERTINENT PM/SXH:   Afib    COPD (chronic obstructive pulmonary disease)    CHF (congestive heart failure)    Dementia        FAMILY HISTORY:    ITEMS NOT CHECKED ARE NOT PRESENT    SOCIAL HISTORY:   Significant other/partner:  [x] Single Children:  [ ]  Restorationist/Spirituality: Taoist   Substance hx:  [ ]   Tobacco hx:  [ ]   Alcohol hx: [ ]   Home Opioid hx:  [ ] I-Stop Reference No:  Living Situation: [x ]Home  [ ]Long term care  [ ]Rehab [ ]Other  As per care coordination notes: Per daughter, patient resides with daughter James in a third floor  apartment (no elevator). Daughter is caretaker, no home health services.  Patient owns a rolling walker, wheelchair and home oxygen provided through  [employer]. Patient is , however her spouse, 86 years old with AMS and  resides with patients daughter in [place]. Patient has four adult children  total, (see previous). LCSW explained HCP and Surrogate Decision Maker law to  patients daughter James, who stated repeatedly that because she is Power of   she is also the sole contact. LCSW requested other siblings phone  numbers however James declined to provide. James stated she has been in  on-going contact with her other siblings to update them on patients status.  Patient has not completed a HCP before, and cannot at this time due to her  medical status. Surrogate decision makers are four children jointly as spouse  is not A&Ox4. Patient unable to identify caregiver. Patient has active medicare  and [facility] medicaid insurance in place.Notes: patient has four adult children: a son and daughter who reside in  [place], a daughter in [place], and daughter James.  ADVANCE DIRECTIVES:    DNR  MOLST  [ ]  Living Will  [ ]   DECISION MAKER(s):  [ ] Health Care Proxy(s)  [ ] Surrogate(s)  [ ] Guardian           Name(s): Phone Number(s):    BASELINE (I)ADL(s) (prior to admission):  [place]: [ ]Total  [ ] Moderate [ ]Dependent    Allergies    No Known Allergies    Intolerances    MEDICATIONS  (STANDING):  atorvastatin 40 milliGRAM(s) Oral at bedtime  buMETAnide Infusion 1 mG/Hr (5 mL/Hr) IV Continuous <Continuous>  cefepime   IVPB 2000 milliGRAM(s) IV Intermittent every 24 hours  cefepime   IVPB      chlorhexidine 0.12% Liquid 15 milliLiter(s) Oral Mucosa every 12 hours  chlorhexidine 2% Cloths 1 Application(s) Topical <User Schedule>  heparin   Injectable 5000 Unit(s) SubCutaneous every 8 hours  norepinephrine Infusion 0.05 MICROgram(s)/kG/Min (10.5 mL/Hr) IV Continuous <Continuous>  pantoprazole  Injectable 40 milliGRAM(s) IV Push daily  vasopressin Infusion 0.08 Unit(s)/Min (4.8 mL/Hr) IV Continuous <Continuous>    MEDICATIONS  (PRN):  acetaminophen    Suspension .. 650 milliGRAM(s) Oral every 6 hours PRN Temp greater or equal to 38C (100.4F)    PRESENT SYMPTOMS: [x ]Unable to obtain due to poor mentation   Source if other than patient:  [ ]Family   [ ]Team     Pain: [ ] yes [ ] no  QOL impact -   Location -                    Aggravating factors -  Quality -  Radiation -  Timing-  Severity (0-10 scale):  Minimal acceptable level (0-10 scale):     PAIN AD Score:     http://geriatrictoolkit.[email or web address]/cog/painad.pdf (press ctrl +  left click to view)    Dyspnea:                           [ ]Mild [ ]Moderate [ ]Severe  Anxiety:                             [ ]Mild [ ]Moderate [ ]Severe  Fatigue:                             [ ]Mild [ ]Moderate [ ]Severe  Nausea:                             [ ]Mild [ ]Moderate [ ]Severe  Loss of appetite:              [ ]Mild [ ]Moderate [ ]Severe  Constipation:                    [ ]Mild [ ]Moderate [ ]Severe    Other Symptoms:  [ ]All other review of systems negative     Karnofsky Performance Score/Palliative Performance Status Version 2:    20     %    http://[facility].org/files/news/palliative_performance_scale_ppsv2.pdf  PHYSICAL EXAM:  Vital Signs Last 24 Hrs  T(C): 37.4 (07 Jun 2021 07:00), Max: 38 (07 Jun 2021 00:00)  T(F): 99.3 (07 Jun 2021 07:00), Max: 100.4 (07 Jun 2021 00:00)  HR: 96 (07 Jun 2021 09:39) (78 - 124)  BP: --  BP(mean): --  RR: 21 (07 Jun 2021 08:00) (11 - 34)  SpO2: 100% (07 Jun 2021 09:39) (91% - 100%) I&O's Summary    06 Jun 2021 07:01  -  07 Jun 2021 07:00  --------------------------------------------------------  IN: 1883.7 mL / OUT: 3175 mL / NET: -1291.3 mL    07 Jun 2021 07:01  -  07 Jun 2021 10:32  --------------------------------------------------------  IN: 234.2 mL / OUT: 125 mL / NET: 109.2 mL    GENERAL:  [x ]Alert  [ ]Oriented x   [ ]Lethargic  [ ]Cachexia  [ ]Unarousable  [ ]Verbal  [ x]Non-Verbal  Behavioral:   [ ] Anxiety  [ ] Delirium [ ] Agitation [x ] Other Confused appearing   HEENT:  [ ]Normal   [ ]Dry mouth   [x ]ET Tube/Trach  [ ]Oral lesions  PULMONARY:   [ ]Clear [ ]Tachypnea  [x ]Audible excessive secretions   [x ]Rhonchi        [ ]Right [ ]Left [ x]Bilateral  [ ]Crackles        [ ]Right [ ]Left [ ]Bilateral  [ ]Wheezing     [ ]Right [ ]Left [ ]Bilateral  CARDIOVASCULAR:    [x ]Regular [ ]Irregular [ ]Tachy  [ ]Aman [ ]Murmur [ ]Other  GASTROINTESTINAL:  [ x]Soft  [ ]Distended   [ ]+BS  [ ]Non tender [ ]Tender  [ ]PEG [x ]OGT/ NGT  Last BM:   06-02-21 @ 07:01  -  06-03-21 @ 07:00  --------------------------------------------------------  OUT: 700 mL    06-03-21 @ 07:01  -  06-04-21 @ 07:00  --------------------------------------------------------  OUT: 700 mL    06-04-21 @ 07:01  -  06-05-21 @ 07:00  --------------------------------------------------------  OUT: 150 mL    GENITOURINARY:  [ ]Normal [ ] Incontinent   [ ]Oliguria/Anuria   [ x]Hernadez  MUSCULOSKELETAL:   [ ]Normal   [ ]Weakness  [ x]Bed/Wheelchair bound [ ]Edema  NEUROLOGIC:   [ ]No focal deficits  [ x] Cognitive impairment  [ ] Dysphagia [ ]Dysarthria [ ] Paresis [ ]Other   SKIN:   [ ]Normal   [ x]Pressure ulcer(s) Stage 2 to Sacrum   [ ]Rash    CRITICAL CARE:  [x ] Shock Present  [x ]Septic [ ]Cardiogenic [ ]Neurologic [ ]Hypovolemic  [ ]  Vasopressors [ ]  Inotropes   [ x] Respiratory failure present [ ] mechanical ventilation [ ] non-invasive ventilatory support [ ] High flow  [ ] Acute  [ ] Chronic [ ] Hypoxic  [ ] Hypercarbic [ ] Other  [x ] Other organ failure     LABS:                        9.4    12.85 )-----------( 172      ( 07 Jun 2021 01:25 )             29.5   06-07    147<H>  |  103  |  96<H>  ----------------------------<  174<H>  3.9   |  33<H>  |  3.01<H>    Ca    9.9      07 Jun 2021 01:26  Phos  2.0     06-07  Mg     2.2     06-07    TPro  6.9  /  Alb  2.4<L>  /  TBili  3.3<H>  /  DBili  x   /  AST  42<H>  /  ALT  21  /  AlkPhos  231<H>  06-07  PT/INR - ( 07 Jun 2021 01:26 )   PT: 14.3 sec;   INR: 1.20 ratio         PTT - ( 07 Jun 2021 01:26 )  PTT:63.1 sec      RADIOLOGY & ADDITIONAL STUDIES:  `< from: CT Head No Cont (05.31.21 @ 10:07) >    INTERPRETATION:  Clinical indication: Altered consciousness    Multiple axial sections were performed from base of skull to vertex without contrast enhancement. Coronal and sagittal reconstructions were performed as well    Parenchymal volume loss and chronic microvascular ischemic changes are identified    There is no acute hemorrhage mass or mass effect seen    Evaluation of the osseous structures with the appropriate window appears normal    The visualized paranasal sinuses appear clear    Sclerotic change is seen involving left mastoid which could be compatible with chronic inflammatory change.    Oral tracheal and orogastric tubes are seen.    IMPRESSION:No evidence of acute hemorrhage mass or mass effect.    < end of copied text >  < from: CT Abdomen and Pelvis No Cont (05.26.21 @ 11:59) >    IMPRESSION:    Study limited by lack of intravenous contrast and motion artifact.    A small to moderate pericardial effusion.    A trace right pleural effusion.    A likely small right groin hematoma in the region of the right femoral approach arterial and venous lines.    Mildly nodular liver contour, which may be seen in the setting of early cirrhosis.    < end of copied text >  < from: Transthoracic Echocardiogram (05.26.21 @ 09:44) >  Conclusions:  1. Endocardium not well visualized; overall preserved left  ventricular systolic function. Septal flattening with  paradoxical septal motion consistent with right ventricular  overload.  2. Severe right ventricular enlargement with severely  decreased right ventricular systolic function.  3.Estimated right ventricular systolic pressure equals 67  mm Hg, assuming right atrial pressure equals 15 mm Hg,  consistent with severe pulmonary hypertension.  4. Large pericardial effusion seen adjacent to the right  atrium. The effusion measures up to approximately 3.4 cm  adjacent to the RA in its largest dimension. Thickened  pericardium with small-moderate pericardial effusion seen  posterior and lateral to the LV. The effusion measures up  to approximately 1.5 cm posterior to the LV. No  echocardiographic evidence of pericardial tamponade.  Results discussed with physician assistant Schmidt from  [facility].  *** No previous Echo exam.  ------------------------------------------------------------------------  Confirmed on  5/26/2021 - 11:13:14 by Justin Riley M.D.    < end of copied text >    PROTEIN CALORIE MALNUTRITION PRESENT: [ ] Yes [ ] No  [ ] PPSV2 < or = to 30% [ ] significant weight loss  [ ] poor nutritional intake [ ] catabolic state [ ] anasarca     Albumin, Serum: 2.4 g/dL (06-07-21 @ 01:26)  Artificial Nutrition [ ]     REFERRALS:   [ ]Chaplaincy  [ ] Hospice  [ ]Child Life  [ ]Social Work  [ ]Case management [ ]Holistic Therapy     Goals of Care Document:   Care Coordination Assessment 201 [C. Provider] (05-27-21 @ 12:11)   Progress Notes - Care Coordination [C. Provider] (06-04-21 @ 12:14)

## 2021-06-07 NOTE — PROGRESS NOTE ADULT - SUBJECTIVE AND OBJECTIVE BOX
[facility] NE[facility], [facility]  NEPHROLOGY AND HYPERTENSION  [address]  [address]  [place] [address]  [phone]    MD LANG PHAN, MD PUMA MOORE, MD JANAE HWANG, MD KARMEN MONTES, MD JAIDA DUNCAN, MD CORI STAPLETON MD          Patient events noted    MEDICATIONS  (STANDING):  atorvastatin 40 milliGRAM(s) Oral at bedtime  buMETAnide Infusion 1 mG/Hr (5 mL/Hr) IV Continuous <Continuous>  cefepime   IVPB 2000 milliGRAM(s) IV Intermittent every 24 hours  cefepime   IVPB      chlorhexidine 0.12% Liquid 15 milliLiter(s) Oral Mucosa every 12 hours  chlorhexidine 2% Cloths 1 Application(s) Topical <User Schedule>  heparin   Injectable 5000 Unit(s) SubCutaneous every 8 hours  pantoprazole  Injectable 40 milliGRAM(s) IV Push daily  vasopressin Infusion 0.08 Unit(s)/Min (4.8 mL/Hr) IV Continuous <Continuous>    MEDICATIONS  (PRN):  acetaminophen    Suspension .. 650 milliGRAM(s) Oral every 6 hours PRN Temp greater or equal to 38C (100.4F)      06-06-21 @ 07:01  -  06-07-21 @ 07:00  --------------------------------------------------------  IN: 1883.7 mL / OUT: 3175 mL / NET: -1291.3 mL    06-07-21 @ 07:01  -  06-07-21 @ 23:25  --------------------------------------------------------  IN: 963.3 mL / OUT: 1750 mL / NET: -786.7 mL      PHYSICAL EXAM:      T(C): 37.4 (06-07-21 @ 19:00), Max: 38 (06-07-21 @ 00:00)  HR: 82 (06-07-21 @ 23:00) (78 - 124)  BP: --  RR: 12 (06-07-21 @ 23:00) (11 - 40)  SpO2: 94% (06-07-21 @ 23:00) (90% - 100%)  Wt(kg): --  Lungs clear  Heart S1S2  Abd soft NT ND  Extremities:   1-2 edema                                    9.4    12.85 )-----------( 172      ( 07 Jun 2021 01:25 )             29.5     06-07    148<H>  |  102  |  98<H>  ----------------------------<  166<H>  3.5   |  33<H>  |  3.04<H>    Ca    10.1      07 Jun 2021 17:28  Phos  3.0     06-07  Mg     2.2     06-07    TPro  7.2  /  Alb  2.4<L>  /  TBili  3.0<H>  /  DBili  x   /  AST  44<H>  /  ALT  20  /  AlkPhos  246<H>  06-07    ABG - ( 06 Jun 2021 09:25 )  pH, Arterial: 7.46  pH, Blood: x     /  pCO2: 54    /  pO2: 148   / HCO3: 38    / Base Excess: 12.4  /  SaO2: 100               LIVER FUNCTIONS - ( 07 Jun 2021 17:28 )  Alb: 2.4 g/dL / Pro: 7.2 g/dL / ALK PHOS: 246 U/L / ALT: 20 U/L / AST: 44 U/L / GGT: x           Creatinine Trend: 3.04<--, 3.01<--, 3.01<--, 2.91<--, 2.84<--, 2.99<--  Mode: AC/ CMV (Assist Control/ Continuous Mandatory Ventilation)  RR (machine): 12  TV (machine): 350  FiO2: 30  PEEP: 5  ITime: 1  MAP: 9  PIP: 26        ASSESSMENT:  NIC at least 10 days old with small, partial recovery, remains non oliguric on bumex drip;    No metabolic abnormalities of advanced renal failure noted, eGFR at least 14 mL/min, essentially no suspicion that this degree of renal impairment could result in such profound encephalopathy    PLAN:  No indications for a dialytic trial  Will follow course.          Amrik Nobles MD

## 2021-06-07 NOTE — CONSULT NOTE ADULT - CONSULT REASON
Goals of Care 74 yo F w/ prolonged intubation with history of dementia and unable to be weaned. Family doesn't want trach/peg .

## 2021-06-07 NOTE — CONSULT NOTE ADULT - CONSULT REQUESTED DATE/TIME
28-May-2021 22:34
01-Jun-2021 13:06
26-May-2021 12:17
26-May-2021 15:05
26-May-2021 12:03
01-Jun-2021 15:21
07-Jun-2021 04:23

## 2021-06-07 NOTE — CHART NOTE - NSCHARTNOTEFT_GEN_A_CORE
At patient's daughter (Josh) wishes I spoke with each immediate family member in preparation for compassionate extubation, as well as two granddaughters. Spoke with eldest daughter last who reported would not like to proceed with extubation at this time. Discussed concern for lack of meaningful recovery, but daughter was adamant in her decision. Will make Palliative Care team aware tomorrow to help facilitate family meeting and align goals of care. At this time patient remains full code.

## 2021-06-07 NOTE — CONSULT NOTE ADULT - PROBLEM SELECTOR RECOMMENDATION 5
As noted in the care coordination notes, since the patient's  is not able to participate in decision making (due to cognitive impairment), and based on the Family Healthcare Decision Act As noted in the care coordination notes, since the patient's  is not able to participate in decision making (due to cognitive impairment), and based on the Family Healthcare Decision Act, the patient's 4 children are her surrogates. Dr. Cam (geriatric fellow) called the patient's emergency contact (Daughter James) but she did not answer. Will try calling back on 6/8. As noted in the care coordination notes, since there is not a documented HPC and the patient's  is not able to participate in decision making (due to cognitive impairment), based on the Family Healthcare Decision Act, the patient's 4 children are her surrogates. Dr. Cam (geriatric fellow) called the patient's emergency contact (Daughter James) but she did not answer. Will try calling back on 6/8. Patient needs full nursing care. PPSv2 20 %.

## 2021-06-07 NOTE — PROGRESS NOTE ADULT - ATTENDING COMMENTS
Patient is seen and examined with fellow, NP and the CCU house-staff. I agree with the history, physical and the assessment and plan.    poor neuro response - no purposeful response - repeat head CT is negative for any new pathology or change from prior  on pressor support to maintain MAP > 65 mmHg  CPAP trial for exercise  right sided heart failure in setting of pulmonary HTN of unknown etiology  f/u with vancomycin troph  c/w abx - will f/u with ID in regards to length of treatment  will speak with family in regards to goals of care - will call palliative care team for goals of care discussion and family meeting - as per the daughter last night, the family do not want PEG/TRACH Patient is seen and examined with fellow, NP and the CCU house-staff. I agree with the history, physical and the assessment and plan.    poor neuro response - no purposeful response - repeat head CT is negative for any new pathology or change from prior  on pressor support to maintain MAP > 65 mmHg  CPAP trial for exercise  right sided heart failure in setting of pulmonary HTN of unknown etiology  f/u with vancomycin troph  c/w abx - will f/u with ID in regards to length of treatment  will speak with family in regards to goals of care - will call palliative care team for goals of care discussion and family meeting - as per the daughter last night, the family do not want PEG/TRACH or aggressive therapies

## 2021-06-07 NOTE — CONSULT NOTE ADULT - PROBLEM SELECTOR RECOMMENDATION 7
Will continue to f/u for GOC and ACP.         Lacho Hernandez MD   Geriatrics and Palliative Care (GAP) Consult Service    of Geriatric and Palliative Medicine  [facility]      Please page the following number for clinical matters between the hours of 9 am and 5 pm from Monday through Friday : [phone]    After 5pm and on weekends, please see the contact information below:    In the event of newly developing, evolving, or worsening symptoms, please contact the Palliative Medicine team via pager (if the patient is at [facility] #[phone] or if the patient is at [facility] #[phone]) The Geriatric and Palliative Medicine service has coverage 24 hours a day/ 7 days a week to provide medical recommendations regarding symptom management needs via telephone

## 2021-06-07 NOTE — CONSULT NOTE ADULT - PROBLEM SELECTOR RECOMMENDATION 2
As per Renal, "NIC at least 10 days old with small, partial recovery, remains non oliguric on bumex drip;    No metabolic abnormalities of advanced renal failure noted, eGFR at least 14 mL/min, essentially no suspicion that this degree of renal impairment could result in such profound encephalopathy ... No indications for a dialytic trial"

## 2021-06-07 NOTE — ADVANCED PRACTICE NURSE CONSULT - REASON FOR CONSULT
Requested by staff to assess skin status of pt a/w a pressure injury. PMH is noted:  75y.o female with PMHx of CHFpEF, Severe TR with Right CHF and severe pHTN, COPD on home 02, HTN, HLD, Chronic Afib (on Eliquis), OA, CKD3, DM, right eye cataract who was brought in by EMS to [facility] on 5/23/21 for AMS. pt A&O x1, per daughter patient with generalized weakness and lethargy since  5/22. Pt had recent admission  in Jan 2021 with CP and NIC. Per chart on arrival to ED pt was hypotensive, obtunded. Chest CT with large pericardial effusion (increased since Jan 2021)- per thoracic surgery consult no indication for pericardial window, pHTN, cirrhotic liver with small ascites, ECHO EF 55% dilated RV with decreased function, severe TN, pHTN, NIC (per renal consult no acute indication for HD), venous duplex NG for DVTs, Head CT revealed chronic involutional and white matter changes, no acute intracranial process, NIC(SCr peaked at 4.6) with  hyperkalemia (5.8) requiring lokelma and insulin.  Of note daughter states over past month patient more and more confused ? dementia and pt is legally blind. Daughter states pt HAS NOT RECEIVED COVID VACCINE as she has been sick these past few months.     5/26 Patient transferred to [facility] with concern of pericardial effusion, hypotension and NIC. On arrival patient SBP 80's, A&O to first name but very lethargic.

## 2021-06-07 NOTE — PROGRESS NOTE ADULT - SUBJECTIVE AND OBJECTIVE BOX
SANJIV ROBERTS  MRN-[id number]  Patient is a 75y old  Female who presents with a chief complaint of Transfer from [facility] with pericardial effusion, NIC and hypotension (2021 20:55)    HPI:  75y.o female with PMHx of CHFpEF, Severe TR with Right CHF and severe pHTN, COPD on home 02, HTN, HLD, Chronic Afib (on Eliquis), OA, CKD3, DM, right eye cataract who was brought in by EMS to [facility] on 21 for AMS. pt A&O x1, per daughter patient with generalized weakness and lethargy since since . Pt had recent in 2021 with CP and NIC. Per chart on arrival to ED pt was hypotensive, obtunded. Chest CT with large pericardial effusion (increased since 2021)- per thoracic surgery consult no indication for pericardial window, pHTN, cirrhotic liver with small ascites, ECHO EF 55% dilated RV with decreased function, severe TN, pHTN, NIC (per renal consult no acute indication for HD), venous duplex NG for DVTs, Head CT revealed chronic involutional and white matter changes, no acute intracranial process, NIC(SCr peaked at 4.6) with  hyperkalemia (5.8) requiring lokelma and insulin.  Of note daughter states over past month patient more and more confused ? dementia and pt is legally blind. Daughter states pt HAS NOT RECEIVED COVID VACCINE as she has been sick these past few months.      Patient transferred to [facility] with concern of pericardial effusion, hypotension and NIC. On arrival patient SBP 80's, A&O to first name but very lethargic.  (26 May 2021 10:16)      24 HOUR EVENTS:  -Febrile overnight    REVIEW OF SYSTEMS:  -Unable to assess 2/2 intubation    CONSTITUTIONAL: No weakness, fevers or chills  EYES/ENT: No visual changes;  No vertigo or throat pain   NECK: No pain or stiffness  RESPIRATORY: No cough, wheezing, hemoptysis; No shortness of breath  CARDIOVASCULAR: No chest pain or palpitations  GASTROINTESTINAL: No abdominal or epigastric pain. No nausea, vomiting, or hematemesis; No diarrhea or constipation. No melena or hematochezia.  GENITOURINARY: No dysuria, frequency or hematuria  NEUROLOGICAL: No numbness or weakness  SKIN: No itching, rashes      ICU Vital Signs Last 24 Hrs  T(C): 37.5 (2021 06:00), Max: 38 (2021 00:00)  T(F): 99.5 (2021 06:00), Max: 100.4 (2021 00:00)  HR: 90 (2021 06:45) (78 - 124)  BP: 136/73 (2021 08:09) (136/73 - 136/73)  BP(mean): 99 (2021 08:09) (99 - 99)  ABP: 108/48 (2021 06:45) (92/40 - 136/80)  ABP(mean): 66 (2021 06:45) (54 - 102)  RR: 23 (2021 06:45) (11 - 34)  SpO2: 100% (2021 06:45) (91% - 100%)    Mode: CPAP with PS, FiO2: 30, PEEP: 5  CVP(mm Hg): 10 (- @ 16:15) (6 - 18)  CO: --  CI: --  PA: --  PA(mean): --  PA(direct): --  PCWP: --  LA: --  RA: --  SVR: --  SVRI: --  PVR: --  PVRI: --  I&O's Summary    2021 07:01  -  2021 07:00  --------------------------------------------------------  IN: 1883.7 mL / OUT: 3175 mL / NET: -1291.3 mL        CAPILLARY BLOOD GLUCOSE    CAPILLARY BLOOD GLUCOSE          PHYSICAL EXAM:  GENERAL: sedated  HEAD:  Atraumatic, Normocephalic  EYES: EOMI, PERRLA, conjunctiva and sclera clear  NECK: Supple, no lymphadenopathy, no JVD  CHEST/LUNG: CTAB; No wheezes, rales, or rhonchi  HEART: Regular rate and rhythm. Normal S1/S2. No murmurs, rubs, or gallops  ABDOMEN: Soft, non-tender, non-distended; normal bowel sounds, no organomegaly  EXTREMITIES:  2+ peripheral pulses b/l, No clubbing, cyanosis, or edema  NEUROLOGY: intubated and sedated  SKIN: No rashes or lesions    ============================I/O===========================   I&O's Detail    2021 07:01  -  2021 07:00  --------------------------------------------------------  IN:    Bumetanide: 115 mL    Enteral Tube Flush: 180 mL    IV PiggyBack: 365.2 mL    Nepro with Carb Steady: 920 mL    Norepinephrine: 154.7 mL    sodium chloride 0.9%: 90 mL    Vasopressin: 3.6 mL    Vasopressin: 16.8 mL    Vasopressin: 38.4 mL  Total IN: 1883.7 mL    OUT:    Indwelling Catheter - Urethral (mL): 3175 mL  Total OUT: 3175 mL    Total NET: -1291.3 mL        ============================ LABS =========================                        9.4    12.85 )-----------( 172      ( 2021 01:25 )             29.5     06-07    147<H>  |  103  |  96<H>  ----------------------------<  174<H>  3.9   |  33<H>  |  3.01<H>    Ca    9.9      2021 01:26  Phos  2.0     06-07  Mg     2.2     06-07    TPro  6.9  /  Alb  2.4<L>  /  TBili  3.3<H>  /  DBili  x   /  AST  42<H>  /  ALT  21  /  AlkPhos  231<H>                  LIVER FUNCTIONS - ( 2021 01:26 )  Alb: 2.4 g/dL / Pro: 6.9 g/dL / ALK PHOS: 231 U/L / ALT: 21 U/L / AST: 42 U/L / GGT: x           PT/INR - ( 2021 01:26 )   PT: 14.3 sec;   INR: 1.20 ratio         PTT - ( :26 )  PTT:63.1 sec  ABG - ( 2021 09:25 )  pH, Arterial: 7.46  pH, Blood: x     /  pCO2: 54    /  pO2: 148   / HCO3: 38    / Base Excess: 12.4  /  SaO2: 100               Lactate, Blood: 0.9 mmol/L (21 @ 01:26)  Blood Gas Arterial, Lactate: 0.8 mmol/L (21 @ 09:25)  Blood Gas Venous - Lactate: 0.9 mmoL/L (21 @ 04:16)  Blood Gas Arterial, Lactate: 1.0 mmol/L (21 @ 04:16)  Blood Gas Arterial, Lactate: 0.9 mmol/L (21 @ 02:13)  Blood Gas Venous - Lactate: 0.9 mmoL/L (21 @ 02:13)  Blood Gas Venous - Lactate: 0.9 mmoL/L (21 @ 16:08)  Blood Gas Venous - Lactate: 0.9 mmoL/L (21 @ 10:57)      ======================Micro/Rad/Cardio=================  Telemtry: Reviewed   EKG: Reviewed  CXR: Reviewed  Culture: Reviewed   Echo: Transthoracic Echocardiogram:   Patient name: SANJIV ROBERTS  YOB: 1945   Age: 75 (F)   MR#: [id number]  Study Date: 2021  Location: [id number]: Haydee Cordon RDCS  Study quality: Technically difficult  Referring Physician: Gilmar Welch MD  Blood Pressure: 116/69 mmHg  Height: 168 cm  Weight: 100 kg  BSA: 2.1 m2  ------------------------------------------------------------------------  PROCEDURE: Transthoracic echocardiogram with 2-D, M-Mode  and complete spectral and color flow Doppler.  INDICATION: Cardiac tamponade (I31.4)  ------------------------------------------------------------------------  Dimensions:    Normal Values:  LA:     4.4    2.0 - 4.0 cm  Ao:            2.0 - 3.8 cm  SEPTUM: 0.8    0.6 - 1.2 cm  PWT:    1.0    0.6 - 1.1 cm  LVIDd:  3.6    3.0 - 5.6 cm  LVIDs:  2.2    1.8 - 4.0 cm  Derived variables:  LVMI: 45 g/m2  RWT: 0.55  EF (Visual Estimate): 55 %  ------------------------------------------------------------------------  Observations:  Mitral Valve: Mitral annular calcification, otherwise  normal mitral valve. Mild-moderate mitral regurgitation.  Aortic Valve/Aorta: Calcified trileaflet aortic valve with  normal opening.  Normal aortic root.  Left Atrium: Normal left atrial size.  Left Ventricle: Endocardium not well visualized; overall  preserved left ventricular systolic function. Septal  flattening with paradoxical septal motion consistent with  right ventricular overload. Small left ventricle.  Right Heart: Severe right atrial enlargement. Mobile  interatrial septum. Interatrial septum bows towards the LA  consistent with elevated RA pressure. Severe right  ventricular enlargement with severely decreased right  ventricular systolic function. Tricuspid annular dilatation  with normal leaflet opening. There is central malcoaptation  of the leaflets. Moderate-severe tricuspid regurgitation.  Normal pulmonic valve. Moderate pulmonic regurgitation.  Pericardium/Pleura: Large pericardial effusion seen  adjacent to the right atrium. The effusionmeasures up to  approximately 3.4 cm adjacent to the RA in its largest  dimension. Thickened pericardium with small-moderate  pericardial effusion seen posterior and lateral to the LV.  The effusion measures up to approximately 1.5 cm posterior  to the LV. No echocardiographic evidence of pericardial  tamponade.  Hemodynamic: Dilated IVC (diameterabout 3.4 cm) with less  than 50% respiratory variation in size consistent with  estimated RA pressure 15 mmHg. Estimated right ventricular  systolic pressure equals 67 mm Hg, assuming right atrial  pressure equals 15 mm Hg, consistent with severe pulmonary  hypertension.  ------------------------------------------------------------------------  Conclusions:  1. Endocardium not well visualized; overall preserved left  ventricular systolic function. Septal flattening with  paradoxical septal motion consistent with right ventricular  overload.  2. Severe right ventricular enlargement with severely  decreased right ventricular systolic function.  3.Estimated right ventricular systolic pressure equals 67  mm Hg, assuming right atrial pressure equals 15 mm Hg,  consistent with severe pulmonary hypertension.  4. Large pericardial effusion seen adjacent to the right  atrium. The effusion measures up to approximately 3.4 cm  adjacent to the RA in its largest dimension. Thickened  pericardium with small-moderate pericardial effusion seen  posterior and lateral to the LV. The effusion measures up  to approximately 1.5 cm posterior to the LV. No  echocardiographic evidence of pericardial tamponade.  Results discussed with physician assistant Schmidt from  [facility].  *** No previous Echo exam.  ------------------------------------------------------------------------  Confirmed on  2021 - 11:13:14 by Justin Riley M.D.  ------------------------------------------------------------------------ (21 @ 09:44)    Cath: Cardiac Cath Lab - Adult:   [facility]  Department of Cardiology  [address] [place] [address]  [phone]  Cath Lab Report -- Comprehensive Report  Patient: SANJIV ROBERTS  Study date: 2021  Account number: [id number]  MR number: [id number]  : 1945  Gender: Female  Race:  Amer  Case Physician(s):  Darrian Chong D.O.  Fellow:  Marc Bui M.D.  Referring Physician:  TAQUERIA Maloney M.D.  INDICATIONS: Heart failure; unspecified.  HISTORY: There was no prior cardiac history. The patient has renal failure  (not requiring dialysis).  PROCEDURE:  --  Right heart catheterization.  --  [place] Dm Insertion.  TECHNIQUE: The risks and alternatives of the procedures and conscious  sedation were explained to the patient's next of kin and informed consent  was obtained. Cardiac catheterization performed electively.  Local anesthetic given. Right internal jugular vein access. Right heart  catheterization. The procedure was performed utilizing a catheter. [place]  Dm Insertion. RADIATION EXPOSURE: 0.9 min.  COMPLICATIONS: There were no complications.  DIAGNOSTIC RECOMMENDATIONS: [place] Dm placement via RIJ (catheter was  placed in the ICU prior to the procedure).  CI 1.82 by Janes.  Management as per primary team and ICU.  INTERVENTIONAL RECOMMENDATIONS: [place] Dm placement via RIJ (catheter was  placed in the ICU prior to the procedure).  CI 1.82 by Janes.  Management as per primary team and ICU.  Prepared and signed by  Darrian Chong D.O.  Signed 2021 16:39:13  HEMODYNAMIC TABLES  Pressures:  Baseline  Pressures:  - HR: 57  Pressures:  - Rhythm:  Pressures:  -- Pulmonary Artery (S/D/M): 65/15/30  Pressures:  -- Pulmonary Capillary Wedge: --/28/20  Pressures:  -- Right Atrium (a/v/M): --/15/14  O2 Sats:  Baseline  O2 Sats:  - HR: 57  O2 Sats:  - Rhythm:  O2 Sats:  -- AO: 12.2/100/16.59  O2 Sats:  -- PA: 12.2/55.4/9.19  Outputs:  Baseline  Outputs:  -- CALCULATIONS[age]  Outputs:  -- CALCULATIONS: Body Surface Area: 2.17  Outputs:  -- CALCULATIONS: Height in cm: 165.00  Outputs:  -- CALCULATIONS: Sex: Female  Outputs:  -- CALCULATIONS: Weight in k.40  Outputs:  -- OUTPUTS: CO by Janes: 3.93  Outputs:  -- OUTPUTS: Janes cardiac index: 1.82  Outputs:  -- OUTPUTS: O2 consumption: 291.00  Outputs:  -- OUTPUTS: Vo2 Indexed: 134.36  Outputs:  -- RESISTANCES: Pulmonary vascular index (dsc): 440.49  Outputs:  -- RESISTANCES: Pulmonary vascular index (Wood Units): 5.51  Outputs:  -- RESISTANCES: Pulmonary vascular resistance (dsc): 203.39  Outputs:  -- RESISTANCES: Pulmonary vascular resistance (Wood Units): 2.54  Outputs:  -- RESISTANCES: Right ventricular stroke work: 16.15  Outputs:  -- RESISTANCES: Right ventricular stroke work index: 7.45  Outputs:  -- RESISTANCES: Total pulmonary index (dsc): 1321.48  Outputs:  -- RESISTANCES: Total pulmonary index (Wood Units): 16.52  Outputs:  -- RESISTANCES: Total pulmonary resistance (dsc): 610.16  Outputs:  -- RESISTANCES: Total pulmonary resistance (Wood Units): 7.63  Outputs:  -- SHUNTS: Pulmonary flow: 3.93  Outputs:  -- SHUNTS: Qp Indexed: 1.82  Outputs:  -- SHUNTS: Qs Indexed: 1.82  Outputs:  -- SHUNTS: Systemic flow: 3.93 (21 @ 14:49)    ======================================================  PAST MEDICAL & SURGICAL HISTORY:  Afib    COPD (chronic obstructive pulmonary disease)    CHF (congestive heart failure)    Dementia       SANJIV ROBERTS  MRN-[id number]  Patient is a 75y old  Female who presents with a chief complaint of Transfer from [facility] with pericardial effusion, NIC and hypotension (2021 20:55)    HPI:  75y.o female with PMHx of CHFpEF, Severe TR with Right CHF and severe pHTN, COPD on home 02, HTN, HLD, Chronic Afib (on Eliquis), OA, CKD3, DM, right eye cataract who was brought in by EMS to [facility] on 21 for AMS. pt A&O x1, per daughter patient with generalized weakness and lethargy since since . Pt had recent in 2021 with CP and NIC. Per chart on arrival to ED pt was hypotensive, obtunded. Chest CT with large pericardial effusion (increased since 2021)- per thoracic surgery consult no indication for pericardial window, pHTN, cirrhotic liver with small ascites, ECHO EF 55% dilated RV with decreased function, severe TN, pHTN, NIC (per renal consult no acute indication for HD), venous duplex NG for DVTs, Head CT revealed chronic involutional and white matter changes, no acute intracranial process, NIC(SCr peaked at 4.6) with  hyperkalemia (5.8) requiring lokelma and insulin.  Of note daughter states over past month patient more and more confused ? dementia and pt is legally blind. Daughter states pt HAS NOT RECEIVED COVID VACCINE as she has been sick these past few months.      Patient transferred to [facility] with concern of pericardial effusion, hypotension and NIC. On arrival patient SBP 80's, A&O to first name but very lethargic.  (26 May 2021 10:16)      24 HOUR EVENTS:  -Febrile overnight    REVIEW OF SYSTEMS:  -Unable to assess 2/2 intubation    CONSTITUTIONAL: No weakness, fevers or chills  EYES/ENT: No visual changes;  No vertigo or throat pain   NECK: No pain or stiffness  RESPIRATORY: No cough, wheezing, hemoptysis; No shortness of breath  CARDIOVASCULAR: No chest pain or palpitations  GASTROINTESTINAL: No abdominal or epigastric pain. No nausea, vomiting, or hematemesis; No diarrhea or constipation. No melena or hematochezia.  GENITOURINARY: No dysuria, frequency or hematuria  NEUROLOGICAL: No numbness or weakness  SKIN: No itching, rashes      ICU Vital Signs Last 24 Hrs  T(C): 37.5 (2021 06:00), Max: 38 (2021 00:00)  T(F): 99.5 (2021 06:00), Max: 100.4 (2021 00:00)  HR: 90 (2021 06:45) (78 - 124)  BP: 136/73 (2021 08:09) (136/73 - 136/73)  BP(mean): 99 (2021 08:09) (99 - 99)  ABP: 108/48 (2021 06:45) (92/40 - 136/80)  ABP(mean): 66 (2021 06:45) (54 - 102)  RR: 23 (2021 06:45) (11 - 34)  SpO2: 100% (2021 06:45) (91% - 100%)    Mode: CPAP with PS, FiO2: 30, PEEP: 5  CVP(mm Hg): 10 (- @ 16:15) (6 - 18)  CO: --  CI: --  PA: --  PA(mean): --  PA(direct): --  PCWP: --  LA: --  RA: --  SVR: --  SVRI: --  PVR: --  PVRI: --  I&O's Summary    2021 07:01  -  2021 07:00  --------------------------------------------------------  IN: 1883.7 mL / OUT: 3175 mL / NET: -1291.3 mL        CAPILLARY BLOOD GLUCOSE    CAPILLARY BLOOD GLUCOSE          PHYSICAL EXAM:  GENERAL: sedated  HEAD:  Atraumatic, Normocephalic  EYES: EOMI, PERRLA, conjunctiva and sclera clear  NECK: Supple, no lymphadenopathy, no JVD  CHEST/LUNG: CTAB; No wheezes, rales, or rhonchi  HEART: Regular rate and rhythm. Normal S1/S2. No murmurs, rubs, or gallops  ABDOMEN: Soft, non-tender, non-distended; normal bowel sounds, no organomegaly  EXTREMITIES:  2+ peripheral pulses b/l, No clubbing, cyanosis, or edema  NEUROLOGY: intubated and sedated  SKIN: No rashes or lesions    ============================I/O===========================   I&O's Detail    2021 07:01  -  2021 07:00  --------------------------------------------------------  IN:    Bumetanide: 115 mL    Enteral Tube Flush: 180 mL    IV PiggyBack: 365.2 mL    Nepro with Carb Steady: 920 mL    Norepinephrine: 154.7 mL    sodium chloride 0.9%: 90 mL    Vasopressin: 3.6 mL    Vasopressin: 16.8 mL    Vasopressin: 38.4 mL  Total IN: 1883.7 mL    OUT:    Indwelling Catheter - Urethral (mL): 3175 mL  Total OUT: 3175 mL    Total NET: -1291.3 mL        ============================ LABS =========================                        9.4    12.85 )-----------( 172      ( 2021 01:25 )             29.5     06-07    147<H>  |  103  |  96<H>  ----------------------------<  174<H>  3.9   |  33<H>  |  3.01<H>    Ca    9.9      2021 01:26  Phos  2.0     06-07  Mg     2.2     06-07    TPro  6.9  /  Alb  2.4<L>  /  TBili  3.3<H>  /  DBili  x   /  AST  42<H>  /  ALT  21  /  AlkPhos  231<H>                  LIVER FUNCTIONS - ( 2021 01:26 )  Alb: 2.4 g/dL / Pro: 6.9 g/dL / ALK PHOS: 231 U/L / ALT: 21 U/L / AST: 42 U/L / GGT: x           PT/INR - ( 2021 01:26 )   PT: 14.3 sec;   INR: 1.20 ratio         PTT - ( :26 )  PTT:63.1 sec  ABG - ( 2021 09:25 )  pH, Arterial: 7.46  pH, Blood: x     /  pCO2: 54    /  pO2: 148   / HCO3: 38    / Base Excess: 12.4  /  SaO2: 100               Lactate, Blood: 0.9 mmol/L (21 @ 01:26)  Blood Gas Arterial, Lactate: 0.8 mmol/L (21 @ 09:25)  Blood Gas Venous - Lactate: 0.9 mmoL/L (21 @ 04:16)  Blood Gas Arterial, Lactate: 1.0 mmol/L (21 @ 04:16)  Blood Gas Arterial, Lactate: 0.9 mmol/L (21 @ 02:13)  Blood Gas Venous - Lactate: 0.9 mmoL/L (21 @ 02:13)  Blood Gas Venous - Lactate: 0.9 mmoL/L (21 @ 16:08)  Blood Gas Venous - Lactate: 0.9 mmoL/L (21 @ 10:57)      ======================Micro/Rad/Cardio=================  Telemtry: Reviewed   EKG: Reviewed  CXR: Reviewed  Culture: Reviewed   Echo: Transthoracic Echocardiogram:   Patient name: SANJIV ROBERTS  YOB: 1945   Age: 75 (F)   MR#: [id number]  Study Date: 2021  Location: [id number]: Haydee Cordon RDCS  Study quality: Technically difficult  Referring Physician: Gilmar Welch MD  Blood Pressure: 116/69 mmHg  Height: 168 cm  Weight: 100 kg  BSA: 2.1 m2  ------------------------------------------------------------------------  PROCEDURE: Transthoracic echocardiogram with 2-D, M-Mode  and complete spectral and color flow Doppler.  INDICATION: Cardiac tamponade (I31.4)  ------------------------------------------------------------------------  Dimensions:    Normal Values:  LA:     4.4    2.0 - 4.0 cm  Ao:            2.0 - 3.8 cm  SEPTUM: 0.8    0.6 - 1.2 cm  PWT:    1.0    0.6 - 1.1 cm  LVIDd:  3.6    3.0 - 5.6 cm  LVIDs:  2.2    1.8 - 4.0 cm  Derived variables:  LVMI: 45 g/m2  RWT: 0.55  EF (Visual Estimate): 55 %  ------------------------------------------------------------------------  Observations:  Mitral Valve: Mitral annular calcification, otherwise  normal mitral valve. Mild-moderate mitral regurgitation.  Aortic Valve/Aorta: Calcified trileaflet aortic valve with  normal opening.  Normal aortic root.  Left Atrium: Normal left atrial size.  Left Ventricle: Endocardium not well visualized; overall  preserved left ventricular systolic function. Septal  flattening with paradoxical septal motion consistent with  right ventricular overload. Small left ventricle.  Right Heart: Severe right atrial enlargement. Mobile  interatrial septum. Interatrial septum bows towards the LA  consistent with elevated RA pressure. Severe right  ventricular enlargement with severely decreased right  ventricular systolic function. Tricuspid annular dilatation  with normal leaflet opening. There is central malcoaptation  of the leaflets. Moderate-severe tricuspid regurgitation.  Normal pulmonic valve. Moderate pulmonic regurgitation.  Pericardium/Pleura: Large pericardial effusion seen  adjacent to the right atrium. The effusionmeasures up to  approximately 3.4 cm adjacent to the RA in its largest  dimension. Thickened pericardium with small-moderate  pericardial effusion seen posterior and lateral to the LV.  The effusion measures up to approximately 1.5 cm posterior  to the LV. No echocardiographic evidence of pericardial  tamponade.  Hemodynamic: Dilated IVC (diameterabout 3.4 cm) with less  than 50% respiratory variation in size consistent with  estimated RA pressure 15 mmHg. Estimated right ventricular  systolic pressure equals 67 mm Hg, assuming right atrial  pressure equals 15 mm Hg, consistent with severe pulmonary  hypertension.  ------------------------------------------------------------------------  Conclusions:  1. Endocardium not well visualized; overall preserved left  ventricular systolic function. Septal flattening with  paradoxical septal motion consistent with right ventricular  overload.  2. Severe right ventricular enlargement with severely  decreased right ventricular systolic function.  3.Estimated right ventricular systolic pressure equals 67  mm Hg, assuming right atrial pressure equals 15 mm Hg,  consistent with severe pulmonary hypertension.  4. Large pericardial effusion seen adjacent to the right  atrium. The effusion measures up to approximately 3.4 cm  adjacent to the RA in its largest dimension. Thickened  pericardium with small-moderate pericardial effusion seen  posterior and lateral to the LV. The effusion measures up  to approximately 1.5 cm posterior to the LV. No  echocardiographic evidence of pericardial tamponade.  Results discussed with physician assistant Schmidt from  [facility].  *** No previous Echo exam.  ------------------------------------------------------------------------  Confirmed on  2021 - 11:13:14 by Justin Riley M.D.  ------------------------------------------------------------------------ (21 @ 09:44)    Cath: Cardiac Cath Lab - Adult:   [facility]  Department of Cardiology  [address] [place] [address]  [phone]  Cath Lab Report -- Comprehensive Report  Patient: SANJIV ROBERTS  Study date: 2021  Account number: [id number]  MR number: [id number]  : 1945  Gender: Female  Race:  Amer  Case Physician(s):  Darrian Chong D.O.  Fellow:  Marc Bui M.D.  Referring Physician:  TAQUERIA Maloney M.D.  INDICATIONS: Heart failure; unspecified.   HISTORY: There was no prior cardiac history. The patient has renal failure  (not requiring dialysis).  PROCEDURE:  --  Right heart catheterization.  --  [place] Dm Insertion.  TECHNIQUE: The risks and alternatives of the procedures and conscious  sedation were explained to the patient's next of kin and informed consent  was obtained. Cardiac catheterization performed electively.  Local anesthetic given. Right internal jugular vein access. Right heart  catheterization. The procedure was performed utilizing a catheter. [place]  Dm Insertion. RADIATION EXPOSURE: 0.9 min.  COMPLICATIONS: There were no complications.  DIAGNOSTIC RECOMMENDATIONS: [place] Dm placement via RIJ (catheter was  placed in the ICU prior to the procedure).  CI 1.82 by Janes.  Management as per primary team and ICU.  INTERVENTIONAL RECOMMENDATIONS: [place] Dm placement via RIJ (catheter was  placed in the ICU prior to the procedure).  CI 1.82 by Janes.  Management as per primary team and ICU.  Prepared and signed by  Darrian Chong D.O.  Signed 2021 16:39:13  HEMODYNAMIC TABLES  Pressures:  Baseline  Pressures:  - HR: 57  Pressures:  - Rhythm:  Pressures:  -- Pulmonary Artery (S/D/M): 65/15/30  Pressures:  -- Pulmonary Capillary Wedge: --/28/20  Pressures:  -- Right Atrium (a/v/M): --/15/14  O2 Sats:  Baseline  O2 Sats:  - HR: 57  O2 Sats:  - Rhythm:  O2 Sats:  -- AO: 12.2/100/16.59  O2 Sats:  -- PA: 12.2/55.4/9.19  Outputs:  Baseline  Outputs:  -- CALCULATIONS[age]  Outputs:  -- CALCULATIONS: Body Surface Area: 2.17  Outputs:  -- CALCULATIONS: Height in cm: 165.00  Outputs:  -- CALCULATIONS: Sex: Female  Outputs:  -- CALCULATIONS: Weight in k.40  Outputs:  -- OUTPUTS: CO by Janes: 3.93  Outputs:  -- OUTPUTS: Janes cardiac index: 1.82  Outputs:  -- OUTPUTS: O2 consumption: 291.00  Outputs:  -- OUTPUTS: Vo2 Indexed: 134.36  Outputs:  -- RESISTANCES: Pulmonary vascular index (dsc): 440.49  Outputs:  -- RESISTANCES: Pulmonary vascular index (Wood Units): 5.51  Outputs:  -- RESISTANCES: Pulmonary vascular resistance (dsc): 203.39  Outputs:  -- RESISTANCES: Pulmonary vascular resistance (Wood Units): 2.54  Outputs:  -- RESISTANCES: Right ventricular stroke work: 16.15  Outputs:  -- RESISTANCES: Right ventricular stroke work index: 7.45  Outputs:  -- RESISTANCES: Total pulmonary index (dsc): 1321.48  Outputs:  -- RESISTANCES: Total pulmonary index (Wood Units): 16.52  Outputs:  -- RESISTANCES: Total pulmonary resistance (dsc): 610.16  Outputs:  -- RESISTANCES: Total pulmonary resistance (Wood Units): 7.63  Outputs:  -- SHUNTS: Pulmonary flow: 3.93  Outputs:  -- SHUNTS: Qp Indexed: 1.82  Outputs:  -- SHUNTS: Qs Indexed: 1.82  Outputs:  -- SHUNTS: Systemic flow: 3.93 (21 @ 14:49)    ======================================================  PAST MEDICAL & SURGICAL HISTORY:  Afib    COPD (chronic obstructive pulmonary disease)    CHF (congestive heart failure)    Dementia

## 2021-06-07 NOTE — PROGRESS NOTE ADULT - SUBJECTIVE AND OBJECTIVE BOX
SANJIV ROBERTS  MRN-[id number]  Patient is a 75y old  Female who presents with a chief complaint of Transfer from [facility] with pericardial effusion, NIC and hypotension (07 Jun 2021 10:31)    HPI:  75y.o female with PMHx of CHFpEF, Severe TR with Right CHF and severe pHTN, COPD on home 02, HTN, HLD, Chronic Afib (on Eliquis), OA, CKD3, DM, right eye cataract who was brought in by EMS to [facility] on 5/23/21 for AMS. pt A&O x1, per daughter patient with generalized weakness and lethargy since since 5/22. Pt had recent in Jan 2021 with CP and NIC. Per chart on arrival to ED pt was hypotensive, obtunded. Chest CT with large pericardial effusion (increased since Jan 2021)- per thoracic surgery consult no indication for pericardial window, pHTN, cirrhotic liver with small ascites, ECHO EF 55% dilated RV with decreased function, severe TN, pHTN, NIC (per renal consult no acute indication for HD), venous duplex NG for DVTs, Head CT revealed chronic involutional and white matter changes, no acute intracranial process, NIC(SCr peaked at 4.6) with  hyperkalemia (5.8) requiring lokelma and insulin.  Of note daughter states over past month patient more and more confused ? dementia and pt is legally blind. Daughter states pt HAS NOT RECEIVED COVID VACCINE as she has been sick these past few months.     5/26 Patient transferred to [facility] with concern of pericardial effusion, hypotension and NIC. On arrival patient SBP 80's, A&O to first name but very lethargic.  (26 May 2021 10:16)      Hospital Course:  5/26 Patient transferred to [facility] with concern of pericardial effusion, hypotension and NIC.   5/26 intubated for hypercarbic respiratory distressed. Transferred from CTU to CICU as no need for pericardiocentesis. Briefly on pressors prior to intubation and since weaned off. RIJ cordis placed- unable to float swan- coiling in RV. RV pressures 50s /10s. Start sildenafil 20 tid. start bumex gtt   5/27 S/P RHC with [place] emmanuel catheter placed.  5/28 MTP called for R Femoral A bleed from previous A line site s/p 11U PRBC, 8 plasma, 2 plts.   5/29 started IV Levophed, sildenafil dc'ed  6/02 Dc'ed Cefepime  6/04 fever of 100.6F, restarted Cefepime   6/6 central line removed    24 HOUR EVENTS:   GOC discussion ongoing with family, possible terminal extubation tomorrow after visit from granddaughter     REVIEW OF SYSTEMS:    Unable to assess       ICU Vital Signs Last 24 Hrs  T(C): 37.4 (07 Jun 2021 19:00), Max: 38 (07 Jun 2021 00:00)  T(F): 99.3 (07 Jun 2021 19:00), Max: 100.4 (07 Jun 2021 00:00)  HR: 82 (07 Jun 2021 22:30) (78 - 124)  BP: --  BP(mean): --  ABP: 120/54 (07 Jun 2021 22:30) (90/46 - 142/68)  ABP(mean): 78 (07 Jun 2021 22:30) (54 - 94)  RR: 23 (07 Jun 2021 22:30) (11 - 40)  SpO2: 98% (07 Jun 2021 22:30) (90% - 100%)    Mode: AC/ CMV (Assist Control/ Continuous Mandatory Ventilation), RR (machine): 12, TV (machine): 350, FiO2: 30, PEEP: 5, ITime: 1  CVP(mm Hg): 10 (06-06-21 @ 16:15) (6 - 14)        CAPILLARY BLOOD GLUCOSE          PHYSICAL EXAM:  GENERAL: No acute distress, well-developed  HEAD:  Atraumatic, Normocephalic  EYES: EOMI, PERRLA, conjunctiva and sclera clear  NECK: Supple, no lymphadenopathy, no JVD  CHEST/LUNG: CTAB; No wheezes, rales, or rhonchi  HEART: Regular rate and rhythm. Normal S1/S2. No murmurs, rubs, or gallops  ABDOMEN: Soft, non-tender, non-distended; normal bowel sounds, no organomegaly  EXTREMITIES:  2+ peripheral pulses b/l, No clubbing, cyanosis, or edema  NEUROLOGY: A&O x 3, no focal deficits  SKIN: No rashes or lesions    ============================I/O===========================   I&O's Detail    06 Jun 2021 07:01  -  07 Jun 2021 07:00  --------------------------------------------------------  IN:    Bumetanide: 115 mL    Enteral Tube Flush: 180 mL    IV PiggyBack: 365.2 mL    Nepro with Carb Steady: 920 mL    Norepinephrine: 154.7 mL    sodium chloride 0.9%: 90 mL    Vasopressin: 3.6 mL    Vasopressin: 16.8 mL    Vasopressin: 38.4 mL  Total IN: 1883.7 mL    OUT:    Indwelling Catheter - Urethral (mL): 3175 mL  Total OUT: 3175 mL    Total NET: -1291.3 mL      07 Jun 2021 07:01  -  07 Jun 2021 23:04  --------------------------------------------------------  IN:    Bumetanide: 70 mL    Enteral Tube Flush: 60 mL    IV PiggyBack: 195.6 mL    Nepro with Carb Steady: 560 mL    Norepinephrine: 10.5 mL    Vasopressin: 67.2 mL  Total IN: 963.3 mL    OUT:    Indwelling Catheter - Urethral (mL): 1750 mL  Total OUT: 1750 mL    Total NET: -786.7 mL        ============================ LABS =========================                        9.4    12.85 )-----------( 172      ( 07 Jun 2021 01:25 )             29.5     06-07    148<H>  |  102  |  98<H>  ----------------------------<  166<H>  3.5   |  33<H>  |  3.04<H>    Ca    10.1      07 Jun 2021 17:28  Phos  3.0     06-07  Mg     2.2     06-07    TPro  7.2  /  Alb  2.4<L>  /  TBili  3.0<H>  /  DBili  x   /  AST  44<H>  /  ALT  20  /  AlkPhos  246<H>  06-07                LIVER FUNCTIONS - ( 07 Jun 2021 17:28 )  Alb: 2.4 g/dL / Pro: 7.2 g/dL / ALK PHOS: 246 U/L / ALT: 20 U/L / AST: 44 U/L / GGT: x           PT/INR - ( 07 Jun 2021 01:26 )   PT: 14.3 sec;   INR: 1.20 ratio         PTT - ( 07 Jun 2021 01:26 )  PTT:63.1 sec  ABG - ( 06 Jun 2021 09:25 )  pH, Arterial: 7.46  pH, Blood: x     /  pCO2: 54    /  pO2: 148   / HCO3: 38    / Base Excess: 12.4  /  SaO2: 100               Lactate, Blood: 0.9 mmol/L (06-07-21 @ 01:26)      ======================Micro/Rad/Cardio=================  Telemtry: Reviewed   EKG: Reviewed  CXR: Reviewed  Culture: Reviewed   Echo: Reviewed   Cath: Reviewed     ======================================================  PAST MEDICAL & SURGICAL HISTORY:  Afib    COPD (chronic obstructive pulmonary disease)    CHF (congestive heart failure)    Dementia      ====================ASSESSMENT ==============            Plan:  ====================CARDIOVASCULAR==================  HFpEF with severe TR and severely decr RVSF radha 2/2 pulm HTN   - C/w Bumex gtt @ 1mg/hr. Target net negative  - trend perfusion labs; lactate, scvo2, lfts  - ScVO2 remains in the 70s; 78%, lactate 0.9.  -c/w Vasopressin gtt, wean as tolerated to keep MAP>60 and transition to vasopressin       Chronic Afib  - rate controlled, continue to monitor  -No BB due to hypotension   - off hep gtt i/s/o recent bleed & hemorrhagic shock, cont sq heparin    buMETAnide Infusion 1 mG/Hr (5 mL/Hr) IV Continuous <Continuous>      ====================== NEUROLOGY=====================  AMS, likely metabolic encephalopathy  - off sedation since 5/30, has been without purposeful movements. Neuro exam unchanged from yesterday, opening eyes to voice, occasionally tracking with eyes, spontaneous non purposeful movements b/l UE, withdraws b/l UE and grimaces to pain. Does not follow commands  - vEEG; generalized non-specific slowing, no epileptiform activity.  - MRI brain negative for acute pathology  - correction of metabolic derangements; continues to be uremic w/ BUN now in the 80s, f/up renal for consideration of HD to clear uremia  - Continue to monitor neuro status per protocol  - appreciate neuro     acetaminophen    Suspension .. 650 milliGRAM(s) Oral every 6 hours PRN Temp greater or equal to 38C (100.4F)    ==================== RESPIRATORY======================  Acute Hypercapnic Respiratory Failure req Intubation   - On full vent support, minimal settings 350/12/5/30%, unable to extubate 2/2 mental status  - daily sbt trials as tolerated, full vent overnight  - trend ABGs    pulmonary HTN, likely group 2  - continue diuresis bumex gtt @ 1 mg/hr  - will need repeat RHC when euvolemic   - pulmonology following appreciate recs    Mechanical Ventilation:  Mode: AC/ CMV (Assist Control/ Continuous Mandatory Ventilation)  RR (machine): 12  TV (machine): 350  FiO2: 30  PEEP: 5  ITime: 1  MAP: 9  PIP: 26    ABG - ( 06 Jun 2021 09:25 )  pH, Arterial: 7.46  pH, Blood: x     /  pCO2: 54    /  pO2: 148   / HCO3: 38    / Base Excess: 12.4  /  SaO2: 100           ===================== RENAL =========================  Non-oliguric NIC with creatinine peak of 4.49 currently 2.99 Baseline 0.97 in 02/2021   - trend SCr, BMP q12  - adequate uop on bumex gtt. Strict I/Os  - Nephro following. No urgent needs for HD at this time, f/up recs regarding HD to clear uremia in setting of encephalopathy  - Replete lytes PRN. Keep K> 4 and Mg >2.   - avoid nephrotoxic agents    hypernatremia  - continue FW and decrease volume as appropriate. Na 148 > 148 today      06-06-21 @ 07:01  -  06-07-21 @ 07:00  --------------------------------------------------------  IN: 1883.7 mL / OUT: 3175 mL / NET: -1291.3 mL    06-07-21 @ 07:01  -  06-07-21 @ 23:04  --------------------------------------------------------  IN: 963.3 mL / OUT: 1750 mL / NET: -786.7 mL      Renal Replacement Therapy:  [ ] CRRT      [ ] IHD, Last Session:    Fluid removal:     [ ] Diuretic therapy, Regimen:       ==================== GASTROINTESTINAL===================  No active issues  - Diet: NPO w/ tube feed - Nepro with Carb steady goal 40cc/hr.   - continue Protonix for stress ulcer prophylaxis.     Last BM: yesterday x4, watery  Indication for Stress Ulcer Prophylaxis, [ ] Yes    [ ] No   If Yes, Medication:     pantoprazole  Injectable 40 milliGRAM(s) IV Push daily    ========================INFECTIOUS DISEASE================  Febrile today 100.4  -cultures sent and started on empiric cefepime and vanc by level  -MRSA negative, vanco d/c   - WBC 11>13, trend.  - BCX x2 5/26: NGTD, UCx 5/26: no growth. S/P MRSA swab 5/27: neg. new blood cultures sent today due to fever   -Central line d/c 6/6    T(C): 37.4 (06-07-21 @ 19:00), Max: 38 (06-07-21 @ 00:00)  WBC Count: 12.85 K/uL (06-07-21 @ 01:25)  WBC Count: 15.57 K/uL (06-06-21 @ 04:22)  WBC Count: 14.48 K/uL (06-05-21 @ 02:17)      Culture - Blood (collected 06-04-21 @ 17:14)  Source: .Blood Blood-Peripheral  Preliminary Report (06-05-21 @ 18:00):    No growth to date.    Culture - Blood (collected 06-04-21 @ 17:14)  Source: .Blood Blood-Peripheral  Preliminary Report (06-05-21 @ 18:00):    No growth to date.    Culture - Bronchial (collected 06-04-21 @ 17:11)  Source: Bronch Wash Combicath  Gram Stain (06-04-21 @ 19:32):    Few polymorphonuclear leukocytes per low power field    No Squamous epithelial cells per low power field    No organisms seen per oil power field  Final Report (06-06-21 @ 16:50):    No growth at 48 hours    Culture - Sputum (collected 06-04-21 @ 17:11)  Source: .Sputum Sputum  Gram Stain (06-04-21 @ 19:32):    Few polymorphonuclear leukocytes per low power field    Rare Squamous epithelial cells per low power field    No organisms seen per oil power field  Final Report (06-06-21 @ 16:46):    Normal Respiratory Karen present        Current Antibiotics with start date:     cefepime   IVPB 2000 milliGRAM(s) IV Intermittent every 24 hours  cefepime   IVPB        ===================HEMATOLOGIC/ONC ===================  Hemorrhagic Shock, resolved   - RFA s/p removal of Beto with large hematoma and active bleeding i/s/o supratherapeutic ptt   - MTP s/p 11U PRBC, 8U plasma, 2 plts. Protamine x2 + 2.5L IVF resuscitated   - right groin stable with positive pulses  - Monitor H/H    VTE ppx   - sq heparin    Hemoglobin: 9.4 g/dL (06-07-21 @ 01:25)  Hemoglobin: 10.3 g/dL (06-06-21 @ 04:22)  Hemoglobin: 10.5 g/dL (06-05-21 @ 02:17)    Platelet Count - Automated: 172 K/uL (06-07-21 @ 01:25)  Platelet Count - Automated: 181 K/uL (06-06-21 @ 04:22)  Platelet Count - Automated: 142 K/uL (06-05-21 @ 02:17)    Chemical VTE Prophylaxis:  [ ] Lovenox    [ ] SQH   [ ]NA  Systemic Anticogaulation:  [ ] Yes    [ ] No,  If Yes, Medication:     heparin   Injectable 5000 Unit(s) SubCutaneous every 8 hours    =======================    ENDOCRINE  =====================  Bgl controlled  - monitor glucose for need to initiate sliding scale.     Hx of HLD  - C/w Lipitor 40mg QHS        atorvastatin 40 milliGRAM(s) Oral at bedtime  vasopressin Infusion 0.08 Unit(s)/Min (4.8 mL/Hr) IV Continuous <Continuous>      Patient requires continuous monitoring with bedside rhythm monitoring, pulse ox monitoring, and intermittent blood gas analysis. Care plan discussed with ICU care team. Patient remained critical and at risk for life threatening decompensation.  Patient seen, examined and plan discussed with CCU team during rounds.     I have personally provided ____ minutes of critical care time excluding time spent on separate procedures.      I, ____ , personally performed the services described in this documentation. all medical record entries made by the scribe were at my direction and in my presence. I have reviewed the chart and agree that the record reflects my personal performance and is accurate and complete  Electronically signed: ______

## 2021-06-07 NOTE — CONSULT NOTE ADULT - ASSESSMENT
75F with CHFpEF, Severe TR with Right CHF and severe pHTN, COPD on home 02, HTN, HLD, Chronic Afib (on Eliquis), OA, CKD3, DM, right eye cataract who was brought in by EMS to [facility] on 5/23/21 for AMS, found to have a large pericardial effusion w/o tamponade physiology and without drainable window. She was intubated (5/26) for acute hypercarbic respiratory failure and worsening AMS. Transferred to CICU for shock and NIC on CKD. Course c/b expanding R thigh hematoma s/p protamine for hep reversal. Palliative care was consulted for GOC and ACP.

## 2021-06-07 NOTE — ADVANCED PRACTICE NURSE CONSULT - RECOMMEDATIONS
Will recommend the followin. Gluteal cleft, b/l buttocks: apply Advanced Cavilon M-W-F routine pericare between applications.  2. continue with turning and positioning  3. Nutrition support as pt condition allows  Tx pan discussed with RN

## 2021-06-07 NOTE — CONSULT NOTE ADULT - PROBLEM SELECTOR RECOMMENDATION 9
-Hemodynamically unstable on arrival, currently hypertensive off all pressors after intubation  -CVP on L fem TLC 8 mmHg. Recommend placing swan emmanuel to confirm CVP  -Start sildenafil 20 TID   -Consult Dr. Patel  -Full pulm htn workup once stable
On Mechanical ventilation.   Patient appears to be tolerating on CPAP. However, CICU team "...will not extubate until GOC are fully establish as pt has a high chance of decompensation given comorbidities."

## 2021-06-07 NOTE — CONSULT NOTE ADULT - PROBLEM SELECTOR RECOMMENDATION 6
Will continue to f/u for GOC and ACP.         Lacho Hernandez MD   Geriatrics and Palliative Care (GAP) Consult Service    of Geriatric and Palliative Medicine  [facility]      Please page the following number for clinical matters between the hours of 9 am and 5 pm from Monday through Friday : [phone]    After 5pm and on weekends, please see the contact information below:    In the event of newly developing, evolving, or worsening symptoms, please contact the Palliative Medicine team via pager (if the patient is at [facility] #[phone] or if the patient is at [facility] #[phone]) The Geriatric and Palliative Medicine service has coverage 24 hours a day/ 7 days a week to provide medical recommendations regarding symptom management needs via telephone As noted in the care coordination notes, since there is not a documented HPC and the patient's  is not able to participate in decision making (due to cognitive impairment), based on the Family Healthcare Decision Act, the patient's 4 children are her surrogates. Dr. Cam (geriatric fellow) called the patient's emergency contact (Daughter James) but she did not answer. Will try calling back on 6/8.

## 2021-06-08 NOTE — PROGRESS NOTE ADULT - PROBLEM SELECTOR PLAN 4
- remains off AC given recent bleed requiring multiple transfusions
Will continue to f/u for symptoms.   If surviving extubation, will transfer to PCU.         Lacho Hernandez MD   Geriatrics and Palliative Care (GAP) Consult Service    of Geriatric and Palliative Medicine  [facility]      Please page the following number for clinical matters between the hours of 9 am and 5 pm from Monday through Friday : [phone]    After 5pm and on weekends, please see the contact information below:    In the event of newly developing, evolving, or worsening symptoms, please contact the Palliative Medicine team via pager (if the patient is at [facility] #[phone] or if the patient is at [facility] #[phone]) The Geriatric and Palliative Medicine service has coverage 24 hours a day/ 7 days a week to provide medical recommendations regarding symptom management needs via telephone

## 2021-06-08 NOTE — PROGRESS NOTE ADULT - ATTENDING COMMENTS
Pulmonary hypertension with R heart failure  No sedation for over 1 week, does not follow commands  Vasodilatory shock requiring Vasopressin infusion - wean as able  Acute hypoxic respiratory failure requiring mechanical ventilation with minimal vent requirements - attempt breathing trials  Sildenafil off due to pressor requirement  Pulmonary Hypertension service has been consulted  Tolerating tube feeds  Non-oliguric NIC that is worsening as BUN is rising, Renal following - continue diuresis  Hemoglobin low but acceptable on HSQ for DVT prophylaxis   Cultures negative on empiric broad spectrum antibiotics - stop antibiotics  Sugars controlled  L radial harshil 5/27  Poor prognosis - in discussion with family about palliative extubation

## 2021-06-08 NOTE — PROGRESS NOTE ADULT - SUBJECTIVE AND OBJECTIVE BOX
HPI:  75F with CHFpEF, Severe TR with Right CHF and severe pHTN, COPD on home 02, HTN, HLD, Chronic Afib (on Eliquis), OA, CKD3, DM, right eye cataract who was brought in by EMS to [facility] on 5/23/21 for AMS, found to have a large pericardial effusion w/o tamponade physiology and without drainable window. She was intubated (5/26) for acute hypercarbic respiratory failure and worsening AMS. Transferred to CICU for shock and NIC on CKD. Course c/b expanding R thigh hematoma s/p protamine for hep reversal. Palliative care was consulted for GOC and ACP.       6/7/2021: Patient seen in CCU discussed with Medical resident and bed side Nursing : Patient currently unable to participate in any HPI or ROS due to intubation and know advanced history of Dementia. As per CCU team she has been intubated since 5/26/2021 and off sedation for 2&1/2 days. With minimal turning on CPAP setting patient does desaturate into the 70's as per Nursing and noted to have a lot of secretions.    6/8/2021: CICU team already addressed GOC which are now for a compassionate extubation and maximizing symptoms Rx.     PERTINENT PM/SXH:   Afib    COPD (chronic obstructive pulmonary disease)    CHF (congestive heart failure)    Dementia        FAMILY HISTORY:    ITEMS NOT CHECKED ARE NOT PRESENT    SOCIAL HISTORY:   Significant other/partner:  [x] Single Children:  [ ]  Yazidism/Spirituality: Rastafarian   Substance hx:  [ ]   Tobacco hx:  [ ]   Alcohol hx: [ ]   Home Opioid hx:  [ ] I-Stop Reference No:  Living Situation: [x ]Home  [ ]Long term care  [ ]Rehab [ ]Other  As per care coordination notes: Per daughter, patient resides with daughter James in a third floor  apartment (no elevator). Daughter is caretaker, no home health services.  Patient owns a rolling walker, wheelchair and home oxygen provided through  [employer]. Patient is , however her spouse, 86 years old with AMS and  resides with patients daughter in [place]. Patient has four adult children  total, (see previous). LCSW explained HCP and Surrogate Decision Maker law to  patients daughter James, who stated repeatedly that because she is Power of   she is also the sole contact. LCSW requested other siblings phone  numbers however James declined to provide. James stated she has been in  on-going contact with her other siblings to update them on patients status.  Patient has not completed a HCP before, and cannot at this time due to her  medical status. Surrogate decision makers are four children jointly as spouse  is not A&Ox4. Patient unable to identify caregiver. Patient has active medicare  and [facility] medicaid insurance in place.Notes: patient has four adult children: a son and daughter who reside in  [place], a daughter in [place], and daughter James.  ADVANCE DIRECTIVES:    DNR  MOLST  [ ]  Living Will  [ ]   DECISION MAKER(s):  [ ] Health Care Proxy(s)  [ ] Surrogate(s)  [ ] Guardian           Name(s): Phone Number(s):    BASELINE (I)ADL(s) (prior to admission):  [place]: [ ]Total  [ ] Moderate [ ]Dependent    Allergies    No Known Allergies    Intolerances    MEDICATIONS  (STANDING):  atorvastatin 40 milliGRAM(s) Oral at bedtime  buMETAnide Infusion 1 mG/Hr (5 mL/Hr) IV Continuous <Continuous>  chlorhexidine 0.12% Liquid 15 milliLiter(s) Oral Mucosa every 12 hours  chlorhexidine 2% Cloths 1 Application(s) Topical <User Schedule>  heparin   Injectable 5000 Unit(s) SubCutaneous every 8 hours  HYDROmorphone  Injectable 0.5 milliGRAM(s) IV Push once  LORazepam   Injectable 0.5 milliGRAM(s) IV Push once  pantoprazole  Injectable 40 milliGRAM(s) IV Push daily  vasopressin Infusion 0.06 Unit(s)/Min (3.6 mL/Hr) IV Continuous <Continuous>    MEDICATIONS  (PRN):  acetaminophen    Suspension .. 650 milliGRAM(s) Oral every 6 hours PRN Temp greater or equal to 38C (100.4F)  glycopyrrolate Injectable 0.4 milliGRAM(s) IV Push every 4 hours PRN Secretions.  HYDROmorphone  Injectable 0.8 milliGRAM(s) IV Push once PRN For respiratory distres inmediately post extubation.  HYDROmorphone  Injectable 0.5 milliGRAM(s) IV Push every 1 hour PRN Labored breathing or RR > 30  LORazepam   Injectable 0.5 milliGRAM(s) IV Push once PRN To be given for respiratory distress that does not improve with Dilaudid post extuabtation      PRESENT SYMPTOMS: [x ]Unable to obtain due to poor mentation   Source if other than patient:  [ ]Family   [ ]Team     Pain: [ ] yes [ ] no  QOL impact -   Location -                    Aggravating factors -  Quality -  Radiation -  Timing-  Severity (0-10 scale):  Minimal acceptable level (0-10 scale):     PAIN AD Score:     [email or web address] (press ctrl +  left click to view)    Dyspnea:                           [ ]Mild [ ]Moderate [ ]Severe  Anxiety:                             [ ]Mild [ ]Moderate [ ]Severe  Fatigue:                             [ ]Mild [ ]Moderate [ ]Severe  Nausea:                             [ ]Mild [ ]Moderate [ ]Severe  Loss of appetite:              [ ]Mild [ ]Moderate [ ]Severe  Constipation:                    [ ]Mild [ ]Moderate [ ]Severe    Other Symptoms:  [ ]All other review of systems negative     Karnofsky Performance Score/Palliative Performance Status Version 2:    20     %    http://[facility].org/files/news/palliative_performance_scale_ppsv2.pdf  PHYSICAL EXAM:  Vital Signs Last 24 Hrs  T(C): 37.8 (08 Jun 2021 12:00), Max: 37.8 (08 Jun 2021 12:00)  T(F): 100 (08 Jun 2021 12:00), Max: 100 (08 Jun 2021 12:00)  HR: 88 (08 Jun 2021 15:16) (78 - 96)  BP: --  BP(mean): --  RR: 18 (08 Jun 2021 14:00) (11 - 40)  SpO2: 100% (08 Jun 2021 15:16) (90% - 100%)    GENERAL:  [x ]Alert  [ ]Oriented x   [ ]Lethargic  [ ]Cachexia  [ ]Unarousable  [ ]Verbal  [ x]Non-Verbal  Behavioral:   [ ] Anxiety  [ ] Delirium [ ] Agitation [x ] Other Confused appearing   HEENT:  [ ]Normal   [ ]Dry mouth   [x ]ET Tube/Trach  [ ]Oral lesions  PULMONARY:   [ ]Clear [ ]Tachypnea  [x ]Audible excessive secretions   [x ]Rhonchi        [ ]Right [ ]Left [ x]Bilateral  [ ]Crackles        [ ]Right [ ]Left [ ]Bilateral  [ ]Wheezing     [ ]Right [ ]Left [ ]Bilateral  CARDIOVASCULAR:    [x ]Regular [ ]Irregular [ ]Tachy  [ ]Aman [ ]Murmur [ ]Other  GASTROINTESTINAL:  [ x]Soft  [ ]Distended   [ ]+BS  [ ]Non tender [ ]Tender  [ ]PEG [x ]OGT/ NGT  Last BM:     GENITOURINARY:  [ ]Normal [ ] Incontinent   [ ]Oliguria/Anuria   [ x]Hernadez  MUSCULOSKELETAL:   [ ]Normal   [ ]Weakness  [ x]Bed/Wheelchair bound [ ]Edema  NEUROLOGIC:   [ ]No focal deficits  [ x] Cognitive impairment  [ ] Dysphagia [ ]Dysarthria [ ] Paresis [ ]Other   SKIN:   [ ]Normal   [ x]Pressure ulcer(s) Stage 2 to Sacrum   [ ]Rash    CRITICAL CARE:  [x ] Shock Present  [x ]Septic [ ]Cardiogenic [ ]Neurologic [ ]Hypovolemic  [ ]  Vasopressors [ ]  Inotropes   [ x] Respiratory failure present [ ] mechanical ventilation [ ] non-invasive ventilatory support [ ] High flow  [ ] Acute  [ ] Chronic [ ] Hypoxic  [ ] Hypercarbic [ ] Other  [x ] Other organ failure     LABS:                                         8.8    12.42 )-----------( 187      ( 08 Jun 2021 00:31 )             28.3   06-08    151<H>  |  104  |  100<H>  ----------------------------<  154<H>  4.1   |  31  |  3.13<H>    Ca    10.5      08 Jun 2021 00:31  Phos  2.3     06-08  Mg     2.2     06-08    TPro  7.5  /  Alb  2.5<L>  /  TBili  3.2<H>  /  DBili  x   /  AST  47<H>  /  ALT  21  /  AlkPhos  256<H>  06-08      RADIOLOGY & ADDITIONAL STUDIES:  `< from: CT Head No Cont (05.31.21 @ 10:07) >    INTERPRETATION:  Clinical indication: Altered consciousness    Multiple axial sections were performed from base of skull to vertex without contrast enhancement. Coronal and sagittal reconstructions were performed as well    Parenchymal volume loss and chronic microvascular ischemic changes are identified    There is no acute hemorrhage mass or mass effect seen    Evaluation of the osseous structures with the appropriate window appears normal    The visualized paranasal sinuses appear clear    Sclerotic change is seen involving left mastoid which could be compatible with chronic inflammatory change.    Oral tracheal and orogastric tubes are seen.    IMPRESSION:No evidence of acute hemorrhage mass or mass effect.    < end of copied text >  < from: CT Abdomen and Pelvis No Cont (05.26.21 @ 11:59) >    IMPRESSION:    Study limited by lack of intravenous contrast and motion artifact.    A small to moderate pericardial effusion.    A trace right pleural effusion.    A likely small right groin hematoma in the region of the right femoral approach arterial and venous lines.    Mildly nodular liver contour, which may be seen in the setting of early cirrhosis.    < end of copied text >  < from: Transthoracic Echocardiogram (05.26.21 @ 09:44) >  Conclusions:  1. Endocardium not well visualized; overall preserved left  ventricular systolic function. Septal flattening with  paradoxical septal motion consistent with right ventricular  overload.  2. Severe right ventricular enlargement with severely  decreased right ventricular systolic function.  3.Estimated right ventricular systolic pressure equals 67  mm Hg, assuming right atrial pressure equals 15 mm Hg,  consistent with severe pulmonary hypertension.  4. Large pericardial effusion seen adjacent to the right  atrium. The effusion measures up to approximately 3.4 cm  adjacent to the RA in its largest dimension. Thickened  pericardium with small-moderate pericardial effusion seen  posterior and lateral to the LV. The effusion measures up  to approximately 1.5 cm posterior to the LV. No  echocardiographic evidence of pericardial tamponade.  Results discussed with physician assistant Schmidt from  CTICU.  *** No previous Echo exam.  ------------------------------------------------------------------------  Confirmed on  5/26/2021 - 11:13:14 by Justin Riley M.D.    < end of copied text >    PROTEIN CALORIE MALNUTRITION PRESENT: [ ] Yes [ ] No  [ ] PPSV2 < or = to 30% [ ] significant weight loss  [ ] poor nutritional intake [ ] catabolic state [ ] anasarca     Albumin, Serum: 2.4 g/dL (06-07-21 @ 01:26)  Artificial Nutrition [ ]     REFERRALS:   [ ]Chaplaincy  [ ] Hospice  [ ]Child Life  [ ]Social Work  [ ]Case management [ ]Holistic Therapy     Goals of Care Document:   Care Coordination Assessment 201 [C. Provider] (05-27-21 @ 12:11)   Progress Notes - Care Coordination [C. Provider] (06-04-21 @ 12:14)   HPI:  75F with CHFpEF, Severe TR with Right CHF and severe pHTN, COPD on home 02, HTN, HLD, Chronic Afib (on Eliquis), OA, CKD3, DM, right eye cataract who was brought in by EMS to [facility] on 5/23/21 for AMS, found to have a large pericardial effusion w/o tamponade physiology and without drainable window. She was intubated (5/26) for acute hypercarbic respiratory failure and worsening AMS. Transferred to CICU for shock and NIC on CKD. Course c/b expanding R thigh hematoma s/p protamine for hep reversal. Palliative care was consulted for GOC and ACP.       6/7/2021: Patient seen in CCU discussed with Medical resident and bed side Nursing : Patient currently unable to participate in any HPI or ROS due to intubation and know advanced history of Dementia. As per CCU team she has been intubated since 5/26/2021 and off sedation for 2&1/2 days. With minimal turning on CPAP setting patient does desaturate into the 70's as per Nursing and noted to have a lot of secretions.    6/8/2021: CICU team already addressed GOC which are now for a compassionate extubation and maximizing symptoms Rx. The patient was seen with her daughter, James by her bedside. The patient appeared to be uncomfortable and trying to pull the OT tube out.     PERTINENT PM/SXH:   Afib    COPD (chronic obstructive pulmonary disease)    CHF (congestive heart failure)    Dementia        FAMILY HISTORY:    ITEMS NOT CHECKED ARE NOT PRESENT    SOCIAL HISTORY:   Significant other/partner:  [x] Single Children:  [ ]  Sikh/Spirituality: Yazidism   Substance hx:  [ ]   Tobacco hx:  [ ]   Alcohol hx: [ ]   Home Opioid hx:  [ ] I-Stop Reference No:  Living Situation: [x ]Home  [ ]Long term care  [ ]Rehab [ ]Other  As per care coordination notes: Per daughter, patient resides with daughter James in a third floor  apartment (no elevator). Daughter is caretaker, no home health services.  Patient owns a rolling walker, wheelchair and home oxygen provided through  [employer]. Patient is , however her spouse, 84 years old with AMS and  resides with patients daughter in [place]. Patient has four adult children  total, (see previous). LCSW explained HCP and Surrogate Decision Maker law to  patients andreina Ramirez, who stated repeatedly that because she is Power of   she is also the sole contact. LCSW requested other siblings phone  numbers however James declined to provide. James stated she has been in  on-going contact with her other siblings to update them on patients status.  Patient has not completed a HCP before, and cannot at this time due to her  medical status. Surrogate decision makers are four children jointly as spouse  is not A&Ox4. Patient unable to identify caregiver. Patient has active medicare  and [facility] medicaid insurance in place.Notes: patient has four adult children: a son and daughter who reside in  [place], a daughter in [place], and daughter James.  ADVANCE DIRECTIVES:    DNR  MOLST  [ ]  Living Will  [ ]   DECISION MAKER(s):  [ ] Health Care Proxy(s)  [ ] Surrogate(s)  [ ] Guardian           Name(s): Phone Number(s):    BASELINE (I)ADL(s) (prior to admission):  [place]: [ ]Total  [ ] Moderate [ ]Dependent    Allergies    No Known Allergies    Intolerances    MEDICATIONS  (STANDING):  atorvastatin 40 milliGRAM(s) Oral at bedtime  buMETAnide Infusion 1 mG/Hr (5 mL/Hr) IV Continuous <Continuous>  chlorhexidine 0.12% Liquid 15 milliLiter(s) Oral Mucosa every 12 hours  chlorhexidine 2% Cloths 1 Application(s) Topical <User Schedule>  heparin   Injectable 5000 Unit(s) SubCutaneous every 8 hours  HYDROmorphone  Injectable 0.5 milliGRAM(s) IV Push once  LORazepam   Injectable 0.5 milliGRAM(s) IV Push once  pantoprazole  Injectable 40 milliGRAM(s) IV Push daily  vasopressin Infusion 0.06 Unit(s)/Min (3.6 mL/Hr) IV Continuous <Continuous>    MEDICATIONS  (PRN):  acetaminophen    Suspension .. 650 milliGRAM(s) Oral every 6 hours PRN Temp greater or equal to 38C (100.4F)  glycopyrrolate Injectable 0.4 milliGRAM(s) IV Push every 4 hours PRN Secretions.  HYDROmorphone  Injectable 0.8 milliGRAM(s) IV Push once PRN For respiratory distres inmediately post extubation.  HYDROmorphone  Injectable 0.5 milliGRAM(s) IV Push every 1 hour PRN Labored breathing or RR > 30  LORazepam   Injectable 0.5 milliGRAM(s) IV Push once PRN To be given for respiratory distress that does not improve with Dilaudid post extuabtation      PRESENT SYMPTOMS: [x ]Unable to obtain due to poor mentation   Source if other than patient:  [ ]Family   [ ]Team     Pain: [ ] yes [ ] no  QOL impact -   Location -                    Aggravating factors -  Quality -  Radiation -  Timing-  Severity (0-10 scale):  Minimal acceptable level (0-10 scale):     PAIN AD Score: 2    [email or web address]/painad.pdf (press ctrl +  left click to view)    Dyspnea:                           [ ]Mild [ ]Moderate [ ]Severe  Anxiety:                             [ ]Mild [ ]Moderate [ ]Severe  Fatigue:                             [ ]Mild [ ]Moderate [ ]Severe  Nausea:                             [ ]Mild [ ]Moderate [ ]Severe  Loss of appetite:              [ ]Mild [ ]Moderate [ ]Severe  Constipation:                    [ ]Mild [ ]Moderate [ ]Severe    Other Symptoms:  [ ]All other review of systems negative     Karnofsky Performance Score/Palliative Performance Status Version 2:    20     %    http://npcrc.org/files/news/palliative_performance_scale_ppsv2.pdf  PHYSICAL EXAM:  Vital Signs Last 24 Hrs  T(C): 37.8 (08 Jun 2021 12:00), Max: 37.8 (08 Jun 2021 12:00)  T(F): 100 (08 Jun 2021 12:00), Max: 100 (08 Jun 2021 12:00)  HR: 88 (08 Jun 2021 15:16) (78 - 96)  BP: --  BP(mean): --  RR: 18 (08 Jun 2021 14:00) (11 - 40)  SpO2: 100% (08 Jun 2021 15:16) (90% - 100%)    GENERAL:  [x ]Alert  [ ]Oriented x   [ ]Lethargic  [ ]Cachexia  [ ]Unarousable  [ ]Verbal  [ x]Non-Verbal  Behavioral:   [ ] Anxiety  [x ] Delirium [ ] Agitation [x ] Other Confused appearing   HEENT:  [ ]Normal   [ ]Dry mouth   [x ]ET Tube/Trach  [ ]Oral lesions  PULMONARY:   [ ]Clear [ ]Tachypnea  [x ]Audible excessive secretions   [x ]Rhonchi        [ ]Right [ ]Left [ x]Bilateral  [ ]Crackles        [ ]Right [ ]Left [ ]Bilateral  [ ]Wheezing     [ ]Right [ ]Left [ ]Bilateral  CARDIOVASCULAR:    [x ]Regular [ ]Irregular [ ]Tachy  [ ]Aman [ ]Murmur [ ]Other  GASTROINTESTINAL:  [ x]Soft  [ ]Distended   [ ]+BS  [ ]Non tender [ ]Tender  [ ]PEG [x ]OGT/ NGT  Last BM:     GENITOURINARY:  [ ]Normal [ ] Incontinent   [ ]Oliguria/Anuria   [ x]Hernadez  MUSCULOSKELETAL:   [ ]Normal   [ ]Weakness  [ x]Bed/Wheelchair bound [ ]Edema  NEUROLOGIC:   [ ]No focal deficits  [ x] Cognitive impairment  [ ] Dysphagia [ ]Dysarthria [ ] Paresis [ ]Other   SKIN:   [ ]Normal   [ x]Pressure ulcer(s) Stage 2 to Sacrum   [ ]Rash    CRITICAL CARE:  [x ] Shock Present  [x ]Septic [ ]Cardiogenic [ ]Neurologic [ ]Hypovolemic  [ ]  Vasopressors [ ]  Inotropes   [ x] Respiratory failure present [ ] mechanical ventilation [ ] non-invasive ventilatory support [ ] High flow  [ ] Acute  [ ] Chronic [ ] Hypoxic  [ ] Hypercarbic [ ] Other  [x ] Other organ failure     LABS:                                         8.8    12.42 )-----------( 187      ( 08 Jun 2021 00:31 )             28.3   06-08    151<H>  |  104  |  100<H>  ----------------------------<  154<H>  4.1   |  31  |  3.13<H>    Ca    10.5      08 Jun 2021 00:31  Phos  2.3     06-08  Mg     2.2     06-08    TPro  7.5  /  Alb  2.5<L>  /  TBili  3.2<H>  /  DBili  x   /  AST  47<H>  /  ALT  21  /  AlkPhos  256<H>  06-08      RADIOLOGY & ADDITIONAL STUDIES:  `< from: CT Head No Cont (05.31.21 @ 10:07) >    INTERPRETATION:  Clinical indication: Altered consciousness    Multiple axial sections were performed from base of skull to vertex without contrast enhancement. Coronal and sagittal reconstructions were performed as well    Parenchymal volume loss and chronic microvascular ischemic changes are identified    There is no acute hemorrhage mass or mass effect seen    Evaluation of the osseous structures with the appropriate window appears normal    The visualized paranasal sinuses appear clear    Sclerotic change is seen involving left mastoid which could be compatible with chronic inflammatory change.    Oral tracheal and orogastric tubes are seen.    IMPRESSION:No evidence of acute hemorrhage mass or mass effect.    < end of copied text >  < from: CT Abdomen and Pelvis No Cont (05.26.21 @ 11:59) >    IMPRESSION:    Study limited by lack of intravenous contrast and motion artifact.    A small to moderate pericardial effusion.    A trace right pleural effusion.    A likely small right groin hematoma in the region of the right femoral approach arterial and venous lines.    Mildly nodular liver contour, which may be seen in the setting of early cirrhosis.    < end of copied text >  < from: Transthoracic Echocardiogram (05.26.21 @ 09:44) >  Conclusions:  1. Endocardium not well visualized; overall preserved left  ventricular systolic function. Septal flattening with  paradoxical septal motion consistent with right ventricular  overload.  2. Severe right ventricular enlargement with severely  decreased right ventricular systolic function.  3.Estimated right ventricular systolic pressure equals 67  mm Hg, assuming right atrial pressure equals 15 mm Hg,  consistent with severe pulmonary hypertension.  4. Large pericardial effusion seen adjacent to the right  atrium. The effusion measures up to approximately 3.4 cm  adjacent to the RA in its largest dimension. Thickened  pericardium with small-moderate pericardial effusion seen  posterior and lateral to the LV. The effusion measures up  to approximately 1.5 cm posterior to the LV. No  echocardiographic evidence of pericardial tamponade.  [facility]  *** No previous Echo exam.  ------------------------------------------------------------------------  Confirmed on  5/26/2021 - 11:13:14 by Justin Riley M.D.    < end of copied text >    PROTEIN CALORIE MALNUTRITION PRESENT: [ ] Yes [ ] No  [ ] PPSV2 < or = to 30% [ ] significant weight loss  [ ] poor nutritional intake [ ] catabolic state [ ] anasarca     Albumin, Serum: 2.4 g/dL (06-07-21 @ 01:26)  Artificial Nutrition [ ]     REFERRALS:   [ ]Chaplaincy  [ ] Hospice  [ ]Child Life  [ ]Social Work  [ ]Case management [ ]Holistic Therapy     Goals of Care Document:   Care Coordination Assessment 201 [C. Provider] (05-27-21 @ 12:11)   Progress Notes - Care Coordination [C. Provider] (06-04-21 @ 12:14)

## 2021-06-08 NOTE — PROGRESS NOTE ADULT - SUBJECTIVE AND OBJECTIVE BOX
PATIENT:  SANJIV ROBERTS  [id number]    CHIEF COMPLAINT:  Patient is a 75y old  Female who presents with a chief complaint of Transfer from [facility] with pericardial effusion, NIC and hypotension (07 Jun 2021 23:24)      INTERVAL HISTORY/OVERNIGHT EVENTS:  -Prolonged discussion with family last night, planning to have one grandaughter come in today to say her goodbyes to grandmother  -No acute events overnight      REVIEW OF SYSTEMS:      [ ] All other systems negative  [X ] Unable to assess ROS because intubated and sedated.    MEDICATIONS:  MEDICATIONS  (STANDING):  atorvastatin 40 milliGRAM(s) Oral at bedtime  buMETAnide Infusion 1 mG/Hr (5 mL/Hr) IV Continuous <Continuous>  cefepime   IVPB 2000 milliGRAM(s) IV Intermittent every 24 hours  cefepime   IVPB      chlorhexidine 0.12% Liquid 15 milliLiter(s) Oral Mucosa every 12 hours  chlorhexidine 2% Cloths 1 Application(s) Topical <User Schedule>  heparin   Injectable 5000 Unit(s) SubCutaneous every 8 hours  pantoprazole  Injectable 40 milliGRAM(s) IV Push daily  vasopressin Infusion 0.06 Unit(s)/Min (3.6 mL/Hr) IV Continuous <Continuous>    MEDICATIONS  (PRN):  acetaminophen    Suspension .. 650 milliGRAM(s) Oral every 6 hours PRN Temp greater or equal to 38C (100.4F)      ALLERGIES:  Allergies    No Known Allergies    Intolerances        OBJECTIVE:  ICU Vital Signs Last 24 Hrs  T(C): 37.6 (08 Jun 2021 04:00), Max: 37.6 (08 Jun 2021 04:00)  T(F): 99.7 (08 Jun 2021 04:00), Max: 99.7 (08 Jun 2021 04:00)  HR: 88 (08 Jun 2021 06:30) (78 - 100)  BP: --  BP(mean): --  ABP: 102/48 (08 Jun 2021 06:30) (90/46 - 142/68)  ABP(mean): 66 (08 Jun 2021 06:30) (58 - 94)  RR: 19 (08 Jun 2021 06:30) (12 - 40)  SpO2: 100% (08 Jun 2021 06:30) (90% - 100%)    Mode: AC/ CMV (Assist Control/ Continuous Mandatory Ventilation)  RR (machine): 12  TV (machine): 350  FiO2: 30  PEEP: 5  ITime: 1  MAP: 10  PIP: 23    Adult Advanced Hemodynamics Last 24 Hrs  CVP(mm Hg): --  CVP(cm H2O): --  CO: --  CI: --  PA: --  PA(mean): --  PCWP: --  SVR: --  SVRI: --  PVR: --  PVRI: --  CAPILLARY BLOOD GLUCOSE        CAPILLARY BLOOD GLUCOSE        I&O's Summary    06 Jun 2021 07:01  -  07 Jun 2021 07:00  --------------------------------------------------------  IN: 1883.7 mL / OUT: 3175 mL / NET: -1291.3 mL    07 Jun 2021 07:01  -  08 Jun 2021 06:56  --------------------------------------------------------  IN: 1403.1 mL / OUT: 2350 mL / NET: -946.9 mL      Daily     Daily     PHYSICAL EXAMINATION:  General: intubated   HEENT: PERRLA, EOMI, moist mucous membranes  Neurology: intubated  Respiratory: CTA B/L, normal respiratory effort, no wheezes, crackles, rales  CV: RRR, S1S2, no murmurs, rubs or gallops  Abdominal: Soft, NT, ND +BS, Last BM  Extremities: No edema, + peripheral pulses  Incisions:   Tubes:    LABS:  ABG - ( 06 Jun 2021 09:25 )  pH, Arterial: 7.46  pH, Blood: x     /  pCO2: 54    /  pO2: 148   / HCO3: 38    / Base Excess: 12.4  /  SaO2: 100                                     8.8    12.42 )-----------( 187      ( 08 Jun 2021 00:31 )             28.3     06-08    151<H>  |  104  |  100<H>  ----------------------------<  154<H>  4.1   |  31  |  3.13<H>    Ca    10.5      08 Jun 2021 00:31  Phos  2.3     06-08  Mg     2.2     06-08    TPro  7.5  /  Alb  2.5<L>  /  TBili  3.2<H>  /  DBili  x   /  AST  47<H>  /  ALT  21  /  AlkPhos  256<H>  06-08    LIVER FUNCTIONS - ( 08 Jun 2021 00:31 )  Alb: 2.5 g/dL / Pro: 7.5 g/dL / ALK PHOS: 256 U/L / ALT: 21 U/L / AST: 47 U/L / GGT: x           PT/INR - ( 08 Jun 2021 00:31 )   PT: 13.9 sec;   INR: 1.17 ratio         PTT - ( 08 Jun 2021 00:31 )  PTT:67.1 sec            TELEMETRY:     EKG:     IMAGING:       PATIENT:  SANJIV ROBERTS  [id number]    CHIEF COMPLAINT:  Patient is a 75y old  Female who presents with a chief complaint of Transfer from [facility] with pericardial effusion, NIC and hypotension (07 Jun 2021 23:24)      INTERVAL HISTORY/OVERNIGHT EVENTS:  -Prolonged discussion with family last night, planning to have one of the grandaughters come in today to say her goodbyes  -No acute events overnight      REVIEW OF SYSTEMS:      [ ] All other systems negative  [X ] Unable to assess ROS because intubated and sedated.    MEDICATIONS:  MEDICATIONS  (STANDING):  atorvastatin 40 milliGRAM(s) Oral at bedtime  buMETAnide Infusion 1 mG/Hr (5 mL/Hr) IV Continuous <Continuous>  cefepime   IVPB 2000 milliGRAM(s) IV Intermittent every 24 hours  cefepime   IVPB      chlorhexidine 0.12% Liquid 15 milliLiter(s) Oral Mucosa every 12 hours  chlorhexidine 2% Cloths 1 Application(s) Topical <User Schedule>  heparin   Injectable 5000 Unit(s) SubCutaneous every 8 hours  pantoprazole  Injectable 40 milliGRAM(s) IV Push daily  vasopressin Infusion 0.06 Unit(s)/Min (3.6 mL/Hr) IV Continuous <Continuous>    MEDICATIONS  (PRN):  acetaminophen    Suspension .. 650 milliGRAM(s) Oral every 6 hours PRN Temp greater or equal to 38C (100.4F)      ALLERGIES:  Allergies    No Known Allergies    Intolerances        OBJECTIVE:  ICU Vital Signs Last 24 Hrs  T(C): 37.6 (08 Jun 2021 04:00), Max: 37.6 (08 Jun 2021 04:00)  T(F): 99.7 (08 Jun 2021 04:00), Max: 99.7 (08 Jun 2021 04:00)  HR: 88 (08 Jun 2021 06:30) (78 - 100)  BP: --  BP(mean): --  ABP: 102/48 (08 Jun 2021 06:30) (90/46 - 142/68)  ABP(mean): 66 (08 Jun 2021 06:30) (58 - 94)  RR: 19 (08 Jun 2021 06:30) (12 - 40)  SpO2: 100% (08 Jun 2021 06:30) (90% - 100%)    Mode: AC/ CMV (Assist Control/ Continuous Mandatory Ventilation)  RR (machine): 12  TV (machine): 350  FiO2: 30  PEEP: 5  ITime: 1  MAP: 10  PIP: 23    Adult Advanced Hemodynamics Last 24 Hrs  CVP(mm Hg): --  CVP(cm H2O): --  CO: --  CI: --  PA: --  PA(mean): --  PCWP: --  SVR: --  SVRI: --  PVR: --  PVRI: --  CAPILLARY BLOOD GLUCOSE        CAPILLARY BLOOD GLUCOSE        I&O's Summary    06 Jun 2021 07:01  -  07 Jun 2021 07:00  --------------------------------------------------------  IN: 1883.7 mL / OUT: 3175 mL / NET: -1291.3 mL    07 Jun 2021 07:01  -  08 Jun 2021 06:56  --------------------------------------------------------  IN: 1403.1 mL / OUT: 2350 mL / NET: -946.9 mL      Daily     Daily     PHYSICAL EXAMINATION:  General: intubated   HEENT: PERRLA, EOMI, moist mucous membranes  Neurology: intubated  Respiratory: CTA B/L, normal respiratory effort, no wheezes, crackles, rales  CV: RRR, S1S2, no murmurs, rubs or gallops  Abdominal: Soft, NT, ND +BS, Last BM  Extremities: No edema, + peripheral pulses      Tubes/Lines: L. Radial line. Hernadez catheter    LABS:  ABG - ( 06 Jun 2021 09:25 )  pH, Arterial: 7.46  pH, Blood: x     /  pCO2: 54    /  pO2: 148   / HCO3: 38    / Base Excess: 12.4  /  SaO2: 100                                     8.8    12.42 )-----------( 187      ( 08 Jun 2021 00:31 )             28.3     06-08    151<H>  |  104  |  100<H>  ----------------------------<  154<H>  4.1   |  31  |  3.13<H>    Ca    10.5      08 Jun 2021 00:31  Phos  2.3     06-08  Mg     2.2     06-08    TPro  7.5  /  Alb  2.5<L>  /  TBili  3.2<H>  /  DBili  x   /  AST  47<H>  /  ALT  21  /  AlkPhos  256<H>  06-08    LIVER FUNCTIONS - ( 08 Jun 2021 00:31 )  Alb: 2.5 g/dL / Pro: 7.5 g/dL / ALK PHOS: 256 U/L / ALT: 21 U/L / AST: 47 U/L / GGT: x           PT/INR - ( 08 Jun 2021 00:31 )   PT: 13.9 sec;   INR: 1.17 ratio         PTT - ( 08 Jun 2021 00:31 )  PTT:67.1 sec            TELEMETRY:     EKG:     IMAGING:

## 2021-06-08 NOTE — PROGRESS NOTE ADULT - ASSESSMENT
75F with CHFpEF, Severe TR with Right CHF and severe pHTN, COPD on home 02, HTN, HLD, Chronic Afib (on Eliquis), OA, CKD3, DM, right eye cataract who was brought in by EMS to [facility] on 5/23/21 for AMS, found to have a large pericardial effusion w/o tamponade physiology and without drainable window. She was intubated (5/26) for acute hypercarbic respiratory failure and worsening AMS. Transferred to CICU for shock and NIC on CKD. Course c/b expanding R thigh hematoma s/p protamine for hep reversal. Palliative care was consulted for GOC and ACP.

## 2021-06-08 NOTE — PROGRESS NOTE ADULT - NSICDXPILOT_GEN_ALL_CORE
[place]

## 2021-06-08 NOTE — DISCHARGE NOTE FOR THE EXPIRED PATIENT - HOSPITAL COURSE
75F with a pmhx of CHFpEF, Severe TR with Right CHF and severe pHTN, COPD on home 02, HTN, HLD, Chronic Afib (on Eliquis), OA, CKD3, DM, right eye cataract who was brought in by EMS to [facility] on 21 for AMS, found to have a large pericardial effusion w/o tamponade physiology, without drainable window, intubated () for acute hypercarbic respiratory failure and worsening AMS, transferred to CICU for hypotension and NIC on CKD in undifferentiated shock, course c/b expanding R thigh hematoma, s/p MTP. Patient with worsening mental status, not following commands despite being off sedation. Rising BUN concerning for metabolic encephalopathy. GOC discussion with family and Palliative Care held and family elected to compassionately extubate the patient. Pt was extubated.    Patient had no spontaneous respirations, heart sounds, response to any stimulus including noxious stimuli. Patient's pupil were fixed and dilated at 8mm and with no response to light, no corneal reflexes, no gag reflex, and no oculocephalic reflex. Patient was pronounced at 1715. Patient's family was contacted and did not desire autopsy,  services were offered, and  arrangements were discussed.

## 2021-06-08 NOTE — PROGRESS NOTE ADULT - PROVIDER SPECIALTY LIST ADULT
ARTURO
ARTURO
CCU
Nephrology
Pulmonology
ARTURO
CCU
Nephrology
Vascular Surgery
ARTURO
ARTURO
Anesthesia
CCU
CCU
Heart Failure
Nephrology
ARTURO
CCU
Pulmonology
ARTURO
Heart Failure
Nephrology
Palliative Care

## 2021-06-08 NOTE — CHART NOTE - NSCHARTNOTESELECT_GEN_ALL_CORE
Event Note
GOC Discussion
VTE risk/Event Note
Event Note
Neurology
Nutrition Services
Nutrition Services

## 2021-06-08 NOTE — PROGRESS NOTE ADULT - PROBLEM SELECTOR PLAN 1
A respiratory distress trial was performed. AC mode was changed to CPAP 5 FiO2 21% while apnea alarm settings were changes to 60". After 2', the patient's RR increased to 30 and with labored breathing.   Though ideally will hold tube feeds for 8 hours before extubation, due to her daughter's immediate desire to extubate, ill recommend stopping tube feeds for at least 1 to 2 hours before extubation.   Give Dilaudid 0.5mg IV x 1, Ativan 0.5mg IV x 1 and Glyco 0.4 mg IV x 1 before extubation. After 15-20' of providing PRN medications,  extubate to room air.   Will also add Glyco 0.4mg IV q 4 PRN, Dilaudid 0.5mg IV q 1PRN pain, and Ativan 0.5mg IV q 6 PRN agitation.         Lacho Hernandez MD   Geriatrics and Palliative Care (GAP) Consult Service    of Geriatric and Palliative Medicine  [facility]      Please page the following number for clinical matters between the hours of 9 am and 5 pm from Monday through Friday : [phone]    After 5pm and on weekends, please see the contact information below:    In the event of newly developing, evolving, or worsening symptoms, please contact the Palliative Medicine team via pager (if the patient is at [facility] #[phone] or if the patient is at [facility] #[phone]) The Geriatric and Palliative Medicine service has coverage 24 hours a day/ 7 days a week to provide medical recommendations regarding symptom management needs via telephone A respiratory distress trial was performed. AC mode was changed to CPAP 5 FiO2 21% while apnea alarm settings were changes to 60". After 2', the patient's RR increased to 30 and with labored breathing. She was then placed back on AC mode.   Though ideally will hold tube feeds for 8 hours before extubation, due to her daughter's immediate desire to extubate, I will recommend stopping tube feeds for at least 1 to 2 hours before extubation.   Give Dilaudid 0.5mg IV x 1, Ativan 0.5mg IV x 1 and Glyco 0.4 mg IV x 1 before extubation. After 15-20' of providing PRN medications,  extubate to room air.   Will also add Glyco 0.4mg IV q 4 PRN, Dilaudid 0.5mg IV q 1PRN pain, and Ativan 0.5mg IV q 6 PRN agitation.   Call palliative care [phone] if having any issues with symptoms Rx.

## 2021-06-08 NOTE — AIRWAY REMOVAL NOTE  ADULT & PEDS - ARTIFICAL AIRWAY REMOVAL COMMENTS
Written order for extubation verified. The patient was identified by full name and birth date compared to the identification band. Present during the procedure was Lanny DELGADILLO).

## 2021-06-08 NOTE — CHART NOTE - NSCHARTNOTEFT_GEN_A_CORE
Seen for: nutrition follow up  Source: EMR, communication with team    Chart reviewed, events noted. 75F with CHFpEF, Severe TR with Right CHF and severe pHTN, COPD on home 02, HTN, HLD, Chronic Afib (on Eliquis), OA, CKD3, DM, presenting with acute RV failure and hypercarbic respiratory failure req intubation c/b hemorrhagic shock requiring MTP and now further c/b AMS. Patient off sedation for 1 week at this time without any purposeful Neurologic function. With worsening BUN/Cr and no plans for invasive measures (PEG/trach/HD); S/p multiple GOC discussions, pending possible palliative extubation     Diet, NPO with Tube Feed:   Tube Feeding Modality: Orogastric  Nepro with Carb Steady (NEPRORTH)  Total Volume for 24 Hours (mL): 960  Continuous  Starting Tube Feed Rate {mL per Hour}: 10  Increase Tube Feed Rate by (mL): 10     Every 4 hours  Until Goal Tube Feed Rate (mL per Hour): 40  Tube Feed Duration (in Hours): 24  Tube Feed Start Time: 05:00  No Carb Prosource (1pkg = 15gms Protein)     Qty per Day:  1  Supplement Feeding Modality:  Orogastric (21 @ 09:51) [Active]    EN Order Provides: 1768 kcal, 89 g protein (15.7 kcal/kg using dosing weight 112.4 kg, 1.6 g/kg protein using IBW 56.6 kg)    EN provision per chart: >80% of goal rate since previous RD note    - Noted ongoing hypernatremia; free water has been d/c'd   - Renal TF formula, K+ WNL, Phos low today    GI: Last BM  (x4)  No overt GI distress reported    Weights:   Daily Weight in k.2 (), Weight in k.3 (), Weight in k.2 (), Weight in k.9 (), Weight in k.4 (), Weight in k.8 ()    MEDICATIONS  (STANDING):  atorvastatin  buMETAnide Infusion  cefepime   IVPB  cefepime   IVPB  pantoprazole  Injectable  vasopressin Infusion    Pertinent Labs:  @ 00:31: Na 151<H>, <H>, Cr 3.13<H>, <H>, K+ 4.1, Phos 2.3<L>, Mg 2.2, Alk Phos 256<H>, ALT/SGPT 21, AST/SGOT 47<H>, HbA1c --   @ 17:28: Na 148<H>, BUN 98<H>, Cr 3.04<H>, <H>, K+ 3.5, Phos 3.0, Mg 2.2, Alk Phos 246<H>, ALT/SGPT 20, AST/SGOT 44<H>, HbA1c --   @ 12:20: Na 149<H>, BUN 98<H>, Cr 3.01<H>, <H>, K+ 3.7, Phos 3.2, Mg 2.1, Alk Phos 241<H>, ALT/SGPT 19, AST/SGOT 42<H>, HbA1c --    A1C with Estimated Average Glucose Result: 5.3 % (21 @ 17:05)    Skin per chart: sacral pressure injury stage II  Edema per chart: () 1+ bilateral hands and legs     Estimated Nutrition Needs:   - Considering vent/BMI >40  - Energy Needs (15-20 kcal/kg): 5642-9720 kcal/day - using dosing weight 112.4kg  - Protein Needs (1.4-1.8 g/kg):  g/day - using IBW 56.6 kg  - Fluid Needs: per team    Previous Nutrition Diagnoses  1) Overweight/obesity  2) Increased nutrient needs 2/2 pressure injury  - Both ongoing and addressed with tube feeds    New Nutrition Diagnosis: none at this time       Recommendations:      1) Continue tube feeds as ordered, meet estimated nutrient needs at goal rate    - If within GOC, in setting of hypophosphatemia consider switching to Jevity 1.2 @ 70 ml/hr x 24 hours. At goal, provides 1680 ml formula, 2016 kcal, 93 g protein (meeting 17.9 kcal/kg using dosing weight 112.4 kg, 1.6 g/kg protein using IBW 56.6 kg). - Of note, this regimen would provide 1356 ml free water (additional +658 ml free water than current regimen).   2) Free water per medical team    Continue monitoring and evaluating:   - Labs: blood glucose, electrolytes, renal indices  - GI function and bowel movements  - Nutritional intake, weight trends, skin integrity    RD remains available PRN and will follow up per protocol.   Rani Henriquez RD, CDN, [facility]   Pager # [phone]

## 2021-06-08 NOTE — PROGRESS NOTE ADULT - ATTENDING SUPERVISION STATEMENT
Resident/Fellow
ACP
ACP
Fellow
Resident/Fellow
ACP
ACP
Resident
Resident/Fellow
ACP
ACP/Fellow
Fellow
ACP/Fellow
Resident/Fellow
ACP
Resident
Resident/Fellow
ACP
ACP

## 2021-06-08 NOTE — PROGRESS NOTE ADULT - PROBLEM SELECTOR PLAN 2
- Hemodynamics to be assessed with placement of swan  - Would defer initiation of pulmonary vasodilators until euvolemic  - Dr. Patel, pHTN specialist, consulted
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
The patient needs full nursing care (PPS 20%)

## 2021-06-08 NOTE — PROGRESS NOTE ADULT - CONVERSATION DETAILS
Spoke with family again yesterday evening, and are in agreement of pursuing comfort measures for their loved one. There is a granddaughter who is trying to make arrangements to come in and see her grandmother. Will touch base with them later in the morning and are in contact with palliative care as well.

## 2021-06-08 NOTE — PROGRESS NOTE ADULT - ASSESSMENT
`75F with a pmhx of CHFpEF, Severe TR with Right CHF and severe pHTN, COPD on home 02, HTN, HLD, Chronic Afib (on Eliquis), OA, CKD3, DM, right eye cataract who was brought in by EMS to [facility] on 5/23/21 for AMS, found to have a large pericardial effusion w/o tamponade physiology, without drainable window, intubated (5/26) for acute hypercarbic respiratory failure and worsening AMS, transferred to CICU for hypotension and NIC on CKD in undifferentiated shock, course c/b expanding R thigh hematoma, s/p MTP, with ratio predom targeted towards FFP, s/p protamine for hep reversal.    Neuro:  -Mental status: CTH obtained and MR of the brain negative . Neuro following and likely toxic/metobolic in origin. I suspect that her MS is caused by septic shock. This AM she nodded her head appropriately and moved her arms to command.     Resp:  -(5/26): Intubated , RR (machine): 12, TV (machine): 350, FiO2: 30,  PEEP:5 . ABG 7.46/56/94/34. Chest XR showing pulmonary congestion however it is improved from XR from 6/4 , CVP 11 on Bumex 1 mg. She desaturated when laying flat.  Daily spontaneous breathing trails. She is tolerating PS trails 8/5/30%.  Will check ABG for readiness to extubation however will not extubate until GOC are fully establish as pt has a high chance of decompensation given comorbities.   - - Group 2, Mild --> however RHC not performed in euvolemic state.   . Continue with diuretics.     CV:  (5/26): TTE: EF 55%, RVH with severe RVSD, severe pulm HTN, large pericardial effusion 3.4cm @ largest diameter, adjacent to RA  ventricular systolic function.  - Pt Levo requirements have gona up from 0.05 to .13 . I suspect it is septic shock not cardiogenic. Central sat is 77. CVP 11 Continue with diuresis with Bumex at 1 . She is overall net neg 667  -c/w atorvastatin 40mg qhs  -Hold sildenefil as pt is still on Levophed  -Systolic goal: >90, MAP >65  Telemetry    GI:  -Diet: Continue Nepro at 40   -Stress ulcer ppx:continue PPI  -Bowel regimen: none, had diarrhea now resolving  -(5/26): CT a/p noncon: mild contoured liver. Anasarca. TBili elevated at 4.       - Hernadez catheter in place for critical care monitoring    Heme:  -Thrombocytopenia  -Hemoglobin has remained stable now . on Heparin SQ for DVT px. Hold systemic full AC as bleeding risk is high.     Renal:  - Continue to monitor I/Os, BUN/Creatinine, and urine output. Cr has leveled at 3 and BUN 80. Nephrology following.   - CVP 11 continue bumex gtt.   - Replete lytes PRN. Keep K> 4 and Mg >2. Pt has frequent bigeminy when K is low.     Endo:  - Monitor blood sugar 100-130    ID:  -  Low grade fevers with WBC rising now 15. Continue Cefepime and check Vanco by level. Obtain blood cultures next time the patient has a fever. I will remove the central line today ( Day 10) . She is on Levo 0.06 I will discuss with family whether they would like a central line as it is considered an invasive procedure and they have expressed that they would not want it. May do a midline ultrasound guided.     CODE STATUS:  Full code with palliative meeting pending    TUBES/LINES/DRAINS:  -ETT  -OGT  -Hernadez  -PIV    GOC discussion    I spoke with the daughter whom she lives with Mayank. I explained to her that her mom has multiple irreversible comorbidites including dementia and R sided HF. She has yet to return to her mental status despite being off sedation for > 1 week and she is 9 days post intubation. I discussed that it is time to start discussing with the family whether they would like to pursue trach/peg vs palliative extubation as pt is not tolerating a pressure support < 8. Daughter says she will start discussing with her family members but her mother has expressed that she would not like to be intubated for a prolonged period of time and would not want trach or artifical nutrition if there was low liklihood of improvement. Daughter has requested a family meeting for when the time comes to discuss trach to see what the options are.     For now we have agreed that we will continue to treat her with aggressive measures. We will obtain a palliative consultation to discuss options. If the time comes to discuss trach she is leaning towards a palliative extubation or trail of extubation however she would like to discuss this with her family as well as in a meeting with the doctors. Palliative consult placed. She expressed gratitude for the care provided.      `75F with a pmhx of CHFpEF, Severe TR with Right CHF and severe pHTN, COPD on home 02, HTN, HLD, Chronic Afib (on Eliquis), OA, CKD3, DM, right eye cataract who was brought in by EMS to [facility] on 5/23/21 for AMS, found to have a large pericardial effusion w/o tamponade physiology, without drainable window, intubated (5/26) for acute hypercarbic respiratory failure and worsening AMS, transferred to CICU for hypotension and NIC on CKD in undifferentiated shock, course c/b expanding R thigh hematoma, s/p MTP, with ratio predom targeted towards FFP, s/p protamine for hep reversal.    Neuro:  -Mental status: CTH obtained and MR of the brain negative . Neuro following and likely toxic/metobolic in origin. I suspect that her MS is caused by her uremia    Resp:  -(5/26): Intubated , RR (machine): 12, TV (machine): 350, FiO2: 30,  PEEP:5 . ABG 7.46/56/94/34. Chest XR showing pulmonary congestion however it is improved from XR from 6/4 , CVP 11 on Bumex 1 mg. She desaturated when laying flat.  Daily spontaneous breathing trails. She is tolerating PS trails 8/5/30%.    - - Group 2, Mild --> however RHC not performed in euvolemic state.   . Continue with diuretics.   -Plan for vent liberation today 6/8    CV:  (5/26): TTE: EF 55%, RVH with severe RVSD, severe pulm HTN, large pericardial effusion 3.4cm @ largest diameter, adjacent to RA  ventricular systolic function.  -Now off levo, on vaso  -c/w atorvastatin 40mg qhs  -Systolic goal: >90, MAP >65  Telemetry    GI:  -Diet: Continue Nepro at 40   -Stress ulcer ppx:continue PPI  -Bowel regimen: none, had diarrhea now resolving  -(5/26): CT a/p noncon: mild contoured liver. Anasarca. TBili elevated at 4.       - Hernadez catheter in place for critical care monitoring    Heme:  -Thrombocytopenia  -Hemoglobin has remained stable now . on Heparin SQ for DVT px. Hold systemic full AC as bleeding risk is high.     Renal:  - Continue to monitor I/Os, BUN/Creatinine, and urine output. Cr has leveled at 3 and BUN 80. Nephrology following.   - CVP 11 continue bumex gtt.   - Replete lytes PRN. Keep K> 4 and Mg >2. Pt has frequent bigeminy when K is low.     Endo:  - Monitor blood sugar 100-130    ID:  -  Low grade fevers with WBC rising now 15. Continue Cefepime and check Vanco by level. Obtain blood cultures next time the patient has a fever. I will remove the central line today ( Day 10) . She is on Levo 0.06 I will discuss with family whether they would like a central line as it is considered an invasive procedure and they have expressed that they would not want it. May do a midline ultrasound guided.     CODE STATUS:  Full code with palliative meeting pending    TUBES/LINES/DRAINS:  -ETT  -OGT  -Hernadez  -PIV    GOC discussion    I spoke with the daughter whom she lives with Mayank. I explained to her that her mom has multiple irreversible comorbidites including dementia and R sided HF. She has yet to return to her mental status despite being off sedation for > 1 week and she is 9 days post intubation. I discussed that it is time to start discussing with the family whether they would like to pursue trach/peg vs palliative extubation as pt is not tolerating a pressure support < 8. Daughter says she will start discussing with her family members but her mother has expressed that she would not like to be intubated for a prolonged period of time and would not want trach or artifical nutrition if there was low liklihood of improvement. Daughter has requested a family meeting for when the time comes to discuss trach to see what the options are.     For now we have agreed that we will continue to treat her with aggressive measures. We will obtain a palliative consultation to discuss options. If the time comes to discuss trach she is leaning towards a palliative extubation or trail of extubation however she would like to discuss this with her family as well as in a meeting with the doctors. Palliative consult placed. She expressed gratitude for the care provided.      `75F with a pmhx of CHFpEF, Severe TR with Right CHF and severe pHTN, COPD on home 02, HTN, HLD, Chronic Afib (on Eliquis), OA, CKD3, DM, right eye cataract who was brought in by EMS to [facility] on 5/23/21 for AMS, found to have a large pericardial effusion w/o tamponade physiology, without drainable window, intubated (5/26) for acute hypercarbic respiratory failure and worsening AMS, transferred to CICU for hypotension and NIC on CKD in undifferentiated shock, course c/b expanding R thigh hematoma, s/p MTP, with ratio predom targeted towards FFP, s/p protamine for hep reversal.    Neuro:  -Mental status: CTH obtained and MR of the brain negative . Neuro following and likely toxic/metobolic in origin. I suspect that her MS is caused by her uremia    Resp:  -(5/26): Intubated , RR (machine): 12, TV (machine): 350, FiO2: 30,  PEEP:5 . ABG 7.46/56/94/34. Chest XR showing pulmonary congestion however it is improved from XR from 6/4 , CVP 11 on Bumex 1 mg. She desaturated when laying flat.  Daily spontaneous breathing trails. She is tolerating PS trails 8/5/30%.    - - Group 2, Mild --> however RHC not performed in euvolemic state.   . Continue with diuretics.   -Plan for vent liberation today 6/8    CV:  (5/26): TTE: EF 55%, RVH with severe RVSD, severe pulm HTN, large pericardial effusion 3.4cm @ largest diameter, adjacent to RA  ventricular systolic function.  -Now off levo, on vaso  -c/w atorvastatin 40mg qhs  -Systolic goal: >90, MAP >65  Telemetry    GI:  -Diet: Continue Nepro at 40   -Stress ulcer ppx:continue PPI  -Bowel regimen: none, had diarrhea now resolving  -(5/26): CT a/p noncon: mild contoured liver. Anasarca. TBili elevated at 4.       - Hernadez catheter in place for critical care monitoring    Heme:  -Thrombocytopenia  -Hemoglobin has remained stable now . on Heparin SQ for DVT px. Hold systemic full AC as bleeding risk is high.     Renal:  - Continue to monitor I/Os, BUN/Creatinine, and urine output. Cr has leveled at 3 and BUN 80. Nephrology following.   - CVP 11 continue bumex gtt.   - Replete lytes PRN. Keep K> 4 and Mg >2. Pt has frequent bigeminy when K is low.     Endo:  - Monitor blood sugar 100-130    ID:  -  Low grade fevers with WBC rising now 15. Continue Cefepime and check Vanco by level. Obtain blood cultures next time the patient has a fever. I will remove the central line today ( Day 10) . She is on Levo 0.06 I will discuss with family whether they would like a central line as it is considered an invasive procedure and they have expressed that they would not want it. May do a midline ultrasound guided.     CODE STATUS:  Full code with palliative meeting pending    TUBES/LINES/DRAINS:  -ETT  -OGT  -Hernadez  -PIV       `75F with a pmhx of CHFpEF, Severe TR with Right CHF and severe pHTN, COPD on home 02, HTN, HLD, Chronic Afib (on Eliquis), OA, CKD3, DM, right eye cataract who was brought in by EMS to [facility] on 5/23/21 for AMS, found to have a large pericardial effusion w/o tamponade physiology, without drainable window, intubated (5/26) for acute hypercarbic respiratory failure and worsening AMS, transferred to CICU for hypotension and NIC on CKD in undifferentiated shock, course c/b expanding R thigh hematoma, s/p MTP, with ratio predom targeted towards FFP, s/p protamine for hep reversal.    Neuro:  -Mental status: CTH obtained and MR of the brain negative . Neuro following and likely toxic/metobolic in origin.  suspect that her MS is caused by her uremia    Resp:  -(5/26): Intubated , RR (machine): 12, TV (machine): 350, FiO2: 30,  PEEP:5 . ABG 7.46/56/94/34. Chest XR showing pulmonary congestion however it is improved from XR from 6/4 , CVP 11 on Bumex 1 mg. She desaturated when laying flat.  Daily spontaneous breathing trails. She is tolerating PS trails 8/5/30%.    - - Group 2, Mild --> however RHC not performed in euvolemic state.   . Continue with diuretics.   -Plan for vent liberation today 6/8    CV:  (5/26): TTE: EF 55%, RVH with severe RVSD, severe pulm HTN, large pericardial effusion 3.4cm @ largest diameter, adjacent to RA  ventricular systolic function.  -Now off levo, on vaso  -c/w atorvastatin 40mg qhs  -Systolic goal: >90, MAP >65  Telemetry    GI:  -Diet: Continue Nepro at 40   -Stress ulcer ppx:continue PPI  -Bowel regimen: none, had diarrhea now resolving  -(5/26): CT a/p noncon: mild contoured liver. Anasarca. TBili elevated at 4.       - Hernadez catheter in place for critical care monitoring    Heme:  -Thrombocytopenia  -Hemoglobin has remained stable now . on Heparin SQ for DVT px. Hold systemic full AC as bleeding risk is high.     Renal:  - Continue to monitor I/Os, BUN/Creatinine, and urine output. Cr has leveled at 3 and BUN 80. Nephrology following.   - CVP 11 continue bumex gtt.   - Replete lytes PRN. Keep K> 4 and Mg >2. Pt has frequent bigeminy when K is low.     Endo:  - Monitor blood sugar 100-130    ID:  -  Monitor off cefepime, cultures NGTD    CODE STATUS:  Full code with palliative meeting pending    TUBES/LINES/DRAINS:  -ETT  -OGT  -Hernadez  -PIV

## 2021-06-08 NOTE — PROGRESS NOTE ADULT - PROBLEM SELECTOR PROBLEM 1
Acute respiratory failure with hypercapnia
Right ventricular failure

## 2021-06-08 NOTE — PROGRESS NOTE ADULT - REASON FOR ADMISSION
Transfer from [facility] with pericardial effusion, NIC and hypotension
Transfer from [facility] with pericardial effusion, NIC and hypotension
Transfer from [facility] with pericardial effusion, NIC and hypotension
Transfer from [facility] with pericardial effusion, NIC and hypotension
Transfer from [facility] with pericardial effusion, NIC and hypotension
Transfer from [facility] with pericardial effusion, NIC and hypotension
Transfer from [facility] with pericardial effusion, NIC and hypotension
Transfer from [facility] with pericardial effusion, NIC and hypotension
Transfer from [facility] with pericardial effusion, NIC and hypotension
Transfer from [facility] with pericardial effusion, NIC and hypotension
Transfer from [facility] with pericardial effusion, NIC and hypotension
Transfer from [facility] with pericardial effusion, NIC and hypotension
Transfer from [facility] with pericardial effusion, NIC and hypotension
Transfer from [facility] with pericardial effusion, NIC and hypotension
Transfer from [facility] with pericardial effusion, NIC and hypotension
Transfer from [facility] with pericardial effusion, NIC and hypotension
pericardial effusion, INC and hypotension
Transfer from [facility] with pericardial effusion, NIC and hypotension
Transfer from [facility] with pericardial effusion, NIC and hypotension
Transfer from [facility] with pericardial effusion, NIC and hypotension
Transfer from [facility] with pericardial effusion, NIC and hypotension
Transfer from [facility] with pericardial effusion, NIC and hypotension
Transfer from [facility] with pericardial effusion, NIC and hypotension
Transfer from [facility] with pericardial effusion, NIC and hypotension
Transfer from [facility] with pericardial effusion, NIC and hypotension
Transfer from [facility] with pericardial effusion, NIC and hypotension
Transfer from [facility] with pericardial effusion, NIC and hypotension
Transfer from [facility] with pericardial effusion, NIC and hypotension
Transfer from [facility] with pericardial effusion, NIC and hypotension
Transfer from [facility] with pericardial effusion, NIC and hypotension
Transfer from [facility] with pericardial effusion, NIC and hypotension
Transfer from [facility] with pericardial effusion, NIC and hypotension
Transfer from [facility] with pericardial effusion, NIC and hypotension
Transfer from [facility] with pericardial effusion, NIC and hypotension
Transfer from [facility] with pericardial effusion, NIC and hypotension
Transfer from [facility] with pericardial effusion, NIC and hypotension
Transfer from [facility] with pericardial effusion, NIC and hypotension
Transfer from [facility] with pericardial effusion, NIC and hypotension
Transfer from [facility] with pericardial effusion, NIC and hypotension
Transfer from [facility] with pericardial effusion, NIC and hypotension
Transfer from [facility] with pericardial effusion, NIC and hypotension
Transfer from [facility] with pericardial effusion, NIC and hypotension
Transfer from [facility] with pericardial effusion, NIC and hypotension
Transfer from [facility] with pericardial effusion, NIC and hypotension
Transfer from [facility] with pericardial effusion, NIC and hypotension
Transfer from [facility] with pericardial effusion, NIC and hypotension
Transfer from [facility] with pericardial effusion, NIC and hypotension
Transfer from [facility] with pericardial effusion, NIC and hypotension

## 2021-06-08 NOTE — PROGRESS NOTE ADULT - PROBLEM SELECTOR PLAN 3
- persistent AMS without identifiable etiology  - neurology following  - EEG 6/2 with diffuse cerebral dysfunction, likely metabolic encephalopathy  - Brain MRI 6/3 without evidence of infarct or hemorrhage. Suggestive of probable central atrophy
As per CICU team, the patient's children are now all agree on a compassionate extubation. Goals are for preventing and treating distressful symptoms.

## 2021-06-09 LAB
CULTURE RESULTS: SIGNIFICANT CHANGE UP
CULTURE RESULTS: SIGNIFICANT CHANGE UP
SPECIMEN SOURCE: SIGNIFICANT CHANGE UP
SPECIMEN SOURCE: SIGNIFICANT CHANGE UP

## 2021-10-15 NOTE — ED PROVIDER NOTE - ENMT, MLM
Airway patent, Nasal mucosa clear. Mouth with normal mucosa. Throat has no vesicles, no oropharyngeal exudates and uvula is midline.
---

## 2022-07-29 NOTE — ED PROVIDER NOTE - NS ED SCRIBE STATEMENT
Ms. Claros    Your recent test results are attached.  Mammogram did not show any concerning lesions.  Needs recheck in 1 year for follow-up.    If you have any questions or concerns please contact me via My Chart or call the clinic at [phone]     Thank You  Lindsay Padgett MD.   Attending

## 2022-12-08 NOTE — H&P ADULT - HISTORY OF PRESENT ILLNESS
[facility] Medicine Services Daily Progress Note    Patient Name: Jc Driscoll  : 1966  MRN: [id number]  Primary Care Physician:  Reshma Alvarenga MD  Date of admission: 2022      Subjective      Chief Complaint: Weakness  Subjectively continues to improve.  However had another low-grade fever overnight    ROS negative apart for above      Objective      Vitals:   Temp:  [98.6 °F (37 °C)-101.4 °F (38.6 °C)] 98.9 °F (37.2 °C)  Heart Rate:  [] 108  Resp:  [18-20] 18  BP: (110-130)/(64-79) 117/72    Physical Exam   Physical Exam   Constitutional:  oriented to person, place, and time. No distress.   HENT:   Head: Normocephalic and atraumatic.   Eyes: Conjunctivae and EOM are normal. Pupils are equal, round, and reactive to light.   Neck: No JVD present. No thyromegaly present.   Cardiovascular: Normal rate, regular rhythm, normal heart sounds and intact distal pulses. Exam reveals no gallop and no friction rub.   No murmur heard.  Pulmonary/Chest: Effort normal and breath sounds normal. No stridor. No respiratory distress.  has no wheezes.  has no rales.  exhibits no tenderness.   Abdominal: Soft. Bowel sounds are normal.  no distension and no mass. There is no tenderness. There is no rebound and no guarding. No hernia.   Musculoskeletal: Normal range of motion.   Lymphadenopathy:     no cervical adenopathy.   Neurological:  alert and oriented to person, place, and time. No cranial nerve deficit or sensory deficit. exhibits normal muscle tone.   Skin: No rash noted.  not diaphoretic.   Psychiatric:  normal mood and affect.   Vitals reviewed.         Result Review    Result Review:  I have personally reviewed the results from the time of this admission to 2022 12:38 EST and agree with these findings:  [x]  Laboratory  []  Microbiology  [x]  Radiology  []  EKG/Telemetry   []  Cardiology/Vascular   []  Pathology            Assessment & Plan          buPROPion XL, 300 mg,  Oral, Daily  enoxaparin, 40 mg, Subcutaneous, Daily  gabapentin, 300 mg, Oral, Q8H  levothyroxine, 100 mcg, Oral, Q AM  liothyronine, 5 mcg, Oral, Daily  metoprolol succinate XL, 100 mg, Oral, Daily  OLANZapine, 5 mg, Oral, Nightly  pantoprazole, 40 mg, Oral, Q AM  sodium chloride, 10 mL, Intravenous, Q12H  sodium chloride, 2 g, Oral, TID With Meals  vitamin B-12, 1,000 mcg, Oral, Daily             Active Hospital Problems:  Active Hospital Problems    Diagnosis    • **Weakness    • Hyponatremia    • Immunotherapy    • NSCLC of left lung (HCC)    • Hashimoto's thyroiditis    • Non-small cell carcinoma of lung, right (HCC)    • Gastroesophageal reflux disease    • Hyperlipidemia    • Diastolic CHF (HCC)    • Anxiety and depression    • Hypertension      Plan:   Generalized weakness  Patient reports lack of appetite last couple weeks.  He has been trying to drink plenty of liquids however.  Improved, likely due to dehydration hyponatremia    Fever  This could be directly related to malignancy and undergoing treatment?  Low-grade fever x2 while here  Denies fevers at home   Patient s/p chemotherapy and now immunotherapyas an outpatient   no neutropenia here  No obvious source of infection per initial diagnostics or review of systems  Blood cultures are pending and so far negative  Respiratory viral panel negative, UA no evidence of infection, chest x-ray negative, CT head negative  TSH within normal limits    Hyponatremia  Improving   Dosed with samsca  Per nephrology     Non-small cell lung cancer  Per heme-onc    GERD  Continue PPI    Anxiety/depression  Continue home meds, avoid ssri     Hypertension  Continue metoprolol next    thyroiditis  Continue Cytomel levothyroxine  TSH normal     DVT PUD prophylaxis    Plan subjectively continues to improve however spiked another fever overnight.  We are awaiting final blood cultures as long as those come back negative we will consider discharge home as patient is  subjectively improving.  Will monitor off antibiotics for now no obvious source of infection    DVT prophylaxis:  Medical and mechanical DVT prophylaxis orders are present.    CODE STATUS:    Code Status (Patient has no pulse and is not breathing): CPR (Attempt to Resuscitate)  Medical Interventions (Patient has pulse or is breathing): Full Support      Disposition:  I expect patient to be discharged when stable.      Electronically signed by Lake Dewey MD, 12/08/22, 12:38 EST.  [facility], [place], [facility]            75y.o female with PMHx of CHFpEF, Severe TR with Right CHF and severe pHTN, COPD on home 02, HTN, HLD, Chronic Afib (on Eliquis), OA, CKD3, DM, right eye cataract who was brought in by EMS to [facility] on 5/23/21 for AMS. pt A&O x1, per daughter patient with generalized weakness and lethargy since since 5/22. Pt had recent in Jan 2021 with CP and NIC. Per chart on arrival to ED pt was hypotensive, obtunded. Chest CT with large pericardial effusion (increased since Jan 2021)- per thoracic surgery consult no indication for pericardial window, pHTN, cirrhotic liver with small ascites, ECHO EF 55% dilated RV with decreased function, severe TN, pHTN, NIC (per renal consult no acute indication for HD), venous duplex NG for DVTs, Head CT revealed chronic involutional and white matter changes, no acute intracranial process, NIC(SCr peaked at 4.6) with  hyperkalemia (5.8) requiring lokelma and insulin.  Of note daughter states over past month patient more and more confused ? dementia and pt is legally blind.    5/26 Patient transferred to [facility] with concern of pericardial effusion, hypotension and NIC. On arrival patient SBP 80's, A&O to first name but very lethargic.  75y.o female with PMHx of CHFpEF, Severe TR with Right CHF and severe pHTN, COPD on home 02, HTN, HLD, Chronic Afib (on Eliquis), OA, CKD3, DM, right eye cataract who was brought in by EMS to [facility] on 5/23/21 for AMS. pt A&O x1, per daughter patient with generalized weakness and lethargy since since 5/22. Pt had recent in Jan 2021 with CP and NIC. Per chart on arrival to ED pt was hypotensive, obtunded. Chest CT with large pericardial effusion (increased since Jan 2021)- per thoracic surgery consult no indication for pericardial window, pHTN, cirrhotic liver with small ascites, ECHO EF 55% dilated RV with decreased function, severe TN, pHTN, NIC (per renal consult no acute indication for HD), venous duplex NG for DVTs, Head CT revealed chronic involutional and white matter changes, no acute intracranial process, NIC(SCr peaked at 4.6) with  hyperkalemia (5.8) requiring lokelma and insulin.  Of note daughter states over past month patient more and more confused ? dementia and pt is legally blind. Daughter states pt HAS NOT RECEIVED COVID VACCINE as she has been sick these past few months.     5/26 Patient transferred to [facility] with concern of pericardial effusion, hypotension and NIC. On arrival patient SBP 80's, A&O to first name but very lethargic.

## 2024-01-01 NOTE — DISCHARGE NOTE NURSING/CASE MANAGEMENT/SOCIAL WORK - NSDCPEELIQUISDIET_GEN_ALL_CORE
Patient Education     Well Child Exam 1 Month   About this topic   Your baby's 1-month well child exam is a visit with the doctor to check your baby's health. The doctor measures your child's weight, height, and head size. The doctor plots these numbers on a growth curve. The growth curve gives a picture of your baby's growth at each visit. The doctor may listen to your baby's heart, lungs, and belly. Your doctor will do a full exam of your baby from the head to the toes.  Your baby may also need shots or blood tests during this visit.  General   Growth and Development   Your doctor will ask you how your baby is developing. The doctor will focus on the skills that most children your child's age are expected to do. During the first month of your child's life, here are some things you can expect.  Movement - Your baby may:  Start to be more alert and respond to you.  Move arms and legs more smoothly.  Start to put a closed hand to the mouth or in front of the face.  Have problems holding their head up, but can lift their head up briefly while laying on their stomach  Hearing and seeing - Your baby will likely:  Turn to the sound of your voice.  See best about 8 to 12 inches (20 to 30 cm) away from the face.  Want to look at your face or a black and white pattern.  Still have their eyes cross or wander from time to time.  Feeding - Your baby needs:  Breast milk or formula for all of their nutrition. Your baby should not be given juice, water, cow's milk, rice cereal, or solid food at this age.  To eat every 2 to 3 hours, based on if you are breast or bottle feeding.  babies should eat about 8 to 12 times per day. Formula fed babies typically eat about 24 ounces total each day. Look for signs your baby is hungry like:  Smacking or licking the lips  Sucking on fingers, hands, tongue, or lips  Opening and closing mouth  Rooting and moving the head from side to side  To be burped often if having problems with  spitting up.  Your baby may turn away, close the mouth, or relax the arms when full. Do not overfeed your baby.  Always hold your baby when feeding. Do not prop a bottle. Propping the bottle makes it easier for your baby to choke and get ear infections.  Sleep - Your child:  Sleeps for about 2 to 4 hours at a time  Is likely sleeping about 14 to 17 hours total out of each day, with 4 to 5 daytime naps.  May sleep better when swaddled. Monitor your baby when swaddled. Check to make sure your baby has not rolled over. Also, make sure the swaddle blanket has not come loose. Keep the swaddle blanket loose around your baby's hips. Stop swaddling your baby before your baby starts to roll over. Most times, you will need to stop swaddling your baby by 2 months of age.  Should always sleep on the back, in your child's own bed, on a firm mattress  May soothe to sleep better sucking on a pacifier.  Help for Parents   Play with your baby.  Use tummy time to help your baby grow strong neck muscles. Shake a small rattle to encourage your baby to turn their head to the side.  Talk or sing to your baby often. Let your baby look at your face. Show your baby pictures.  Gently move your baby's arms and legs. Give your baby a gentle massage.  Here are some things you can do to help keep your baby safe and healthy.  Learn CPR and basic first aid. Learn how to take your baby's temperature.  Do not allow anyone to smoke in your home or around your baby. Second hand smoke can harm your baby.  Have the right size car seat for your baby and use it every time your baby is in the car. Your baby should be rear facing until 2 years of age. Check with a local car seat safety inspection station to be sure it is properly installed.  Always place your baby on the back for sleep. Keep soft bedding, bumpers, loose blankets, and toys out of your baby's bed.  Keep one hand on the baby whenever you are changing their diaper or clothes to prevent  falls.  Keep small toys and objects away from your baby.  Never leave your baby alone in the bath.  Keep your baby in the shade, rather than in the sun. Doctors don’t recommend sunscreen until children are 6 months and older.  Parents need to think about:  A plan for going back to work or school.  A reliable  or  provider  How to handle bouts of crying or colic. It is normal for your baby to have times when they are hard to console. You need a plan for what to do if you are frustrated because it is never OK to shake a baby.  The next well child visit will most likely be when your baby is 2 months old. At this visit your doctor may:  Do a full check up on your baby  Talk about how your baby is sleeping, if your baby has colic or long periods of crying, and how well you are coping with your baby  Give your baby the next set of shots       When do I need to call the doctor?   Fever of 100.4°F (38°C) or higher  Having a hard time breathing  Doesn’t have a wet diaper for more than 8 hours  Problems eating or spits up a lot  Legs and arms are very loose or floppy all the time  Legs and arms are very stiff  Won't stop crying  Doesn't blink or startle with loud sounds  Last Reviewed Date   2021-05-06  Consumer Information Use and Disclaimer   This generalized information is a limited summary of diagnosis, treatment, and/or medication information. It is not meant to be comprehensive and should be used as a tool to help the user understand and/or assess potential diagnostic and treatment options. It does NOT include all information about conditions, treatments, medications, side effects, or risks that may apply to a specific patient. It is not intended to be medical advice or a substitute for the medical advice, diagnosis, or treatment of a health care provider based on the health care provider's examination and assessment of a patient’s specific and unique circumstances. Patients must speak with a health  care provider for complete information about their health, medical questions, and treatment options, including any risks or benefits regarding use of medications. This information does not endorse any treatments or medications as safe, effective, or approved for treating a specific patient. [employer] and its affiliates disclaim any warranty or liability relating to this information or the use thereof. The use of this information is governed by the Terms of Use, available at https://www.woltersCHNLuwer.com/en/know/clinical-effectiveness-terms   Copyright   Copyright © 2024 [employer] and its affiliates and/or licensors. All rights reserved.     Eat healthy foods you enjoy. Apixaban/Eliquis DOES NOT have a special diet. Limit your alcohol intake.

## 2024-12-04 NOTE — PROGRESS NOTE ADULT - THIS PATIENT HAS THE FOLLOWING CONDITION(S)/DIAGNOSES ON THIS ADMISSION:
None
None
Heart Failure/Acute Respiratory Failure
None
Pulmonary Hypertension
None
None
Pulmonary Hypertension
Pulmonary Hypertension/Renal Disease
Heart Failure/Pulmonary Hypertension
4 = No assist / stand by assistance